# Patient Record
Sex: MALE | Race: BLACK OR AFRICAN AMERICAN | NOT HISPANIC OR LATINO | URBAN - METROPOLITAN AREA
[De-identification: names, ages, dates, MRNs, and addresses within clinical notes are randomized per-mention and may not be internally consistent; named-entity substitution may affect disease eponyms.]

---

## 2018-08-09 ENCOUNTER — INPATIENT (INPATIENT)
Facility: HOSPITAL | Age: 73
LOS: 4 days | Discharge: HOME | DRG: 64 | End: 2018-08-14
Attending: INTERNAL MEDICINE | Admitting: INTERNAL MEDICINE
Payer: COMMERCIAL

## 2018-08-10 VITALS
DIASTOLIC BLOOD PRESSURE: 123 MMHG | TEMPERATURE: 99 F | HEART RATE: 85 BPM | OXYGEN SATURATION: 98 % | SYSTOLIC BLOOD PRESSURE: 220 MMHG | WEIGHT: 179.9 LBS | RESPIRATION RATE: 18 BRPM

## 2018-08-10 DIAGNOSIS — E87.5 HYPERKALEMIA: ICD-10-CM

## 2018-08-10 DIAGNOSIS — I16.0 HYPERTENSIVE URGENCY: ICD-10-CM

## 2018-08-10 DIAGNOSIS — Z29.9 ENCOUNTER FOR PROPHYLACTIC MEASURES, UNSPECIFIED: ICD-10-CM

## 2018-08-10 DIAGNOSIS — G45.9 TRANSIENT CEREBRAL ISCHEMIC ATTACK, UNSPECIFIED: ICD-10-CM

## 2018-08-10 DIAGNOSIS — E83.51 HYPOCALCEMIA: ICD-10-CM

## 2018-08-10 DIAGNOSIS — R29.898 OTHER SYMPTOMS AND SIGNS INVOLVING THE MUSCULOSKELETAL SYSTEM: ICD-10-CM

## 2018-08-10 LAB
ALBUMIN SERPL ELPH-MCNC: 3.5 G/DL — SIGNIFICANT CHANGE UP (ref 3.5–5)
ALP SERPL-CCNC: 120 U/L — SIGNIFICANT CHANGE UP (ref 40–120)
ALT FLD-CCNC: 30 U/L DA — SIGNIFICANT CHANGE UP (ref 10–60)
ANION GAP SERPL CALC-SCNC: 4 MMOL/L — LOW (ref 5–17)
ANION GAP SERPL CALC-SCNC: 6 MMOL/L — SIGNIFICANT CHANGE UP (ref 5–17)
APPEARANCE UR: CLEAR — SIGNIFICANT CHANGE UP
APTT BLD: 28.4 SEC — SIGNIFICANT CHANGE UP (ref 27.5–37.4)
AST SERPL-CCNC: 63 U/L — HIGH (ref 10–40)
BASOPHILS # BLD AUTO: 0 K/UL — SIGNIFICANT CHANGE UP (ref 0–0.2)
BASOPHILS NFR BLD AUTO: 0.7 % — SIGNIFICANT CHANGE UP (ref 0–2)
BILIRUB SERPL-MCNC: 0.4 MG/DL — SIGNIFICANT CHANGE UP (ref 0.2–1.2)
BILIRUB UR-MCNC: NEGATIVE — SIGNIFICANT CHANGE UP
BUN SERPL-MCNC: 11 MG/DL — SIGNIFICANT CHANGE UP (ref 7–18)
BUN SERPL-MCNC: 12 MG/DL — SIGNIFICANT CHANGE UP (ref 7–18)
CALCIUM SERPL-MCNC: 7.7 MG/DL — LOW (ref 8.4–10.5)
CALCIUM SERPL-MCNC: 8.3 MG/DL — LOW (ref 8.4–10.5)
CHLORIDE SERPL-SCNC: 107 MMOL/L — SIGNIFICANT CHANGE UP (ref 96–108)
CHLORIDE SERPL-SCNC: 107 MMOL/L — SIGNIFICANT CHANGE UP (ref 96–108)
CHOLEST SERPL-MCNC: 236 MG/DL — HIGH (ref 10–199)
CK MB BLD-MCNC: 1.1 % — SIGNIFICANT CHANGE UP (ref 0–3.5)
CK MB CFR SERPL CALC: 3.3 NG/ML — SIGNIFICANT CHANGE UP (ref 0–3.6)
CK SERPL-CCNC: 311 U/L — HIGH (ref 35–232)
CO2 SERPL-SCNC: 28 MMOL/L — SIGNIFICANT CHANGE UP (ref 22–31)
CO2 SERPL-SCNC: 28 MMOL/L — SIGNIFICANT CHANGE UP (ref 22–31)
COLOR SPEC: YELLOW — SIGNIFICANT CHANGE UP
CREAT SERPL-MCNC: 1.19 MG/DL — SIGNIFICANT CHANGE UP (ref 0.5–1.3)
CREAT SERPL-MCNC: 1.33 MG/DL — HIGH (ref 0.5–1.3)
DIFF PNL FLD: ABNORMAL
EOSINOPHIL # BLD AUTO: 0.2 K/UL — SIGNIFICANT CHANGE UP (ref 0–0.5)
EOSINOPHIL NFR BLD AUTO: 2.7 % — SIGNIFICANT CHANGE UP (ref 0–6)
GLUCOSE SERPL-MCNC: 104 MG/DL — HIGH (ref 70–99)
GLUCOSE SERPL-MCNC: 118 MG/DL — HIGH (ref 70–99)
GLUCOSE UR QL: NEGATIVE — SIGNIFICANT CHANGE UP
HBA1C BLD-MCNC: 6.4 % — HIGH (ref 4–5.6)
HCT VFR BLD CALC: 47.5 % — SIGNIFICANT CHANGE UP (ref 39–50)
HCT VFR BLD CALC: 48 % — SIGNIFICANT CHANGE UP (ref 39–50)
HDLC SERPL-MCNC: 35 MG/DL — LOW (ref 40–125)
HGB BLD-MCNC: 14.8 G/DL — SIGNIFICANT CHANGE UP (ref 13–17)
HGB BLD-MCNC: 15.2 G/DL — SIGNIFICANT CHANGE UP (ref 13–17)
INR BLD: 0.96 RATIO — SIGNIFICANT CHANGE UP (ref 0.88–1.16)
KETONES UR-MCNC: NEGATIVE — SIGNIFICANT CHANGE UP
LEUKOCYTE ESTERASE UR-ACNC: NEGATIVE — SIGNIFICANT CHANGE UP
LIPID PNL WITH DIRECT LDL SERPL: 153 MG/DL — SIGNIFICANT CHANGE UP
LYMPHOCYTES # BLD AUTO: 1.7 K/UL — SIGNIFICANT CHANGE UP (ref 1–3.3)
LYMPHOCYTES # BLD AUTO: 24.9 % — SIGNIFICANT CHANGE UP (ref 13–44)
MAGNESIUM SERPL-MCNC: 2.1 MG/DL — SIGNIFICANT CHANGE UP (ref 1.6–2.6)
MCHC RBC-ENTMCNC: 27.6 PG — SIGNIFICANT CHANGE UP (ref 27–34)
MCHC RBC-ENTMCNC: 27.6 PG — SIGNIFICANT CHANGE UP (ref 27–34)
MCHC RBC-ENTMCNC: 31.3 GM/DL — LOW (ref 32–36)
MCHC RBC-ENTMCNC: 31.6 GM/DL — LOW (ref 32–36)
MCV RBC AUTO: 87.5 FL — SIGNIFICANT CHANGE UP (ref 80–100)
MCV RBC AUTO: 88.2 FL — SIGNIFICANT CHANGE UP (ref 80–100)
MONOCYTES # BLD AUTO: 0.5 K/UL — SIGNIFICANT CHANGE UP (ref 0–0.9)
MONOCYTES NFR BLD AUTO: 7.3 % — SIGNIFICANT CHANGE UP (ref 2–14)
NEUTROPHILS # BLD AUTO: 4.3 K/UL — SIGNIFICANT CHANGE UP (ref 1.8–7.4)
NEUTROPHILS NFR BLD AUTO: 64.4 % — SIGNIFICANT CHANGE UP (ref 43–77)
NITRITE UR-MCNC: NEGATIVE — SIGNIFICANT CHANGE UP
PH UR: 7 — SIGNIFICANT CHANGE UP (ref 5–8)
PHOSPHATE SERPL-MCNC: 2.2 MG/DL — LOW (ref 2.5–4.5)
PLATELET # BLD AUTO: 236 K/UL — SIGNIFICANT CHANGE UP (ref 150–400)
PLATELET # BLD AUTO: 250 K/UL — SIGNIFICANT CHANGE UP (ref 150–400)
POTASSIUM SERPL-MCNC: 3.8 MMOL/L — SIGNIFICANT CHANGE UP (ref 3.5–5.3)
POTASSIUM SERPL-MCNC: 5.9 MMOL/L — HIGH (ref 3.5–5.3)
POTASSIUM SERPL-SCNC: 3.8 MMOL/L — SIGNIFICANT CHANGE UP (ref 3.5–5.3)
POTASSIUM SERPL-SCNC: 5.9 MMOL/L — HIGH (ref 3.5–5.3)
PROT SERPL-MCNC: 8.2 G/DL — SIGNIFICANT CHANGE UP (ref 6–8.3)
PROT UR-MCNC: NEGATIVE — SIGNIFICANT CHANGE UP
PROTHROM AB SERPL-ACNC: 10.4 SEC — SIGNIFICANT CHANGE UP (ref 9.8–12.7)
RBC # BLD: 5.38 M/UL — SIGNIFICANT CHANGE UP (ref 4.2–5.8)
RBC # BLD: 5.48 M/UL — SIGNIFICANT CHANGE UP (ref 4.2–5.8)
RBC # FLD: 14 % — SIGNIFICANT CHANGE UP (ref 10.3–14.5)
RBC # FLD: 14.2 % — SIGNIFICANT CHANGE UP (ref 10.3–14.5)
SODIUM SERPL-SCNC: 139 MMOL/L — SIGNIFICANT CHANGE UP (ref 135–145)
SODIUM SERPL-SCNC: 141 MMOL/L — SIGNIFICANT CHANGE UP (ref 135–145)
SP GR SPEC: 1.01 — SIGNIFICANT CHANGE UP (ref 1.01–1.02)
TOTAL CHOLESTEROL/HDL RATIO MEASUREMENT: 6.7 RATIO — SIGNIFICANT CHANGE UP (ref 3.4–9.6)
TRIGL SERPL-MCNC: 239 MG/DL — HIGH (ref 10–149)
TROPONIN I SERPL-MCNC: <0.015 NG/ML — SIGNIFICANT CHANGE UP (ref 0–0.04)
TROPONIN I SERPL-MCNC: <0.015 NG/ML — SIGNIFICANT CHANGE UP (ref 0–0.04)
TSH SERPL-MCNC: 2.14 UU/ML — SIGNIFICANT CHANGE UP (ref 0.34–4.82)
TSH SERPL-MCNC: 3.38 UU/ML — SIGNIFICANT CHANGE UP (ref 0.34–4.82)
UROBILINOGEN FLD QL: NEGATIVE — SIGNIFICANT CHANGE UP
WBC # BLD: 6.7 K/UL — SIGNIFICANT CHANGE UP (ref 3.8–10.5)
WBC # BLD: 8 K/UL — SIGNIFICANT CHANGE UP (ref 3.8–10.5)
WBC # FLD AUTO: 6.7 K/UL — SIGNIFICANT CHANGE UP (ref 3.8–10.5)
WBC # FLD AUTO: 8 K/UL — SIGNIFICANT CHANGE UP (ref 3.8–10.5)

## 2018-08-10 PROCEDURE — 70450 CT HEAD/BRAIN W/O DYE: CPT | Mod: 26

## 2018-08-10 PROCEDURE — 99285 EMERGENCY DEPT VISIT HI MDM: CPT | Mod: 25

## 2018-08-10 PROCEDURE — 99053 MED SERV 10PM-8AM 24 HR FAC: CPT

## 2018-08-10 PROCEDURE — 71045 X-RAY EXAM CHEST 1 VIEW: CPT | Mod: 26

## 2018-08-10 RX ORDER — LOSARTAN POTASSIUM 100 MG/1
25 TABLET, FILM COATED ORAL DAILY
Qty: 0 | Refills: 0 | Status: DISCONTINUED | OUTPATIENT
Start: 2018-08-10 | End: 2018-08-11

## 2018-08-10 RX ORDER — ACETAMINOPHEN 500 MG
650 TABLET ORAL EVERY 6 HOURS
Qty: 0 | Refills: 0 | Status: DISCONTINUED | OUTPATIENT
Start: 2018-08-10 | End: 2018-08-10

## 2018-08-10 RX ORDER — INSULIN LISPRO 100/ML
VIAL (ML) SUBCUTANEOUS
Qty: 0 | Refills: 0 | Status: DISCONTINUED | OUTPATIENT
Start: 2018-08-10 | End: 2018-08-14

## 2018-08-10 RX ORDER — ENOXAPARIN SODIUM 100 MG/ML
40 INJECTION SUBCUTANEOUS DAILY
Qty: 0 | Refills: 0 | Status: DISCONTINUED | OUTPATIENT
Start: 2018-08-10 | End: 2018-08-14

## 2018-08-10 RX ORDER — INSULIN HUMAN 100 [IU]/ML
10 INJECTION, SOLUTION SUBCUTANEOUS ONCE
Qty: 0 | Refills: 0 | Status: COMPLETED | OUTPATIENT
Start: 2018-08-10 | End: 2018-08-10

## 2018-08-10 RX ORDER — ATORVASTATIN CALCIUM 80 MG/1
40 TABLET, FILM COATED ORAL AT BEDTIME
Qty: 0 | Refills: 0 | Status: DISCONTINUED | OUTPATIENT
Start: 2018-08-10 | End: 2018-08-14

## 2018-08-10 RX ORDER — DEXTROSE 50 % IN WATER 50 %
50 SYRINGE (ML) INTRAVENOUS ONCE
Qty: 0 | Refills: 0 | Status: COMPLETED | OUTPATIENT
Start: 2018-08-10 | End: 2018-08-10

## 2018-08-10 RX ORDER — LABETALOL HCL 100 MG
20 TABLET ORAL ONCE
Qty: 0 | Refills: 0 | Status: COMPLETED | OUTPATIENT
Start: 2018-08-10 | End: 2018-08-10

## 2018-08-10 RX ORDER — CALCIUM GLUCONATE 100 MG/ML
1 VIAL (ML) INTRAVENOUS ONCE
Qty: 0 | Refills: 0 | Status: COMPLETED | OUTPATIENT
Start: 2018-08-10 | End: 2018-08-10

## 2018-08-10 RX ORDER — ASPIRIN/CALCIUM CARB/MAGNESIUM 324 MG
81 TABLET ORAL DAILY
Qty: 0 | Refills: 0 | Status: DISCONTINUED | OUTPATIENT
Start: 2018-08-10 | End: 2018-08-14

## 2018-08-10 RX ADMIN — LOSARTAN POTASSIUM 25 MILLIGRAM(S): 100 TABLET, FILM COATED ORAL at 14:46

## 2018-08-10 RX ADMIN — Medication 81 MILLIGRAM(S): at 14:46

## 2018-08-10 RX ADMIN — Medication 200 GRAM(S): at 05:17

## 2018-08-10 RX ADMIN — Medication 20 MILLIGRAM(S): at 02:02

## 2018-08-10 RX ADMIN — ATORVASTATIN CALCIUM 40 MILLIGRAM(S): 80 TABLET, FILM COATED ORAL at 21:09

## 2018-08-10 RX ADMIN — Medication 50 MILLILITER(S): at 05:15

## 2018-08-10 RX ADMIN — ENOXAPARIN SODIUM 40 MILLIGRAM(S): 100 INJECTION SUBCUTANEOUS at 18:09

## 2018-08-10 RX ADMIN — Medication 650 MILLIGRAM(S): at 13:40

## 2018-08-10 RX ADMIN — Medication 650 MILLIGRAM(S): at 13:08

## 2018-08-10 RX ADMIN — INSULIN HUMAN 10 UNIT(S): 100 INJECTION, SOLUTION SUBCUTANEOUS at 05:13

## 2018-08-10 RX ADMIN — Medication 1 CAPSULE(S): at 15:07

## 2018-08-10 RX ADMIN — Medication 1 CAPSULE(S): at 14:46

## 2018-08-10 NOTE — H&P ADULT - HISTORY OF PRESENT ILLNESS
Patient is a 73 y /o M with PMH of HTN (not on any medications) doesn't have a PCP, presented with complaint of left arm numbness and weakness started yesterday morning. patient said he was trying to wear his clothes but his arm was heavy and was shaking.  He wore his clothes with difficulty  later He took 2 alieve and went to sleep. He woke up after an hour , alieve helped his symptoms a little bit but weakness and heaviness persisted. He called his Niece to bring him to the hospital. In Ed he has left upper extremity as per Ed provider. As per patient weakness resolved in a couple of hours. Now he is feeling much better. Patient denies any chest pain, SOB, similar symptoms in the past, diaphoresis, dizziness, melena, hematochezia, epistaxis, Drug of abuse use . In Ed patient's blood pressure was 220/123 mmhg. HR 85. EKG was NSR@ Rate 85 BPM Atrial Rate 85 BPMP-R Interval 152 ms QRS Duration 78 ms  ms QTc 423 ms. Received 1 dose labetalol 20mg IV push.

## 2018-08-10 NOTE — H&P ADULT - PROBLEM SELECTOR PLAN 5
RISK                                                          Points  [] Previous VTE                                           3  [] Thrombophilia                                        2  [] Lower limb paralysis                              2   [] Current Cancer                                       2   [x] Immobilization > 24 hrs                        1  [] ICU/CCU stay > 24 hours                       1  [x] Age > 60                                                   1    IMPROVE score 2 points. Lovenox 40 sc

## 2018-08-10 NOTE — ED ADULT TRIAGE NOTE - ARRIVAL INFO ADDITIONAL COMMENTS
pt BIBA from home reports R sided weakness x 12 hours. States that after onset he slept all day and woke, called his neice who then called [phone]

## 2018-08-10 NOTE — ED ADULT NURSE NOTE - NSIMPLEMENTINTERV_GEN_ALL_ED
Implemented All Universal Safety Interventions:  [place] to call system. Call bell, personal items and telephone within reach. Instruct patient to call for assistance. Room bathroom lighting operational. Non-slip footwear when patient is off stretcher. Physically safe environment: no spills, clutter or unnecessary equipment. Stretcher in lowest position, wheels locked, appropriate side rails in place.

## 2018-08-10 NOTE — H&P ADULT - PROBLEM SELECTOR PLAN 1
-Patient   presented with blood pressure of 220/23 mmHg  -headache, left arm weakness and numbness likely secondary to hypertensive urgency  -EKG NSR @85 b/min  -resolved spontaneously  -Patient has  Scr 1.33-> resolved after blood pressure control on repeat BMP  -Serum k+ 5.5 -> resolved after receiving cocktail   -will start on  Losartan 25 mg daily as per cardiology Dr meadows  -will send urine toxicology  -Monitor on telemetry  -will get echocardiogram  -cardiac enzyme x 1 negative

## 2018-08-10 NOTE — H&P ADULT - PROBLEM SELECTOR PLAN 2
-patient presented with left arm weakness and numbness  -On resident evaluation NIHSS score 0  -CT head  negative for any new infarct or bleed, except chronic microvascular ischemic changes,  -will monitor on telemetry  -will start on Aspirin, atorvastatin  -will get MRI brain w/wo contrast as per neurology recommendation Dr Rousseau  -will get cardiology consult DR meadows

## 2018-08-10 NOTE — ED PROVIDER NOTE - CRANIAL NERVE AND PUPILLARY EXAM
central vision intact/peripheral vision intact/tongue is midline/cranial nerves 2-12 intact/extra-ocular movements intact

## 2018-08-10 NOTE — ED PROVIDER NOTE - OBJECTIVE STATEMENT
73year old male PMH htn which is untreated coming in for 12 hours of left hand weakness. After symptoms began went to sleep and when he woke up in the evening symptoms were still present. tried to make food but was unable to properly use his left hand. Symptoms have resolved at this time. Denies ha, dizziness, b/l LE or RUE numbness/tingling/weakness, cp, sob, palpitations, abd pains, n/V/D/C, urinary complaints, fevers, chills, sweats. took advil at home which didn't improve symptoms. No hx cva for pt or in fam.

## 2018-08-10 NOTE — ED PROVIDER NOTE - MEDICAL DECISION MAKING DETAILS
labs are signifncant for hyperkalemia and hypocalcemia otherwise are unremarkable. cxr and ct head are unremarkable. ecg is unremarkabls NSR RRR no st or t wave changes. BP initially 220/120- given 20mg IV labetalol with improvement of bp. no symptoms in the ed. will admit for TIA workup.

## 2018-08-10 NOTE — CONSULT NOTE ADULT - SUBJECTIVE AND OBJECTIVE BOX
CHIEF COMPLAINT:Patient is a 73y old  Male who presents with a chief complaint of left arm weakness and numbness (10 Aug 2018 13:34)      HPI:  Patient is a 73 y /o M with PMH of HTN (not on any medications) doesn't have a PCP, presented with complaint of left arm numbness and weakness started yesterday morning. patient said he was trying to wear his clothes but his arm was heavy and was shaking.  He wore his clothes with difficulty  later He took 2 alieve and went to sleep. He woke up after an hour , alieve helped his symptoms a little bit but weakness and heaviness persisted. He called his Niece to bring him to the hospital. In Ed he has left upper extremity as per Ed provider. As per patient weakness resolved in a couple of hours. Now he is feeling much better. Patient denies any chest pain, SOB, similar symptoms in the past, diaphoresis, dizziness, melena, hematochezia, epistaxis, Drug of abuse use . In Ed patient's blood pressure was 220/123 mmhg. HR 85. EKG was NSR@ Rate 85 BPM Atrial Rate 85 BPMP-R Interval 152 ms QRS Duration 78 ms  ms QTc 423 ms. Received 1 dose labetalol 20mg IV push. (10 Aug 2018 13:34)      PAST MEDICAL & SURGICAL HISTORY:  Hypertension        MEDICATIONS  (STANDING):  aspirin  chewable 81 milliGRAM(s) Oral daily  atorvastatin 40 milliGRAM(s) Oral at bedtime  enoxaparin Injectable 40 milliGRAM(s) SubCutaneous daily  losartan 25 milliGRAM(s) Oral daily    MEDICATIONS  (PRN):  acetaminophen 300 mG/butalbital 50 mG/ caffeine 40 mG 1 Capsule(s) Oral every 6 hours PRN severe headache      FAMILY HISTORY:  Mother-DM  Sister-HTN      SOCIAL HISTORY:    [x ] Non-smoker    [x ] Alcohol-denies    Allergies    No Known Allergies    Intolerances    	    REVIEW OF SYSTEMS:  CONSTITUTIONAL: No fever, weight loss, or fatigue  EYES: No eye pain, visual disturbances, or discharge  ENT:  No difficulty hearing, tinnitus, vertigo; No sinus or throat pain  NECK: No pain or stiffness  RESPIRATORY: No cough, wheezing, chills or hemoptysis; No Shortness of Breath  CARDIOVASCULAR: No chest pain, palpitations, passing out, dizziness, or leg swelling  GASTROINTESTINAL: No abdominal or epigastric pain. No nausea, vomiting, or hematemesis; No diarrhea or constipation. No melena or hematochezia.  GENITOURINARY: No dysuria, frequency, hematuria, or incontinence  NEUROLOGICAL: No headaches, memory loss, + loss of strength, + numbness  SKIN: No itching, burning, rashes, or lesions   LYMPH Nodes: No enlarged glands  ENDOCRINE: No heat or cold intolerance; No hair loss  MUSCULOSKELETAL: No joint pain or swelling; No muscle, back, or extremity pain  PSYCHIATRIC: No depression, anxiety, mood swings, or difficulty sleeping  HEME/LYMPH: No easy bruising, or bleeding gums  ALLERGY AND IMMUNOLOGIC: No hives or eczema	      PHYSICAL EXAM:  T(C): 36.9 (08-10-18 @ 15:22), Max: 37.1 (08-10-18 @ 00:06)  HR: 75 (08-10-18 @ 15:22) (67 - 89)  BP: 173/91 (08-10-18 @ 11:25) (166/96 - 220/123)  RR: 17 (08-10-18 @ 15:22) (16 - 20)  SpO2: 100% (08-10-18 @ 15:22) (96% - 100%)      Appearance: Normal	  HEENT:   Normal oral mucosa, PERRL, EOMI	  Lymphatic: No lymphadenopathy  Cardiovascular: Normal S1 S2, No JVD, No murmurs, No edema  Respiratory: Lungs clear to auscultation	  Psychiatry: A & O x 3, Mood & affect appropriate  Gastrointestinal:  Soft, Non-tender, + BS	  Skin: No rashes, No ecchymoses, No cyanosis	  Neurologic: Non-focal  Extremities: Normal range of motion, No clubbing, cyanosis or edema  Vascular: Peripheral pulses palpable 2+ bilaterally     	    ECG:  	NSR    	  LABS:	 	    CARDIAC MARKERS:  CARDIAC MARKERS ( 10 Aug 2018 01:13 )  <0.015 ng/mL / x     / x     / x     / x                            15.2   8.0   )-----------( 236      ( 10 Aug 2018 10:49 )             48.0     08-10    141  |  107  |  11  ----------------------------<  118<H>  3.8   |  28  |  1.19    Ca    8.3<L>      10 Aug 2018 10:49  Phos  2.2     08-10  Mg     2.1     08-10    TPro  8.2  /  Alb  3.5  /  TBili  0.4  /  DBili  x   /  AST  63<H>  /  ALT  30  /  AlkPhos  120  08-10      Lipid Profile: Cholesterol 236    HDL 35      HgA1c: Hemoglobin A1C, Whole Blood: 6.4 % (08-10 @ 14:17)    TSH: Thyroid Stimulating Hormone, Serum: 3.38 uU/mL (08-10 @ 10:49)  Thyroid Stimulating Hormone, Serum: 2.14 uU/mL (08-10 @ 01:13)      PROCEDURE DATE:  08/10/2018          INTERPRETATION:  HISTORY: Right-sided weakness.    COMPARISON: None.    TECHNIQUE: Axial noncontrast CT images from the skull base to the vertex   were obtained and submitted for interpretation. Coronal and sagittal   reformatted images were performed. Bone and soft tissue windows were   evaluated.    FINDINGS:         There is no acute intracranial mass-effect, hemorrhage, midline shift, or   abnormal extra-axial fluid collection. Gray-white differentiation is   maintained.    Mild chronic microvascular ischemic changes are noted.    Ventricles, sulci, and cisterns are normal in size for the patient's age   without hydrocephalus. Basal cisterns are patent.     Paranasal sinuses and mastoid air cells are clear. Calvarium is intact.     IMPRESSION:     No acute intracranial bleeding, mass effect, or shift. Mild chronic   microvascular ischemic changes.     Findings were discussed with Dr. Castañeda at 12:52 AM on 8/10/2018 indicated   that the medication was understood.

## 2018-08-10 NOTE — ED PROVIDER NOTE - CARE PLAN
Principal Discharge DX:	Hand weakness Principal Discharge DX:	Hand weakness  Assessment and plan of treatment:	73 year old male with left hand weakness. hypertensive. other vitals WNL. PE as above.  ct head, cxr, ecg, labs, ua, reassess

## 2018-08-10 NOTE — ED ADULT NURSE NOTE - ED STAT RN HANDOFF DETAILS
handover patient to Nurse Martita, patient awake and alert. Is being nursed on cardiac monitor-SR. Is being admitted, awaiting bed availability.

## 2018-08-10 NOTE — H&P ADULT - NSHPPHYSICALEXAM_GEN_ALL_CORE
PHYSICAL EXAM:  GENERAL: NAD  HEENT: Normocephalic;  conjunctivae and sclerae clear; moist mucous membranes;   NECK : supple  CHEST/LUNG: Clear to auscultation bilaterally with good air entry   HEART: S1 S2  regular; no murmurs, gallops or rubs  ABDOMEN: Soft, Nontender, Nondistended; Bowel sounds present  EXTREMITIES: no cyanosis; no edema; no calf tenderness  SKIN: warm and dry; no rash  NERVOUS SYSTEM:  Awake and alert; Oriented  to place, person and time ; no new deficits

## 2018-08-10 NOTE — H&P ADULT - PROBLEM SELECTOR PLAN 4
- Serum calcium 7.7  -Phosphorous 2.2  -Serum albumin WNL  -Hypocalcemia and hypophosphatemia.  -will send PTH and 25 OH vitamin D  -f/u repeat BMp in am

## 2018-08-10 NOTE — H&P ADULT - ASSESSMENT
Patient is a 73 y /o M with PMH of HTN (not on any medications) doesn't have a PCP, presented with complaint of left arm numbness and weakness started yesterday morning. patient said he was trying to wear his clothes but his arm was heavy and was shaking.  He wore his clothes with difficulty  later He took 2 alieve and went to sleep. He woke up after an hour , alieve helped his symptoms a little bit but weakness and heaviness persisted. He called his Niece to bring him to the hospital.

## 2018-08-10 NOTE — ED PROVIDER NOTE - PLAN OF CARE
73 year old male with left hand weakness. hypertensive. other vitals WNL. PE as above.  ct head, cxr, ecg, labs, ua, reassess

## 2018-08-10 NOTE — H&P ADULT - PROBLEM SELECTOR PLAN 3
patient's serum Potassium 5.5 on admission  -s/p IV insulin, dextrose 50%, Calcium gluconate.  -repeat Serum Potassium 3.8  -could be secondary to hypertensive urgency cause renal function compromise.

## 2018-08-11 DIAGNOSIS — E78.5 HYPERLIPIDEMIA, UNSPECIFIED: ICD-10-CM

## 2018-08-11 DIAGNOSIS — R56.9 UNSPECIFIED CONVULSIONS: ICD-10-CM

## 2018-08-11 LAB
ALBUMIN SERPL ELPH-MCNC: 3 G/DL — LOW (ref 3.5–5)
ALP SERPL-CCNC: 105 U/L — SIGNIFICANT CHANGE UP (ref 40–120)
ALT FLD-CCNC: 23 U/L DA — SIGNIFICANT CHANGE UP (ref 10–60)
ANION GAP SERPL CALC-SCNC: 10 MMOL/L — SIGNIFICANT CHANGE UP (ref 5–17)
AST SERPL-CCNC: 20 U/L — SIGNIFICANT CHANGE UP (ref 10–40)
BASOPHILS # BLD AUTO: 0 K/UL — SIGNIFICANT CHANGE UP (ref 0–0.2)
BASOPHILS NFR BLD AUTO: 0.6 % — SIGNIFICANT CHANGE UP (ref 0–2)
BILIRUB SERPL-MCNC: 0.5 MG/DL — SIGNIFICANT CHANGE UP (ref 0.2–1.2)
BUN SERPL-MCNC: 13 MG/DL — SIGNIFICANT CHANGE UP (ref 7–18)
CALCIUM SERPL-MCNC: 8.2 MG/DL — LOW (ref 8.4–10.5)
CHLORIDE SERPL-SCNC: 106 MMOL/L — SIGNIFICANT CHANGE UP (ref 96–108)
CO2 SERPL-SCNC: 26 MMOL/L — SIGNIFICANT CHANGE UP (ref 22–31)
CREAT SERPL-MCNC: 1.31 MG/DL — HIGH (ref 0.5–1.3)
CULTURE RESULTS: SIGNIFICANT CHANGE UP
EOSINOPHIL # BLD AUTO: 0.2 K/UL — SIGNIFICANT CHANGE UP (ref 0–0.5)
EOSINOPHIL NFR BLD AUTO: 3.3 % — SIGNIFICANT CHANGE UP (ref 0–6)
GLUCOSE SERPL-MCNC: 97 MG/DL — SIGNIFICANT CHANGE UP (ref 70–99)
HCT VFR BLD CALC: 43.4 % — SIGNIFICANT CHANGE UP (ref 39–50)
HGB BLD-MCNC: 13.7 G/DL — SIGNIFICANT CHANGE UP (ref 13–17)
LYMPHOCYTES # BLD AUTO: 1.9 K/UL — SIGNIFICANT CHANGE UP (ref 1–3.3)
LYMPHOCYTES # BLD AUTO: 29.4 % — SIGNIFICANT CHANGE UP (ref 13–44)
MAGNESIUM SERPL-MCNC: 2.1 MG/DL — SIGNIFICANT CHANGE UP (ref 1.6–2.6)
MCHC RBC-ENTMCNC: 27.5 PG — SIGNIFICANT CHANGE UP (ref 27–34)
MCHC RBC-ENTMCNC: 31.5 GM/DL — LOW (ref 32–36)
MCV RBC AUTO: 87.5 FL — SIGNIFICANT CHANGE UP (ref 80–100)
MONOCYTES # BLD AUTO: 0.6 K/UL — SIGNIFICANT CHANGE UP (ref 0–0.9)
MONOCYTES NFR BLD AUTO: 9.2 % — SIGNIFICANT CHANGE UP (ref 2–14)
NEUTROPHILS # BLD AUTO: 3.7 K/UL — SIGNIFICANT CHANGE UP (ref 1.8–7.4)
NEUTROPHILS NFR BLD AUTO: 57.4 % — SIGNIFICANT CHANGE UP (ref 43–77)
PHOSPHATE SERPL-MCNC: 2.3 MG/DL — LOW (ref 2.5–4.5)
PLATELET # BLD AUTO: 224 K/UL — SIGNIFICANT CHANGE UP (ref 150–400)
POTASSIUM SERPL-MCNC: 3.7 MMOL/L — SIGNIFICANT CHANGE UP (ref 3.5–5.3)
POTASSIUM SERPL-SCNC: 3.7 MMOL/L — SIGNIFICANT CHANGE UP (ref 3.5–5.3)
PROT SERPL-MCNC: 7 G/DL — SIGNIFICANT CHANGE UP (ref 6–8.3)
RBC # BLD: 4.96 M/UL — SIGNIFICANT CHANGE UP (ref 4.2–5.8)
RBC # FLD: 14 % — SIGNIFICANT CHANGE UP (ref 10.3–14.5)
SODIUM SERPL-SCNC: 142 MMOL/L — SIGNIFICANT CHANGE UP (ref 135–145)
SPECIMEN SOURCE: SIGNIFICANT CHANGE UP
WBC # BLD: 6.5 K/UL — SIGNIFICANT CHANGE UP (ref 3.8–10.5)
WBC # FLD AUTO: 6.5 K/UL — SIGNIFICANT CHANGE UP (ref 3.8–10.5)

## 2018-08-11 PROCEDURE — 99223 1ST HOSP IP/OBS HIGH 75: CPT

## 2018-08-11 RX ORDER — LOSARTAN POTASSIUM 100 MG/1
50 TABLET, FILM COATED ORAL DAILY
Qty: 0 | Refills: 0 | Status: DISCONTINUED | OUTPATIENT
Start: 2018-08-11 | End: 2018-08-14

## 2018-08-11 RX ORDER — POTASSIUM PHOSPHATE, MONOBASIC POTASSIUM PHOSPHATE, DIBASIC 236; 224 MG/ML; MG/ML
15 INJECTION, SOLUTION INTRAVENOUS ONCE
Qty: 0 | Refills: 0 | Status: COMPLETED | OUTPATIENT
Start: 2018-08-11 | End: 2018-08-11

## 2018-08-11 RX ORDER — METOPROLOL TARTRATE 50 MG
25 TABLET ORAL
Qty: 0 | Refills: 0 | Status: DISCONTINUED | OUTPATIENT
Start: 2018-08-11 | End: 2018-08-14

## 2018-08-11 RX ADMIN — LOSARTAN POTASSIUM 25 MILLIGRAM(S): 100 TABLET, FILM COATED ORAL at 05:11

## 2018-08-11 RX ADMIN — ATORVASTATIN CALCIUM 40 MILLIGRAM(S): 80 TABLET, FILM COATED ORAL at 21:24

## 2018-08-11 RX ADMIN — Medication 81 MILLIGRAM(S): at 13:40

## 2018-08-11 RX ADMIN — LOSARTAN POTASSIUM 50 MILLIGRAM(S): 100 TABLET, FILM COATED ORAL at 13:38

## 2018-08-11 RX ADMIN — ENOXAPARIN SODIUM 40 MILLIGRAM(S): 100 INJECTION SUBCUTANEOUS at 16:18

## 2018-08-11 RX ADMIN — Medication 25 MILLIGRAM(S): at 17:52

## 2018-08-11 RX ADMIN — POTASSIUM PHOSPHATE, MONOBASIC POTASSIUM PHOSPHATE, DIBASIC 62.5 MILLIMOLE(S): 236; 224 INJECTION, SOLUTION INTRAVENOUS at 16:18

## 2018-08-11 NOTE — PROGRESS NOTE ADULT - SUBJECTIVE AND OBJECTIVE BOX
PGY1 Note discussed with supervising resident and primary attending.    Patient is a 73y old  Male who presents with a chief complaint of left arm weakness and numbness (10 Aug 2018 13:34)    INTERVAL HPI/OVERNIGHT EVENTS:    Mr. Joshua Sierra is seen at the bedside. He reports that his symptoms have resolved and have not recurred. He has no complaints at this time.    MEDICATIONS  (STANDING):  aspirin  chewable 81 milliGRAM(s) Oral daily  atorvastatin 40 milliGRAM(s) Oral at bedtime  enoxaparin Injectable 40 milliGRAM(s) SubCutaneous daily  insulin lispro (HumaLOG) corrective regimen sliding scale   SubCutaneous three times a day before meals  losartan 50 milliGRAM(s) Oral daily  metoprolol tartrate 25 milliGRAM(s) Oral two times a day  potassium phosphate IVPB 15 milliMole(s) IV Intermittent once    MEDICATIONS  (PRN):  acetaminophen 300 mG/butalbital 50 mG/ caffeine 40 mG 1 Capsule(s) Oral every 6 hours PRN severe headache    Allergies    No Known Allergies    Intolerances    REVIEW OF SYSTEMS:  CONSTITUTIONAL: No fever, weight loss, or fatigue  RESPIRATORY: No cough, wheezing, chills or hemoptysis; No shortness of breath  CARDIOVASCULAR: No chest pain, palpitations, dizziness, or leg swelling  GASTROINTESTINAL: No abdominal or epigastric pain. No nausea, vomiting, or hematemesis; No diarrhea or constipation. No melena or hematochezia.  NEUROLOGICAL: No headaches, memory loss, loss of strength, numbness, or tremors  SKIN: No itching, burning, rashes, or lesions     Vital Signs Last 24 Hrs  T(C): 36.8 (11 Aug 2018 11:55), Max: 37 (11 Aug 2018 08:24)  T(F): 98.2 (11 Aug 2018 11:55), Max: 98.6 (11 Aug 2018 08:24)  HR: 71 (11 Aug 2018 11:55) (70 - 84)  BP: 140/84 (11 Aug 2018 11:55) (140/84 - 183/99)  BP(mean): --  RR: 18 (11 Aug 2018 11:55) (17 - 18)  SpO2: 98% (11 Aug 2018 11:55) (98% - 100%)    PHYSICAL EXAM:  GENERAL: NAD, well-groomed, well-developed  HEAD:  Atraumatic, Normocephalic  EYES: EOMI, PERRLA, conjunctiva and sclera clear  NECK: Supple, No JVD, Normal thyroid  CHEST/LUNG: Clear to percussion bilaterally; No rales, rhonchi, wheezing, or rubs  HEART: Regular rate and rhythm; No murmurs, rubs, or gallops  ABDOMEN: Soft, Nontender, Nondistended; Bowel sounds present  NERVOUS SYSTEM:  Alert & Oriented X3, Good concentration; Motor Strength 5/5 B/L   EXTREMITIES:  2+ Peripheral Pulses, No clubbing, cyanosis, or edema    LABS:                        13.7   6.5   )-----------( 224      ( 11 Aug 2018 08:23 )             43.4     08-    142  |  106  |  13  ----------------------------<  97  3.7   |  26  |  1.31<H>    Ca    8.2<L>      11 Aug 2018 08:23  Phos  2.3       Mg     2.1     11    TPro  7.0  /  Alb  3.0<L>  /  TBili  0.5  /  DBili  x   /  AST  20  /  ALT  23  /  AlkPhos  105  08-11    PT/INR - ( 10 Aug 2018 01:13 )   PT: 10.4 sec;   INR: 0.96 ratio         PTT - ( 10 Aug 2018 01:13 )  PTT:28.4 sec  Urinalysis Basic - ( 10 Aug 2018 02:15 )    Color: Yellow / Appearance: Clear / S.010 / pH: x  Gluc: x / Ketone: Negative  / Bili: Negative / Urobili: Negative   Blood: x / Protein: Negative / Nitrite: Negative   Leuk Esterase: Negative / RBC: 0-2 /HPF / WBC 0-2 /HPF   Sq Epi: x / Non Sq Epi: Occasional /HPF / Bacteria: Trace /HPF      CAPILLARY BLOOD GLUCOSE      POCT Blood Glucose.: 136 mg/dL (11 Aug 2018 11:45)  POCT Blood Glucose.: 99 mg/dL (11 Aug 2018 08:10)  POCT Blood Glucose.: 123 mg/dL (10 Aug 2018 18:08)      RADIOLOGY & ADDITIONAL TESTS:    Imaging Personally Reviewed:  [X ] YES  [ ] NO    Consultant(s) Notes Reviewed:  [X ] YES  [ ] NO    CT HEAD:    IMPRESSION:     No acute intracranial bleeding, mass effect, or shift. Mild chronic   microvascular ischemic changes.

## 2018-08-11 NOTE — PROGRESS NOTE ADULT - SUBJECTIVE AND OBJECTIVE BOX
CARDIOLOGY     PROGRESS  NOTE   ________________________________________________    CHIEF COMPLAINT:Patient is a 73y old  Male who presents with a chief complaint of left arm weakness and numbness (10 Aug 2018 13:34)  no complain.  	  REVIEW OF SYSTEMS:  CONSTITUTIONAL: No fever, weight loss, or fatigue  EYES: No eye pain, visual disturbances, or discharge  ENT:  No difficulty hearing, tinnitus, vertigo; No sinus or throat pain  NECK: No pain or stiffness  RESPIRATORY: No cough, wheezing, chills or hemoptysis; No Shortness of Breath  CARDIOVASCULAR: No chest pain, palpitations, passing out, dizziness, or leg swelling  GASTROINTESTINAL: No abdominal or epigastric pain. No nausea, vomiting, or hematemesis; No diarrhea or constipation. No melena or hematochezia.  GENITOURINARY: No dysuria, frequency, hematuria, or incontinence  NEUROLOGICAL: No headaches, memory loss, loss of strength, numbness, or tremors  SKIN: No itching, burning, rashes, or lesions   LYMPH Nodes: No enlarged glands  ENDOCRINE: No heat or cold intolerance; No hair loss  MUSCULOSKELETAL: No joint pain or swelling; No muscle, back, or extremity pain  PSYCHIATRIC: No depression, anxiety, mood swings, or difficulty sleeping  HEME/LYMPH: No easy bruising, or bleeding gums  ALLERGY AND IMMUNOLOGIC: No hives or eczema	    [ ] All others negative	  [ ] Unable to obtain    PHYSICAL EXAM:  T(C): 37 (08-11-18 @ 08:24), Max: 37 (08-11-18 @ 08:24)  HR: 70 (08-11-18 @ 08:24) (67 - 84)  BP: 183/99 (08-11-18 @ 08:24) (155/74 - 183/99)  RR: 18 (08-11-18 @ 08:24) (17 - 18)  SpO2: 100% (08-11-18 @ 08:24) (99% - 100%)  Wt(kg): --  I&O's Summary      Appearance: Normal	  HEENT:   Normal oral mucosa, PERRL, EOMI	  Lymphatic: No lymphadenopathy  Cardiovascular: Normal S1 S2, No JVD, + murmurs, No edema  Respiratory: Lungs clear to auscultation	  Psychiatry: A & O x 3, Mood & affect appropriate  Gastrointestinal:  Soft, Non-tender, + BS	  Skin: No rashes, No ecchymoses, No cyanosis	  Neurologic: Non-focal  Extremities: Normal range of motion, No clubbing, cyanosis or edema  Vascular: Peripheral pulses palpable 2+ bilaterally    MEDICATIONS  (STANDING):  aspirin  chewable 81 milliGRAM(s) Oral daily  atorvastatin 40 milliGRAM(s) Oral at bedtime  enoxaparin Injectable 40 milliGRAM(s) SubCutaneous daily  insulin lispro (HumaLOG) corrective regimen sliding scale   SubCutaneous three times a day before meals  losartan 25 milliGRAM(s) Oral daily      TELEMETRY: nsr	    ECG:  	  RADIOLOGY:  OTHER: 	  	  LABS:	 	    CARDIAC MARKERS:  CARDIAC MARKERS ( 10 Aug 2018 16:27 )  <0.015 ng/mL / x     / 311 U/L / x     / 3.3 ng/mL  CARDIAC MARKERS ( 10 Aug 2018 01:13 )  <0.015 ng/mL / x     / x     / x     / x                                    15.2   8.0   )-----------( 236      ( 10 Aug 2018 10:49 )             48.0     08-10    141  |  107  |  11  ----------------------------<  118<H>  3.8   |  28  |  1.19    Ca    8.3<L>      10 Aug 2018 10:49  Phos  2.2     08-10  Mg     2.1     08-10    TPro  8.2  /  Alb  3.5  /  TBili  0.4  /  DBili  x   /  AST  63<H>  /  ALT  30  /  AlkPhos  120  08-10    proBNP:   Lipid Profile: Cholesterol 236    HDL 35      HgA1c: Hemoglobin A1C, Whole Blood: 6.4 % (08-10 @ 14:17)    TSH: Thyroid Stimulating Hormone, Serum: 3.38 uU/mL (08-10 @ 10:49)  Thyroid Stimulating Hormone, Serum: 2.14 uU/mL (08-10 @ 01:13)    PT/INR - ( 10 Aug 2018 01:13 )   PT: 10.4 sec;   INR: 0.96 ratio         PTT - ( 10 Aug 2018 01:13 )  PTT:28.4 sec      Assessment and plan  ---------------------------  73 y /o M with PMH of HTN (not on any medications) doesn't have a PCP, presented with complaint of left arm numbness and weakness.  1.Tele monitoring.  2.Echocardiogram.  3.Neurology eval.  4.HTN-satrt cozaar 25mg qd-Pt complaining of headache.  5.Lipid D/O-statin.  6.Cont asa,statin.  7. will adjust bp meds

## 2018-08-11 NOTE — CONSULT NOTE ADULT - SUBJECTIVE AND OBJECTIVE BOX
Patient is a 73y old  Male who presents with a chief complaint of left arm weakness and numbness (10 Aug 2018 13:34)      HPI:  Patient is a 73 y /o M with PMH of HTN (not on any medications) doesn't have a PCP, presented with complaint of left arm numbness and weakness started yesterday morning. patient said he was trying to wear his clothes but his arm was heavy and was shaking.  He wore his clothes with difficulty  later He took 2 alieve and went to sleep. He woke up after an hour , alieve helped his symptoms a little bit but weakness and heaviness persisted. He called his Niece to bring him to the hospital. In Ed he has left upper extremity as per Ed provider. As per patient weakness resolved in a couple of hours. Now he is feeling much better. Patient denies any chest pain, SOB, similar symptoms in the past, diaphoresis, dizziness, melena, hematochezia, epistaxis, Drug of abuse use . In Ed patient's blood pressure was 220/123 mmhg. HR 85. EKG was NSR@ Rate 85 BPM Atrial Rate 85 BPMP-R Interval 152 ms QRS Duration 78 ms  ms QTc 423 ms. Received 1 dose labetalol 20mg IV push. (10 Aug 2018 13:34)           The patient was last know well at  The patient lives at home/ NH.  The patient walks without assistance/ with a cane or walker    Neurological Review of Systems:  No difficulty with language.  No vision loss or double vision.  No dizziness, vertigo or new hearing loss.  No difficulty with speech or swallowing.  No focal weakness.  No focal sensory changes.  No numbness or tingling in the bilateral lower extremities.  No difficulty with balance.  No difficulty with ambulation.      MEDICATIONS  (STANDING):  aspirin  chewable 81 milliGRAM(s) Oral daily  atorvastatin 40 milliGRAM(s) Oral at bedtime  enoxaparin Injectable 40 milliGRAM(s) SubCutaneous daily  insulin lispro (HumaLOG) corrective regimen sliding scale   SubCutaneous three times a day before meals  losartan 50 milliGRAM(s) Oral daily  metoprolol tartrate 25 milliGRAM(s) Oral two times a day  potassium phosphate IVPB 15 milliMole(s) IV Intermittent once    MEDICATIONS  (PRN):  acetaminophen 300 mG/butalbital 50 mG/ caffeine 40 mG 1 Capsule(s) Oral every 6 hours PRN severe headache    Allergies    No Known Allergies    Intolerances      PAST MEDICAL & SURGICAL HISTORY:  Hypertension  No significant past surgical history    FAMILY HISTORY:  No pertinent family history in first degree relatives    SOCIAL HISTORY: non smoker/ former smoker/ active smoker    Review of Systems:  Constitutional: No generalized weakness. No fevers or chills.                    Eyes, Ears, Mouth, Throat: No vision loss   Respiratory: No shortness of breath or cough.                                Cardiovascular: No chest pain or palpitations  Gastrointestinal: No nausea or vomiting.                                         Genitourinary: No urinary incontinence or burning on urination.  Musculoskeletal: No joint pain.                                                           Dermatologic: No rash.  Neurological: as per HPI                                                                      Psychiatric: No behavioral problems.  Endocrine: No known hypoglycemia.               Hematologic/Lymphatic: No easy bleeding.    O:  Vital Signs Last 24 Hrs  T(C): 36.8 (11 Aug 2018 11:55), Max: 37 (11 Aug 2018 08:24)  T(F): 98.2 (11 Aug 2018 11:55), Max: 98.6 (11 Aug 2018 08:24)  HR: 71 (11 Aug 2018 11:55) (70 - 84)  BP: 140/84 (11 Aug 2018 11:55) (140/84 - 183/99)  BP(mean): --  RR: 18 (11 Aug 2018 11:55) (17 - 18)  SpO2: 98% (11 Aug 2018 11:55) (98% - 100%)    General Exam:   General appearance: No acute distress                 Cardiovascular: Pedal dorsalis pulses intact bilaterally    Neurological Exam:  NIH Stroke Scale (NIHSS):   1a. LOConscious:  0-alert 1-lethargy 2-obtund 3-coma:    _____  1b. LOC Questions:  0-both 1-one 2-none                       _____  1c. LOC Commands:  0-both 1-one 2-none                     _____  2.   Gaze:  0-nl 1-partial 2-conjugate                                _____  3.   Visual:  0-nl 1-part ming 2-full ming 3-bilat ming         _____  4.   Facial Palsy:  0-nl 1-minor 2-part 3-complete             _____  5.   Motor Arm:  0-nl 1-drift 2-effort 3-no effort         Left             _____                              4-no move UN-amputated                     Right  _____  6.   Motor Leg:                                                                 Left   _____                                                                                   Right _____  7.   Ataxia:  0-nl 1-one limb 2-two UN-amp                      _____  8.   Sensory:  0-nl 1-mild 2-severe                                  _____  9.   Language:  0-nl 1-mild 2-severe 3-mute                     _____  10.  Dysarthria:  0-nl 1-mild 2-severe 3-barrier                  _____  11.  Extinction/Inattention:  0-nl 1-mild 2-deep                 _____         TOTAL NIHSS       ________    Mental Status: Orientated to self, date and place.  Attention intact.  No dysarthria, aphasia or neglect.  Knowledge intact.  Registration intact.  Short and long term memory grossly intact.      Cranial Nerves: CN I - not tested.  PERRL, EOMI, VFF, no nystagmus or diplopia.  No APD.  Fundi not visualized bilaterally.  CN V1-3 intact to light touch and pinprick.  No facial asymmetry.  Hearing intact to finger rub bilaterally.  Tongue, uvula and palate midline.  Sternocleidomastoid and Trapezius intact bilaterally.    Motor:   Tone: normal.                  Strength intact throughout  No pronator drift bilaterally                      No dysmetria on finger-nose-finger or heel-shin-heel  No truncal ataxia.  No resting, postural or action tremor.  No myoclonus.    Sensation: intact to light touch, pinprick, vibration and proprioception    Deep Tendon Reflexes: 1+ bilateral biceps, triceps, brachioradialis, knee and ankle  Toes flexor bilaterally    Gait: normal and stable.  Rhomberg -aguila.    Other:      LABS:                        13.7   6.5   )-----------( 224      ( 11 Aug 2018 08:23 )             43.4     -    142  |  106  |  13  ----------------------------<  97  3.7   |  26  |  1.31<H>    Ca    8.2<L>      11 Aug 2018 08:23  Phos  2.3       Mg     2.1         TPro  7.0  /  Alb  3.0<L>  /  TBili  0.5  /  DBili  x   /  AST  20  /  ALT  23  /  AlkPhos  105  08-11    PT/INR - ( 10 Aug 2018 01:13 )   PT: 10.4 sec;   INR: 0.96 ratio         PTT - ( 10 Aug 2018 01:13 )  PTT:28.4 sec  Urinalysis Basic - ( 10 Aug 2018 02:15 )    Color: Yellow / Appearance: Clear / S.010 / pH: x  Gluc: x / Ketone: Negative  / Bili: Negative / Urobili: Negative   Blood: x / Protein: Negative / Nitrite: Negative   Leuk Esterase: Negative / RBC: 0-2 /HPF / WBC 0-2 /HPF   Sq Epi: x / Non Sq Epi: Occasional /HPF / Bacteria: Trace /HPF      LDL  [id number] A1C, Whole Blood: 6.4 % (08-10 @ 14:17)      RADIOLOGY & ADDITIONAL STUDIES:    EKG: < from: 12 Lead ECG (08.10.18 @ 00:22) >    Diagnosis Line Normal sinus rhythm  Normal ECG  Confirmed by CHERY DODSON MD ([id number]) on 10-Aug-2018 11:30:08    < end of copied text >    < from: CT Head No Cont (08.10.18 @ 00:55) > (images reviwed)  IMPRESSION:     No acute intracranial bleeding, mass effect, or shift. Mild chronic   microvascular ischemic changes.     < end of copied text >      Impression:       Recommendations:  1.             Admit to telemetry   2.             MRI brain, MRA head without contrast, Carotid duplex (CD).  If unable to get MR imaging, please consider CTA head and neck in 24hours (no need for CD in this case).  If the patient is unable to get MR and unable to get IV contrast please repeat the CTH in 24hours and get a CD.  3.             TTE  4.             Please check HbA1C and fasting lipid profile  5.             Start ASA 81mg (or ASA 325mg rectally) and Lipitor 40mg HS  6.             BP goal of normal/ permissive HTN of SBP <200/<180<160  7.             NS at 125 cc/h/ D5 NS at 125 cc/hour, if NPO, if cardiac function allows, to ensure good perfusion  8.             Frequent neurochecks  9.             Urine Tox  10.          Able to resume normal diet as passed bedside swallowing test/ NPO for now  11.          Formal speech and swallow evaluation  12.          PT evaluation  13.          STAT CTH IF the patient has sudden change in mental status or neurological exam  14.          DVT PPx    Thank you for the courtesy of this consult. Patient is a 73y old  Male who presents with a chief complaint of left arm weakness and numbness (10 Aug 2018 13:34)      HPI:  Patient is a 73 y /o M with PMH of HTN (not on any medications) doesn't have a PCP, presented with complaint of left arm numbness and weakness started day prior to presentation.  The patient says that he had left arm shaking, without his control, present for a few minutes. He was completely awake and aware of his surroundings during this episode.  The shaking lasted for few minutes.  After the shaking, the patient felt numbness and weakness in the left arm.  Subsequently, he felt numbness and weakness in  the right arm as well.  His symptoms are now resolved.  He has not had similar episodes in the past.  No history of head trama.      Neurological Review of Systems:  No difficulty with language.  No vision loss or double vision.  No dizziness, vertigo or new hearing loss.  No difficulty with speech or swallowing.  No numbness or tingling in the bilateral lower extremities.  No difficulty with balance.  No difficulty with ambulation.      MEDICATIONS  (STANDING):  aspirin  chewable 81 milliGRAM(s) Oral daily  atorvastatin 40 milliGRAM(s) Oral at bedtime  enoxaparin Injectable 40 milliGRAM(s) SubCutaneous daily  insulin lispro (HumaLOG) corrective regimen sliding scale   SubCutaneous three times a day before meals  losartan 50 milliGRAM(s) Oral daily  metoprolol tartrate 25 milliGRAM(s) Oral two times a day  potassium phosphate IVPB 15 milliMole(s) IV Intermittent once    MEDICATIONS  (PRN):  acetaminophen 300 mG/butalbital 50 mG/ caffeine 40 mG 1 Capsule(s) Oral every 6 hours PRN severe headache    Allergies    No Known Allergies    Intolerances      PAST MEDICAL & SURGICAL HISTORY:  Hypertension  No significant past surgical history    FAMILY HISTORY:  No pertinent family history in first degree relatives    SOCIAL HISTORY: non smoker    Review of Systems:  Constitutional: No generalized weakness. No fevers or chills.                    Eyes, Ears, Mouth, Throat: No vision loss   Respiratory: No shortness of breath or cough.                                Cardiovascular: No chest pain or palpitations  Gastrointestinal: No nausea or vomiting.                                         Genitourinary: No urinary incontinence or burning on urination.  Musculoskeletal: No joint pain.                                                           Dermatologic: No rash.  Neurological: as per HPI                                                                      Psychiatric: No behavioral problems.  Endocrine: No known hypoglycemia.               Hematologic/Lymphatic: No easy bleeding.    O:  Vital Signs Last 24 Hrs  T(C): 36.8 (11 Aug 2018 11:55), Max: 37 (11 Aug 2018 08:24)  T(F): 98.2 (11 Aug 2018 11:55), Max: 98.6 (11 Aug 2018 08:24)  HR: 71 (11 Aug 2018 11:55) (70 - 84)  BP: 140/84 (11 Aug 2018 11:55) (140/84 - 183/99)  BP(mean): --  RR: 18 (11 Aug 2018 11:55) (17 - 18)  SpO2: 98% (11 Aug 2018 11:55) (98% - 100%)    General Exam:   General appearance: No acute distress                 Cardiovascular: Pedal dorsalis pulses intact bilaterally    Neurological Exam:  NIH Stroke Scale (NIHSS):   1a. LOConscious:  0-alert 1-lethargy 2-obtund 3-coma:    ___0__  1b. LOC Questions:  0-both 1-one 2-none                       _0____  1c. LOC Commands:  0-both 1-one 2-none                     _0____  2.   Gaze:  0-nl 1-partial 2-conjugate                                0_____  3.   Visual:  0-nl 1-part ming 2-full ming 3-bilat ming         __0___  4.   Facial Palsy:  0-nl 1-minor 2-part 3-complete             __0___  5.   Motor Arm:  0-nl 1-drift 2-effort 3-no effort         Left             _0____                              4-no move UN-amputated                     Right  _0____  6.   Motor Leg:                                                                 Left   __0___                                                                                   Right __0___  7.   Ataxia:  0-nl 1-one limb 2-two UN-amp                      ____0_  8.   Sensory:  0-nl 1-mild 2-severe                                  _0____  9.   Language:  0-nl 1-mild 2-severe 3-mute                     _0____  10.  Dysarthria:  0-nl 1-mild 2-severe 3-barrier                 0 _____  11.  Extinction/Inattention:  0-nl 1-mild 2-deep                 0_____         TOTAL NIHSS       ____0____    Mental Status: Orientated to self, date and place.  Attention intact.  No dysarthria, aphasia or neglect.  Knowledge intact.  Registration intact.  Short and long term memory grossly intact.      Cranial Nerves: CN I - not tested.  PERRL, EOMI, VFF, no nystagmus or diplopia.  No APD.  Fundi not visualized bilaterally.  CN V1-3 intact to light touch.  No facial asymmetry.  Hearing intact to finger rub bilaterally.  Tongue, uvula and palate midline.  Sternocleidomastoid and Trapezius intact bilaterally.    Motor:   Tone: normal.                  Strength intact throughout  No pronator drift bilaterally                      No dysmetria on finger-nose-finger or heel-shin-heel    Sensation: intact to light touch    Deep Tendon Reflexes: 1+ bilateral biceps, triceps, brachioradialis, knee and ankle  Toes flexor bilaterally    Gait: normal and stable.     Other:      LABS:                        13.7   6.5   )-----------( 224      ( 11 Aug 2018 08:23 )             43.4     08-11    142  |  106  |  13  ----------------------------<  97  3.7   |  26  |  1.31<H>    Ca    8.2<L>      11 Aug 2018 08:23  Phos  2.3     08-11  Mg     2.1     -11    TPro  7.0  /  Alb  3.0<L>  /  TBili  0.5  /  DBili  x   /  AST  20  /  ALT  23  /  AlkPhos  105  08-11    PT/INR - ( 10 Aug 2018 01:13 )   PT: 10.4 sec;   INR: 0.96 ratio         PTT - ( 10 Aug 2018 01:13 )  PTT:28.4 sec  Urinalysis Basic - ( 10 Aug 2018 02:15 )    Color: Yellow / Appearance: Clear / S.010 / pH: x  Gluc: x / Ketone: Negative  / Bili: Negative / Urobili: Negative   Blood: x / Protein: Negative / Nitrite: Negative   Leuk Esterase: Negative / RBC: 0-2 /HPF / WBC 0-2 /HPF   Sq Epi: x / Non Sq Epi: Occasional /HPF / Bacteria: Trace /HPF    Lipid Profile (08.10.18 @ 10:49)    Direct LDL: 153: LDL Cholesterol --- Interpretive Comment (for adults 18 and over)  Optimal LDL Level may vary based on clinical situation  Below 70                  Ideal for people at very high risk of heart  disease  Below 100                Ideal for people at risk of heart disease  100 - 129                   [place]  130 - 159                   Borderline high  160 - 189                   High  190 and Above          Very high mg/dL      [id number] A1C, Whole Blood: 6.4 % (08-10 @ 14:17)      RADIOLOGY & ADDITIONAL STUDIES:    EKG: < from: 12 Lead ECG (08.10.18 @ 00:22) >    Diagnosis Line Normal sinus rhythm  Normal ECG  Confirmed by CHERY DODSON MD ([id number]) on 10-Aug-2018 11:30:08    < end of copied text >    < from: CT Head No Cont (08.10.18 @ 00:55) > (images reviwed)  IMPRESSION:     No acute intracranial bleeding, mass effect, or shift. Mild chronic   microvascular ischemic changes.     < end of copied text >

## 2018-08-12 LAB
24R-OH-CALCIDIOL SERPL-MCNC: 9.4 NG/ML — LOW (ref 30–80)
ALBUMIN SERPL ELPH-MCNC: 3 G/DL — LOW (ref 3.5–5)
ALP SERPL-CCNC: 111 U/L — SIGNIFICANT CHANGE UP (ref 40–120)
ALT FLD-CCNC: 25 U/L DA — SIGNIFICANT CHANGE UP (ref 10–60)
ANION GAP SERPL CALC-SCNC: 6 MMOL/L — SIGNIFICANT CHANGE UP (ref 5–17)
AST SERPL-CCNC: 20 U/L — SIGNIFICANT CHANGE UP (ref 10–40)
BASOPHILS # BLD AUTO: 0 K/UL — SIGNIFICANT CHANGE UP (ref 0–0.2)
BASOPHILS NFR BLD AUTO: 0.6 % — SIGNIFICANT CHANGE UP (ref 0–2)
BILIRUB SERPL-MCNC: 0.5 MG/DL — SIGNIFICANT CHANGE UP (ref 0.2–1.2)
BUN SERPL-MCNC: 14 MG/DL — SIGNIFICANT CHANGE UP (ref 7–18)
CALCIUM SERPL-MCNC: 8.2 MG/DL — LOW (ref 8.4–10.5)
CALCIUM SERPL-MCNC: 8.5 MG/DL — SIGNIFICANT CHANGE UP (ref 8.4–10.5)
CHLORIDE SERPL-SCNC: 106 MMOL/L — SIGNIFICANT CHANGE UP (ref 96–108)
CO2 SERPL-SCNC: 27 MMOL/L — SIGNIFICANT CHANGE UP (ref 22–31)
CREAT SERPL-MCNC: 1.31 MG/DL — HIGH (ref 0.5–1.3)
EOSINOPHIL # BLD AUTO: 0.2 K/UL — SIGNIFICANT CHANGE UP (ref 0–0.5)
EOSINOPHIL NFR BLD AUTO: 2.7 % — SIGNIFICANT CHANGE UP (ref 0–6)
FOLATE SERPL-MCNC: 9.2 NG/ML — SIGNIFICANT CHANGE UP
GLUCOSE SERPL-MCNC: 127 MG/DL — HIGH (ref 70–99)
HCT VFR BLD CALC: 44.4 % — SIGNIFICANT CHANGE UP (ref 39–50)
HGB BLD-MCNC: 13.9 G/DL — SIGNIFICANT CHANGE UP (ref 13–17)
LYMPHOCYTES # BLD AUTO: 1.9 K/UL — SIGNIFICANT CHANGE UP (ref 1–3.3)
LYMPHOCYTES # BLD AUTO: 24.8 % — SIGNIFICANT CHANGE UP (ref 13–44)
MAGNESIUM SERPL-MCNC: 2.3 MG/DL — SIGNIFICANT CHANGE UP (ref 1.6–2.6)
MCHC RBC-ENTMCNC: 27.6 PG — SIGNIFICANT CHANGE UP (ref 27–34)
MCHC RBC-ENTMCNC: 31.3 GM/DL — LOW (ref 32–36)
MCV RBC AUTO: 88.3 FL — SIGNIFICANT CHANGE UP (ref 80–100)
MONOCYTES # BLD AUTO: 0.6 K/UL — SIGNIFICANT CHANGE UP (ref 0–0.9)
MONOCYTES NFR BLD AUTO: 7.2 % — SIGNIFICANT CHANGE UP (ref 2–14)
NEUTROPHILS # BLD AUTO: 5 K/UL — SIGNIFICANT CHANGE UP (ref 1.8–7.4)
NEUTROPHILS NFR BLD AUTO: 64.8 % — SIGNIFICANT CHANGE UP (ref 43–77)
PHOSPHATE SERPL-MCNC: 2.2 MG/DL — LOW (ref 2.5–4.5)
PLATELET # BLD AUTO: 235 K/UL — SIGNIFICANT CHANGE UP (ref 150–400)
POTASSIUM SERPL-MCNC: 4.3 MMOL/L — SIGNIFICANT CHANGE UP (ref 3.5–5.3)
POTASSIUM SERPL-SCNC: 4.3 MMOL/L — SIGNIFICANT CHANGE UP (ref 3.5–5.3)
PROT SERPL-MCNC: 7.2 G/DL — SIGNIFICANT CHANGE UP (ref 6–8.3)
PTH-INTACT FLD-MCNC: 57 PG/ML — SIGNIFICANT CHANGE UP (ref 15–65)
RBC # BLD: 5.02 M/UL — SIGNIFICANT CHANGE UP (ref 4.2–5.8)
RBC # FLD: 14 % — SIGNIFICANT CHANGE UP (ref 10.3–14.5)
SODIUM SERPL-SCNC: 139 MMOL/L — SIGNIFICANT CHANGE UP (ref 135–145)
VIT B12 SERPL-MCNC: 466 PG/ML — SIGNIFICANT CHANGE UP (ref 232–1245)
WBC # BLD: 7.7 K/UL — SIGNIFICANT CHANGE UP (ref 3.8–10.5)
WBC # FLD AUTO: 7.7 K/UL — SIGNIFICANT CHANGE UP (ref 3.8–10.5)

## 2018-08-12 RX ADMIN — Medication 25 MILLIGRAM(S): at 17:29

## 2018-08-12 RX ADMIN — Medication 81 MILLIGRAM(S): at 12:01

## 2018-08-12 RX ADMIN — Medication 25 MILLIGRAM(S): at 06:01

## 2018-08-12 RX ADMIN — LOSARTAN POTASSIUM 50 MILLIGRAM(S): 100 TABLET, FILM COATED ORAL at 06:01

## 2018-08-12 RX ADMIN — ENOXAPARIN SODIUM 40 MILLIGRAM(S): 100 INJECTION SUBCUTANEOUS at 13:30

## 2018-08-12 RX ADMIN — Medication 1: at 08:13

## 2018-08-12 RX ADMIN — ATORVASTATIN CALCIUM 40 MILLIGRAM(S): 80 TABLET, FILM COATED ORAL at 22:06

## 2018-08-12 NOTE — PROGRESS NOTE ADULT - SUBJECTIVE AND OBJECTIVE BOX
HILLARY DREW  MR# [id number]  73yMale        Patient is a 73y old  Male who presents with a chief complaint of left arm weakness and numbness (10 Aug 2018 13:34)      INTERVAL HPI/OVERNIGHT EVENTS:  Patient seen and examined at bedside. No notations of chest pain, palpitation, SOB, orthopnea, nausea, vomiting or abdominal pain.    ALLERGIES  No Known Allergies      MEDICATIONS  acetaminophen 300 mG/butalbital 50 mG/ caffeine 40 mG 1 Capsule(s) Oral every 6 hours PRN severe headache  aspirin  chewable 81 milliGRAM(s) Oral daily  atorvastatin 40 milliGRAM(s) Oral at bedtime  enoxaparin Injectable 40 milliGRAM(s) SubCutaneous daily  insulin lispro (HumaLOG) corrective regimen sliding scale   SubCutaneous three times a day before meals  losartan 50 milliGRAM(s) Oral daily  metoprolol tartrate 25 milliGRAM(s) Oral two times a day              REVIEW OF SYSTEMS:  CONSTITUTIONAL: No fever, weight loss, or fatigue  EYES: No eye pain, visual disturbances, or discharge  ENT:  No difficulty hearing, tinnitus, vertigo; No sinus or throat pain  NECK: No pain or stiffness  RESPIRATORY: No cough, wheezing, chills or hemoptysis; No Shortness of Breath  CARDIOVASCULAR: No chest pain, palpitations, passing out, dizziness, or leg swelling  GASTROINTESTINAL: No abdominal or epigastric pain. No nausea, vomiting, or hematemesis; No diarrhea or constipation. No melena or hematochezia.  GENITOURINARY: No dysuria, frequency, hematuria, or incontinence  NEUROLOGICAL: No headaches, memory loss, loss of strength, numbness, or tremors  SKIN: No itching, burning, rashes, or lesions   LYMPH Nodes: No enlarged glands  ENDOCRINE: No heat or cold intolerance; No hair loss  MUSCULOSKELETAL: No joint pain or swelling; No muscle, back, or extremity pain  PSYCHIATRIC: No depression, anxiety, mood swings, or difficulty sleeping  HEME/LYMPH: No easy bruising, or bleeding gums  ALLERGY AND IMMUNOLOGIC: No hives or eczema	    [ ] All others negative	  [ ] Unable to obtain      T(C): 36.9 (08-12-18 @ 15:17), Max: 37.1 (08-12-18 @ 00:00)  T(F): 98.4 (08-12-18 @ 15:17), Max: 98.8 (08-12-18 @ 08:05)  HR: 69 (08-12-18 @ 15:17) (66 - 76)  BP: 142/89 (08-12-18 @ 15:17) (132/84 - 157/79)  RR: 20 (08-12-18 @ 15:17) (16 - 20)  SpO2: 100% (08-12-18 @ 15:17) (96% - 100%)  Wt(kg): --    I&O's Summary        PHYSICAL EXAM:  A X O x  HEAD:  Atraumatic, Normocephalic  EYES: EOMI, PERRLA, conjunctiva and sclera clear  NECK: Supple, No JVD, Normal thyroid  Resp: CTAB, No crackles, wheezing,   CVS: Regular rate and rhythm; No discernable murmurs, rubs, or gallops  ABD: Soft, Nontender, Nondistended; Bowel sounds present  EXTREMITIES:  2+ Peripheral Pulses, No edema  LYMPH: No dicernable lymphadenopathy noted  GENERAL: NAD, well-groomed, well-developed      LABS:                        13.9   7.7   )-----------( 235      ( 12 Aug 2018 08:15 )             44.4     08-12    139  |  106  |  14  ----------------------------<  127<H>  4.3   |  27  |  1.31<H>    Ca    8.5      12 Aug 2018 08:15  Phos  2.2     08-12  Mg     2.3     08-12    TPro  7.2  /  Alb  3.0<L>  /  TBili  0.5  /  DBili  x   /  AST  20  /  ALT  25  /  AlkPhos  111  08-12        CAPILLARY BLOOD GLUCOSE      POCT Blood Glucose.: 140 mg/dL (12 Aug 2018 16:41)  POCT Blood Glucose.: 106 mg/dL (12 Aug 2018 11:41)  POCT Blood Glucose.: 159 mg/dL (12 Aug 2018 07:38)  POCT Blood Glucose.: 123 mg/dL (11 Aug 2018 21:55)      Troponins:  ProBNP:  Lipid Profile:   HgA1c:  TSH:           RADIOLOGY & ADDITIONAL TESTS:    Imaging Personally Reviewed:  [ ] YES  [ ] NO      Consultant(s) Notes Reviewed:  [x ] YES  [ ] NO    Care Discussed with Consultants/Other Providers [ x] YES  [ ] NO          PAST MEDICAL & SURGICAL HISTORY:  Hypertension  No significant past surgical history        Other symptom or sign involving musculoskeletal system  No pertinent family history in first degree relatives  Handoff  MEWS Score  Hypertension  Hand weakness  Seizure  Hyperlipidemia  Prophylactic measure  Hypocalcemia  Hyperkalemia  TIA (transient ischemic attack)  Hypertensive urgency  No significant past surgical history  A-STROKE  CVA (cerebral vascular accident)

## 2018-08-12 NOTE — PROGRESS NOTE ADULT - SUBJECTIVE AND OBJECTIVE BOX
CARDIOLOGY     PROGRESS  NOTE   ________________________________________________    CHIEF COMPLAINT:Patient is a 73y old  Male who presents with a chief complaint of left arm weakness and numbness (10 Aug 2018 13:34)  no complain.  	  REVIEW OF SYSTEMS:  CONSTITUTIONAL: No fever, weight loss, or fatigue  EYES: No eye pain, visual disturbances, or discharge  ENT:  No difficulty hearing, tinnitus, vertigo; No sinus or throat pain  NECK: No pain or stiffness  RESPIRATORY: No cough, wheezing, chills or hemoptysis; No Shortness of Breath  CARDIOVASCULAR: No chest pain, palpitations, passing out, dizziness, or leg swelling  GASTROINTESTINAL: No abdominal or epigastric pain. No nausea, vomiting, or hematemesis; No diarrhea or constipation. No melena or hematochezia.  GENITOURINARY: No dysuria, frequency, hematuria, or incontinence  NEUROLOGICAL: No headaches, memory loss, loss of strength, numbness, or tremors  SKIN: No itching, burning, rashes, or lesions   LYMPH Nodes: No enlarged glands  ENDOCRINE: No heat or cold intolerance; No hair loss  MUSCULOSKELETAL: No joint pain or swelling; No muscle, back, or extremity pain  PSYCHIATRIC: No depression, anxiety, mood swings, or difficulty sleeping  HEME/LYMPH: No easy bruising, or bleeding gums  ALLERGY AND IMMUNOLOGIC: No hives or eczema	    [ ] All others negative	  [ ] Unable to obtain    PHYSICAL EXAM:  T(C): 37.1 (08-12-18 @ 08:05), Max: 37.1 (08-12-18 @ 00:00)  HR: 71 (08-12-18 @ 08:05) (66 - 76)  BP: 157/79 (08-12-18 @ 08:05) (132/84 - 157/79)  RR: 16 (08-12-18 @ 08:05) (16 - 20)  SpO2: 100% (08-12-18 @ 08:05) (96% - 100%)  Wt(kg): --  I&O's Summary      Appearance: Normal	  HEENT:   Normal oral mucosa, PERRL, EOMI	  Lymphatic: No lymphadenopathy  Cardiovascular: Normal S1 S2, No JVD, + murmurs, No edema  Respiratory: rhonchi  Psychiatry: A & O x 3, Mood & affect appropriate  Gastrointestinal:  Soft, Non-tender, + BS	  Skin: No rashes, No ecchymoses, No cyanosis	  Neurologic: Non-focal  Extremities: Normal range of motion, No clubbing, cyanosis or edema  Vascular: Peripheral pulses palpable 2+ bilaterally    MEDICATIONS  (STANDING):  aspirin  chewable 81 milliGRAM(s) Oral daily  atorvastatin 40 milliGRAM(s) Oral at bedtime  enoxaparin Injectable 40 milliGRAM(s) SubCutaneous daily  insulin lispro (HumaLOG) corrective regimen sliding scale   SubCutaneous three times a day before meals  losartan 50 milliGRAM(s) Oral daily  metoprolol tartrate 25 milliGRAM(s) Oral two times a day      TELEMETRY: nsr	    ECG:  	  RADIOLOGY:  OTHER: 	  	  LABS:	 	    CARDIAC MARKERS:  CARDIAC MARKERS ( 10 Aug 2018 16:27 )  <0.015 ng/mL / x     / 311 U/L / x     / 3.3 ng/mL                                13.9   7.7   )-----------( 235      ( 12 Aug 2018 08:15 )             44.4     08-12    139  |  106  |  14  ----------------------------<  127<H>  4.3   |  27  |  1.31<H>    Ca    8.5      12 Aug 2018 08:15  Phos  2.2     08-12  Mg     2.3     08-12    TPro  7.2  /  Alb  3.0<L>  /  TBili  0.5  /  DBili  x   /  AST  20  /  ALT  25  /  AlkPhos  111  08-12    proBNP:   Lipid Profile: Cholesterol 236    HDL 35      HgA1c: Hemoglobin A1C, Whole Blood: 6.4 % (08-10 @ 14:17)    TSH: Thyroid Stimulating Hormone, Serum: 3.38 uU/mL (08-10 @ 10:49)  Thyroid Stimulating Hormone, Serum: 2.14 uU/mL (08-10 @ 01:13)          Assessment and plan  ---------------------------  73 y /o M with PMH of HTN (not on any medications) doesn't have a PCP, presented with complaint of left arm numbness and weakness.  1.Tele monitoring.  2.Echocardiogram awaiting  3.Neurology eval.  4.HTN-satrt cozaar 25mg qd-Pt complaining of headache.  5.Lipid D/O-statin.  6.Cont asa,statin.  7. will adjust bp meds better controlled f/u renal function

## 2018-08-13 LAB
ALBUMIN SERPL ELPH-MCNC: 3.2 G/DL — LOW (ref 3.5–5)
ALP SERPL-CCNC: 118 U/L — SIGNIFICANT CHANGE UP (ref 40–120)
ALT FLD-CCNC: 25 U/L DA — SIGNIFICANT CHANGE UP (ref 10–60)
ANION GAP SERPL CALC-SCNC: 7 MMOL/L — SIGNIFICANT CHANGE UP (ref 5–17)
AST SERPL-CCNC: 21 U/L — SIGNIFICANT CHANGE UP (ref 10–40)
BASOPHILS # BLD AUTO: 0.1 K/UL — SIGNIFICANT CHANGE UP (ref 0–0.2)
BASOPHILS NFR BLD AUTO: 1 % — SIGNIFICANT CHANGE UP (ref 0–2)
BILIRUB SERPL-MCNC: 0.5 MG/DL — SIGNIFICANT CHANGE UP (ref 0.2–1.2)
BUN SERPL-MCNC: 16 MG/DL — SIGNIFICANT CHANGE UP (ref 7–18)
CALCIUM SERPL-MCNC: 8.5 MG/DL — SIGNIFICANT CHANGE UP (ref 8.4–10.5)
CHLORIDE SERPL-SCNC: 107 MMOL/L — SIGNIFICANT CHANGE UP (ref 96–108)
CO2 SERPL-SCNC: 25 MMOL/L — SIGNIFICANT CHANGE UP (ref 22–31)
CREAT SERPL-MCNC: 1.35 MG/DL — HIGH (ref 0.5–1.3)
EOSINOPHIL # BLD AUTO: 0.3 K/UL — SIGNIFICANT CHANGE UP (ref 0–0.5)
EOSINOPHIL NFR BLD AUTO: 3.7 % — SIGNIFICANT CHANGE UP (ref 0–6)
GLUCOSE SERPL-MCNC: 119 MG/DL — HIGH (ref 70–99)
HCT VFR BLD CALC: 48.1 % — SIGNIFICANT CHANGE UP (ref 39–50)
HGB BLD-MCNC: 15.1 G/DL — SIGNIFICANT CHANGE UP (ref 13–17)
LYMPHOCYTES # BLD AUTO: 2.3 K/UL — SIGNIFICANT CHANGE UP (ref 1–3.3)
LYMPHOCYTES # BLD AUTO: 32 % — SIGNIFICANT CHANGE UP (ref 13–44)
MAGNESIUM SERPL-MCNC: 2.2 MG/DL — SIGNIFICANT CHANGE UP (ref 1.6–2.6)
MCHC RBC-ENTMCNC: 27.5 PG — SIGNIFICANT CHANGE UP (ref 27–34)
MCHC RBC-ENTMCNC: 31.5 GM/DL — LOW (ref 32–36)
MCV RBC AUTO: 87.4 FL — SIGNIFICANT CHANGE UP (ref 80–100)
MONOCYTES # BLD AUTO: 0.6 K/UL — SIGNIFICANT CHANGE UP (ref 0–0.9)
MONOCYTES NFR BLD AUTO: 9 % — SIGNIFICANT CHANGE UP (ref 2–14)
NEUTROPHILS # BLD AUTO: 3.9 K/UL — SIGNIFICANT CHANGE UP (ref 1.8–7.4)
NEUTROPHILS NFR BLD AUTO: 54.3 % — SIGNIFICANT CHANGE UP (ref 43–77)
PHOSPHATE SERPL-MCNC: 2.8 MG/DL — SIGNIFICANT CHANGE UP (ref 2.5–4.5)
PLATELET # BLD AUTO: 294 K/UL — SIGNIFICANT CHANGE UP (ref 150–400)
POTASSIUM SERPL-MCNC: 4.1 MMOL/L — SIGNIFICANT CHANGE UP (ref 3.5–5.3)
POTASSIUM SERPL-SCNC: 4.1 MMOL/L — SIGNIFICANT CHANGE UP (ref 3.5–5.3)
PROT SERPL-MCNC: 8.1 G/DL — SIGNIFICANT CHANGE UP (ref 6–8.3)
RBC # BLD: 5.5 M/UL — SIGNIFICANT CHANGE UP (ref 4.2–5.8)
RBC # FLD: 14.1 % — SIGNIFICANT CHANGE UP (ref 10.3–14.5)
SODIUM SERPL-SCNC: 139 MMOL/L — SIGNIFICANT CHANGE UP (ref 135–145)
WBC # BLD: 7.2 K/UL — SIGNIFICANT CHANGE UP (ref 3.8–10.5)
WBC # FLD AUTO: 7.2 K/UL — SIGNIFICANT CHANGE UP (ref 3.8–10.5)

## 2018-08-13 PROCEDURE — 93880 EXTRACRANIAL BILAT STUDY: CPT | Mod: 26

## 2018-08-13 PROCEDURE — 70551 MRI BRAIN STEM W/O DYE: CPT | Mod: 26

## 2018-08-13 RX ORDER — ERGOCALCIFEROL 1.25 MG/1
50000 CAPSULE ORAL
Qty: 0 | Refills: 0 | Status: DISCONTINUED | OUTPATIENT
Start: 2018-08-13 | End: 2018-08-14

## 2018-08-13 RX ADMIN — LOSARTAN POTASSIUM 50 MILLIGRAM(S): 100 TABLET, FILM COATED ORAL at 05:14

## 2018-08-13 RX ADMIN — Medication 81 MILLIGRAM(S): at 12:05

## 2018-08-13 RX ADMIN — Medication 25 MILLIGRAM(S): at 05:14

## 2018-08-13 RX ADMIN — ERGOCALCIFEROL 50000 UNIT(S): 1.25 CAPSULE ORAL at 12:05

## 2018-08-13 RX ADMIN — ENOXAPARIN SODIUM 40 MILLIGRAM(S): 100 INJECTION SUBCUTANEOUS at 12:05

## 2018-08-13 RX ADMIN — Medication 25 MILLIGRAM(S): at 20:08

## 2018-08-13 RX ADMIN — Medication 1: at 12:03

## 2018-08-13 RX ADMIN — ATORVASTATIN CALCIUM 40 MILLIGRAM(S): 80 TABLET, FILM COATED ORAL at 21:50

## 2018-08-13 NOTE — PROGRESS NOTE ADULT - SUBJECTIVE AND OBJECTIVE BOX
HILLARY DREW  73y Male  MRN:[id number]    Patient is a 73y old  Male who presents with a chief complaint of left arm weakness and numbness (10 Aug 2018 13:34)    HPI:  Patient is a 73 y /o M with PMH of HTN (not on any medications) doesn't have a PCP, presented with complaint of left arm numbness and weakness started yesterday morning. patient said he was trying to wear his clothes but his arm was heavy and was shaking.  He wore his clothes with difficulty  later He took 2 alieve and went to sleep. He woke up after an hour , alieve helped his symptoms a little bit but weakness and heaviness persisted. He called his Niece to bring him to the hospital. In Ed he has left upper extremity as per Ed provider. As per patient weakness resolved in a couple of hours. Now he is feeling much better. Patient denies any chest pain, SOB, similar symptoms in the past, diaphoresis, dizziness, melena, hematochezia, epistaxis, Drug of abuse use . In Ed patient's blood pressure was 220/123 mmhg. HR 85. EKG was NSR@ Rate 85 BPM Atrial Rate 85 BPMP-R Interval 152 ms QRS Duration 78 ms  ms QTc 423 ms. Received 1 dose labetalol 20mg IV push. (10 Aug 2018 13:34)      Patient seen and evaluated at bedside. No acute events overnight except as noted.    Interval HPI: found to have acute cva on MRI    PAST MEDICAL & SURGICAL HISTORY:  Hypertension  No significant past surgical history      REVIEW OF SYSTEMS:  as per hpi    VITALS:  Vital Signs Last 24 Hrs  T(C): 37.1 (13 Aug 2018 11:17), Max: 37.1 (13 Aug 2018 11:17)  T(F): 98.7 (13 Aug 2018 11:17), Max: 98.7 (13 Aug 2018 11:17)  HR: 68 (13 Aug 2018 11:17) (67 - 76)  BP: 151/94 (13 Aug 2018 11:17) (134/94 - 161/95)  BP(mean): --  RR: 18 (13 Aug 2018 11:17) (18 - 20)  SpO2: 96% (13 Aug 2018 11:17) (96% - 100%)  CAPILLARY BLOOD GLUCOSE      POCT Blood Glucose.: 174 mg/dL (13 Aug 2018 11:55)  POCT Blood Glucose.: 120 mg/dL (13 Aug 2018 08:02)  POCT Blood Glucose.: 162 mg/dL (12 Aug 2018 21:37)  POCT Blood Glucose.: 140 mg/dL (12 Aug 2018 16:41)    I&O's Summary    12 Aug 2018 07:01  -  13 Aug 2018 07:00  --------------------------------------------------------  IN: 200 mL / OUT: 350 mL / NET: -150 mL        PHYSICAL EXAM:  GENERAL: NAD, well-developed  HEAD:  Atraumatic, Normocephalic  EYES: EOMI, PERRLA, conjunctiva and sclera clear  NECK: Supple, No JVD  CHEST/LUNG: Clear to auscultation bilaterally; No wheeze  HEART: S1, S2; No murmurs, rubs, or gallops  ABDOMEN: Soft, Nontender, Nondistended; Bowel sounds present  EXTREMITIES:  2+ Peripheral Pulses, No clubbing, cyanosis, or edema  PSYCH: Normal affect  NEUROLOGY: AAOX3; non-focal  SKIN: No rashes or lesions    Consultant(s) Notes Reviewed:  [x ] YES  [ ] NO  Care Discussed with Consultants/Other Providers [ x] YES  [ ] NO    MEDS:  MEDICATIONS  (STANDING):  aspirin  chewable 81 milliGRAM(s) Oral daily  atorvastatin 40 milliGRAM(s) Oral at bedtime  enoxaparin Injectable 40 milliGRAM(s) SubCutaneous daily  ergocalciferol 54523 Unit(s) Oral <User Schedule>  insulin lispro (HumaLOG) corrective regimen sliding scale   SubCutaneous three times a day before meals  losartan 50 milliGRAM(s) Oral daily  metoprolol tartrate 25 milliGRAM(s) Oral two times a day    MEDICATIONS  (PRN):  acetaminophen 300 mG/butalbital 50 mG/ caffeine 40 mG 1 Capsule(s) Oral every 6 hours PRN severe headache    ALLERGIES:  No Known Allergies      LABS:                        15.1   7.2   )-----------( 294      ( 13 Aug 2018 09:00 )             48.1     08-13    139  |  107  |  16  ----------------------------<  119<H>  4.1   |  25  |  1.35<H>    Ca    8.5      13 Aug 2018 09:00  Phos  2.8     08-13  Mg     2.2     08-13    TPro  8.1  /  Alb  3.2<L>  /  TBili  0.5  /  DBili  x   /  AST  21  /  ALT  25  /  AlkPhos  118  08-13          LIVER FUNCTIONS - ( 13 Aug 2018 09:00 )  Alb: 3.2 g/dL / Pro: 8.1 g/dL / ALK PHOS: 118 U/L / ALT: 25 U/L DA / AST: 21 U/L / GGT: x             < from: MR Head No Cont (08.13.18 @ 10:57) >  IMPRESSION:  Multiple small acute/subacute infarcts in the left cerebral hemisphere,   mostly involving the white matter and left basal ganglia, and also   involving left frontal, parietal and occipital lobe cortices. These are   suggestive of watershed left VIGNESH/MCA and MCA/PCA territories.    Findings were discussed with Dr. Watkins on 8/13/2018 11:15 AM with   readback.      < end of copied text >

## 2018-08-13 NOTE — PROGRESS NOTE ADULT - SUBJECTIVE AND OBJECTIVE BOX
PGY1 Note discussed with Supervising Resident and Primary Attending.    Patient is a 73y old  Male who presents with a chief complaint of left arm weakness and numbness (10 Aug 2018 13:34)      INTERVAL HPI/OVERNIGHT EVENTS :  Patient had no acute overnight events. Patient seen and examined at bedside. Denies current headache, tingling, numbness, weakness, dizziness, SOB, chest pain, abd pain.    MEDICATIONS  (STANDING):  aspirin  chewable 81 milliGRAM(s) Oral daily  atorvastatin 40 milliGRAM(s) Oral at bedtime  enoxaparin Injectable 40 milliGRAM(s) SubCutaneous daily  ergocalciferol 74632 Unit(s) Oral <User Schedule>  insulin lispro (HumaLOG) corrective regimen sliding scale   SubCutaneous three times a day before meals  losartan 50 milliGRAM(s) Oral daily  metoprolol tartrate 25 milliGRAM(s) Oral two times a day    MEDICATIONS  (PRN):  acetaminophen 300 mG/butalbital 50 mG/ caffeine 40 mG 1 Capsule(s) Oral every 6 hours PRN severe headache      Allergies    No Known Allergies    Intolerances        REVIEW OF SYSTEMS :  General: denies chills, fatigue, loss of appetite  HEENT: denies headache, dizziness, changes in vision  Respiratory: denies SOB, wheezing, coughing  Cardio: denies chest pain, palpitations  Abd: denies nausea, vomiting, diarrhea, abd pain  Extremities: denies bruising  Neuro: denies weakness, numbness, tingling  Skin: denies rashes    Vital Signs Last 24 Hrs  T(C): 36.8 (13 Aug 2018 15:24), Max: 37.1 (13 Aug 2018 11:17)  T(F): 98.2 (13 Aug 2018 15:24), Max: 98.7 (13 Aug 2018 11:17)  HR: 60 (13 Aug 2018 15:24) (60 - 76)  BP: 123/78 (13 Aug 2018 15:24) (123/78 - 161/95)  BP(mean): --  RR: 18 (13 Aug 2018 15:24) (18 - 20)  SpO2: 97% (13 Aug 2018 15:24) (96% - 100%)    PHYSICAL EXAM :  * General: no acute distress, well nourished  * Head: atraumatic, normocephalic  * Eyes: EOMI, PERRL  * ENT: moist mucus membranes  * Neck: supple, no JVD  * Respiratory: cta b/l; no wheezes, rales, rhonchi  * Cardio: RRR; no appreciable murmurs, rubs, gallops  * Abdomen: soft, nontender, nondistended + BS  * Neuro: AAOx3; sensation intact throughout; 5/5 strength throughout  * Extremities: no clubbing, cyanosis, edema  * Skin: no rashes, lesions     LABS:                          15.1   7.2   )-----------( 294      ( 13 Aug 2018 09:00 )             48.1     08-13    139  |  107  |  16  ----------------------------<  119<H>  4.1   |  25  |  1.35<H>    Ca    8.5      13 Aug 2018 09:00  Phos  2.8     08-13  Mg     2.2     08-13    TPro  8.1  /  Alb  3.2<L>  /  TBili  0.5  /  DBili  x   /  AST  21  /  ALT  25  /  AlkPhos  118  08-13        CAPILLARY BLOOD GLUCOSE      POCT Blood Glucose.: 128 mg/dL (13 Aug 2018 17:07)  POCT Blood Glucose.: 174 mg/dL (13 Aug 2018 11:55)  POCT Blood Glucose.: 120 mg/dL (13 Aug 2018 08:02)  POCT Blood Glucose.: 162 mg/dL (12 Aug 2018 21:37)      RADIOLOGY & ADDITIONAL TESTS:   MRI head: multiple subacute and acute infarcts in left hemisphere    Imaging Personally Reviewed   :  [x] YES  [ ] NO    Consultant(s) Notes Reviewed :  [x] YES  [ ] NO

## 2018-08-13 NOTE — PROGRESS NOTE ADULT - PROBLEM SELECTOR PLAN 2
CT head negative  MR head noted with acute cva  MRA and carotid duplex pending  neuro f/u  asa/statin  echo pending  tele monitor  neuro following - Dr. Rousseau  swallow eval

## 2018-08-13 NOTE — PROGRESS NOTE ADULT - SUBJECTIVE AND OBJECTIVE BOX
CHIEF COMPLAINT:Patient is a 73y old  Male who presents with a chief complaint of left arm weakness and numbness .Pt appears comfrtable.    	  REVIEW OF SYSTEMS:  CONSTITUTIONAL: No fever, weight loss, or fatigue  EYES: No eye pain, visual disturbances, or discharge  ENT:  No difficulty hearing, tinnitus, vertigo; No sinus or throat pain  NECK: No pain or stiffness  RESPIRATORY: No cough, wheezing, chills or hemoptysis; No Shortness of Breath  CARDIOVASCULAR: No chest pain, palpitations, passing out, dizziness, or leg swelling  GASTROINTESTINAL: No abdominal or epigastric pain. No nausea, vomiting, or hematemesis; No diarrhea or constipation. No melena or hematochezia.  GENITOURINARY: No dysuria, frequency, hematuria, or incontinence  NEUROLOGICAL: No headaches, memory loss, loss of strength, numbness, or tremors  SKIN: No itching, burning, rashes, or lesions   LYMPH Nodes: No enlarged glands  ENDOCRINE: No heat or cold intolerance; No hair loss  MUSCULOSKELETAL: No joint pain or swelling; No muscle, back, or extremity pain  PSYCHIATRIC: No depression, anxiety, mood swings, or difficulty sleeping  HEME/LYMPH: No easy bruising, or bleeding gums  ALLERGY AND IMMUNOLOGIC: No hives or eczema	      PHYSICAL EXAM:  T(C): 36.8 (08-13-18 @ 07:20), Max: 36.9 (08-12-18 @ 11:59)  HR: 67 (08-13-18 @ 07:20) (67 - 76)  BP: 143/97 (08-13-18 @ 07:20) (134/94 - 161/95)  RR: 18 (08-13-18 @ 07:20) (16 - 20)  SpO2: 100% (08-13-18 @ 07:20) (97% - 100%)  Wt(kg): --  I&O's Summary    12 Aug 2018 07:01  -  13 Aug 2018 07:00  --------------------------------------------------------  IN: 200 mL / OUT: 350 mL / NET: -150 mL        Appearance: Normal	  HEENT:   Normal oral mucosa, PERRL, EOMI	  Lymphatic: No lymphadenopathy  Cardiovascular: Normal S1 S2, No JVD, No murmurs, No edema  Respiratory: Lungs clear to auscultation	  Psychiatry: A & O x 3, Mood & affect appropriate  Gastrointestinal:  Soft, Non-tender, + BS	  Skin: No rashes, No ecchymoses, No cyanosis	  Neurologic: Non-focal  Extremities: Normal range of motion, No clubbing, cyanosis or edema  Vascular: Peripheral pulses palpable 2+ bilaterally    MEDICATIONS  (STANDING):  aspirin  chewable 81 milliGRAM(s) Oral daily  atorvastatin 40 milliGRAM(s) Oral at bedtime  enoxaparin Injectable 40 milliGRAM(s) SubCutaneous daily  insulin lispro (HumaLOG) corrective regimen sliding scale   SubCutaneous three times a day before meals  losartan 50 milliGRAM(s) Oral daily  metoprolol tartrate 25 milliGRAM(s) Oral two times a day      	  LABS:	 	                         13.9   7.7   )-----------( 235      ( 12 Aug 2018 08:15 )             44.4     08-12    139  |  106  |  14  ----------------------------<  127<H>  4.3   |  27  |  1.31<H>    Ca    8.5      12 Aug 2018 08:15  Phos  2.2     08-12  Mg     2.3     08-12    TPro  7.2  /  Alb  3.0<L>  /  TBili  0.5  /  DBili  x   /  AST  20  /  ALT  25  /  AlkPhos  111  08-12    proBNP:   Lipid Profile: Cholesterol 236    HDL 35      HgA1c: Hemoglobin A1C, Whole Blood: 6.4 % (08-10 @ 14:17)    TSH: Thyroid Stimulating Hormone, Serum: 3.38 uU/mL (08-10 @ 10:49)  Thyroid Stimulating Hormone, Serum: 2.14 uU/mL (08-10 @ 01:13)      OBSERVATIONS:  Mitral Valve: Normal mitral valve.  Aortic Root: Aortic Root: 3.3 cm.    Aortic Valve: Normal trileaflet aortic valve.  Left Ventricle: Normal Left Ventricular Systolic Function,  (EF = 55 to 60%) Normal left ventricular internal  dimensions and wall thicknesses. Grade II diastolic  dysfunction.  Right Heart: Normal right atrium. Normal right ventricular  size and function. Normal tricuspid valve. Normal pulmonic  valve.  Pericardium/PleuraNormal pericardium with no pericardial  effusion.

## 2018-08-13 NOTE — PROGRESS NOTE ADULT - SUBJECTIVE AND OBJECTIVE BOX
Seen at bedside denies recurrence of sxms.  Denies HA, or dizziness.  Reports ambulating today w/o difficulty.      HPI:    PAST MEDICAL & SURGICAL HISTORY:  Hypertension  No significant past surgical history      FAMILY HISTORY:  No pertinent family history in first degree relatives        Social Hx:  Nonsmoker, no drug or alcohol use    MEDICATIONS  (STANDING):  aspirin  chewable 81 milliGRAM(s) Oral daily  atorvastatin 40 milliGRAM(s) Oral at bedtime  enoxaparin Injectable 40 milliGRAM(s) SubCutaneous daily  ergocalciferol 04713 Unit(s) Oral <User Schedule>  insulin lispro (HumaLOG) corrective regimen sliding scale   SubCutaneous three times a day before meals  losartan 50 milliGRAM(s) Oral daily  metoprolol tartrate 25 milliGRAM(s) Oral two times a day       Allergies    No Known Allergies    Intolerances        ROS: Pertinent positives in HPI, all other ROS were reviewed and are negative.      Vital Signs Last 24 Hrs  T(C): 37.1 (13 Aug 2018 11:17), Max: 37.1 (13 Aug 2018 11:17)  T(F): 98.7 (13 Aug 2018 11:17), Max: 98.7 (13 Aug 2018 11:17)  HR: 68 (13 Aug 2018 11:17) (67 - 76)  BP: 151/94 (13 Aug 2018 11:17) (134/94 - 161/95)  BP(mean): --  RR: 18 (13 Aug 2018 11:17) (18 - 20)  SpO2: 96% (13 Aug 2018 11:17) (96% - 100%)        Constitutional: awake and alert.  HEENT: PERRLA, EOMI,   Neck: Supple  Respiratory: Breath sounds are clear bilaterally  Cardiovascular: S1 and S2, regular / irregular rhythm  Gastrointestinal: soft, nontender  Extremities:  no edema  Vascular: Carotid Bruit - no  Musculoskeletal: no joint swelling/tenderness, no abnormal movements  Skin: No rashes    Neurological exam:  HF: A x O x 3. Appropriately interactive, normal affect. Speech fluent, No Aphasia or paraphasic errors. Naming /repetition intact   CN: SARAH, EOMI, VFF, facial sensation normal, no NLFD, tongue midline, Palate moves equally, SCM equal bilaterally  Motor: No pronator drift, Strength 5/5 in all 4 ext, normal bulk and tone, no tremor, rigidity or bradykinesia.    Sens: Intact to light touch / PP/ VS/ JS    Reflexes: Symmetric and normal . BJ 2+, BR 2+, KJ 2+, AJ 2+, downgoing toes b/l  Coord:  No FNFA, dysmetria, DOMENICO intact   Gait/Balance: Normal/Cannot test    NIHSS: 0    MRS: 0          Labs:                        15.1   7.2   )-----------( 294      ( 13 Aug 2018 09:00 )             48.1     08-13    139  |  107  |  16  ----------------------------<  119<H>  4.1   |  25  |  1.35<H>    Ca    8.5      13 Aug 2018 09:00  Phos  2.8     08-13  Mg     2.2     08-13    TPro  8.1  /  Alb  3.2<L>  /  TBili  0.5  /  DBili  x   /  AST  21  /  ALT  25  /  AlkPhos  118  08-13    08-10 [id number] 6.4    08-10 Chol 236<H>  HDL 35<L> Trig 239<H>      Radiology report:  CT head:  < from: CT Head No Cont (08.10.18 @ 00:55) >    EXAM:  CT BRAIN                            PROCEDURE DATE:  08/10/2018          INTERPRETATION:  HISTORY: Right-sided weakness.    COMPARISON: None.    TECHNIQUE: Axial noncontrast CT images from the skull base to the vertex   were obtained and submitted for interpretation. Coronal and sagittal   reformatted images were performed. Bone and soft tissue windows were   evaluated.    FINDINGS:         There is no acute intracranial mass-effect, hemorrhage, midline shift, or   abnormal extra-axial fluid collection. Gray-white differentiation is   maintained.    Mild chronic microvascular ischemic changes are noted.    Ventricles, sulci, and cisterns are normal in size for the patient's age   without hydrocephalus. Basal cisterns are patent.     Paranasal sinuses and mastoid air cells are clear. Calvarium is intact.     IMPRESSION:     No acute intracranial bleeding, mass effect, or shift. Mild chronic     < end of copied text >    MRI brain:  < from: MR Head No Cont (08.13.18 @ 10:57) >  EXAM:  MR BRAIN                            PROCEDURE DATE:  08/13/2018          INTERPRETATION:  MRI BRAIN WITHOUT CONTRAST     INDICATION: 73 years old. Male. Right-sided weakness r/o CVA.     COMPARISON: CT head 8/10/2018.    TECHNIQUE: Multiplanar, multisequential MR imaging of the brain without   contrast was performed on a 1.5 Giovanna magnet.    FINDINGS:  There are multiple small foci of diffusion restriction with associated   T2/FLAIR hyperintensity compatible with acute/subacute infarcts in the   left cerebral hemisphere, mostly affecting the left basal ganglia,   caudate, periventricular, deep and subcortical white matter in the left   frontal and parietal lobes, minimally in the left occipital lobe; several   foci involve the cortex.    The ventricles and cortical sulci are within normal limits for age. No   intracranial hemorrhage, mass effect or midline shift. The basal cisterns   are patent.    Few, scattered foci of T2/FLAIR hyperintensity are noted in the   periventricular and subcortical white matter, nonspecific but likely   sequela of small vessel ischemic disease.    The major intracranial flow voids at the skull base are preserved. The   paranasal sinuses and mastoid air cells are well aerated.    IMPRESSION:  Multiple small acute/subacute infarcts in the left cerebral hemisphere,   mostly involving the white matter and left basal ganglia, and also   involving left frontal, parietal and occipital lobe cortices. These are   suggestive of watershed left VIGNESH/MCA and MCA/PCA territories.      < end of copied text >    A/P:  Multiple small acute/subacute infarcts in the left cerebral hemisphere  Continue ASA & Statin  Recommend MRA Brain & Carotid A. US  - Dysphagia screen  - Speech/swallow eval  - DVT PPX  - PT eval    Total Critical Care Time spent:

## 2018-08-13 NOTE — PROGRESS NOTE ADULT - PROBLEM SELECTOR PLAN 2
CT head negative  MRI - multiple subacute and acute infarcts in left hemisphere  Dr. Rousseau and Dr. Brandon aware  MRA, TTE with bubble scan, and b/l carotid U/S ordered  f/u lipid panel

## 2018-08-14 ENCOUNTER — TRANSCRIPTION ENCOUNTER (OUTPATIENT)
Age: 73
End: 2018-08-14

## 2018-08-14 VITALS
HEART RATE: 71 BPM | DIASTOLIC BLOOD PRESSURE: 81 MMHG | TEMPERATURE: 98 F | RESPIRATION RATE: 17 BRPM | OXYGEN SATURATION: 98 % | SYSTOLIC BLOOD PRESSURE: 187 MMHG

## 2018-08-14 DIAGNOSIS — N18.2 CHRONIC KIDNEY DISEASE, STAGE 2 (MILD): ICD-10-CM

## 2018-08-14 DIAGNOSIS — R60.0 LOCALIZED EDEMA: ICD-10-CM

## 2018-08-14 DIAGNOSIS — I50.32 CHRONIC DIASTOLIC (CONGESTIVE) HEART FAILURE: ICD-10-CM

## 2018-08-14 DIAGNOSIS — I12.9 HYPERTENSIVE CHRONIC KIDNEY DISEASE WITH STAGE 1 THROUGH STAGE 4 CHRONIC KIDNEY DISEASE, OR UNSPECIFIED CHRONIC KIDNEY DISEASE: ICD-10-CM

## 2018-08-14 LAB
ANION GAP SERPL CALC-SCNC: 6 MMOL/L — SIGNIFICANT CHANGE UP (ref 5–17)
ANION GAP SERPL CALC-SCNC: 7 MMOL/L — SIGNIFICANT CHANGE UP (ref 5–17)
BUN SERPL-MCNC: 16 MG/DL — SIGNIFICANT CHANGE UP (ref 7–18)
BUN SERPL-MCNC: 20 MG/DL — HIGH (ref 7–18)
CALCIUM SERPL-MCNC: 8.2 MG/DL — LOW (ref 8.4–10.5)
CALCIUM SERPL-MCNC: 8.2 MG/DL — LOW (ref 8.4–10.5)
CHLORIDE SERPL-SCNC: 107 MMOL/L — SIGNIFICANT CHANGE UP (ref 96–108)
CHLORIDE SERPL-SCNC: 107 MMOL/L — SIGNIFICANT CHANGE UP (ref 96–108)
CHOLEST SERPL-MCNC: 180 MG/DL — SIGNIFICANT CHANGE UP (ref 10–199)
CO2 SERPL-SCNC: 27 MMOL/L — SIGNIFICANT CHANGE UP (ref 22–31)
CO2 SERPL-SCNC: 28 MMOL/L — SIGNIFICANT CHANGE UP (ref 22–31)
CREAT SERPL-MCNC: 1.45 MG/DL — HIGH (ref 0.5–1.3)
CREAT SERPL-MCNC: 1.57 MG/DL — HIGH (ref 0.5–1.3)
GLUCOSE SERPL-MCNC: 97 MG/DL — SIGNIFICANT CHANGE UP (ref 70–99)
GLUCOSE SERPL-MCNC: 99 MG/DL — SIGNIFICANT CHANGE UP (ref 70–99)
HCT VFR BLD CALC: 45.3 % — SIGNIFICANT CHANGE UP (ref 39–50)
HCYS SERPL-MCNC: 17.9 UMOL/L — SIGNIFICANT CHANGE UP (ref 5–20)
HDLC SERPL-MCNC: 31 MG/DL — LOW (ref 40–125)
HGB BLD-MCNC: 14.4 G/DL — SIGNIFICANT CHANGE UP (ref 13–17)
LIPID PNL WITH DIRECT LDL SERPL: 122 MG/DL — SIGNIFICANT CHANGE UP
MCHC RBC-ENTMCNC: 27.8 PG — SIGNIFICANT CHANGE UP (ref 27–34)
MCHC RBC-ENTMCNC: 31.7 GM/DL — LOW (ref 32–36)
MCV RBC AUTO: 87.7 FL — SIGNIFICANT CHANGE UP (ref 80–100)
PLATELET # BLD AUTO: 237 K/UL — SIGNIFICANT CHANGE UP (ref 150–400)
POTASSIUM SERPL-MCNC: 3.9 MMOL/L — SIGNIFICANT CHANGE UP (ref 3.5–5.3)
POTASSIUM SERPL-MCNC: 3.9 MMOL/L — SIGNIFICANT CHANGE UP (ref 3.5–5.3)
POTASSIUM SERPL-SCNC: 3.9 MMOL/L — SIGNIFICANT CHANGE UP (ref 3.5–5.3)
POTASSIUM SERPL-SCNC: 3.9 MMOL/L — SIGNIFICANT CHANGE UP (ref 3.5–5.3)
RBC # BLD: 5.16 M/UL — SIGNIFICANT CHANGE UP (ref 4.2–5.8)
RBC # FLD: 13.9 % — SIGNIFICANT CHANGE UP (ref 10.3–14.5)
SODIUM SERPL-SCNC: 141 MMOL/L — SIGNIFICANT CHANGE UP (ref 135–145)
SODIUM SERPL-SCNC: 141 MMOL/L — SIGNIFICANT CHANGE UP (ref 135–145)
TOTAL CHOLESTEROL/HDL RATIO MEASUREMENT: 5.8 RATIO — SIGNIFICANT CHANGE UP (ref 3.4–9.6)
TRIGL SERPL-MCNC: 137 MG/DL — SIGNIFICANT CHANGE UP (ref 10–149)
WBC # BLD: 5.8 K/UL — SIGNIFICANT CHANGE UP (ref 3.8–10.5)
WBC # FLD AUTO: 5.8 K/UL — SIGNIFICANT CHANGE UP (ref 3.8–10.5)

## 2018-08-14 PROCEDURE — 81291 MTHFR GENE: CPT

## 2018-08-14 PROCEDURE — 84443 ASSAY THYROID STIM HORMONE: CPT

## 2018-08-14 PROCEDURE — 70544 MR ANGIOGRAPHY HEAD W/O DYE: CPT

## 2018-08-14 PROCEDURE — 85610 PROTHROMBIN TIME: CPT

## 2018-08-14 PROCEDURE — 93880 EXTRACRANIAL BILAT STUDY: CPT

## 2018-08-14 PROCEDURE — 82310 ASSAY OF CALCIUM: CPT

## 2018-08-14 PROCEDURE — 93306 TTE W/DOPPLER COMPLETE: CPT

## 2018-08-14 PROCEDURE — 83735 ASSAY OF MAGNESIUM: CPT

## 2018-08-14 PROCEDURE — 86147 CARDIOLIPIN ANTIBODY EA IG: CPT

## 2018-08-14 PROCEDURE — 85300 ANTITHROMBIN III ACTIVITY: CPT

## 2018-08-14 PROCEDURE — 70544 MR ANGIOGRAPHY HEAD W/O DYE: CPT | Mod: 26

## 2018-08-14 PROCEDURE — 80053 COMPREHEN METABOLIC PANEL: CPT

## 2018-08-14 PROCEDURE — 82550 ASSAY OF CK (CPK): CPT

## 2018-08-14 PROCEDURE — 82306 VITAMIN D 25 HYDROXY: CPT

## 2018-08-14 PROCEDURE — 86901 BLOOD TYPING SEROLOGIC RH(D): CPT

## 2018-08-14 PROCEDURE — 86850 RBC ANTIBODY SCREEN: CPT

## 2018-08-14 PROCEDURE — 81240 F2 GENE: CPT

## 2018-08-14 PROCEDURE — 85307 ASSAY ACTIVATED PROTEIN C: CPT

## 2018-08-14 PROCEDURE — 70548 MR ANGIOGRAPHY NECK W/DYE: CPT

## 2018-08-14 PROCEDURE — 85303 CLOT INHIBIT PROT C ACTIVITY: CPT

## 2018-08-14 PROCEDURE — 70551 MRI BRAIN STEM W/O DYE: CPT

## 2018-08-14 PROCEDURE — 97161 PT EVAL LOW COMPLEX 20 MIN: CPT

## 2018-08-14 PROCEDURE — 93308 TTE F-UP OR LMTD: CPT

## 2018-08-14 PROCEDURE — 85730 THROMBOPLASTIN TIME PARTIAL: CPT

## 2018-08-14 PROCEDURE — 99232 SBSQ HOSP IP/OBS MODERATE 35: CPT

## 2018-08-14 PROCEDURE — 70450 CT HEAD/BRAIN W/O DYE: CPT

## 2018-08-14 PROCEDURE — 86146 BETA-2 GLYCOPROTEIN ANTIBODY: CPT

## 2018-08-14 PROCEDURE — 87086 URINE CULTURE/COLONY COUNT: CPT

## 2018-08-14 PROCEDURE — 84100 ASSAY OF PHOSPHORUS: CPT

## 2018-08-14 PROCEDURE — 86900 BLOOD TYPING SEROLOGIC ABO: CPT

## 2018-08-14 PROCEDURE — 81001 URINALYSIS AUTO W/SCOPE: CPT

## 2018-08-14 PROCEDURE — 85027 COMPLETE CBC AUTOMATED: CPT

## 2018-08-14 PROCEDURE — 93005 ELECTROCARDIOGRAM TRACING: CPT

## 2018-08-14 PROCEDURE — 82607 VITAMIN B-12: CPT

## 2018-08-14 PROCEDURE — 83970 ASSAY OF PARATHORMONE: CPT

## 2018-08-14 PROCEDURE — 96374 THER/PROPH/DIAG INJ IV PUSH: CPT

## 2018-08-14 PROCEDURE — 99285 EMERGENCY DEPT VISIT HI MDM: CPT | Mod: 25

## 2018-08-14 PROCEDURE — 83090 ASSAY OF HOMOCYSTEINE: CPT

## 2018-08-14 PROCEDURE — 82746 ASSAY OF FOLIC ACID SERUM: CPT

## 2018-08-14 PROCEDURE — 92610 EVALUATE SWALLOWING FUNCTION: CPT

## 2018-08-14 PROCEDURE — 83036 HEMOGLOBIN GLYCOSYLATED A1C: CPT

## 2018-08-14 PROCEDURE — 80061 LIPID PANEL: CPT

## 2018-08-14 PROCEDURE — 71045 X-RAY EXAM CHEST 1 VIEW: CPT

## 2018-08-14 PROCEDURE — 82962 GLUCOSE BLOOD TEST: CPT

## 2018-08-14 PROCEDURE — 70548 MR ANGIOGRAPHY NECK W/DYE: CPT | Mod: 26

## 2018-08-14 PROCEDURE — 80048 BASIC METABOLIC PNL TOTAL CA: CPT

## 2018-08-14 PROCEDURE — 81241 F5 GENE: CPT

## 2018-08-14 PROCEDURE — 84484 ASSAY OF TROPONIN QUANT: CPT

## 2018-08-14 PROCEDURE — 82553 CREATINE MB FRACTION: CPT

## 2018-08-14 PROCEDURE — G0452: CPT | Mod: 26

## 2018-08-14 RX ORDER — SODIUM CHLORIDE 9 MG/ML
1000 INJECTION INTRAMUSCULAR; INTRAVENOUS; SUBCUTANEOUS
Qty: 0 | Refills: 0 | Status: DISCONTINUED | OUTPATIENT
Start: 2018-08-14 | End: 2018-08-14

## 2018-08-14 RX ORDER — HYDRALAZINE HCL 50 MG
1 TABLET ORAL
Qty: 90 | Refills: 0 | OUTPATIENT
Start: 2018-08-14 | End: 2018-09-12

## 2018-08-14 RX ORDER — ATORVASTATIN CALCIUM 80 MG/1
1 TABLET, FILM COATED ORAL
Qty: 30 | Refills: 0
Start: 2018-08-14 | End: 2018-09-12

## 2018-08-14 RX ORDER — ASPIRIN/CALCIUM CARB/MAGNESIUM 324 MG
1 TABLET ORAL
Qty: 30 | Refills: 0
Start: 2018-08-14 | End: 2018-09-12

## 2018-08-14 RX ORDER — ASPIRIN/CALCIUM CARB/MAGNESIUM 324 MG
1 TABLET ORAL
Qty: 30 | Refills: 0 | OUTPATIENT
Start: 2018-08-14 | End: 2018-09-12

## 2018-08-14 RX ORDER — HYDRALAZINE HCL 50 MG
25 TABLET ORAL EVERY 8 HOURS
Qty: 0 | Refills: 0 | Status: DISCONTINUED | OUTPATIENT
Start: 2018-08-14 | End: 2018-08-14

## 2018-08-14 RX ORDER — METOPROLOL TARTRATE 50 MG
1 TABLET ORAL
Qty: 60 | Refills: 0 | OUTPATIENT
Start: 2018-08-14 | End: 2018-09-12

## 2018-08-14 RX ADMIN — SODIUM CHLORIDE 50 MILLILITER(S): 9 INJECTION INTRAMUSCULAR; INTRAVENOUS; SUBCUTANEOUS at 14:36

## 2018-08-14 RX ADMIN — Medication 25 MILLIGRAM(S): at 14:24

## 2018-08-14 RX ADMIN — Medication 25 MILLIGRAM(S): at 05:37

## 2018-08-14 RX ADMIN — Medication 81 MILLIGRAM(S): at 11:39

## 2018-08-14 RX ADMIN — LOSARTAN POTASSIUM 50 MILLIGRAM(S): 100 TABLET, FILM COATED ORAL at 05:37

## 2018-08-14 RX ADMIN — Medication 25 MILLIGRAM(S): at 17:19

## 2018-08-14 RX ADMIN — ENOXAPARIN SODIUM 40 MILLIGRAM(S): 100 INJECTION SUBCUTANEOUS at 11:39

## 2018-08-14 NOTE — DISCHARGE NOTE ADULT - MEDICATION SUMMARY - MEDICATIONS TO TAKE
I will START or STAY ON the medications listed below when I get home from the hospital:    [place] Aspirin Adult Chewable 81 mg oral tablet, chewable  -- 1 tab(s) by mouth once a day  -- Indication: For Chronic diastolic heart failure    atorvastatin 40 mg oral tablet  -- 1 tab(s) by mouth once a day (at bedtime)  -- Indication: For Hyperlipidemia    Metoprolol Tartrate 25 mg oral tablet  -- 1 tab(s) by mouth 2 times a day  -- Indication: For Hypertension    hydrALAZINE 25 mg oral tablet  -- 1 tab(s) by mouth every 8 hours  -- Indication: For Hypertension

## 2018-08-14 NOTE — CONSULT NOTE ADULT - ATTENDING COMMENTS
[facility] NEPHROLOGY  Shree Gardiner M.D.  Say Hancock D.O.  Bina Hernandez M.D.  Ban Bonilla, MSN, ANP-C    Telephone: [phone]  Facsimile: [phone]    [address] [place] [address]

## 2018-08-14 NOTE — DISCHARGE NOTE ADULT - HOSPITAL COURSE
Patient is a 73 y /o M with PMH of HTN (not on any medications) doesn't have a PCP, presented with complaint of left arm numbness and weakness started yesterday morning. patient said he was trying to wear his clothes but his arm was heavy and was shaking.  He wore his clothes with difficulty  later He took 2 alieve and went to sleep. He woke up after an hour , alieve helped his symptoms a little bit but weakness and heaviness persisted. He called his Niece to bring him to the hospital. In Ed he has left upper extremity as per Ed provider. As per patient weakness resolved in a couple of hours. Now he is feeling much better. Patient denies any chest pain, SOB, similar symptoms in the past, diaphoresis, dizziness, melena, hematochezia, epistaxis, Drug of abuse use . In Ed patient's blood pressure was 220/123 mmhg. HR 85. EKG was NSR@ Rate 85 BPM Atrial Rate 85 BPMP-R Interval 152 ms QRS Duration 78 ms  ms QTc 423 ms. Received 1 dose labetalol 20mg IV push. (10 Aug 2018 13:34)    Patient had CT head that was negative. Patient had MRI that showed multiple infarcts in left cerebral hemisphere. Patient had MRA that showed an aneurysm, and a b/l carotid u/s that was unable to visualize due to high bifurcation. Patient was seen by neurology and cleared for discharge. Patient was seen by nephrology due to increased Creatinine. Cr has since decreased, patient has been cleared for discharge. Patient is stable for discharge home and will followup with cardiology, neurology, neurosurgery, nephrology, and PCP within a week of discharge. Please see full chart for full summary of stay.

## 2018-08-14 NOTE — DISCHARGE NOTE ADULT - CARE PROVIDERS DIRECT ADDRESSES
,DirectAddress_Unknown,[email or web address],DirectAddress_Unknown

## 2018-08-14 NOTE — DISCHARGE NOTE ADULT - PLAN OF CARE
recovery You presented with left arm weakness and were admitted to rule out CVA. Your CT head was negative. Your MRI showed multiple small infarcts in the left side of your brain and multiple areas. You had an ultrasound of your carotid arteries that was unable to fully visualize what was needed, so you had an MRA of your head and neck which showed a small aneurysm in your brain. You will followup with your neurologist and neurosurgeon within one week after discharge. You will followup with your primary care doctor within one week. You will continue with your medications as prescribed. You presented with elevated creatinine due to damage to your kidneys. You will followup with your nephrologist within a week of discharge. You will continue with your medication as prescribed. You have elevated lipid levels in your blood. You will followup with your cardiologist within one week after discharge. You will continue with your medication as prescribed. You have a history of high blood pressure. You will continue with your medications as prescribed. You will followup with your cardiologist and primary care provider within one week of discharge.

## 2018-08-14 NOTE — CONSULT NOTE ADULT - PROBLEM SELECTOR RECOMMENDATION 9
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  The rise is ~20% from baseline and therefore borderline acceptable rise in creatinine.  Recommend continuation of ARB and f/u as outpatient in 1 week in my office.

## 2018-08-14 NOTE — SWALLOW BEDSIDE ASSESSMENT ADULT - ASR SWALLOW ASPIRATION MONITOR
cough/gurgly voice/upper respiratory infection/fever/pneumonia/throat clearing/change of breathing pattern/oral hygiene/position upright (89Y)

## 2018-08-14 NOTE — CONSULT NOTE ADULT - PROBLEM SELECTOR RECOMMENDATION 3
LE edema, trace, with diastolic HF.- monitor for now.  Would consider giving a diuretic in part for HTN management in the future if appropriate from cardiac perspective as well.  For now, no diuretic and monitor as outpatient.

## 2018-08-14 NOTE — PROGRESS NOTE ADULT - PROBLEM SELECTOR PLAN 1
losartan 50mg daily  metoprolol 25mg BID  mary nagel - Dr. Phillips
losartan 50mg daily  metoprolol 25mg BID  cardio - Dr. Phillips
losartan 50mg daily  metoprolol 25mg BID  cardio - Dr. Phillips
losartan 50mg daily  metoprolol 25mg BID  cards following - Dr. Jacqueline meehan pending
losartan 50mg daily  metoprolol 25mg BID  mary nagel - Dr. Phillips

## 2018-08-14 NOTE — PROGRESS NOTE ADULT - SUBJECTIVE AND OBJECTIVE BOX
PGY1 Note discussed with Supervising Resident and Primary Attending.    Patient is a 73y old  Male who presents with a chief complaint of left arm weakness and numbness (10 Aug 2018 13:34)      INTERVAL HPI/OVERNIGHT EVENTS :  No acute overnight events. Patient seen and examined at bedside. Patient has no current complaints. Patient denies nausea, vomiting, diarrhea, chest pain, SOB, headache.  MEDICATIONS  (STANDING):  aspirin  chewable 81 milliGRAM(s) Oral daily  atorvastatin 40 milliGRAM(s) Oral at bedtime  enoxaparin Injectable 40 milliGRAM(s) SubCutaneous daily  ergocalciferol 47056 Unit(s) Oral <User Schedule>  insulin lispro (HumaLOG) corrective regimen sliding scale   SubCutaneous three times a day before meals  losartan 50 milliGRAM(s) Oral daily  metoprolol tartrate 25 milliGRAM(s) Oral two times a day    MEDICATIONS  (PRN):  acetaminophen 300 mG/butalbital 50 mG/ caffeine 40 mG 1 Capsule(s) Oral every 6 hours PRN severe headache      Allergies    No Known Allergies    Intolerances        REVIEW OF SYSTEMS :  * General: denies chills, fatigue  * Eyes: denies vision changes  * Respiratory: denies cough, SOB, wheezing  * Cardio: denies chest pain, palpitations  * GI: denies abd pain, nausea, vomiting, diarrhea  * : denies dysuria  * Neuro: denies headaches, numbness, weakness  * MSK: denies joint pain  * Skin: denies itching, rashes    Vital Signs Last 24 Hrs  T(C): 37 (14 Aug 2018 04:45), Max: 37.1 (13 Aug 2018 11:17)  T(F): 98.6 (14 Aug 2018 04:45), Max: 98.7 (13 Aug 2018 11:17)  HR: 69 (14 Aug 2018 04:45) (60 - 74)  BP: 165/99 (14 Aug 2018 04:45) (123/78 - 172/87)  BP(mean): --  RR: 20 (14 Aug 2018 04:45) (18 - 20)  SpO2: 100% (14 Aug 2018 04:45) (94% - 100%)    PHYSICAL EXAM :  * General: no acute distress, well-nourished, well-developed  * HEENT: atraumatic, normocephalic; moist mucus membranes; supple neck; no JVD  * CN: EOMI, PERRL  * Respiratory: cta b/l; no wheezes, rales, rhonchi  * Cardio: RRR; no appreciable murmurs, rubs, gallops  * Abd: soft, nontender, nondistended, +BS  * Neuro: AAOx3; strength 5/5; sensation intact throughout  * Extremities: no clubbing, cyanosis, edema  * Skin: no rashes    LABS:                          15.1   7.2   )-----------( 294      ( 13 Aug 2018 09:00 )             48.1     08-13    139  |  107  |  16  ----------------------------<  119<H>  4.1   |  25  |  1.35<H>    Ca    8.5      13 Aug 2018 09:00  Phos  2.8     08-13  Mg     2.2     08-13    TPro  8.1  /  Alb  3.2<L>  /  TBili  0.5  /  DBili  x   /  AST  21  /  ALT  25  /  AlkPhos  118  08-13        CAPILLARY BLOOD GLUCOSE      POCT Blood Glucose.: 98 mg/dL (14 Aug 2018 08:11)  POCT Blood Glucose.: 229 mg/dL (13 Aug 2018 21:21)  POCT Blood Glucose.: 128 mg/dL (13 Aug 2018 17:07)  POCT Blood Glucose.: 174 mg/dL (13 Aug 2018 11:55)      RADIOLOGY & ADDITIONAL TESTS:   no new imaging    Imaging Personally Reviewed:    Consultant(s) Notes Reviewed: PGY1 Note discussed with Supervising Resident and Primary Attending.    Patient is a 73y old  Male who presents with a chief complaint of left arm weakness and numbness (10 Aug 2018 13:34)      INTERVAL HPI/OVERNIGHT EVENTS :  No acute overnight events. Patient seen and examined at bedside. Patient has no current complaints. Patient denies nausea, vomiting, diarrhea, chest pain, SOB, headache.    MEDICATIONS  (STANDING):  aspirin  chewable 81 milliGRAM(s) Oral daily  atorvastatin 40 milliGRAM(s) Oral at bedtime  enoxaparin Injectable 40 milliGRAM(s) SubCutaneous daily  ergocalciferol 14253 Unit(s) Oral <User Schedule>  insulin lispro (HumaLOG) corrective regimen sliding scale   SubCutaneous three times a day before meals  losartan 50 milliGRAM(s) Oral daily  metoprolol tartrate 25 milliGRAM(s) Oral two times a day    MEDICATIONS  (PRN):  acetaminophen 300 mG/butalbital 50 mG/ caffeine 40 mG 1 Capsule(s) Oral every 6 hours PRN severe headache      Allergies    No Known Allergies    Intolerances        REVIEW OF SYSTEMS :  * General: denies chills, fatigue  * Eyes: denies vision changes  * Respiratory: denies cough, SOB, wheezing  * Cardio: denies chest pain, palpitations  * GI: denies abd pain, nausea, vomiting, diarrhea  * : denies dysuria  * Neuro: denies headaches, numbness, weakness  * MSK: denies joint pain  * Skin: denies itching, rashes    Vital Signs Last 24 Hrs  T(C): 37 (14 Aug 2018 04:45), Max: 37.1 (13 Aug 2018 11:17)  T(F): 98.6 (14 Aug 2018 04:45), Max: 98.7 (13 Aug 2018 11:17)  HR: 69 (14 Aug 2018 04:45) (60 - 74)  BP: 165/99 (14 Aug 2018 04:45) (123/78 - 172/87)  BP(mean): --  RR: 20 (14 Aug 2018 04:45) (18 - 20)  SpO2: 100% (14 Aug 2018 04:45) (94% - 100%)    PHYSICAL EXAM :  * General: no acute distress, well-nourished, well-developed  * HEENT: atraumatic, normocephalic; moist mucus membranes; supple neck; no JVD  * CN: EOMI, PERRL  * Respiratory: cta b/l; no wheezes, rales, rhonchi  * Cardio: RRR; no appreciable murmurs, rubs, gallops  * Abd: soft, nontender, nondistended, +BS  * Neuro: AAOx3; strength 5/5; sensation intact throughout  * Extremities: no clubbing, cyanosis, edema  * Skin: no rashes    LABS:                          15.1   7.2   )-----------( 294      ( 13 Aug 2018 09:00 )             48.1     08-13    139  |  107  |  16  ----------------------------<  119<H>  4.1   |  25  |  1.35<H>    Ca    8.5      13 Aug 2018 09:00  Phos  2.8     08-13  Mg     2.2     08-13    TPro  8.1  /  Alb  3.2<L>  /  TBili  0.5  /  DBili  x   /  AST  21  /  ALT  25  /  AlkPhos  118  08-13        CAPILLARY BLOOD GLUCOSE      POCT Blood Glucose.: 98 mg/dL (14 Aug 2018 08:11)  POCT Blood Glucose.: 229 mg/dL (13 Aug 2018 21:21)  POCT Blood Glucose.: 128 mg/dL (13 Aug 2018 17:07)  POCT Blood Glucose.: 174 mg/dL (13 Aug 2018 11:55)      RADIOLOGY & ADDITIONAL TESTS:   no new imaging    Consultant(s) Notes Reviewed: yes

## 2018-08-14 NOTE — PROGRESS NOTE ADULT - PROVIDER SPECIALTY LIST ADULT
Cardiology
Internal Medicine
Neurology
Neurology
Internal Medicine
Internal Medicine

## 2018-08-14 NOTE — PROGRESS NOTE ADULT - ATTENDING COMMENTS
no events o/n  cva on mri  mra noted with small aneurysm.  as per neuro no further w/u  given outpt f/u with nsx dr cancino    paul  likely post contrast and arb  renal eval noted  creat improved with fluids  dc arb  outpt renal f/u 5 days    dc planning  outpt f/u neuro, nsx, renal, cards

## 2018-08-14 NOTE — PROGRESS NOTE ADULT - PROBLEM SELECTOR PLAN 4
aspirin 81mg  lovenox 40mg subq daily

## 2018-08-14 NOTE — CONSULT NOTE ADULT - SUBJECTIVE AND OBJECTIVE BOX
[facility] NEPHROLOGY- CONSULTATION NOTE    Patient is a 73y Male without regular medical care presented to the hospital with hypertensive emergency and diastolic HF.  He was started on losartan 25mg and this was increased to 50mg.  Pt also on Metoprolol.    PAST MEDICAL & SURGICAL HISTORY:  Hypertension  No significant past surgical history    No Known Allergies    Home Medications Reviewed  Hospital Medications:   MEDICATIONS  (STANDING):  aspirin  chewable 81 milliGRAM(s) Oral daily  atorvastatin 40 milliGRAM(s) Oral at bedtime  enoxaparin Injectable 40 milliGRAM(s) SubCutaneous daily  ergocalciferol 60101 Unit(s) Oral <User Schedule>  hydrALAZINE 25 milliGRAM(s) Oral every 8 hours  insulin lispro (HumaLOG) corrective regimen sliding scale   SubCutaneous three times a day before meals  metoprolol tartrate 25 milliGRAM(s) Oral two times a day  sodium chloride 0.9%. 1000 milliLiter(s) (50 mL/Hr) IV Continuous <Continuous>  sodium chloride 0.9%. 1000 milliLiter(s) (75 mL/Hr) IV Continuous <Continuous>    SOCIAL HISTORY:  Denies ETOh,Smoking,   FAMILY HISTORY:  No pertinent family history in first degree relatives    REVIEW OF SYSTEMS:  CONSTITUTIONAL: No weakness, fevers or chills  EYES/ENT: No visual changes;  No vertigo or throat pain   NECK: No pain or stiffness  RESPIRATORY: No cough, wheezing, hemoptysis; No shortness of breath  CARDIOVASCULAR: No chest pain or palpitations.  GASTROINTESTINAL: No abdominal or epigastric pain. No nausea, vomiting, or hematemesis; No diarrhea or constipation. No melena or hematochezia.  GENITOURINARY: No dysuria, frequency, foamy urine, urinary urgency, incontinence or hematuria  NEUROLOGICAL: No numbness or weakness  SKIN: No itching, burning, rashes, or lesions   VASCULAR: No bilateral lower extremity edema.   All other review of systems is negative unless indicated above.    VITALS:  T(F): 98.3 (18 @ 11:22), Max: 98.6 (18 @ 04:45)  HR: 72 (18 @ 11:22)  BP: 141/98 (18 @ 11:22)  RR: 18 (18 @ 11:22)  SpO2: 99% (18 @ 11:22)  Wt(kg): --      PHYSICAL EXAM:  Constitutional: NAD  HEENT: anicteric sclera, oropharynx clear, MMM  Neck: No JVD  Respiratory: CTAB, no wheezes, rales or rhonchi  Cardiovascular: S1, S2, RRR  Gastrointestinal: BS+, soft, NT/ND  Extremities: No cyanosis or clubbing. + trace B  LE edema  Neurological: A/O x 3, no focal deficits  Psychiatric: Normal mood, normal affect  : No CVA tenderness. No tran.   Skin: No rashes  Vascular Access:    LABS:      141  |  107  |  16  ----------------------------<  97  3.9   |  28  |  1.57<H>    Ca    8.2<L>      14 Aug 2018 08:22  Phos  2.8       Mg     2.2         TPro  8.1  /  Alb  3.2<L>  /  TBili  0.5  /  DBili      /  AST  21  /  ALT  25  /  AlkPhos  118  08    Creatinine Trend: 1.57 <--, 1.35 <--, 1.31 <--, 1.31 <--, 1.19 <--, 1.33 <--                        14.4   5.8   )-----------( 237      ( 14 Aug 2018 08:22 )             45.3     Urine Studies:  Urinalysis Basic - ( 10 Aug 2018 02:15 )    Color: Yellow / Appearance: Clear / S.010 / pH:   Gluc:  / Ketone: Negative  / Bili: Negative / Urobili: Negative   Blood:  / Protein: Negative / Nitrite: Negative   Leuk Esterase: Negative / RBC: 0-2 /HPF / WBC 0-2 /HPF   Sq Epi:  / Non Sq Epi: Occasional /HPF / Bacteria: Trace /HPF      RADIOLOGY & ADDITIONAL STUDIES:    < from: Transthoracic Echocardiogram (18 @ 09:05) >      < end of copied text >

## 2018-08-14 NOTE — PROGRESS NOTE ADULT - SUBJECTIVE AND OBJECTIVE BOX
Patient seen and examined, no new complaints, feels fine    VSS /80  CN's normal  power 5/5  FNF normal  gait normal      MRI brain read as subacute small infarcts subcortical left cerebral, however this looks possibly to be an over-read and does not correlate with symptoms    Carotid dopplers:  no stenosis but has high bifurcation and not visualized    IMP:  TIA, back to baseline    cont ASA  cont Atorvastatin  MRA neck to r/o stenosis, and if ok can be DC'd for outpatient f/u

## 2018-08-14 NOTE — PROGRESS NOTE ADULT - PROBLEM SELECTOR PROBLEM 2
R/O CVA (cerebral vascular accident)

## 2018-08-14 NOTE — DISCHARGE NOTE ADULT - ADDITIONAL INSTRUCTIONS
Followup with your primary care doctor within one week  Followup with Dr. Phillips for cardiology within one week.  Followup with Dr. Brandon for neurology within one week.  Followup with Dr. Bill for neurosurgery within one week.  Followup with Dr. Gardiner for nephrology within one week.

## 2018-08-14 NOTE — SWALLOW BEDSIDE ASSESSMENT ADULT - COMMENTS
Pt seen for bedside swallow evaluation. Pt found seated upright at edge of bed, eating lunch. A,A+Ox4, able to follow directives and respond to questions regarding current status.

## 2018-08-14 NOTE — PROGRESS NOTE ADULT - PROBLEM SELECTOR PLAN 2
CT head negative  MRI - multiple subacute and acute infarcts in left hemisphere  Dr. Ruosseau and Dr. Brandon aware  MRA, TTE with bubble scan, and b/l carotid U/S ordered  f/u lipid panel CT head negative  MRI - multiple subacute and acute infarcts in left hemisphere  carotid U/S - high bifurcations  MRA - aneurysm  Dr. Brandon aware - thinks MRI was over-read, thinks there was no stroke and is TIA, signed off - recommends outpatient neurology f/u

## 2018-08-14 NOTE — SWALLOW BEDSIDE ASSESSMENT ADULT - SWALLOW EVAL: RECOMMENDED FEEDING/EATING TECHNIQUES
oral hygiene/alternate food with liquid/allow for swallow between intakes/check mouth frequently for oral residue/pocketing/hard swallow w/ each bite or sip/position upright (90 degrees)/small sips/bites/maintain upright posture during/after eating for 30 mins

## 2018-08-14 NOTE — DISCHARGE NOTE ADULT - PATIENT PORTAL LINK FT
You can access the [facility] Patient Portal, offered by [facility], by registering with the following website: [email or web address]

## 2018-08-14 NOTE — CONSULT NOTE ADULT - ASSESSMENT
73 y /o M with PMH of HTN (not on any medications) doesn't have a PCP, presented with complaint of left arm numbness and weakness.  1.Tele monitoring.  2.Echocardiogram.  3.Neurology eval.  4.HTN-satrt cozaar 25mg qd-Pt complaining of headache.  5.Lipid D/O-statin.  6.Cont asa,statin.  7.GI and DVT prophylaxis.
Impression:  Left arm shaking followed with numbness and weakness concerning for focal seizure with Alan's paralysis.  Focal stroke can also sometimes present with seizure.     Recommendations:  1.             Admit to telemetry   2.             EEG  3. MRI brain.  If there is stroke on the MRI of the brain, then complete stroke work up: MRA head without contrast, Carotid duplex (CD), TTE  4. ASA 81mg and Lipitor 40mg HS, can stop if there is no stroke on imaging  5. no AED for now  6. seizure and fall precautions  7.          STAT CTH IF the patient has sudden change in mental status or neurological exam  8.          DVT PPx    Thank you for the courtesy of this consult.
73 year-old man with CKD 2 with rising creatinine due to ARB.  The rise is ~20% from baseline and therefore borderline acceptable rise in creatinine.  Recommend continuation of ARB and f/u as outpatient in 1 week in my office.  HTN with BP greatly improved. Continue current BP meds.  Monitor.  LE edema, trace, with diastolic HF.- monitor for now.  Would consider giving a diuretic in part for HTN management in the future if appropriate from cardiac perspective as well.  For now, no diuretic and monitor as outpatient.

## 2018-08-14 NOTE — DISCHARGE NOTE ADULT - CARE PROVIDER_API CALL
Shree Gardiner), Internal Medicine; Nephrology  [address] [place] [address]  Phone: [phone]  Fax: [phone]    Angel Luis Brandon), Neurology  [address]  [place] [address]  Phone: [phone]  Fax: [phone]    Hero Bill), Neurosurgery  [address] [address]  Phone: [phone]  Fax: [phone]    Liberty Phillips), Internal Medicine  [address]  [place] [address]  Phone: [phone]  Fax: [phone]

## 2018-08-14 NOTE — DISCHARGE NOTE ADULT - NS AS DC STROKE ED MATERIALS
Stroke Education Booklet/Stroke Warning Signs and Symptoms/Prescribed Medications/Need for Followup After Discharge/Call 911 for Stroke/Risk Factors for Stroke

## 2018-08-14 NOTE — DISCHARGE NOTE ADULT - CARE PLAN
Principal Discharge DX:	CVA (cerebral vascular accident)  Goal:	recovery  Assessment and plan of treatment:	You presented with left arm weakness and were admitted to rule out CVA. Your CT head was negative. Your MRI showed multiple small infarcts in the left side of your brain and multiple areas. You had an ultrasound of your carotid arteries that was unable to fully visualize what was needed, so you had an MRA of your head and neck which showed a small aneurysm in your brain. You will followup with your neurologist and neurosurgeon within one week after discharge. You will followup with your primary care doctor within one week. You will continue with your medications as prescribed.  Secondary Diagnosis:	CKD (chronic kidney disease) stage 2, GFR 60-89 ml/min  Assessment and plan of treatment:	You presented with elevated creatinine due to damage to your kidneys. You will followup with your nephrologist within a week of discharge. You will continue with your medication as prescribed.  Secondary Diagnosis:	Hyperlipidemia  Assessment and plan of treatment:	You have elevated lipid levels in your blood. You will followup with your cardiologist within one week after discharge. You will continue with your medication as prescribed.  Secondary Diagnosis:	Hypertension  Assessment and plan of treatment:	You have a history of high blood pressure. You will continue with your medications as prescribed. You will followup with your cardiologist and primary care provider within one week of discharge.

## 2018-08-14 NOTE — PROGRESS NOTE ADULT - SUBJECTIVE AND OBJECTIVE BOX
CHIEF COMPLAINT:Patient is a 73y old  Male who presents with a chief complaint of left arm weakness and numbness .Pt appears comfortable.    	  REVIEW OF SYSTEMS:  CONSTITUTIONAL: No fever, weight loss, or fatigue  EYES: No eye pain, visual disturbances, or discharge  ENT:  No difficulty hearing, tinnitus, vertigo; No sinus or throat pain  NECK: No pain or stiffness  RESPIRATORY: No cough, wheezing, chills or hemoptysis; No Shortness of Breath  CARDIOVASCULAR: No chest pain, palpitations, passing out, dizziness, or leg swelling  GASTROINTESTINAL: No abdominal or epigastric pain. No nausea, vomiting, or hematemesis; No diarrhea or constipation. No melena or hematochezia.  GENITOURINARY: No dysuria, frequency, hematuria, or incontinence  NEUROLOGICAL: No headaches, memory loss, loss of strength, numbness, or tremors  SKIN: No itching, burning, rashes, or lesions   LYMPH Nodes: No enlarged glands  ENDOCRINE: No heat or cold intolerance; No hair loss  MUSCULOSKELETAL: No joint pain or swelling; No muscle, back, or extremity pain  PSYCHIATRIC: No depression, anxiety, mood swings, or difficulty sleeping  HEME/LYMPH: No easy bruising, or bleeding gums  ALLERGY AND IMMUNOLOGIC: No hives or eczema	      PHYSICAL EXAM:  T(C): 36.6 (08-14-18 @ 07:52), Max: 37.1 (08-13-18 @ 11:17)  HR: 64 (08-14-18 @ 07:52) (60 - 74)  BP: 143/80 (08-14-18 @ 07:52) (123/78 - 172/87)  RR: 18 (08-14-18 @ 07:52) (18 - 20)  SpO2: 100% (08-14-18 @ 07:52) (94% - 100%)  Wt(kg): --  I&O's Summary      Appearance: Normal	  HEENT:   Normal oral mucosa, PERRL, EOMI	  Lymphatic: No lymphadenopathy  Cardiovascular: Normal S1 S2, No JVD, No murmurs, No edema  Respiratory: Lungs clear to auscultation	  Psychiatry: A & O x 3, Mood & affect appropriate  Gastrointestinal:  Soft, Non-tender, + BS	  Skin: No rashes, No ecchymoses, No cyanosis	  Neurologic: Non-focal  Extremities: Normal range of motion, No clubbing, cyanosis or edema  Vascular: Peripheral pulses palpable 2+ bilaterally    MEDICATIONS  (STANDING):  aspirin  chewable 81 milliGRAM(s) Oral daily  atorvastatin 40 milliGRAM(s) Oral at bedtime  enoxaparin Injectable 40 milliGRAM(s) SubCutaneous daily  ergocalciferol 07476 Unit(s) Oral <User Schedule>  insulin lispro (HumaLOG) corrective regimen sliding scale   SubCutaneous three times a day before meals  losartan 50 milliGRAM(s) Oral daily  metoprolol tartrate 25 milliGRAM(s) Oral two times a day      	  LABS:	 	                       14.4   5.8   )-----------( 237      ( 14 Aug 2018 08:22 )             45.3     08-13    139  |  107  |  16  ----------------------------<  119<H>  4.1   |  25  |  1.35<H>    Ca    8.5      13 Aug 2018 09:00  Phos  2.8     08-13  Mg     2.2     08-13    TPro  8.1  /  Alb  3.2<L>  /  TBili  0.5  /  DBili  x   /  AST  21  /  ALT  25  /  AlkPhos  118  08-13      Lipid Profile: Cholesterol 236    HDL 35      HgA1c: Hemoglobin A1C, Whole Blood: 6.4 % (08-10 @ 14:17)    TSH: Thyroid Stimulating Hormone, Serum: 3.38 uU/mL (08-10 @ 10:49)  Thyroid Stimulating Hormone, Serum: 2.14 uU/mL (08-10 @ 01:13)      EXAM:  MR BRAIN                            PROCEDURE DATE:  08/13/2018          INTERPRETATION:  MRI BRAIN WITHOUT CONTRAST     INDICATION: 73 years old. Male. Right-sided weakness r/o CVA.     COMPARISON: CT head 8/10/2018.    TECHNIQUE: Multiplanar, multisequential MR imaging of the brain without   contrast was performed on a 1.5 Giovanna magnet.    FINDINGS:  There are multiple small foci of diffusion restriction with associated   T2/FLAIR hyperintensity compatible with acute/subacute infarcts in the   left cerebral hemisphere, mostly affecting the left basal ganglia,   caudate, periventricular, deep and subcortical white matter in the left   frontal and parietal lobes, minimally in the left occipital lobe; several   foci involve the cortex.    The ventricles and cortical sulci are within normal limits for age. No   intracranial hemorrhage, mass effect or midline shift. The basal cisterns   are patent.    Few, scattered foci of T2/FLAIR hyperintensity are noted in the   periventricular and subcortical white matter, nonspecific but likely   sequela of small vessel ischemic disease.    The major intracranial flow voids at the skull base are preserved. The   paranasal sinuses and mastoid air cells are well aerated.    IMPRESSION:  Multiple small acute/subacute infarcts in the left cerebral hemisphere,   mostly involving the white matter and left basal ganglia, and also   involving left frontal, parietal and occipital lobe cortices. These are   suggestive of watershed left VIGNESH/MCA and MCA/PCA territories.  	    PROCEDURE DATE:  08/13/2018          INTERPRETATION:  Carotid duplex Doppler ultrasound    Indication: CVA    Comparison: None    Carotid duplex Doppler ultrasound is performed. The examination is   limited by patient's high carotid bifurcation. The right distal internal   carotid artery and the left mid to distal internal carotid artery are not   visualized due to the limitation. Grayscale ultrasound demonstrates mild   atherosclerotic plaques in the carotid bulbs bilaterally. The flow   velocity measurements during peak systolic phase in centimeters per   second are as the following:    Right CCA 79, ICA 46, ECA 39.  Left , ICA 51, ECA 54.    The end diastolic velocity measurement for the right ICA is 13, and  for   the left ICA is 19.    The peak systolic ICA/CCA velocity ratio on the right is 0.6, and on the   left is 0.5.    Both vertebral arteries maintain normal antegrade flow direction.    Impression: The examination is limited by patient's high carotid   bifurcation. The right distal internal carotid artery and the left mid to   distal internal carotid artery are not visualized due to the limitation.   No evidence for hemodynamically significant stenosis in the visualized   internal carotid arteries bilaterally by velocity criteria.

## 2018-08-14 NOTE — PROGRESS NOTE ADULT - ASSESSMENT
Mr. Joshua Sierra is a 73 year old male with PMH of HTN presents with complaint of left arm numbness and weakness. Patient was admitted to rule out CVA. His symptoms have resolved and he currently feels well.
Mr. Joshua Sierra is a 73 year old male with PMH of HTN presents with complaint of left arm numbness and weakness. Patient was admitted to rule out CVA. His symptoms have resolved and he currently feels well.
73 y /o M with PMH of HTN (not on any medications) doesn't have a PCP, presented with complaint of left arm numbness and weakness,Lt cerebral hemeshere infarcts.  1.Tele monitoring.  2.Await MRA head and neck.  3.Neurology f/u.  4.HTN- lopressor and cozaar.  5.Lipid D/O-statin.  6.Cont asa,statin.  7.GI and DVT prophylaxis.  8.Check hypercoag work-up.
73 y /o M with PMH of HTN (not on any medications) doesn't have a PCP, presented with complaint of left arm numbness and weakness.  1.Tele monitoring.  2.Await MRI.  3.Neurology f/u.  4.HTN- cozaar 50mg qd.  5.Lipid D/O-statin.  6.Cont asa,statin.  7.GI and DVT prophylaxis.
Mr. Joshua Sierra is a 73 year old male with PMH of HTN presents with complaint of left arm numbness and weakness. Patient was admitted to rule out CVA. His symptoms have resolved and he currently feels well.

## 2018-08-14 NOTE — SWALLOW BEDSIDE ASSESSMENT ADULT - SLP PERTINENT HISTORY OF CURRENT PROBLEM
73 y /o M with PMHx of HTN (not on any medications), presented with complaint of left arm numbness and weakness. Admitted on 8/10/18 to r/o CVA. As per neurology, MRI brain indicated subacute small infarcts subcortical left cerebral; however, looks possibly to be an over-read and does not correlate with symptoms, likely TIA as Pt is back to baseline.

## 2018-08-15 LAB
B2 GLYCOPROT1 AB SER QL: NEGATIVE — SIGNIFICANT CHANGE UP
CARDIOLIPIN AB SER-ACNC: NEGATIVE — SIGNIFICANT CHANGE UP

## 2018-08-16 LAB
DNA PLOIDY SPEC FC-IMP: SIGNIFICANT CHANGE UP
MTHFR GENE INTERPRETATION: SIGNIFICANT CHANGE UP
PTR INTERPRETATION: SIGNIFICANT CHANGE UP

## 2018-10-10 ENCOUNTER — EMERGENCY (EMERGENCY)
Facility: HOSPITAL | Age: 73
LOS: 1 days | Discharge: HOME | End: 2018-10-10
Attending: EMERGENCY MEDICINE
Payer: COMMERCIAL

## 2018-10-10 VITALS
RESPIRATION RATE: 18 BRPM | HEART RATE: 86 BPM | SYSTOLIC BLOOD PRESSURE: 163 MMHG | TEMPERATURE: 98 F | OXYGEN SATURATION: 98 % | DIASTOLIC BLOOD PRESSURE: 118 MMHG

## 2018-10-10 PROBLEM — I10 ESSENTIAL (PRIMARY) HYPERTENSION: Chronic | Status: ACTIVE | Noted: 2018-08-10

## 2018-10-10 PROCEDURE — 99282 EMERGENCY DEPT VISIT SF MDM: CPT

## 2018-10-10 PROCEDURE — 99281 EMR DPT VST MAYX REQ PHY/QHP: CPT

## 2018-10-10 RX ORDER — METOPROLOL TARTRATE 50 MG
1 TABLET ORAL
Qty: 60 | Refills: 0 | OUTPATIENT
Start: 2018-10-10 | End: 2018-11-08

## 2018-10-10 RX ORDER — METOPROLOL TARTRATE 50 MG
1 TABLET ORAL
Qty: 60 | Refills: 0
Start: 2018-10-10 | End: 2018-11-08

## 2018-10-10 RX ORDER — HYDRALAZINE HCL 50 MG
1 TABLET ORAL
Qty: 90 | Refills: 0 | OUTPATIENT
Start: 2018-10-10 | End: 2018-11-08

## 2018-10-10 RX ORDER — HYDRALAZINE HCL 50 MG
1 TABLET ORAL
Qty: 90 | Refills: 0
Start: 2018-10-10 | End: 2018-11-08

## 2018-10-10 NOTE — ED PROVIDER NOTE - NS ED ROS FT
Constitutional: - Fever, - Chills, - Anorexia, - Fatigue  Eyes: - Discharge, - Irritation, - Redness, - Visual changes, - Light sensitivity  EARS: - Ear Pain, - Apparent hearing problems  NOSE: - Congestion, - Bloody nose  MOUTH/THROAT: - Vocal Changes, - Drooling, - Sore throat  NECK: - Lumps, - Stiffness, - Pain  CV: - Palpitations, - Chest Pain, - Edema   RESP:  - Cough, - Shortness of Breath, - Dyspnea on Exertion, - Trouble speaking, - Pain with breathing, - Wheezing  GI: - Diarrhea, - Constipation, - Bloody stools, - Nausea, - Vomiting, - Abdominal Pain  : - Dysuria, -Frequency, - Hematuria, - Hesitancy, - Incontinence, - Saddle Anesthesia  MSK: - Myalgias, - Arthralgias, - Weakness, - Deformities, - Injuries  SKIN: - Color change, - Rash, - Swelling, - Bruises, - Wounds  NEURO: - Change in behavior, - Dec. Alertness, - Headache, - Dizziness, - Numbness/Tingling, - Change in speech, - Weakness, - Seizure-like activity

## 2018-10-10 NOTE — ED PROVIDER NOTE - ATTENDING CONTRIBUTION TO CARE
agree with above. ran out of bp meds, here asking for refill. not having any sx. gave refill and referred to pmd for outpt follow up for continuity of care.

## 2018-10-10 NOTE — ED PROVIDER NOTE - MEDICAL DECISION MAKING DETAILS
72 yo male with htn requesting medication refill after running out of his metoprolol and hctz previously prescribed in august. pt is asymptomatic and without any complaints, htn in ED likely due to not taking medication. will prescribe medications and have care coordination work with patient to find a primary care provider.

## 2018-10-10 NOTE — ED PROVIDER NOTE - PHYSICAL EXAMINATION
PE:   GEN: Awake, alert, interactive, NAD, non-toxic appearing.   HEAD: Atraumatic  EYES: Sclera white, conjunctiva pink, PERRLA  CARDIAC: Reg rate and rhythm, S1,S2, no murmur/rub/gallop. Strong central and peripheral pulses, Brisk cap refill, no evident pedal edema.   RESP: No distress noted. L/S clear = Bilat without accessory muscle use, wheeze, rhonchi, rales.   ABD: soft, supple, non-tender, no guarding. BS x 4, normoactive.   NEURO: AOx3, CN II-XII grossly intact without focal deficit.   MSK: Moving all extremities with no apparent deformities.   SKIN: Warm, dry, normal color, without apparent rashes.

## 2018-10-10 NOTE — ED ADULT NURSE NOTE - NSIMPLEMENTINTERV_GEN_ALL_ED
Implemented All Universal Safety Interventions:  [place] to call system. Call bell, personal items and telephone within reach. Instruct patient to call for assistance. Room bathroom lighting operational. Non-slip footwear when patient is off stretcher. Physically safe environment: no spills, clutter or unnecessary equipment. Stretcher in lowest position, wheels locked, appropriate side rails in place.

## 2018-10-10 NOTE — ED PROVIDER NOTE - OBJECTIVE STATEMENT
this is a 74 yo male, hx of htn, presenting to the ED with a request for a refill of his metoprolol and hctz. states he does not have a primary care provider and thought the pharmacy would just refill his medications. last took medications 2 weeks ago. has been feeling well without any complaints. no nausea, vomiting, headache, dizziness, chest pain, shortness of breath, abd pain, muscle cramps, urinary symptoms or fever.

## 2018-11-27 ENCOUNTER — EMERGENCY (EMERGENCY)
Facility: HOSPITAL | Age: 73
LOS: 1 days | Discharge: HOME | End: 2018-11-27
Attending: EMERGENCY MEDICINE
Payer: COMMERCIAL

## 2018-11-27 VITALS
HEART RATE: 95 BPM | OXYGEN SATURATION: 100 % | SYSTOLIC BLOOD PRESSURE: 194 MMHG | WEIGHT: 169.98 LBS | RESPIRATION RATE: 20 BRPM | HEIGHT: 70 IN | DIASTOLIC BLOOD PRESSURE: 111 MMHG | TEMPERATURE: 98 F

## 2018-11-27 PROCEDURE — 99282 EMERGENCY DEPT VISIT SF MDM: CPT

## 2018-11-27 RX ORDER — HYDRALAZINE HCL 50 MG
1 TABLET ORAL
Qty: 12 | Refills: 0
Start: 2018-11-27 | End: 2018-11-30

## 2018-11-27 RX ORDER — METOPROLOL TARTRATE 50 MG
1 TABLET ORAL
Qty: 8 | Refills: 0
Start: 2018-11-27 | End: 2018-11-30

## 2018-11-27 NOTE — ED PROVIDER NOTE - OBJECTIVE STATEMENT
72 y/o M patient with a significant PMHx of HTN and no significant PSHx presents to the ED requesting a refill of Metoprolol and Hydralazine. Patient doesn't understand that he must take his medication everyday. Patient states he only takes them when his blood pressure is high. Patient notes a history of mini stroke and states his sister also had a mini stroke and . Discussed with patient that he is going to be on blood pressure medication for the rest of his left without exceptions and has to see his PMD. Patient denies any other complaints. NKDA.

## 2019-06-10 NOTE — PHYSICAL THERAPY INITIAL EVALUATION ADULT - LONG TERM MEMORY, REHAB EVAL
intact Consent: The rationale for the repair was explained to the patient and consent was obtained. The risks, benefits and alternatives to therapy were discussed in detail. Specifically, the risks of infection, scarring, bleeding, prolonged wound healing, incomplete removal, allergy to anesthesia, nerve injury and recurrence were addressed. Prior to the procedure, the treatment site was clearly identified and confirmed by the patient. All components of Universal Protocol/PAUSE Rule completed.

## 2019-07-17 NOTE — ED ADULT NURSE NOTE - OBJECTIVE STATEMENT
Problem: Patient Care Overview  Goal: Plan of Care Review  Outcome: Ongoing (interventions implemented as appropriate)   07/17/19 0616   Coping/Psychosocial   Plan of Care Reviewed With patient;spouse   Plan of Care Review   Progress improving   OTHER   Outcome Summary Admitted from ED, A&O, no complaints of pain, tolerating ice, IVF infusing, VSS, will continue to monitor.     Goal: Individualization and Mutuality  Outcome: Ongoing (interventions implemented as appropriate)    Goal: Discharge Needs Assessment  Outcome: Ongoing (interventions implemented as appropriate)      Problem: Fall Risk (Adult)  Goal: Identify Related Risk Factors and Signs and Symptoms  Outcome: Outcome(s) achieved Date Met: 07/17/19    Goal: Absence of Fall  Outcome: Ongoing (interventions implemented as appropriate)      Problem: Pain, Acute (Adult)  Goal: Identify Related Risk Factors and Signs and Symptoms  Outcome: Outcome(s) achieved Date Met: 07/17/19    Goal: Acceptable Pain Control/Comfort Level  Outcome: Ongoing (interventions implemented as appropriate)         73 year old male presented to ED h/o  left hand weakness over the last 12 hours. After symptoms began went to sleep and when he woke up in the evening symptoms were still present. tried to make food but was unable to properly use his left hand. Symptoms have resolved at this time. Denies ha, dizziness, b/l LE or RUE numbness/tingling/weakness, cp, sob, palpitations, abd pains, n/V/D/C, urinary complaints, fevers, chills, sweats. took Advil at home which didn't improve symptoms.

## 2022-02-15 ENCOUNTER — INPATIENT (INPATIENT)
Facility: HOSPITAL | Age: 77
LOS: 2 days | Discharge: HOME | DRG: 690 | End: 2022-02-18
Attending: STUDENT IN AN ORGANIZED HEALTH CARE EDUCATION/TRAINING PROGRAM | Admitting: STUDENT IN AN ORGANIZED HEALTH CARE EDUCATION/TRAINING PROGRAM
Payer: COMMERCIAL

## 2022-02-15 VITALS
DIASTOLIC BLOOD PRESSURE: 84 MMHG | WEIGHT: 179.9 LBS | SYSTOLIC BLOOD PRESSURE: 151 MMHG | OXYGEN SATURATION: 97 % | HEIGHT: 70 IN | HEART RATE: 98 BPM | TEMPERATURE: 98 F | RESPIRATION RATE: 17 BRPM

## 2022-02-15 PROBLEM — Z00.00 ENCOUNTER FOR PREVENTIVE HEALTH EXAMINATION: Status: ACTIVE | Noted: 2022-02-15

## 2022-02-15 LAB
ALBUMIN SERPL ELPH-MCNC: 3.4 G/DL — LOW (ref 3.5–5)
ALP SERPL-CCNC: 127 U/L — HIGH (ref 40–120)
ALT FLD-CCNC: 24 U/L DA — SIGNIFICANT CHANGE UP (ref 10–60)
ANION GAP SERPL CALC-SCNC: 8 MMOL/L — SIGNIFICANT CHANGE UP (ref 5–17)
APPEARANCE UR: ABNORMAL
AST SERPL-CCNC: 14 U/L — SIGNIFICANT CHANGE UP (ref 10–40)
BACTERIA # UR AUTO: ABNORMAL /HPF
BASOPHILS # BLD AUTO: 0.03 K/UL — SIGNIFICANT CHANGE UP (ref 0–0.2)
BASOPHILS NFR BLD AUTO: 0.3 % — SIGNIFICANT CHANGE UP (ref 0–2)
BILIRUB SERPL-MCNC: 0.6 MG/DL — SIGNIFICANT CHANGE UP (ref 0.2–1.2)
BILIRUB UR-MCNC: NEGATIVE — SIGNIFICANT CHANGE UP
BUN SERPL-MCNC: 28 MG/DL — HIGH (ref 7–18)
CALCIUM SERPL-MCNC: 11.4 MG/DL — HIGH (ref 8.4–10.5)
CHLORIDE SERPL-SCNC: 99 MMOL/L — SIGNIFICANT CHANGE UP (ref 96–108)
CO2 SERPL-SCNC: 28 MMOL/L — SIGNIFICANT CHANGE UP (ref 22–31)
COLOR SPEC: YELLOW — SIGNIFICANT CHANGE UP
CREAT SERPL-MCNC: 1.65 MG/DL — HIGH (ref 0.5–1.3)
DIFF PNL FLD: ABNORMAL
EOSINOPHIL # BLD AUTO: 0.01 K/UL — SIGNIFICANT CHANGE UP (ref 0–0.5)
EOSINOPHIL NFR BLD AUTO: 0.1 % — SIGNIFICANT CHANGE UP (ref 0–6)
EPI CELLS # UR: ABNORMAL /HPF
GLUCOSE SERPL-MCNC: 407 MG/DL — HIGH (ref 70–99)
GLUCOSE UR QL: 1000 MG/DL
HCT VFR BLD CALC: 42.2 % — SIGNIFICANT CHANGE UP (ref 39–50)
HGB BLD-MCNC: 13.8 G/DL — SIGNIFICANT CHANGE UP (ref 13–17)
IMM GRANULOCYTES NFR BLD AUTO: 0.7 % — SIGNIFICANT CHANGE UP (ref 0–1.5)
KETONES UR-MCNC: ABNORMAL
LEUKOCYTE ESTERASE UR-ACNC: ABNORMAL
LYMPHOCYTES # BLD AUTO: 1.01 K/UL — SIGNIFICANT CHANGE UP (ref 1–3.3)
LYMPHOCYTES # BLD AUTO: 9 % — LOW (ref 13–44)
MCHC RBC-ENTMCNC: 27.4 PG — SIGNIFICANT CHANGE UP (ref 27–34)
MCHC RBC-ENTMCNC: 32.7 GM/DL — SIGNIFICANT CHANGE UP (ref 32–36)
MCV RBC AUTO: 83.7 FL — SIGNIFICANT CHANGE UP (ref 80–100)
MONOCYTES # BLD AUTO: 0.64 K/UL — SIGNIFICANT CHANGE UP (ref 0–0.9)
MONOCYTES NFR BLD AUTO: 5.7 % — SIGNIFICANT CHANGE UP (ref 2–14)
NEUTROPHILS # BLD AUTO: 9.42 K/UL — HIGH (ref 1.8–7.4)
NEUTROPHILS NFR BLD AUTO: 84.2 % — HIGH (ref 43–77)
NITRITE UR-MCNC: NEGATIVE — SIGNIFICANT CHANGE UP
NRBC # BLD: 0 /100 WBCS — SIGNIFICANT CHANGE UP (ref 0–0)
NT-PROBNP SERPL-SCNC: 40 PG/ML — SIGNIFICANT CHANGE UP (ref 0–450)
PH UR: 6 — SIGNIFICANT CHANGE UP (ref 5–8)
PLATELET # BLD AUTO: 338 K/UL — SIGNIFICANT CHANGE UP (ref 150–400)
POTASSIUM SERPL-MCNC: 4.8 MMOL/L — SIGNIFICANT CHANGE UP (ref 3.5–5.3)
POTASSIUM SERPL-SCNC: 4.8 MMOL/L — SIGNIFICANT CHANGE UP (ref 3.5–5.3)
PROT SERPL-MCNC: 8.2 G/DL — SIGNIFICANT CHANGE UP (ref 6–8.3)
PROT UR-MCNC: 30 MG/DL
RBC # BLD: 5.04 M/UL — SIGNIFICANT CHANGE UP (ref 4.2–5.8)
RBC # FLD: 13.2 % — SIGNIFICANT CHANGE UP (ref 10.3–14.5)
RBC CASTS # UR COMP ASSIST: ABNORMAL /HPF (ref 0–2)
SODIUM SERPL-SCNC: 135 MMOL/L — SIGNIFICANT CHANGE UP (ref 135–145)
SP GR SPEC: 1.01 — SIGNIFICANT CHANGE UP (ref 1.01–1.02)
UROBILINOGEN FLD QL: NEGATIVE — SIGNIFICANT CHANGE UP
WBC # BLD: 11.19 K/UL — HIGH (ref 3.8–10.5)
WBC # FLD AUTO: 11.19 K/UL — HIGH (ref 3.8–10.5)
WBC UR QL: >50 /HPF (ref 0–5)

## 2022-02-15 PROCEDURE — 99285 EMERGENCY DEPT VISIT HI MDM: CPT

## 2022-02-15 PROCEDURE — 99053 MED SERV 10PM-8AM 24 HR FAC: CPT

## 2022-02-15 RX ORDER — CEFTRIAXONE 500 MG/1
1000 INJECTION, POWDER, FOR SOLUTION INTRAMUSCULAR; INTRAVENOUS ONCE
Refills: 0 | Status: COMPLETED | OUTPATIENT
Start: 2022-02-15 | End: 2022-02-15

## 2022-02-15 RX ORDER — SODIUM CHLORIDE 9 MG/ML
1000 INJECTION INTRAMUSCULAR; INTRAVENOUS; SUBCUTANEOUS ONCE
Refills: 0 | Status: COMPLETED | OUTPATIENT
Start: 2022-02-15 | End: 2022-02-15

## 2022-02-15 RX ADMIN — CEFTRIAXONE 100 MILLIGRAM(S): 500 INJECTION, POWDER, FOR SOLUTION INTRAMUSCULAR; INTRAVENOUS at 23:52

## 2022-02-15 RX ADMIN — SODIUM CHLORIDE 1000 MILLILITER(S): 9 INJECTION INTRAMUSCULAR; INTRAVENOUS; SUBCUTANEOUS at 23:03

## 2022-02-15 NOTE — ED ADULT NURSE NOTE - NSFALLRSKINDICATORS_ED_ALL_ED
Completed Prior Authorization request for patient Rx Maxalt. Awaiting response from insurance.   
no

## 2022-02-15 NOTE — ED PROVIDER NOTE - NSICDXPASTMEDICALHX_GEN_ALL_CORE_FT
PAST MEDICAL HISTORY:  Hypertension      PAST MEDICAL HISTORY:  Hypercholesterolemia     Hypertension

## 2022-02-15 NOTE — ED PROVIDER NOTE - RELIEVING FACTORS
Action 2: Continue Detail Level: Zone Plan: Will give samples of nolex cream Plan: Hyroquinone, TrEtinoin, [place] Acid QHS none

## 2022-02-15 NOTE — ED PROVIDER NOTE - RATE
Price (Do Not Change): 0.00 Instructions: This plan will send the code FBSE to the PM system.  DO NOT or CHANGE the price. Detail Level: Simple 106

## 2022-02-15 NOTE — ED PROVIDER NOTE - CARE PLAN
1 Principal Discharge DX:	Obstructed, uropathy  Secondary Diagnosis:	Diabetes mellitus, new onset  Secondary Diagnosis:	Urinary retention  Secondary Diagnosis:	Cystitis  Secondary Diagnosis:	Hypercalcemia

## 2022-02-15 NOTE — ED ADULT NURSE NOTE - NSIMPLEMENTINTERV_GEN_ALL_ED
Implemented All Universal Safety Interventions:  [place] to call system. Call bell, personal items and telephone within reach. Instruct patient to call for assistance. Room bathroom lighting operational. Non-slip footwear when patient is off stretcher. Physically safe environment: no spills, clutter or unnecessary equipment. Stretcher in lowest position, wheels locked, appropriate side rails in place.

## 2022-02-15 NOTE — ED PROVIDER NOTE - WR ORDER DATE AND TIME 1
----- Message from Teresa Orosco sent at 1/24/2018  9:32 AM CST -----  Contact: pt at [phone]  Dante pt-pt needs to cancel and reschedule his appt th at is on Jan.30   16-Feb-2022 01:46

## 2022-02-15 NOTE — ED ADULT NURSE REASSESSMENT NOTE - NS ED NURSE REASSESS COMMENT FT1
scanned pt's bladder 478 ml of urine noted. tran catheter inserted on this pt x 2 pressure noted unable to fully insert the catheter. Dr EDY Samaniego made aware. as per MD will refer to urology.

## 2022-02-15 NOTE — ED PROVIDER NOTE - PROGRESS NOTE DETAILS
tran placed by surgery, ~450cc pinkish urine obtained.  pt with elevated blood sugar, new onset DM, also post obstructive uropathy, UTI, will admit pt.  Case d/w Dr. Dumont

## 2022-02-15 NOTE — ED PROVIDER NOTE - OBJECTIVE STATEMENT
76 y.o. male c/o urinary retention 3 weeks ago, pt had tran cath on Sun. @ [facility].  Pt lasted urinated in [place] 2 days ago, pt c/o dripping, nausea, dysuria, no vomiting, since, no suprapubic pain, fever, hematuria.  Pt has an appt with urology tomorrow

## 2022-02-16 DIAGNOSIS — N13.9 OBSTRUCTIVE AND REFLUX UROPATHY, UNSPECIFIED: ICD-10-CM

## 2022-02-16 DIAGNOSIS — N39.0 URINARY TRACT INFECTION, SITE NOT SPECIFIED: ICD-10-CM

## 2022-02-16 DIAGNOSIS — R33.9 RETENTION OF URINE, UNSPECIFIED: ICD-10-CM

## 2022-02-16 DIAGNOSIS — N18.9 CHRONIC KIDNEY DISEASE, UNSPECIFIED: ICD-10-CM

## 2022-02-16 DIAGNOSIS — E11.9 TYPE 2 DIABETES MELLITUS WITHOUT COMPLICATIONS: ICD-10-CM

## 2022-02-16 DIAGNOSIS — Z29.9 ENCOUNTER FOR PROPHYLACTIC MEASURES, UNSPECIFIED: ICD-10-CM

## 2022-02-16 DIAGNOSIS — I10 ESSENTIAL (PRIMARY) HYPERTENSION: ICD-10-CM

## 2022-02-16 LAB
ALBUMIN SERPL ELPH-MCNC: 2.9 G/DL — LOW (ref 3.5–5)
ALP SERPL-CCNC: 114 U/L — SIGNIFICANT CHANGE UP (ref 40–120)
ALT FLD-CCNC: 20 U/L DA — SIGNIFICANT CHANGE UP (ref 10–60)
ANION GAP SERPL CALC-SCNC: 6 MMOL/L — SIGNIFICANT CHANGE UP (ref 5–17)
AST SERPL-CCNC: 11 U/L — SIGNIFICANT CHANGE UP (ref 10–40)
BILIRUB SERPL-MCNC: 0.5 MG/DL — SIGNIFICANT CHANGE UP (ref 0.2–1.2)
BUN SERPL-MCNC: 22 MG/DL — HIGH (ref 7–18)
CALCIUM SERPL-MCNC: 10.1 MG/DL — SIGNIFICANT CHANGE UP (ref 8.4–10.5)
CHLORIDE SERPL-SCNC: 106 MMOL/L — SIGNIFICANT CHANGE UP (ref 96–108)
CO2 SERPL-SCNC: 26 MMOL/L — SIGNIFICANT CHANGE UP (ref 22–31)
CREAT SERPL-MCNC: 1.19 MG/DL — SIGNIFICANT CHANGE UP (ref 0.5–1.3)
GLUCOSE BLDC GLUCOMTR-MCNC: 221 MG/DL — HIGH (ref 70–99)
GLUCOSE BLDC GLUCOMTR-MCNC: 273 MG/DL — HIGH (ref 70–99)
GLUCOSE BLDC GLUCOMTR-MCNC: 287 MG/DL — HIGH (ref 70–99)
GLUCOSE BLDC GLUCOMTR-MCNC: 353 MG/DL — HIGH (ref 70–99)
GLUCOSE BLDC GLUCOMTR-MCNC: 391 MG/DL — HIGH (ref 70–99)
GLUCOSE SERPL-MCNC: 283 MG/DL — HIGH (ref 70–99)
HCT VFR BLD CALC: 40.3 % — SIGNIFICANT CHANGE UP (ref 39–50)
HGB BLD-MCNC: 12.9 G/DL — LOW (ref 13–17)
MAGNESIUM SERPL-MCNC: 1.8 MG/DL — SIGNIFICANT CHANGE UP (ref 1.6–2.6)
MCHC RBC-ENTMCNC: 27 PG — SIGNIFICANT CHANGE UP (ref 27–34)
MCHC RBC-ENTMCNC: 32 GM/DL — SIGNIFICANT CHANGE UP (ref 32–36)
MCV RBC AUTO: 84.5 FL — SIGNIFICANT CHANGE UP (ref 80–100)
NRBC # BLD: 0 /100 WBCS — SIGNIFICANT CHANGE UP (ref 0–0)
PHOSPHATE SERPL-MCNC: 3.7 MG/DL — SIGNIFICANT CHANGE UP (ref 2.5–4.5)
PLATELET # BLD AUTO: 290 K/UL — SIGNIFICANT CHANGE UP (ref 150–400)
POTASSIUM SERPL-MCNC: 4.1 MMOL/L — SIGNIFICANT CHANGE UP (ref 3.5–5.3)
POTASSIUM SERPL-SCNC: 4.1 MMOL/L — SIGNIFICANT CHANGE UP (ref 3.5–5.3)
PROT SERPL-MCNC: 7.4 G/DL — SIGNIFICANT CHANGE UP (ref 6–8.3)
RBC # BLD: 4.77 M/UL — SIGNIFICANT CHANGE UP (ref 4.2–5.8)
RBC # FLD: 13.2 % — SIGNIFICANT CHANGE UP (ref 10.3–14.5)
SARS-COV-2 RNA SPEC QL NAA+PROBE: SIGNIFICANT CHANGE UP
SODIUM SERPL-SCNC: 138 MMOL/L — SIGNIFICANT CHANGE UP (ref 135–145)
WBC # BLD: 11.72 K/UL — HIGH (ref 3.8–10.5)
WBC # FLD AUTO: 11.72 K/UL — HIGH (ref 3.8–10.5)

## 2022-02-16 PROCEDURE — 12345: CPT | Mod: NC

## 2022-02-16 PROCEDURE — 71045 X-RAY EXAM CHEST 1 VIEW: CPT | Mod: 26

## 2022-02-16 RX ORDER — INSULIN LISPRO 100/ML
VIAL (ML) SUBCUTANEOUS
Refills: 0 | Status: DISCONTINUED | OUTPATIENT
Start: 2022-02-16 | End: 2022-02-18

## 2022-02-16 RX ORDER — ATORVASTATIN CALCIUM 80 MG/1
40 TABLET, FILM COATED ORAL AT BEDTIME
Refills: 0 | Status: DISCONTINUED | OUTPATIENT
Start: 2022-02-16 | End: 2022-02-18

## 2022-02-16 RX ORDER — TAMSULOSIN HYDROCHLORIDE 0.4 MG/1
0.4 CAPSULE ORAL AT BEDTIME
Refills: 0 | Status: DISCONTINUED | OUTPATIENT
Start: 2022-02-16 | End: 2022-02-18

## 2022-02-16 RX ORDER — INSULIN GLARGINE 100 [IU]/ML
20 INJECTION, SOLUTION SUBCUTANEOUS AT BEDTIME
Refills: 0 | Status: DISCONTINUED | OUTPATIENT
Start: 2022-02-16 | End: 2022-02-17

## 2022-02-16 RX ORDER — AMLODIPINE BESYLATE 2.5 MG/1
10 TABLET ORAL DAILY
Refills: 0 | Status: DISCONTINUED | OUTPATIENT
Start: 2022-02-16 | End: 2022-02-18

## 2022-02-16 RX ORDER — FINASTERIDE 5 MG/1
5 TABLET, FILM COATED ORAL DAILY
Refills: 0 | Status: DISCONTINUED | OUTPATIENT
Start: 2022-02-16 | End: 2022-02-18

## 2022-02-16 RX ORDER — CEFTRIAXONE 500 MG/1
1000 INJECTION, POWDER, FOR SOLUTION INTRAMUSCULAR; INTRAVENOUS EVERY 24 HOURS
Refills: 0 | Status: DISCONTINUED | OUTPATIENT
Start: 2022-02-16 | End: 2022-02-18

## 2022-02-16 RX ORDER — SODIUM CHLORIDE 9 MG/ML
1000 INJECTION INTRAMUSCULAR; INTRAVENOUS; SUBCUTANEOUS ONCE
Refills: 0 | Status: COMPLETED | OUTPATIENT
Start: 2022-02-16 | End: 2022-02-16

## 2022-02-16 RX ORDER — FAMOTIDINE 10 MG/ML
20 INJECTION INTRAVENOUS ONCE
Refills: 0 | Status: COMPLETED | OUTPATIENT
Start: 2022-02-16 | End: 2022-02-16

## 2022-02-16 RX ORDER — HEPARIN SODIUM 5000 [USP'U]/ML
5000 INJECTION INTRAVENOUS; SUBCUTANEOUS EVERY 8 HOURS
Refills: 0 | Status: DISCONTINUED | OUTPATIENT
Start: 2022-02-16 | End: 2022-02-18

## 2022-02-16 RX ADMIN — INSULIN GLARGINE 20 UNIT(S): 100 INJECTION, SOLUTION SUBCUTANEOUS at 04:57

## 2022-02-16 RX ADMIN — CEFTRIAXONE 1000 MILLIGRAM(S): 500 INJECTION, POWDER, FOR SOLUTION INTRAMUSCULAR; INTRAVENOUS at 01:23

## 2022-02-16 RX ADMIN — AMLODIPINE BESYLATE 10 MILLIGRAM(S): 2.5 TABLET ORAL at 05:15

## 2022-02-16 RX ADMIN — ATORVASTATIN CALCIUM 40 MILLIGRAM(S): 80 TABLET, FILM COATED ORAL at 22:38

## 2022-02-16 RX ADMIN — SODIUM CHLORIDE 1000 MILLILITER(S): 9 INJECTION INTRAMUSCULAR; INTRAVENOUS; SUBCUTANEOUS at 01:23

## 2022-02-16 RX ADMIN — TAMSULOSIN HYDROCHLORIDE 0.4 MILLIGRAM(S): 0.4 CAPSULE ORAL at 22:39

## 2022-02-16 RX ADMIN — HEPARIN SODIUM 5000 UNIT(S): 5000 INJECTION INTRAVENOUS; SUBCUTANEOUS at 05:18

## 2022-02-16 RX ADMIN — Medication 5: at 12:42

## 2022-02-16 RX ADMIN — Medication 2: at 18:37

## 2022-02-16 RX ADMIN — CEFTRIAXONE 100 MILLIGRAM(S): 500 INJECTION, POWDER, FOR SOLUTION INTRAMUSCULAR; INTRAVENOUS at 23:10

## 2022-02-16 RX ADMIN — INSULIN GLARGINE 20 UNIT(S): 100 INJECTION, SOLUTION SUBCUTANEOUS at 22:42

## 2022-02-16 RX ADMIN — FINASTERIDE 5 MILLIGRAM(S): 5 TABLET, FILM COATED ORAL at 13:49

## 2022-02-16 RX ADMIN — SODIUM CHLORIDE 1000 MILLILITER(S): 9 INJECTION INTRAMUSCULAR; INTRAVENOUS; SUBCUTANEOUS at 02:47

## 2022-02-16 RX ADMIN — SODIUM CHLORIDE 1000 MILLILITER(S): 9 INJECTION INTRAMUSCULAR; INTRAVENOUS; SUBCUTANEOUS at 02:20

## 2022-02-16 RX ADMIN — HEPARIN SODIUM 5000 UNIT(S): 5000 INJECTION INTRAVENOUS; SUBCUTANEOUS at 22:38

## 2022-02-16 RX ADMIN — FAMOTIDINE 20 MILLIGRAM(S): 10 INJECTION INTRAVENOUS at 02:47

## 2022-02-16 NOTE — H&P ADULT - NSHPPHYSICALEXAM_GEN_ALL_CORE
ICU Vital Signs Last 24 Hrs  T(C): 37.2 (16 Feb 2022 00:06), Max: 37.2 (16 Feb 2022 00:06)  T(F): 98.9 (16 Feb 2022 00:06), Max: 98.9 (16 Feb 2022 00:06)  HR: 102 (16 Feb 2022 00:06) (94 - 102)  BP: 167/92 (16 Feb 2022 00:06) (151/84 - 167/92)  RR: 18 (16 Feb 2022 00:06) (17 - 18)  SpO2: 96% (16 Feb 2022 00:06) (96% - 97%)    GENERAL: NAD, lying in bed comfortably, AAOx3  HEAD:  Atraumatic, Normocephalic  EYES: EOMI, PERRLA, conjunctiva and sclera clear  ENT: Moist mucous membranes  NECK: Supple, No JVD  CHEST/LUNG: Clear to auscultation bilaterally; No rales, rhonchi, wheezing, or rubs. Unlabored respirations  HEART: Regular rate and rhythm; No murmurs, rubs, or gallops  ABDOMEN: Lower mid abdominal pain prior to my exam before tran was inserted as per ED attending; Improved s/p tran   EXTREMITIES:  2+ Peripheral Pulses, brisk capillary refill. No clubbing, cyanosis, or edema  NERVOUS SYSTEM:  Alert & Oriented X3, speech clear. No deficits   MSK: FROM all 4 extremities, full and equal strength  SKIN: No rashes or lesions

## 2022-02-16 NOTE — H&P ADULT - HISTORY OF PRESENT ILLNESS
Patient is 76 years old male with past medical history of recent diagnosis of Pancreatic cancer hypertension, newly diagnosed DM, BPH, intracranial aneurysm, multiple cysts in kidneys who presented to ED due to urinary retention/Unable to urinate. Patient was admitted to [facility] for groin pain and inc urine frequency and he required tran catherter placement. 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 Patient had EUS showed dilation of pancreatic duct. Patient states that 2 days before, He was discharged home and he was urinating normal then he last night, he was unable to urinate on his own and he noticed dripping urine. Patient feels pain on straining so he decided to come to hospital. Patient denied abdominal pain, chest pain, vomiting, change in bowel movement.         Off note; Patient has all paperwork including Visit summary, EUS, CT/MRCP reports.

## 2022-02-16 NOTE — H&P ADULT - ASSESSMENT
in kidneys who presented to ED due to urinary retention/Unable to urinate. Patient was admitted to [facility] for groin pain and inc urine frequency and he required tran catherter placement. 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 Patient had EUS showed dilation of pancreatic duct. Patient states that 2 days before, He was discharged home and he was urinating normal then he last night, he was unable to urinate on his own and he noticed dripping urine.     In ED, Tran was placed; 450cc urine out. Patient feels better. Urology was consulted; Recommended discharge with tran and follow up outpatient. But as Per Dr. Samaniego, his urine was dirty so UA was sent and likely patient has UTI. Patient was admitted for UTI and urine retention.

## 2022-02-16 NOTE — H&P ADULT - PROBLEM SELECTOR PLAN 1
- Patient with history of BPH came due to urine retention; Unable to urinate   - Tran was placed in ED; 450cc came out   - Urology was consulted; Recommended to DC with tran and follow up outpatient on his appointment.  - Will keep the patient hydrated   - Continue on finasteride and tamsulosin

## 2022-02-16 NOTE — H&P ADULT - ATTENDING COMMENTS
Patient seen and examined at bedside.    Vital Signs Last 24 Hrs  T(C): 37.2 (16 Feb 2022 00:06), Max: 37.2 (16 Feb 2022 00:06)  T(F): 98.9 (16 Feb 2022 00:06), Max: 98.9 (16 Feb 2022 00:06)  HR: 102 (16 Feb 2022 00:06) (94 - 102)  BP: 167/92 (16 Feb 2022 00:06) (151/84 - 167/92)  BP(mean): --  RR: 18 (16 Feb 2022 00:06) (17 - 18)  SpO2: 96% (16 Feb 2022 00:06) (96% - 97%) Patient is 76 years old male with past medical history of recent diagnosis of Pancreatic cancer hypertension, newly diagnosed DM, BPH, intracranial aneurysm, multiple cysts in kidneys who presented to ED due to urinary retention/Unable to urinate. Patient was admitted to [facility] for groin pain and inc urine frequency and he required tran catherter placement. 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 Patient had EUS showed dilation of pancreatic duct. Patient states that 2 days before, He was discharged home and he was urinating normal then he last night, he was unable to urinate on his own and he noticed dripping urine. Patient feels pain on straining so he decided to come to hospital. Patient denied abdominal pain, chest pain, vomiting, change in bowel movement.     Vital Signs Last 24 Hrs  T(C): 37.2 (16 Feb 2022 00:06), Max: 37.2 (16 Feb 2022 00:06)  T(F): 98.9 (16 Feb 2022 00:06), Max: 98.9 (16 Feb 2022 00:06)  HR: 102 (16 Feb 2022 00:06) (94 - 102)  BP: 167/92 (16 Feb 2022 00:06) (151/84 - 167/92)  BP(mean): --  RR: 18 (16 Feb 2022 00:06) (17 - 18)  SpO2: 96% (16 Feb 2022 00:06) (96% - 97%) Patient is 77 yo male with PMH of recently diagnosed Pancreatic cancer, hypertension, newly diagnosed DM, BPH, intracranial aneurysm, multiple renal cysts p/w with c/o Unable to urinate. Patient was admitted to [facility] for groin pain and inc urine frequency and he required tran catherter placement. 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 Patient had EUS showed dilation of pancreatic duct. Patient states that 2 days before, He was discharged home and he was urinating normal then he last night, he was unable to urinate on his own and he noticed dripping urine. Patient feels pain on straining so he decided to come to hospital. Patient denied abdominal pain, chest pain, vomiting, change in bowel movement.     Vital Signs Last 24 Hrs  T(C): 37.2 (16 Feb 2022 00:06), Max: 37.2 (16 Feb 2022 00:06)  T(F): 98.9 (16 Feb 2022 00:06), Max: 98.9 (16 Feb 2022 00:06)  HR: 102 (16 Feb 2022 00:06) (94 - 102)  BP: 167/92 (16 Feb 2022 00:06) (151/84 - 167/92)  BP(mean): --  RR: 18 (16 Feb 2022 00:06) (17 - 18)  SpO2: 96% (16 Feb 2022 00:06) (96% - 97%) Patient is 77 yo male with PMH of recently diagnosed Pancreatic cancer( IPMN), hypertension, newly diagnosed DM, BPH, intracranial aneurysm, multiple renal cysts p/w with c/o Unable to urinate/ dripping of urine/straining. Patient was recently admitted to [facility] for groin pain and increased urine frequency and he required tran catheter placement, when he was diagnosed with IPMN (had Triple phase CT abdomen and MRCP, EUS showed dilation of pancreatic duct) as per the [facility] records. Patient had tran catheter removed on discharge and was urinating well for 2 days and until last night.   In ED patient afebrile, sinus tachycardia, suprapubic fullness; Tran catheter was placed in ED   Urology consulted.  mild leucocytosis, hyperglycemia BG in 400s, Normal AGAP,  JJ creat 1.6 ( baseline 1.4)    Vital Signs Last 24 Hrs  T(C): 37.2 (16 Feb 2022 00:06), Max: 37.2 (16 Feb 2022 00:06)  T(F): 98.9 (16 Feb 2022 00:06), Max: 98.9 (16 Feb 2022 00:06)  HR: 102 (16 Feb 2022 00:06) (94 - 102)  BP: 167/92 (16 Feb 2022 00:06) (151/84 - 167/92)  BP(mean): --  RR: 18 (16 Feb 2022 00:06) (17 - 18)  SpO2: 96% (16 Feb 2022 00:06) (96% - 97%)    Physical exam as above    Labs and imaging studies noted.    Assessment and plan:   Patient is 77 yo male with PMH of recently diagnosed Pancreatic cancer( IPMN), hypertension, newly diagnosed DM, BPH, intracranial aneurysm, multiple renal cysts p/w with c/o Unable to urinate/ dripping of urine/straining admitted for urinary retention, post obstructive nephropathy, hyperglycemia.    Urinary retention/BPH: as described above  - Urology consulted in ED  - Tran catheter placed in ED, patient to be discharged with tran with outpatient urology follow up  - started on Flomax and Finasteride.  - Renal function improved with Tran placement 1.6 >1.2. monitor bmp    JJ on CKD: as discussed above.  postobstructive uropathy. creat 1.6 ( baseline 1.4)  -Renal function improved with Tran placement 1.6 >1.2.     Hyperglycemia/newly diagnosed diabetes: hyperglycemia BG in 400s, Normal AGAP.  - patient recently diagnosed with IPMN at [facility]  - takes Lantus 20 units at home, with kkep on basal insulin along with SSI.    Hypertension: on Amlodipine 10 mg . monitor vitals    Recently diagnosed Pancreatic cancer( IPMN): recently diagnosed with IPMN (had Triple phase CT abdomen and MRCP, EUS showed dilation of pancreatic duct) as per the [facility] records.    Rest as above

## 2022-02-16 NOTE — H&P ADULT - NSICDXPASTMEDICALHX_GEN_ALL_CORE_FT
[facility] Farideh Pozo





[facility] recommendations. Specimen was then placed on the grid and placed in

[employer] 

evans the cavity. Dead 




















FARIDEH POZO DO     Mar 11, 2021 22:40 PAST MEDICAL HISTORY:  Hypercholesterolemia     Hypertension

## 2022-02-16 NOTE — H&P ADULT - NSHPREVIEWOFSYSTEMS_GEN_ALL_CORE
REVIEW OF SYSTEMS:  CONSTITUTIONAL: Weakness  EYES/ENT: No visual changes;  No vertigo or throat pain   RESPIRATORY: No cough, wheezing, hemoptysis; No shortness of breath  CARDIOVASCULAR: No chest pain or palpitations  GASTROINTESTINAL: No abdominal  pain. No nausea, vomiting. No diarrhea or constipation. No melena or hematochezia.  GENITOURINARY: No dysuria, frequency or hematuria  NEUROLOGICAL: No numbness or weakness  SKIN: No itching, rashes

## 2022-02-16 NOTE — H&P ADULT - PROBLEM SELECTOR PLAN 4
- On 20 units of Lantus at bedtime; Will continue for now   - Sliding scale   - finger stick TID  - Diabetic diet

## 2022-02-16 NOTE — PATIENT PROFILE ADULT - FALL HARM RISK - RISK INTERVENTIONS

## 2022-02-16 NOTE — PROCEDURE NOTE - NSURITECHNIQUE_GU_A_CORE
Proper hand hygiene was performed/Sterile gloves were worn for the duration of the procedure/A sterile drape was used to cover all adjacent areas/The catheter was appropriately lubricated/The catheter was secured with a securement device (e.g. StatLock)/The urinary drainage system is closed at the end of the procedure/All applicable medical record documentation is completed

## 2022-02-17 DIAGNOSIS — N18.2 CHRONIC KIDNEY DISEASE, STAGE 2 (MILD): ICD-10-CM

## 2022-02-17 LAB
A1C WITH ESTIMATED AVERAGE GLUCOSE RESULT: 14.1 % — HIGH (ref 4–5.6)
CULTURE RESULTS: SIGNIFICANT CHANGE UP
ESTIMATED AVERAGE GLUCOSE: 358 MG/DL — HIGH (ref 68–114)
GLUCOSE BLDC GLUCOMTR-MCNC: 202 MG/DL — HIGH (ref 70–99)
GLUCOSE BLDC GLUCOMTR-MCNC: 235 MG/DL — HIGH (ref 70–99)
GLUCOSE BLDC GLUCOMTR-MCNC: 272 MG/DL — HIGH (ref 70–99)
HCV AB S/CO SERPL IA: 0.11 S/CO — SIGNIFICANT CHANGE UP (ref 0–0.99)
HCV AB SERPL-IMP: SIGNIFICANT CHANGE UP
SPECIMEN SOURCE: SIGNIFICANT CHANGE UP

## 2022-02-17 PROCEDURE — 99233 SBSQ HOSP IP/OBS HIGH 50: CPT

## 2022-02-17 RX ORDER — KETOROLAC TROMETHAMINE 30 MG/ML
15 SYRINGE (ML) INJECTION ONCE
Refills: 0 | Status: DISCONTINUED | OUTPATIENT
Start: 2022-02-17 | End: 2022-02-17

## 2022-02-17 RX ORDER — INSULIN GLARGINE 100 [IU]/ML
24 INJECTION, SOLUTION SUBCUTANEOUS AT BEDTIME
Refills: 0 | Status: DISCONTINUED | OUTPATIENT
Start: 2022-02-17 | End: 2022-02-18

## 2022-02-17 RX ORDER — ACETAMINOPHEN 500 MG
650 TABLET ORAL EVERY 6 HOURS
Refills: 0 | Status: DISCONTINUED | OUTPATIENT
Start: 2022-02-17 | End: 2022-02-18

## 2022-02-17 RX ORDER — INSULIN LISPRO 100/ML
6 VIAL (ML) SUBCUTANEOUS
Refills: 0 | Status: DISCONTINUED | OUTPATIENT
Start: 2022-02-17 | End: 2022-02-18

## 2022-02-17 RX ADMIN — HEPARIN SODIUM 5000 UNIT(S): 5000 INJECTION INTRAVENOUS; SUBCUTANEOUS at 22:04

## 2022-02-17 RX ADMIN — Medication 3: at 08:25

## 2022-02-17 RX ADMIN — Medication 15 MILLIGRAM(S): at 12:25

## 2022-02-17 RX ADMIN — TAMSULOSIN HYDROCHLORIDE 0.4 MILLIGRAM(S): 0.4 CAPSULE ORAL at 22:05

## 2022-02-17 RX ADMIN — Medication 650 MILLIGRAM(S): at 08:38

## 2022-02-17 RX ADMIN — AMLODIPINE BESYLATE 10 MILLIGRAM(S): 2.5 TABLET ORAL at 05:14

## 2022-02-17 RX ADMIN — Medication 6 UNIT(S): at 17:34

## 2022-02-17 RX ADMIN — ATORVASTATIN CALCIUM 40 MILLIGRAM(S): 80 TABLET, FILM COATED ORAL at 22:05

## 2022-02-17 RX ADMIN — FINASTERIDE 5 MILLIGRAM(S): 5 TABLET, FILM COATED ORAL at 11:56

## 2022-02-17 RX ADMIN — HEPARIN SODIUM 5000 UNIT(S): 5000 INJECTION INTRAVENOUS; SUBCUTANEOUS at 14:04

## 2022-02-17 RX ADMIN — HEPARIN SODIUM 5000 UNIT(S): 5000 INJECTION INTRAVENOUS; SUBCUTANEOUS at 05:14

## 2022-02-17 RX ADMIN — Medication 2: at 17:34

## 2022-02-17 RX ADMIN — Medication 15 MILLIGRAM(S): at 12:45

## 2022-02-17 RX ADMIN — Medication 2: at 11:57

## 2022-02-17 RX ADMIN — Medication 650 MILLIGRAM(S): at 09:10

## 2022-02-17 NOTE — CONSULT NOTE ADULT - PROBLEM SELECTOR RECOMMENDATION 9
with hyperglycemia  change lantus to 24 units  add admelog 6 ac tid  pt wishes to take oral meds upon d/c  rec metformin 500 bid and low dose amaryl 2 mg upon d/c   fine tuning as out pt  nutrition eval  fsg ac and hs  d/w prim team

## 2022-02-17 NOTE — PROGRESS NOTE ADULT - PROBLEM SELECTOR PLAN 1
pt p/w urinary retention and pt p/w urinary retention with recent admission at [facility]  Continue tran until outpatient trial of void  pain control  Hand irrigate prn  Continue tamsulosin and finasteride   Patient to f/u with Urologist at [facility]   urology following

## 2022-02-17 NOTE — PROGRESS NOTE ADULT - PROBLEM SELECTOR PLAN 4
pt p/w hx of htn   Will continue on Amlodipine 10mg daily with holding parameters   - DASH diet. pt p/w new onset of diabetes not on any meds  a1c 14  c/w insulin sliding scale  change lantus to 24 units  add admelog 6 ac tid  monitor glucose achs  pt to be discharged on metformin 500 bid and low dose amaryl 2 mg   endocrine Dr. Garzon following

## 2022-02-17 NOTE — CONSULT NOTE ADULT - SUBJECTIVE AND OBJECTIVE BOX
Patient is a 76y old  Male who presents with a chief complaint of Urinary retention (2022 09:39)      HPI:  Patient is 76 years old male with past medical history of recent diagnosis of Pancreatic cancer hypertension, newly diagnosed DM, BPH, intracranial aneurysm, multiple cysts in kidneys who presented to ED due to urinary retention/Unable to urinate. Patient was admitted to [facility] for groin pain and inc urine frequency and he required tran catherter placement. 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 Patient had EUS showed dilation of pancreatic duct. Patient states that 2 days before, He was discharged home and he was urinating normal then he last night, he was unable to urinate on his own and he noticed dripping urine. Patient feels pain on straining so he decided to come to hospital. Patient denied abdominal pain, chest pain, vomiting, change in bowel movement. Found to have hyperglycemia. Pt was recently told to have dm during hospitalization. Pt was not taking any meds as out pt. Admits to noncompliance with diet .         Off note; Patient has all paperwork including Visit summary, EUS, CT/MRCP reports. (2022 03:03)      PAST MEDICAL & SURGICAL HISTORY:  Hypertension    Hypercholesterolemia    No significant past surgical history           MEDICATIONS  (STANDING):  amLODIPine   Tablet 10 milliGRAM(s) Oral daily  atorvastatin 40 milliGRAM(s) Oral at bedtime  cefTRIAXone   IVPB 1000 milliGRAM(s) IV Intermittent every 24 hours  finasteride 5 milliGRAM(s) Oral daily  heparin   Injectable 5000 Unit(s) SubCutaneous every 8 hours  insulin glargine Injectable (LANTUS) 20 Unit(s) SubCutaneous at bedtime  insulin lispro (ADMELOG) corrective regimen sliding scale   SubCutaneous three times a day before meals  tamsulosin 0.4 milliGRAM(s) Oral at bedtime    MEDICATIONS  (PRN):  acetaminophen     Tablet .. 650 milliGRAM(s) Oral every 6 hours PRN Mild Pain (1 - 3)      FAMILY HISTORY:      SOCIAL HISTORY:      REVIEW OF SYSTEMS:  CONSTITUTIONAL: No fever, weight loss, or fatigue  EYES: No eye pain, visual disturbances, or discharge  ENT:  No difficulty hearing, tinnitus, vertigo; No sinus or throat pain  NECK: No pain or stiffness  RESPIRATORY: No cough, wheezing, chills or hemoptysis; No Shortness of Breath  CARDIOVASCULAR: No chest pain, palpitations, passing out, dizziness, or leg swelling  GASTROINTESTINAL: No abdominal or epigastric pain. No nausea, vomiting, or hematemesis; No diarrhea or constipation. No melena or hematochezia.  GENITOURINARY: No dysuria, frequency, hematuria, or incontinence  NEUROLOGICAL: No headaches, memory loss, loss of strength, numbness, or tremors  SKIN: No itching, burning, rashes, or lesions   LYMPH Nodes: No enlarged glands  ENDOCRINE: No heat or cold intolerance; No hair loss  MUSCULOSKELETAL: No joint pain or swelling; No muscle, back, or extremity pain  PSYCHIATRIC: No depression, anxiety, mood swings, or difficulty sleeping  HEME/LYMPH: No easy bruising, or bleeding gums  ALLERGY AND IMMUNOLOGIC: No hives or eczema	        Vital Signs Last 24 Hrs  T(C): 36.7 (2022 05:01), Max: 37.4 (2022 12:53)  T(F): 98 (2022 05:01), Max: 99.3 (2022 12:53)  HR: 84 (2022 05:01) (84 - 110)  BP: 110/68 (2022 05:01) (110/68 - 141/81)  BP(mean): --  RR: 18 (2022 05:01) (17 - 19)  SpO2: 99% (2022 05:01) (97% - 100%)      Constitutional:    NC/AT: nad    Neck:  No JVD, bruits or thyromegaly    Respiratory:  Clear without rales or rhonchi    Cardiovascular:  RR without murmur, rub or gallop.    Gastrointestinal: Soft without hepatosplenomegaly.    Extremities: without cyanosis, clubbing or edema.    Neurological:  Oriented   x  3    . No gross sensory or motor defects.        LABS:                        12.9   11.72 )-----------( 290      ( 2022 06:07 )             40.3     02-16    138  |  106  |  22<H>  ----------------------------<  283<H>  4.1   |  26  |  1.19    Ca    10.1      2022 06:07  Phos  3.7     -  Mg     1.8     -    TPro  7.4  /  Alb  2.9<L>  /  TBili  0.5  /  DBili  x   /  AST  11  /  ALT  20  /  AlkPhos  114  -          Urinalysis Basic - ( 15 Feb 2022 21:15 )    Color: Yellow / Appearance: very cloudy / S.010 / pH: x  Gluc: x / Ketone: Small  / Bili: Negative / Urobili: Negative   Blood: x / Protein: 30 mg/dL / Nitrite: Negative   Leuk Esterase: Moderate / RBC: 5-10 /HPF / WBC >50 /HPF   Sq Epi: x / Non Sq Epi: Occasional /HPF / Bacteria: Many /HPF      CAPILLARY BLOOD GLUCOSE      POCT Blood Glucose.: 272 mg/dL (2022 07:44)  POCT Blood Glucose.: 353 mg/dL (2022 22:41)  POCT Blood Glucose.: 221 mg/dL (2022 18:11)  POCT Blood Glucose.: 391 mg/dL (2022 12:26)      RADIOLOGY & ADDITIONAL STUDIES:
Urology Consultation Note    Patient is a 76y old  Male who presents with a chief complaint of     HPI:  77yo M with history of pancreatic adenocarcinoma c/o inability to urinate today. Pt states he had difficulty urinating 3 weeks ago and went to the ER at [facility].  Pt had Darnell catheter placed and was seen by a urologist 2 days ago.  Pt states Darnell catheter was removed by a urologist and he voided on his own.  Pt states he has a follow-up with a urologist tomorrow at 3pm in [facility].  Pt states he was given medication for enlarged prostate but cannot remember names.  He denies any history of prostate cancer, biopsy.  He admits to burning with attempted urination today but none prior to first episode of retention. He denies history of recurrent UTIs or constipation. Denies fever, chills, nausea or vomiting.     PAST MEDICAL & SURGICAL HISTORY:  Hypertension    No significant past surgical history        Allergies    No Known Allergies    Intolerances    Vital Signs Last 24 Hrs  T(C): 36.9 (15 Feb 2022 18:19), Max: 36.9 (15 Feb 2022 18:19)  T(F): 98.4 (15 Feb 2022 18:19), Max: 98.4 (15 Feb 2022 18:19)  HR: 98 (15 Feb 2022 18:19) (98 - 98)  BP: 151/84 (15 Feb 2022 18:19) (151/84 - 151/84)  BP(mean): --  RR: 17 (15 Feb 2022 18:19) (17 - 17)  SpO2: 97% (15 Feb 2022 18:19) (97% - 97%)    Physical Exam:  Gen: awake, alert oriented NAD  HEENT: anicteric  Abd: soft NT ND, + suprapubic fullness and tenderness  Back: no CVA tenderness bilaterally  Pelvic: nl external genitalia  Ext: warm to touch  no c/c/e        Urinalysis Basic - ( 15 Feb 2022 21:15 )    Color: Yellow / Appearance: very cloudy / S.010 / pH: x  Gluc: x / Ketone: Small  / Bili: Negative / Urobili: Negative   Blood: x / Protein: 30 mg/dL / Nitrite: Negative   Leuk Esterase: Moderate / RBC: x / WBC x   Sq Epi: x / Non Sq Epi: x / Bacteria: x

## 2022-02-17 NOTE — PROGRESS NOTE ADULT - PROBLEM SELECTOR PLAN 5
continue heparin 5000 unit pt p/w hx of htn   Will continue on Amlodipine 10mg daily with holding parameters   - DASH diet.

## 2022-02-17 NOTE — PROGRESS NOTE ADULT - ATTENDING COMMENTS
75 yo male with PMH of recently diagnosed Pancreatic cancer( IPMN), hypertension, newly diagnosed DM, BPH, intracranial aneurysm, multiple renal cysts admitted for urinary retention, post obstructive nephropathy, hyperglycemia and UTI.    #UTI  #Urinary Retention  #Post Obstructive nephropathy  #Hyperglycemia  #DM  #Hx IPMN, HTN, BPH, renal cysts  - Continue ceftriaxone  - f/u UCX  - Cr improving, s/p tran placement  - Trend Cr  - Continue tran until outpatient trial of void  - Continue tamsulosin and finasteride  - c/w Lantus 24U, admelog 6U tid, SSI  - c/w amlodipine  - f/u endocrine  - f/u urology  - DVT ppx  - Once ready for DC patient to be DCed with tran and outpatient urology follow up

## 2022-02-17 NOTE — PROGRESS NOTE ADULT - ASSESSMENT
75 yo M with urinary retention, recurrent. BPH with LUTS on meds  -20 F coude Tran placed without difficulty with return of blood tinged urine   -irrigated multiple times with no return of clots    -- Labs reviewed; Cr improved to 1.19 from 1.65 after tran placement   -- Continue tran until outpatient trial of void  -- Hand irrigate prn  -- F/u urine culture   -- Continue tamsulosin and finasteride   -- Patient to f/u with Urologist at [facility]     D/w Dr. Lainez
77 y/o male with PMH of recent diagnosis of Pancreatic cancer hypertension, newly diagnosed DM, BPH, intracranial aneurysm, multiple cysts in kidneys who presented to ED due to urinary retention and not being to urinate after discharge from [facility]. Patient was recently admitted to [facility] for groin pain with increase in urine frequency and required tran catheter placement. At [place], patient had Triple phase CT abdomen and MRCP and was found to have Intraductal Papillary Mucinous Neoplasm (IPMN). In addition, at [facility], Patient had EUS showed dilation of pancreatic duct.     Pt was admitted to medicine for further management of urinary retention and UTI, Urology following, currently on abx. Pending urine culture results. Endocrine was consulted for newly diagnosed diabetes and poor glucose control.

## 2022-02-17 NOTE — PROGRESS NOTE ADULT - PROBLEM SELECTOR PLAN 3
pt p/w new onset of diabetes not on any meds  a1c 14  c/w insulin sliding scale  change lantus to 24 units  add admelog 6 ac tid  monitor glucose achs  pt to be discharged on metformin 500 bid and low dose amaryl 2 mg   endocrine Dr. Garzon following Baseline 1.45 on 2018  creatinine now improved 1.19  s/p 2L IV fluids   f/u BMP

## 2022-02-17 NOTE — CONSULT NOTE ADULT - ASSESSMENT
75 yo M with urinary retention, recurrent. BPH with LUTS on meds  -20 F coude Darnell placed without difficulty with return of blood tinged urine   -irrigated multiple times with no return of clots    1. Recommend d/c with Darnell leg bag  2. F/u with Urologist at [facility] at appointed time  3. Pt advised to return to ED if Darnell catheter is not draining   4. Pt advised to stay hydrated and drink plenty of water.   5. Will d/w Dr. Lainez
Patient is 76 years old male with past medical history of recent diagnosis of Pancreatic cancer hypertension, newly diagnosed DM, BPH, intracranial aneurysm, multiple cysts in kidneys who presented to ED due to urinary retention/Unable to urinate. Found to have hyperglycemia. Pt was recently told to have dm during hospitalization. Pt was not taking any meds as out pt. Admits to noncompliance with diet .

## 2022-02-18 ENCOUNTER — TRANSCRIPTION ENCOUNTER (OUTPATIENT)
Age: 77
End: 2022-02-18

## 2022-02-18 VITALS
TEMPERATURE: 98 F | SYSTOLIC BLOOD PRESSURE: 107 MMHG | DIASTOLIC BLOOD PRESSURE: 64 MMHG | HEART RATE: 88 BPM | RESPIRATION RATE: 18 BRPM | OXYGEN SATURATION: 99 %

## 2022-02-18 LAB
ALBUMIN SERPL ELPH-MCNC: 2.4 G/DL — LOW (ref 3.5–5)
ALP SERPL-CCNC: 99 U/L — SIGNIFICANT CHANGE UP (ref 40–120)
ALT FLD-CCNC: 17 U/L DA — SIGNIFICANT CHANGE UP (ref 10–60)
ANION GAP SERPL CALC-SCNC: 6 MMOL/L — SIGNIFICANT CHANGE UP (ref 5–17)
AST SERPL-CCNC: 10 U/L — SIGNIFICANT CHANGE UP (ref 10–40)
BILIRUB SERPL-MCNC: 0.3 MG/DL — SIGNIFICANT CHANGE UP (ref 0.2–1.2)
BUN SERPL-MCNC: 19 MG/DL — HIGH (ref 7–18)
CALCIUM SERPL-MCNC: 8.6 MG/DL — SIGNIFICANT CHANGE UP (ref 8.4–10.5)
CHLORIDE SERPL-SCNC: 107 MMOL/L — SIGNIFICANT CHANGE UP (ref 96–108)
CO2 SERPL-SCNC: 26 MMOL/L — SIGNIFICANT CHANGE UP (ref 22–31)
CREAT SERPL-MCNC: 1.28 MG/DL — SIGNIFICANT CHANGE UP (ref 0.5–1.3)
GLUCOSE BLDC GLUCOMTR-MCNC: 180 MG/DL — HIGH (ref 70–99)
GLUCOSE BLDC GLUCOMTR-MCNC: 187 MG/DL — HIGH (ref 70–99)
GLUCOSE BLDC GLUCOMTR-MCNC: 188 MG/DL — HIGH (ref 70–99)
GLUCOSE SERPL-MCNC: 230 MG/DL — HIGH (ref 70–99)
POTASSIUM SERPL-MCNC: 3.4 MMOL/L — LOW (ref 3.5–5.3)
POTASSIUM SERPL-SCNC: 3.4 MMOL/L — LOW (ref 3.5–5.3)
PROT SERPL-MCNC: 6.7 G/DL — SIGNIFICANT CHANGE UP (ref 6–8.3)
SODIUM SERPL-SCNC: 139 MMOL/L — SIGNIFICANT CHANGE UP (ref 135–145)

## 2022-02-18 PROCEDURE — 99285 EMERGENCY DEPT VISIT HI MDM: CPT | Mod: 25

## 2022-02-18 PROCEDURE — 99239 HOSP IP/OBS DSCHRG MGMT >30: CPT

## 2022-02-18 PROCEDURE — 83036 HEMOGLOBIN GLYCOSYLATED A1C: CPT

## 2022-02-18 PROCEDURE — 96361 HYDRATE IV INFUSION ADD-ON: CPT

## 2022-02-18 PROCEDURE — 82962 GLUCOSE BLOOD TEST: CPT

## 2022-02-18 PROCEDURE — 36415 COLL VENOUS BLD VENIPUNCTURE: CPT

## 2022-02-18 PROCEDURE — 80053 COMPREHEN METABOLIC PANEL: CPT

## 2022-02-18 PROCEDURE — 85025 COMPLETE CBC W/AUTO DIFF WBC: CPT

## 2022-02-18 PROCEDURE — 84100 ASSAY OF PHOSPHORUS: CPT

## 2022-02-18 PROCEDURE — 93005 ELECTROCARDIOGRAM TRACING: CPT

## 2022-02-18 PROCEDURE — 83735 ASSAY OF MAGNESIUM: CPT

## 2022-02-18 PROCEDURE — 87635 SARS-COV-2 COVID-19 AMP PRB: CPT

## 2022-02-18 PROCEDURE — 86803 HEPATITIS C AB TEST: CPT

## 2022-02-18 PROCEDURE — 81001 URINALYSIS AUTO W/SCOPE: CPT

## 2022-02-18 PROCEDURE — 71045 X-RAY EXAM CHEST 1 VIEW: CPT

## 2022-02-18 PROCEDURE — 83880 ASSAY OF NATRIURETIC PEPTIDE: CPT

## 2022-02-18 PROCEDURE — 85027 COMPLETE CBC AUTOMATED: CPT

## 2022-02-18 PROCEDURE — 87086 URINE CULTURE/COLONY COUNT: CPT

## 2022-02-18 PROCEDURE — 96365 THER/PROPH/DIAG IV INF INIT: CPT

## 2022-02-18 RX ORDER — GLIMEPIRIDE 1 MG
1 TABLET ORAL
Qty: 30 | Refills: 0
Start: 2022-02-18 | End: 2022-03-19

## 2022-02-18 RX ORDER — SENNA PLUS 8.6 MG/1
2 TABLET ORAL AT BEDTIME
Refills: 0 | Status: DISCONTINUED | OUTPATIENT
Start: 2022-02-18 | End: 2022-02-18

## 2022-02-18 RX ORDER — ACETAMINOPHEN 500 MG
2 TABLET ORAL
Qty: 0 | Refills: 0 | DISCHARGE
Start: 2022-02-18

## 2022-02-18 RX ORDER — ISOPROPYL ALCOHOL, BENZOCAINE .7; .06 ML/ML; ML/ML
1 SWAB TOPICAL
Qty: 100 | Refills: 1
Start: 2022-02-18 | End: 2022-04-08

## 2022-02-18 RX ORDER — FAMOTIDINE 10 MG/ML
1 INJECTION INTRAVENOUS
Qty: 60 | Refills: 0
Start: 2022-02-18 | End: 2022-03-19

## 2022-02-18 RX ORDER — CEFUROXIME AXETIL 250 MG
1 TABLET ORAL
Qty: 8 | Refills: 0
Start: 2022-02-18 | End: 2022-02-21

## 2022-02-18 RX ORDER — PHENAZOPYRIDINE HCL 100 MG
1 TABLET ORAL
Qty: 6 | Refills: 0
Start: 2022-02-18 | End: 2022-02-19

## 2022-02-18 RX ORDER — MAGNESIUM HYDROXIDE 400 MG/1
30 TABLET, CHEWABLE ORAL ONCE
Refills: 0 | Status: COMPLETED | OUTPATIENT
Start: 2022-02-18 | End: 2022-02-18

## 2022-02-18 RX ORDER — METFORMIN HYDROCHLORIDE 850 MG/1
1 TABLET ORAL
Qty: 60 | Refills: 0
Start: 2022-02-18 | End: 2022-03-19

## 2022-02-18 RX ADMIN — Medication 1: at 08:20

## 2022-02-18 RX ADMIN — FINASTERIDE 5 MILLIGRAM(S): 5 TABLET, FILM COATED ORAL at 11:57

## 2022-02-18 RX ADMIN — Medication 6 UNIT(S): at 17:18

## 2022-02-18 RX ADMIN — Medication 1: at 17:18

## 2022-02-18 RX ADMIN — Medication 6 UNIT(S): at 08:20

## 2022-02-18 RX ADMIN — AMLODIPINE BESYLATE 10 MILLIGRAM(S): 2.5 TABLET ORAL at 05:23

## 2022-02-18 RX ADMIN — Medication 1: at 11:58

## 2022-02-18 RX ADMIN — MAGNESIUM HYDROXIDE 30 MILLILITER(S): 400 TABLET, CHEWABLE ORAL at 03:53

## 2022-02-18 RX ADMIN — Medication 6 UNIT(S): at 11:58

## 2022-02-18 RX ADMIN — HEPARIN SODIUM 5000 UNIT(S): 5000 INJECTION INTRAVENOUS; SUBCUTANEOUS at 05:23

## 2022-02-18 NOTE — DISCHARGE NOTE NURSING/CASE MANAGEMENT/SOCIAL WORK - PATIENT PORTAL LINK FT
You can access the FollowMyHealth Patient Portal offered by [facility] by registering at the following website: [email or web address]. By joining [employer]’s FollowMyHealth portal, you will also be able to view your health information using other applications (apps) compatible with our system.

## 2022-02-18 NOTE — DISCHARGE NOTE PROVIDER - ATTENDING DISCHARGE PHYSICAL EXAMINATION:
75 yo male with PMH of recently diagnosed Pancreatic cancer( IPMN), hypertension, newly diagnosed DM, BPH, intracranial aneurysm, multiple renal cysts admitted for urinary retention, post obstructive nephropathy, hyperglycemia and UTI. Tran was placed with improvement of JJ and retention. Patient will need to continue tran at home and follow up with outpatient urology. Started on ceftriaxone IV - switch to PO course for total 7 days. Ucx showing Coag neg staph. Patient to start oral DM regiment outpatient. Stable for discharge  PHYSICAL EXAM:  GENERAL: well appearing, NAD  EYES: clear conjunctiva  ENMT: Moist mucous membranes  NECK: Supple, No JVD  CHEST/LUNG: Clear to auscultation bilaterally; No rales, rhonchi, wheezing, or rubs  HEART: S1, S2, Regular rate and rhythm  ABDOMEN: Soft, Nontender, Nondistended; Bowel sounds present  : +tran  NEURO: Alert & Oriented X3  EXTREMITIES: No LE edema, no calf tenderness  SKIN: No rashes or lesions

## 2022-02-18 NOTE — DISCHARGE NOTE PROVIDER - NSDCCPCAREPLAN_GEN_ALL_CORE_FT
PRINCIPAL DISCHARGE DIAGNOSIS  Diagnosis: Urinary retention  Assessment and Plan of Treatment: you came in with urinary retention, you are to continue tran until outpatient urology follow up to perform trial of void without the tran  please keep the area around the tran clean to prevent any infection  Continue to take the medications tamsulosin and finasteride   A referral for the urology clinic has been provided, please follow up within 1 week.  if you notice that your tran catheter is not draining, and you are experiencing worsing cramps or abdominal pain from not being to urinate, you need to come to the ER to have the tran checked, there may be a blockage.      SECONDARY DISCHARGE DIAGNOSES  Diagnosis: UTI (urinary tract infection)  Assessment and Plan of Treatment: you were found to have a UTI and your urine culture grew coag. negative staph aureus, you were given ceftriaxone intravenously for 3 days and will continue to take ceftin 250 mg BID, to complete total of 7 days of all antibiotics. you can take pyridium for 2 days to reduce the burning sensation and for relief. your burning sensation should resolved with antibiotics as the UTI gets resolved.    Diagnosis: Diabetes mellitus, new onset  Assessment and Plan of Treatment: your hemoglobin a1c is 14, it is a diagnostic for diabetes.   you were seen by endocrinologist Dr. Garzon, please follow up with her in 2 weeks, referral provided.  please take metformin 500 bid and low dose amaryl 2 mg  a prescription for a glucometer to check your sugars also sent to pharmacy  Make sure you get your HgA1c checked every three months.  If you take oral diabetes medications, check your blood glucose two times a day.  It's important not to skip any meals.  Keep a log of your blood glucose results and always take it with you to your doctor appointments.  Keep a list of your current medications including injectables and over the counter medications and bring this medication list with you to all your doctor appointments.  If you have not seen your ophthalmologist this year call for appointment.  Check your feet daily for redness, sores, or openings. Do not self treat. If no improvement in two days call your primary care physician for an appointment.  Low blood sugar (hypoglycemia) is a blood sugar below 70mg/dl. Check your blood sugar if you feel signs/symptoms of hypoglycemia. If your blood sugar is below 70 take 15 grams of carbohydrates (ex 4 oz of apple juice, 3-4 glucose tablets, or 4-6 oz of regular soda) wait 15 minutes and repeat blood sugar to make sure it comes up above 70.  If your blood sugar is above 70 and you are due for a meal, have a meal.  If you are not due for a meal have a snack.  This snack helps keeps your blood sugar at a safe range.    Diagnosis: HTN (hypertension)  Assessment and Plan of Treatment: your blood pressure ranged from (106/68 - 111/72), continue to take amlodipine daily

## 2022-02-18 NOTE — DISCHARGE NOTE NURSING/CASE MANAGEMENT/SOCIAL WORK - NSDCPEFALRISK_GEN_ALL_CORE
[facility] sent a fax stating Novolog is not covered, Humalog is preferred. Per Dr. Nydia Cornejo, it is ok to make this change. Left message for patient to call back to discuss this. For information on Fall & Injury Prevention, visit: [email or web address] OR  [email or web address] OR  https://www.cdc.gov/steadi/patient.html

## 2022-02-18 NOTE — DISCHARGE NOTE PROVIDER - NSFOLLOWUPCLINICS_GEN_ALL_ED_FT
[facility]  Urology  [address] [place] [address]  Phone: [phone]  Fax: [phone]  Follow Up Time: 1 week

## 2022-02-18 NOTE — DISCHARGE NOTE PROVIDER - CARE PROVIDER_API CALL
Jennifer Garzon)  EndocrinologyMetabDiabetes  [address] [place] [address]  Phone: [phone]  Fax: [phone]  Follow Up Time: 2 weeks

## 2022-02-18 NOTE — DISCHARGE NOTE PROVIDER - NSDCFUSCHEDAPPT_GEN_ALL_CORE_FT
HILLARY DREW ; 02/23/2022 ; [facility] [address] [facility] ; 03/24/2022 ; [facility] ; 04/20/2022 ; NPP Med [address] [facility]

## 2022-02-18 NOTE — DISCHARGE NOTE PROVIDER - HOSPITAL COURSE
75 y/o male with PMH of recent diagnosis of Pancreatic cancer hypertension, newly diagnosed DM, BPH, intracranial aneurysm, multiple cysts in kidneys who presented to ED due to urinary retention and not being to urinate after discharge from [facility]. Patient was recently admitted to [facility] for groin pain with increase in urine frequency and required tran catheter placement. At [place], patient had Triple phase CT abdomen and MRCP and was found to have Intraductal Papillary Mucinous Neoplasm (IPMN). In addition, at [place], Patient had EUS showed dilation of pancreatic duct.     Pt was admitted to medicine for further management of urinary retention and UTI, Urology following, currently on abx. Urine culture showed coag. negative staph auerus. Endocrine was consulted for newly diagnosed diabetes and poor glucose control.    Will transition to ceftin for 4 more days to complete a total of 7 days of all abx.   Will discharge on metformin and glimiperide and follow outpatient with endocrine Dr. Garzon.  Given patient's improved clinical status and current hemodynamic stability, decision was made to discharge. Please note that this a brief summary of hospital course please refer to daily progress notes and consult notes for full course and events

## 2022-02-18 NOTE — DISCHARGE NOTE PROVIDER - NSDCMRMEDTOKEN_GEN_ALL_CORE_FT
acetaminophen 325 mg oral tablet: 2 tab(s) orally every 6 hours, As needed, Mild Pain (1 - 3)  Amaryl 2 mg oral tablet: 1 tab(s) orally once a day   amLODIPine 10 mg oral tablet: 1 tab(s) orally once a day  atorvastatin 40 mg oral tablet: 1 tab(s) orally once a day (at bedtime)  cefuroxime 250 mg oral tablet: 1 tab(s) orally 2 times a day   finasteride 5 mg oral tablet: 1 tab(s) orally once a day  metFORMIN 500 mg oral tablet: 1 tab(s) orally 2 times a day   Pyridium 200 mg oral tablet: 1 tab(s) orally 3 times a day   tamsulosin 0.4 mg oral capsule: 1 cap(s) orally once a day   acetaminophen 325 mg oral tablet: 2 tab(s) orally every 6 hours, As needed, Mild Pain (1 - 3)  alcohol swabs : Apply topically to affected area 4 times a day   Amaryl 2 mg oral tablet: 1 tab(s) orally once a day   amLODIPine 10 mg oral tablet: 1 tab(s) orally once a day  atorvastatin 40 mg oral tablet: 1 tab(s) orally once a day (at bedtime)  cefuroxime 250 mg oral tablet: 1 tab(s) orally 2 times a day   finasteride 5 mg oral tablet: 1 tab(s) orally once a day  glucometer (per patient&#x27;s insurance): Test blood sugars four times a day. Dispense #1 glucometer.  metFORMIN 500 mg oral tablet: 1 tab(s) orally 2 times a day   Pyridium 200 mg oral tablet: 1 tab(s) orally 3 times a day   tamsulosin 0.4 mg oral capsule: 1 cap(s) orally once a day  test strips (per patient&#x27;s insurance): 1 application subcutaneously 4 times a day. ** Compatible with patient&#x27;s glucometer **   acetaminophen 325 mg oral tablet: 2 tab(s) orally every 6 hours, As needed, Mild Pain (1 - 3)  alcohol swabs : Apply topically to affected area 4 times a day   Amaryl 2 mg oral tablet: 1 tab(s) orally once a day   amLODIPine 10 mg oral tablet: 1 tab(s) orally once a day  atorvastatin 40 mg oral tablet: 1 tab(s) orally once a day (at bedtime)  cefuroxime 250 mg oral tablet: 1 tab(s) orally 2 times a day   famotidine 10 mg oral tablet: 1 tab(s) orally 2 times a day   finasteride 5 mg oral tablet: 1 tab(s) orally once a day  glucometer (per patient&#x27;s insurance): Test blood sugars four times a day. Dispense #1 glucometer.  metFORMIN 500 mg oral tablet: 1 tab(s) orally 2 times a day   Pyridium 200 mg oral tablet: 1 tab(s) orally 3 times a day   tamsulosin 0.4 mg oral capsule: 1 cap(s) orally once a day  test strips (per patient&#x27;s insurance): 1 application subcutaneously 4 times a day. ** Compatible with patient&#x27;s glucometer **

## 2022-02-18 NOTE — PROGRESS NOTE ADULT - SUBJECTIVE AND OBJECTIVE BOX
Patient is a 76y old  Male who presents with a chief complaint of Urinary retention (2022 10:09)    OVERNIGHT EVENTS: no acute changes    REVIEW OF SYSTEMS:  CONSTITUTIONAL: No fever, chills  NECK: No pain or stiffness  RESPIRATORY: No cough, SOB  CARDIOVASCULAR: No chest pain, palpitations  GASTROINTESTINAL: No abdominal pain. No nausea, vomiting, or diarrhea  GENITOURINARY: +pain around tran cath. No dysuria  NEUROLOGICAL: No HA  SKIN: No itching, burning, rashes, or lesions   MUSCULOSKELETAL: No joint pain or swelling; No muscle, back, or extremity pain    T(C): 36.7 (22 @ 05:01), Max: 37.4 (22 @ 12:53)  HR: 84 (22 @ 05:01) (84 - 110)  BP: 110/68 (22 @ 05:01) (110/68 - 141/81)  RR: 18 (22 @ 05:01) (17 - 19)  SpO2: 99% (22 @ 05:01) (97% - 100%)  Wt(kg): --Vital Signs Last 24 Hrs  T(C): 36.7 (2022 05:01), Max: 37.4 (2022 12:53)  T(F): 98 (2022 05:01), Max: 99.3 (2022 12:53)  HR: 84 (2022 05:01) (84 - 110)  BP: 110/68 (2022 05:01) (110/68 - 141/81)  BP(mean): --  RR: 18 (2022 05:01) (17 - 19)  SpO2: 99% (2022 05:01) (97% - 100%)    MEDICATIONS  (STANDING):  amLODIPine   Tablet 10 milliGRAM(s) Oral daily  atorvastatin 40 milliGRAM(s) Oral at bedtime  cefTRIAXone   IVPB 1000 milliGRAM(s) IV Intermittent every 24 hours  finasteride 5 milliGRAM(s) Oral daily  heparin   Injectable 5000 Unit(s) SubCutaneous every 8 hours  insulin glargine Injectable (LANTUS) 20 Unit(s) SubCutaneous at bedtime  insulin lispro (ADMELOG) corrective regimen sliding scale   SubCutaneous three times a day before meals  tamsulosin 0.4 milliGRAM(s) Oral at bedtime    MEDICATIONS  (PRN):  acetaminophen     Tablet .. 650 milliGRAM(s) Oral every 6 hours PRN Mild Pain (1 - 3)    PHYSICAL EXAM:  GENERAL: well appearing, NAD  EYES: clear conjunctiva  ENMT: Moist mucous membranes  NECK: Supple, No JVD  CHEST/LUNG: Clear to auscultation bilaterally; No rales, rhonchi, wheezing, or rubs  HEART: S1, S2, Regular rate and rhythm  ABDOMEN: Soft, Nontender, Nondistended; Bowel sounds present  : +tran  NEURO: Alert & Oriented X3  EXTREMITIES: No LE edema, no calf tenderness  SKIN: No rashes or lesions    Consultant(s) Notes Reviewed:  [x ] YES  [ ] NO  Care Discussed with Consultants/Other Providers [ x] YES  [ ] NO    LABS:                        12.9   11.72 )-----------( 290      ( 2022 06:07 )             40.3     02-    138  |  106  |  22<H>  ----------------------------<  283<H>  4.1   |  26  |  1.19    Ca    10.1      2022 06:07  Phos  3.7     -  Mg     1.8     -    TPro  7.4  /  Alb  2.9<L>  /  TBili  0.5  /  DBili  x   /  AST  11  /  ALT  20  /  AlkPhos  114  -      CAPILLARY BLOOD GLUCOSE      POCT Blood Glucose.: 202 mg/dL (2022 11:32)  POCT Blood Glucose.: 272 mg/dL (2022 07:44)  POCT Blood Glucose.: 353 mg/dL (2022 22:41)  POCT Blood Glucose.: 221 mg/dL (2022 18:11)        Urinalysis Basic - ( 15 Feb 2022 21:15 )    Color: Yellow / Appearance: very cloudy / S.010 / pH: x  Gluc: x / Ketone: Small  / Bili: Negative / Urobili: Negative   Blood: x / Protein: 30 mg/dL / Nitrite: Negative   Leuk Esterase: Moderate / RBC: 5-10 /HPF / WBC >50 /HPF   Sq Epi: x / Non Sq Epi: Occasional /HPF / Bacteria: Many /HPF        RADIOLOGY & ADDITIONAL TESTS:  < from: Xray Chest 1 View-PORTABLE IMMEDIATE (Xray Chest 1 View-PORTABLE IMMEDIATE .) (22 @ 02:29) >    ACC: [id number] EXAM:  XR CHEST PORTABLE IMMED 1V                          PROCEDURE DATE:  2022          INTERPRETATION:  CLINICAL STATEMENT: Chest Pain.    TECHNIQUE: AP view of the chest.    COMPARISON: 8/10/2018    FINDINGS/  IMPRESSION:  No consolidation or infiltrate.  No pleural effusion.    Heart size cannot be accurately assessed in this projection.    --- End of Report ---            NAY GALLOWAY MD; Attending Radiologist  This document has been electronically signed. 2022 10:12AM    < end of copied text >      Imaging Personally Reviewed:  [ ] YES  [ ] NO  
Interval Events:  pt in nad    Allergies    No Known Allergies    Intolerances      Endocrine/Metabolic Medications:  atorvastatin 40 milliGRAM(s) Oral at bedtime  finasteride 5 milliGRAM(s) Oral daily  insulin glargine Injectable (LANTUS) 24 Unit(s) SubCutaneous at bedtime  insulin lispro (ADMELOG) corrective regimen sliding scale   SubCutaneous three times a day before meals  insulin lispro Injectable (ADMELOG) 6 Unit(s) SubCutaneous three times a day before meals      Vital Signs Last 24 Hrs  T(C): 36.4 (18 Feb 2022 05:49), Max: 37 (17 Feb 2022 14:04)  T(F): 97.6 (18 Feb 2022 05:49), Max: 98.6 (17 Feb 2022 14:04)  HR: 79 (18 Feb 2022 05:49) (79 - 93)  BP: 111/72 (18 Feb 2022 05:49) (106/68 - 111/72)  BP(mean): --  RR: 16 (18 Feb 2022 05:49) (15 - 16)  SpO2: 99% (18 Feb 2022 05:49) (95% - 99%)      PHYSICAL EXAM  All physical exam findings normal, except those marked:  General:	Alert, active, cooperative, NAD, well hydrated  .		[] Abnormal:  Neck		Normal: supple, no cervical adenopathy, no palpable thyroid  .		[] Abnormal:  Cardiovascular	Normal: regular rate, normal S1, S2, no murmurs  .		[] Abnormal:  Respiratory	Normal: no chest wall deformity, normal respiratory pattern, CTA B/L  .		[] Abnormal:  Abdominal	Normal: soft, ND, NT, bowel sounds present, no masses, no organomegaly  .		[] Abnormal:  		Normal normal genitalia, testes descended, circumcised/uncircumcised  .		Itsh stage:			Breast tish:  .		Menstrual history:  .		[] Abnormal:  Extremities	Normal: FROM x4  .		[] Abnormal:  Skin		Normal: intact and not indurated, no rash, no acanthosis nigricans  .		[] Abnormal:  Neurologic	Normal: grossly intact  .		[] Abnormal:    LABS                        11.8   7.79  )-----------( 255      ( 18 Feb 2022 06:13 )             36.7                               139    |  107    |  19                  Calcium: 8.6   / iCa: x      (02-18 @ 06:13)    ----------------------------<  230       Magnesium: x                                3.4     |  26     |  1.28             Phosphorous: x        TPro  6.7    /  Alb  2.4    /  TBili  0.3    /  DBili  x      /  AST  10     /  ALT  17     /  AlkPhos  99     18 Feb 2022 06:13    CAPILLARY BLOOD GLUCOSE      POCT Blood Glucose.: 188 mg/dL (18 Feb 2022 07:52)  POCT Blood Glucose.: 260 mg/dL (17 Feb 2022 23:06)  POCT Blood Glucose.: 235 mg/dL (17 Feb 2022 16:52)  POCT Blood Glucose.: 202 mg/dL (17 Feb 2022 11:32)        Assesment/plan    Patient is 76 years old male with past medical history of recent diagnosis of Pancreatic cancer hypertension, newly diagnosed DM, BPH, intracranial aneurysm, multiple cysts in kidneys who presented to ED due to urinary retention/Unable to urinate. Found to have hyperglycemia. Pt was recently told to have dm during hospitalization. Pt was not taking any meds as out pt. Admits to noncompliance with diet .        Problem/Recommendation - 1:  ·  Problem: Diabetes mellitus.   ·  Recommendation: with hyperglycemia  change lantus to 24 units  add admelog 6 ac tid  pt wishes to take oral meds upon d/c  rec metformin 500 bid and low dose amaryl 2 mg upon d/c   fine tuning as out pt  nutrition eval  fsg ac and hs  a1c-14.1%  d/w prim team.     Problem/Recommendation - 2:  ·  Problem: UTI (urinary tract infection).   ·  Recommendation: iv abx per prim team    
Subjective    Patient seen and examined. No acute events overnight, resting comfortably.     Objective    Vital signs  T(F): , Max: 99 (02-16-22 @ 07:25)  HR: 96 (02-16-22 @ 07:25)  BP: 116/77 (02-16-22 @ 07:25)  SpO2: 97% (02-16-22 @ 07:25)  Wt(kg): --    Output     02-15 @ 07:01  -  02-16 @ 07:00  --------------------------------------------------------  IN: 0 mL / OUT: 1540 mL / NET: -1540 mL        General: NAD  Abdomen: soft/non-tender/non-distended  : tran in place draining clear pink urine    Labs      02-16 @ 06:07    WBC 11.72 / Hct 40.3  / SCr 1.19     02-15 @ 23:09    WBC 11.19 / Hct 42.2  / SCr 1.65       Urine Cx: pending    Imaging: no imaging for review

## 2022-02-23 ENCOUNTER — APPOINTMENT (OUTPATIENT)
Dept: UROLOGY | Facility: CLINIC | Age: 77
End: 2022-02-23
Payer: MEDICARE

## 2022-02-23 DIAGNOSIS — Z78.9 OTHER SPECIFIED HEALTH STATUS: ICD-10-CM

## 2022-02-23 PROBLEM — E78.00 PURE HYPERCHOLESTEROLEMIA, UNSPECIFIED: Chronic | Status: ACTIVE | Noted: 2022-02-15

## 2022-02-23 PROCEDURE — 99204 OFFICE O/P NEW MOD 45 MIN: CPT

## 2022-02-23 NOTE — PHYSICAL EXAM
[General Appearance - Well Developed] : well developed [General Appearance - Well Nourished] : well nourished [Normal Appearance] : normal appearance [Well Groomed] : well groomed [General Appearance - In No Acute Distress] : no acute distress [Edema] : no peripheral edema [Respiration, Rhythm And Depth] : normal respiratory rhythm and effort [Exaggerated Use Of Accessory Muscles For Inspiration] : no accessory muscle use [Abdomen Soft] : soft [Abdomen Tenderness] : non-tender [Costovertebral Angle Tenderness] : no ~M costovertebral angle tenderness [Urethral Meatus] : meatus normal [Urinary Bladder Findings] : the bladder was normal on palpation [Scrotum] : the scrotum was normal [Testes Mass (___cm)] : there were no testicular masses [FreeTextEntry1] : tran clear urine  [Normal Station and Gait] : the gait and station were normal for the patient's age [] : no rash [No Focal Deficits] : no focal deficits [Oriented To Time, Place, And Person] : oriented to person, place, and time [Affect] : the affect was normal [Mood] : the mood was normal [Not Anxious] : not anxious [No Palpable Adenopathy] : no palpable adenopathy

## 2022-02-23 NOTE — HISTORY OF PRESENT ILLNESS
[FreeTextEntry1] : cc retention \par History of Present Illness:\par Patient is 76 years old male with past medical history of recent diagnosis of\par Pancreatic cancer hypertension, newly diagnosed DM, BPH, intracranial aneurysm,\par multiple cysts in kidneys who presented to ED 2/16 due to urinary retention/Unable\par to urinate. Patient was admitted to [facility] for groin pain and inc\par urine frequency and he required tran catherter placement. 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 Patient had EUS showed dilation of pancreatic duct. Patient\par states that 2 days before, He was discharged home and he was urinating normal\par then he last night, he was unable to urinate on his own and he noticed dripping\par urine. Patient feels pain on straining so he decided to come to hospital tran was placed\par Patient denied abdominal pain, chest pain, vomiting, change in bowel movement.\par pt on tamsulosin but does not know for how long  Controlled with current regime

## 2022-02-23 NOTE — REVIEW OF SYSTEMS
[both] : pain during and after intercourse [denies] : denies pain with orgasm [base] : pain in base of penis [Pain during urination] : pain during urination [Urine retention] : urine retention [Strain or push to urinate] : strain or push to urinate [Negative] : Heme/Lymph [Pain at onset of urination] : denies pain during onset of urination [FreeTextEntry2] : pt has catheter

## 2022-03-09 ENCOUNTER — APPOINTMENT (OUTPATIENT)
Dept: UROLOGY | Facility: CLINIC | Age: 77
End: 2022-03-09
Payer: MEDICARE

## 2022-03-09 DIAGNOSIS — N21.0 CALCULUS IN BLADDER: ICD-10-CM

## 2022-03-09 DIAGNOSIS — N13.8 BENIGN PROSTATIC HYPERPLASIA WITH LOWER URINARY TRACT SYMPMS: ICD-10-CM

## 2022-03-09 DIAGNOSIS — N40.1 BENIGN PROSTATIC HYPERPLASIA WITH LOWER URINARY TRACT SYMPMS: ICD-10-CM

## 2022-03-09 PROCEDURE — 52000 CYSTOURETHROSCOPY: CPT

## 2022-03-24 ENCOUNTER — APPOINTMENT (OUTPATIENT)
Dept: GASTROENTEROLOGY | Facility: CLINIC | Age: 77
End: 2022-03-24

## 2022-04-06 ENCOUNTER — APPOINTMENT (OUTPATIENT)
Dept: INTERNAL MEDICINE | Facility: CLINIC | Age: 77
End: 2022-04-06

## 2022-05-12 ENCOUNTER — APPOINTMENT (OUTPATIENT)
Dept: GASTROENTEROLOGY | Facility: CLINIC | Age: 77
End: 2022-05-12

## 2022-06-16 ENCOUNTER — APPOINTMENT (OUTPATIENT)
Dept: GASTROENTEROLOGY | Facility: CLINIC | Age: 77
End: 2022-06-16
Payer: MEDICARE

## 2022-06-16 ENCOUNTER — LABORATORY RESULT (OUTPATIENT)
Age: 77
End: 2022-06-16

## 2022-06-16 VITALS
WEIGHT: 190 LBS | BODY MASS INDEX: 27.2 KG/M2 | HEIGHT: 70 IN | TEMPERATURE: 98.1 F | OXYGEN SATURATION: 98 % | SYSTOLIC BLOOD PRESSURE: 140 MMHG | HEART RATE: 97 BPM | DIASTOLIC BLOOD PRESSURE: 86 MMHG

## 2022-06-16 PROCEDURE — 99204 OFFICE O/P NEW MOD 45 MIN: CPT

## 2022-06-19 NOTE — ASSESSMENT
[FreeTextEntry1] : This patient has an IPMN with an extremely high CEA level suspicious for a malignancy..  My plan will be\par \par \par Repeat MR now in anticipation of seeing Dr. Callaway for likely repeat EUS with aspiration and biopsy.\par Make appointment to see Dr. Franklin from surgical oncology and we will help him do this today

## 2022-06-19 NOTE — HISTORY OF PRESENT ILLNESS
[de-identified] : [facility] patient is a 78-year-old with history of IPMN's and renal cyst.  December 2021 had biopsies of the pancreatic lesions at [place].  February 2022 had urinary retention and was at [place] [place].  Was seen at [facility] but again due to insurance issues was lost to follow-up.  Fluid aspiration showed a fluid CEA level of 180,220.  There were also atypical cells seen at that time that were suspicious.  It was suggested that he see surgical oncology but due to insurance reasons this appointment was also canceled.

## 2022-06-24 ENCOUNTER — APPOINTMENT (OUTPATIENT)
Dept: GASTROENTEROLOGY | Facility: CLINIC | Age: 77
End: 2022-06-24
Payer: MEDICARE

## 2022-06-24 VITALS
SYSTOLIC BLOOD PRESSURE: 138 MMHG | WEIGHT: 190 LBS | BODY MASS INDEX: 27.2 KG/M2 | OXYGEN SATURATION: 98 % | HEART RATE: 90 BPM | HEIGHT: 70 IN | DIASTOLIC BLOOD PRESSURE: 91 MMHG | TEMPERATURE: 97.8 F

## 2022-06-24 PROCEDURE — 99204 OFFICE O/P NEW MOD 45 MIN: CPT

## 2022-06-24 NOTE — HISTORY OF PRESENT ILLNESS
[de-identified] : 76M with pmhx of HTN, BPH, bladder stones, pancreatic cysts presenting for evaluation. Pt previously followed at [place], initially had MRI showing pancreatic cysts with ductal dilation, underwent EUS/FNA 12/2021 with pancreatic head cyst with mural nodule, cytology with atypia. Then lost to f/u due to insurance issues. Now here for evaluation. Pt has no complaints today, denies abd pain, n/v/d/c, melena, hematochezia, fever/chills, jaundice, or other issue.

## 2022-06-24 NOTE — ASSESSMENT
[FreeTextEntry1] : 76M with pmhx of HTN, BPH, bladder stones, pancreatic cysts presenting for evaluation. Pt previously followed at [place], initially had MRI showing pancreatic cysts with ductal dilation, underwent EUS/FNA 12/2021 with pancreatic head cyst with mural nodule, cytology with atypia. Then lost to f/u due to insurance issues. Now here for evaluation. Ordered for MRI but pt states will not do it due to claustrophobia. \par - Discussed given apparent high risk IPMN, risk of progression to cancer and concern given recent elevated Ca19-9, warrants repeat imaging to evaluate current status. \par - Obtain CT pancreas protocol as not able to tolerate MR given no recent imaging to reevaluate. \par - If CT with ductal dilation and mural nodule, given prior EUS findings and atypia with elevated CEA, high risk IPMN and may be reasonable to consider direct surgical resection vs repeat EUS. \par - Has appointment to discuss surgical options with Dr. Franklin next week.

## 2022-06-27 ENCOUNTER — APPOINTMENT (OUTPATIENT)
Dept: CT IMAGING | Facility: HOSPITAL | Age: 77
End: 2022-06-27

## 2022-06-27 ENCOUNTER — RESULT REVIEW (OUTPATIENT)
Age: 77
End: 2022-06-27

## 2022-06-27 ENCOUNTER — OUTPATIENT (OUTPATIENT)
Dept: OUTPATIENT SERVICES | Facility: HOSPITAL | Age: 77
LOS: 1 days | End: 2022-06-27
Payer: MEDICARE

## 2022-06-27 DIAGNOSIS — K86.2 CYST OF PANCREAS: ICD-10-CM

## 2022-06-27 PROCEDURE — 74177 CT ABD & PELVIS W/CONTRAST: CPT

## 2022-06-27 PROCEDURE — 74177 CT ABD & PELVIS W/CONTRAST: CPT | Mod: 26

## 2022-06-29 ENCOUNTER — APPOINTMENT (OUTPATIENT)
Dept: SURGICAL ONCOLOGY | Facility: CLINIC | Age: 77
End: 2022-06-29

## 2022-06-29 VITALS
WEIGHT: 184 LBS | BODY MASS INDEX: 26.34 KG/M2 | DIASTOLIC BLOOD PRESSURE: 86 MMHG | HEIGHT: 70 IN | SYSTOLIC BLOOD PRESSURE: 137 MMHG | HEART RATE: 101 BPM

## 2022-06-29 VITALS — TEMPERATURE: 97.1 F

## 2022-06-29 DIAGNOSIS — Z82.49 FAMILY HISTORY OF ISCHEMIC HEART DISEASE AND OTHER DISEASES OF THE CIRCULATORY SYSTEM: ICD-10-CM

## 2022-06-29 DIAGNOSIS — Z83.3 FAMILY HISTORY OF DIABETES MELLITUS: ICD-10-CM

## 2022-06-29 DIAGNOSIS — Z80.1 FAMILY HISTORY OF MALIGNANT NEOPLASM OF TRACHEA, BRONCHUS AND LUNG: ICD-10-CM

## 2022-06-29 PROCEDURE — 99204 OFFICE O/P NEW MOD 45 MIN: CPT

## 2022-06-30 PROBLEM — Z82.49 FAMILY HISTORY OF HYPERTENSION: Status: ACTIVE | Noted: 2022-06-29

## 2022-06-30 PROBLEM — Z83.3 FAMILY HISTORY OF DIABETES MELLITUS: Status: ACTIVE | Noted: 2022-06-29

## 2022-06-30 PROBLEM — Z80.1 FAMILY HISTORY OF LUNG CANCER: Status: ACTIVE | Noted: 2022-06-29

## 2022-06-30 LAB
ALBUMIN SERPL ELPH-MCNC: 4.3 G/DL
ALP BLD-CCNC: 101 U/L
ALT SERPL-CCNC: 12 U/L
ANION GAP SERPL CALC-SCNC: 14 MMOL/L
AST SERPL-CCNC: 18 U/L
BASOPHILS # BLD AUTO: 0.03 K/UL
BASOPHILS NFR BLD AUTO: 0.5 %
BILIRUB SERPL-MCNC: 0.3 MG/DL
BUN SERPL-MCNC: 17 MG/DL
CALCIUM SERPL-MCNC: 9 MG/DL
CANCER AG19-9 SERPL-ACNC: 87 U/ML
CHLORIDE SERPL-SCNC: 108 MMOL/L
CO2 SERPL-SCNC: 20 MMOL/L
CREAT SERPL-MCNC: 1.38 MG/DL
EGFR: 54 ML/MIN/1.73M2
EOSINOPHIL # BLD AUTO: 0.11 K/UL
EOSINOPHIL NFR BLD AUTO: 1.7 %
GLUCOSE SERPL-MCNC: 112 MG/DL
HCT VFR BLD CALC: 44.2 %
HGB BLD-MCNC: 13.5 G/DL
IMM GRANULOCYTES NFR BLD AUTO: 0.6 %
LYMPHOCYTES # BLD AUTO: 1.89 K/UL
LYMPHOCYTES NFR BLD AUTO: 29.4 %
MAN DIFF?: NORMAL
MCHC RBC-ENTMCNC: 26.9 PG
MCHC RBC-ENTMCNC: 30.5 GM/DL
MCV RBC AUTO: 88.2 FL
MONOCYTES # BLD AUTO: 0.52 K/UL
MONOCYTES NFR BLD AUTO: 8.1 %
NEUTROPHILS # BLD AUTO: 3.84 K/UL
NEUTROPHILS NFR BLD AUTO: 59.7 %
PLATELET # BLD AUTO: 288 K/UL
POTASSIUM SERPL-SCNC: 4.4 MMOL/L
PROT SERPL-MCNC: 7.4 G/DL
RBC # BLD: 5.01 M/UL
RBC # FLD: 14.8 %
SODIUM SERPL-SCNC: 141 MMOL/L
WBC # FLD AUTO: 6.43 K/UL

## 2022-06-30 NOTE — CONSULT LETTER
[Dear  ___] : Dear  [unfilled], [Please see my note below.] : Please see my note below. [Consult Closing:] : Thank you very much for allowing me to participate in the care of this patient.  If you have any questions, please do not hesitate to contact me. [Sincerely,] : Sincerely, [DrSatya  ___] : Dr. ALAS [Consult Letter:] : I had the pleasure of evaluating your patient, [unfilled]. [( Thank you for referring [unfilled] for consultation for _____ )] : Thank you for referring [unfilled] for consultation for [unfilled] [FreeTextEntry2] : Rafael Walsh MD [FreeTextEntry3] : Minh Franklin MD, FICS, FACS\par Director of Surgical Oncology- [facility] \par , Department of Surgery  \par [facility] \par [address]\[place] [address]\par \par [address]\[place] [address]\par \par [address]\[place] [address]\par \par (mob) [phone]\par (o) [phone]\par (f) [phone]\par \par

## 2022-06-30 NOTE — ASSESSMENT
[FreeTextEntry1] : IMP:\par Main duct IPMN with high cyst CEA 1.6 cm mural nodule and 1 cm MPD dilatation, suspicious for malignancy\par \par Labs 6/2022: \par Ca 19-9 87 U/mL\par \par CT pancreas protocol from 6/27/2022 with Atrophic pancreas with severe short length ductal stricture at the head and upstream ductal dilation with possible nodular enhancement versus debris within several dilated branch ducts. Accompanying cysts versus branch duct IPMNs.\par \par Joshua presents today with his niece, another niece present via telephone, discussed findings from [facility] and recent CT imaging. Explained to patient that surgery up front is initial treatment plan, discussed with patient that a repeat EUS may be necessary, he is in agreement. Explained that we need to see the imaging and pathology from [place] in order to decide next steps.\par \par PLAN:\par Need to obtain pathology report from [place], along with discs of all imaging \par Labs today \par RTO 1 week after obtaining discs to discuss treatment plan\par \par \par I have discussed the diagnosis, therapeutic plan and options with the patient at length. Patient expressed verbal understanding to proceed with the proposed plan. All questions answered.\par

## 2022-06-30 NOTE — PHYSICAL EXAM
[Normal] : supple, no neck mass and thyroid not enlarged [Normal Neck Lymph Nodes] : normal neck lymph nodes  [Normal Supraclavicular Lymph Nodes] : normal supraclavicular lymph nodes [Normal Groin Lymph Nodes] : normal groin lymph nodes [Normal Axillary Lymph Nodes] : normal axillary lymph nodes [Normal] : oriented to person, place and time, with appropriate affect [FreeTextEntry1] : COVID-19 precautions as per [facility] policy was universally followed\par  [de-identified] : soft NTND

## 2022-06-30 NOTE — HISTORY OF PRESENT ILLNESS
[de-identified] : Mr. JOSHUA DREW is a 76 year old man, referred by Dr. Rafael Walsh, for evaluation main duct IPMN\par \par PMH of renal cysts, BPH and HTN, newly diagnosed DM in 2021 (recent a1c= 13.2). Denies any family history of pancreatic cancer or pancreatitis \par \par Joshua was initially followed at [facility] in 12/2021 p/w urinary issues, subsequent imaging \par MRCP 12/2021 at [place] showed multiple cystic lesions in pancreas possibly communicating with the dilated PD, suspicious for intraductal neoplasm. \par s/p EUS w/ FNA 1/2022- findings of mild dilatation of PD, multiple cysts likely sidebranch IPMN, one cyst w/ a mural nodule (measuring 1.6 x 1.2 cm)  s/p targeted biopsy. \par mild PD dilatation ~4.5 mm. normal CBD\par Fluid ,220. cytology with atypical findings, rare atypical but degenerated cells, a few lymphocytes and macrophages \par He was discussed at [place] tumor board with recommendations made for a surgical oncology evaluation given that mucinous cystadenocarcinoma cannot be ruled out, however his insurance would not cover a surgical oncology visit at [place]. \par He then lost follow-up due to insurance issues. \par \par He saw Dr. Walsh in 6/2022 to establish care who ordered him for an MRCP and referred to Dr. Clancy for an EUS \par \par 6/2022- Ca 19-9: 87 (elevated)\par At his initial visit with Dr. Clancy last week, they discussed his inability to obtain an MRI due to claustrophobia, and will obtain a CT pancreas protocol. If CT w/ ductal dilation & mural nodule, given prior EUS findings and atypia with elevated CEA, high risk IPMN and may be reasonable to consider surgical resection vs repeat EUS. \par \par CT pancreas protocol from 6/27/2022 with Atrophic pancreas with severe short length ductal stricture at the head and upstream ductal dilation with possible nodular enhancement versus debris within several dilated branch ducts. Accompanying cysts versus branch duct IPMNs. Recommend further enhancement with contrast enhancement MRI abdomen\par Enlarged prostate with extensive ingrowth into the bladder, sequela of chronic outlet obstruction and 1.5 cm lamellated bladder calculus. Consider urology referral\par \par He reports occasional constipation that is relieved with Miralax. Retired from job as . [employer] 0, Denies prior surgical history, denies tobacco, alcohol or illicit drug use

## 2022-07-06 ENCOUNTER — APPOINTMENT (OUTPATIENT)
Dept: SURGICAL ONCOLOGY | Facility: CLINIC | Age: 77
End: 2022-07-06

## 2022-07-06 VITALS — TEMPERATURE: 97.1 F

## 2022-07-06 VITALS
HEIGHT: 70 IN | HEART RATE: 93 BPM | OXYGEN SATURATION: 97 % | SYSTOLIC BLOOD PRESSURE: 137 MMHG | BODY MASS INDEX: 27.35 KG/M2 | DIASTOLIC BLOOD PRESSURE: 83 MMHG | WEIGHT: 191 LBS

## 2022-07-06 PROCEDURE — 99215 OFFICE O/P EST HI 40 MIN: CPT

## 2022-07-12 NOTE — HISTORY OF PRESENT ILLNESS
[de-identified] : Mr. JOSHUA DREW is a 76 year old man, referred by Dr. 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 Here for a follow-up visit\par \par [facility] of renal cysts, BPH and HTN, newly diagnosed DM in 2021 (recent a1c= 13.2). Denies any family history of pancreatic cancer or pancreatitis \par \par Joshua was initially followed at [facility] in 12/2021 p/w urinary issues, subsequent imaging \par MRCP 12/2021 at [place] showed multiple cystic lesions in pancreas possibly communicating with the dilated PD, suspicious for intraductal neoplasm. \par s/p EUS w/ FNA 1/2022- findings of mild dilatation of PD, multiple cysts likely sidebranch IPMN, one cyst w/ a mural nodule (measuring 1.6 x 1.2 cm)  s/p targeted biopsy. \par mild PD dilatation ~4.5 mm. normal CBD\par Fluid ,220. cytology with atypical findings, rare atypical but degenerated cells, a few lymphocytes and macrophages \par He was discussed at [place] tumor board with recommendations made for a surgical oncology evaluation given that mucinous cystadenocarcinoma cannot be ruled out, however his insurance would not cover a surgical oncology visit at [place]. \par He then lost follow-up due to insurance issues. \par \par He saw Dr. Walsh in 6/2022 to establish care who ordered him for an MRCP and referred to Dr. Clancy for an EUS \par \par 6/2022- Ca 19-9: 87 (elevated)\par At his initial visit with Dr. Clancy last week, they discussed his inability to obtain an MRI due to claustrophobia, and will obtain a CT pancreas protocol. If CT w/ ductal dilation & mural nodule, given prior EUS findings and atypia with elevated CEA, high risk IPMN and may be reasonable to consider surgical resection vs repeat EUS. \par \par CT pancreas protocol from 6/27/2022 with Atrophic pancreas with severe short length ductal stricture at the head and upstream ductal dilation with possible nodular enhancement versus debris within several dilated branch ducts. Accompanying cysts versus branch duct IPMNs. Recommend further enhancement with contrast enhancement MRI abdomen\par Enlarged prostate with extensive ingrowth into the bladder, sequela of chronic outlet obstruction and 1.5 cm lamellated bladder calculus. Consider urology referral\par \par 6/29/22- He reports occasional constipation that is relieved with MiraLAX. Retired from job as . ECOG 0, Denies prior surgical history, denies tobacco, alcohol or illicit drug use \par \par Labs 6/29/22: CBC: WNL | CEA: 6.2 | LFTs: WNL\par \par 7/6/22- Patient returns today, having obtained all necessary records, for further recommendations. Patient in agreement to proceed with surgery after extensive discussion.

## 2022-07-12 NOTE — ASSESSMENT
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 Accompanying cysts versus branch duct IPMNs.\par \par 6/29/22- Joshua presents today with his niece, another niece present via telephone, discussed findings from [facility] and recent CT imaging. Explained to patient that surgery up front is initial treatment plan, discussed with patient that a repeat EUS may be necessary, he is in agreement. Explained that we need to see the imaging and pathology from [place] in order to decide next steps.\par Labs: CEA 6.2\par \par PLAN:\par refer to endocrinology for preop optimization and education of glycemic control- \par Pt has a main duct IPMN with complete atrophy of his pancreas - the MPD >1cm with IPMN through out his ductal system including branch ducts. I suspect an invasive component given the CT appearance and serum tumor marker elevation. A segmental resection would be inadequate given an entire field defect like picture of the main duct. He will benefit from a total pancreatectomy, discussed post-op brittle diabetes with patient & niece \par Joshua will need [facility] (has plans to establish care with Dr. Valeria Moran and Dr.Kalpana Garzon) & PST\par \par I have discussed the risks, benefits, alternatives, complications including but not limited bleeding, infection, damage to adjacent structures, sepsis, need for further procedures, sepsis, tumor recurrence to the patient in detail. Patient expressed verbal understanding. Written informed consent to be obtained in the preoperative period\par \par I have discussed the diagnosis, therapeutic plan and options with the patient at length. Patient expressed verbal understanding to proceed with the proposed plan. All questions answered.

## 2022-07-12 NOTE — CONSULT LETTER
[Dear  ___] : Dear  [unfilled], [Courtesy Letter:] : I had the pleasure of seeing your patient, [unfilled], in my office today. [Please see my note below.] : Please see my note below. [Consult Closing:] : Thank you very much for allowing me to participate in the care of this patient.  If you have any questions, please do not hesitate to contact me. [Sincerely,] : Sincerely, [DrSatya  ___] : Dr. ALAS [( Thank you for referring [unfilled] for consultation for _____ )] : Thank you for referring [unfilled] for consultation for [unfilled] [FreeTextEntry2] : Rafael Walsh MD [FreeTextEntry3] : Minh Franklin MD, FICS, FACS\par Director of Surgical Oncology- [facility] \par , Department of Surgery  \par [facility] [employer] [facility] \par [address]\[place] [address]\par \par [address]\[place] [address]\par \par [address]\[place] [address]\par \par (mob) [phone]\par (o) [phone]\par (f) [phone]\par \par

## 2022-07-12 NOTE — PHYSICAL EXAM
[Normal] : supple, no neck mass and thyroid not enlarged [Normal Neck Lymph Nodes] : normal neck lymph nodes  [Normal Supraclavicular Lymph Nodes] : normal supraclavicular lymph nodes [Normal Groin Lymph Nodes] : normal groin lymph nodes [Normal Axillary Lymph Nodes] : normal axillary lymph nodes [Normal] : oriented to person, place and time, with appropriate affect [FreeTextEntry1] : COVID-19 precautions as per [facility] policy was universally followed\par

## 2022-07-18 ENCOUNTER — LABORATORY RESULT (OUTPATIENT)
Age: 77
End: 2022-07-18

## 2022-07-18 ENCOUNTER — APPOINTMENT (OUTPATIENT)
Dept: INTERNAL MEDICINE | Facility: CLINIC | Age: 77
End: 2022-07-18

## 2022-07-18 VITALS
WEIGHT: 184 LBS | BODY MASS INDEX: 26.34 KG/M2 | HEIGHT: 70 IN | TEMPERATURE: 98.2 F | RESPIRATION RATE: 16 BRPM | OXYGEN SATURATION: 92 % | DIASTOLIC BLOOD PRESSURE: 92 MMHG | HEART RATE: 100 BPM | SYSTOLIC BLOOD PRESSURE: 131 MMHG

## 2022-07-18 PROCEDURE — 99204 OFFICE O/P NEW MOD 45 MIN: CPT | Mod: 25

## 2022-07-18 PROCEDURE — 90670 PCV13 VACCINE IM: CPT

## 2022-07-18 PROCEDURE — 90471 IMMUNIZATION ADMIN: CPT

## 2022-07-20 ENCOUNTER — APPOINTMENT (OUTPATIENT)
Dept: SURGICAL ONCOLOGY | Facility: CLINIC | Age: 77
End: 2022-07-20

## 2022-07-20 PROCEDURE — 99204 OFFICE O/P NEW MOD 45 MIN: CPT | Mod: 95

## 2022-07-21 NOTE — HEALTH RISK ASSESSMENT
[Never] : Never [No] : No [0] : 2) Feeling down, depressed, or hopeless: Not at all (0) [[id number]] : 0

## 2022-07-21 NOTE — HISTORY OF PRESENT ILLNESS
[FreeTextEntry1] : establish care [de-identified] : 77yoM, retired security, accompanied by miguel Garcia on phone [phone] [facility] pancreatic mass, DM2, HTN, intracranial aneurysm presents to establish care\par \par seen at outside hospital in 12/2021 for urinary retention\par MR : pancreatic mass\par followed up with GI, completed EUS and CT abdomen (cannot complete MR 2/2 claustrophobia) - scheduled for total pancreatectomy/splenectomy\par \par endo at [place], Dr Mireles - prescribed insulin but did not start insulin\par \par seen at outside hospital in 2018 for transient left hand weakness - was told has "brain aneurysm" - no follow up afterwards\par \par denies chest pain, sob, abdominal apin

## 2022-07-22 ENCOUNTER — NON-APPOINTMENT (OUTPATIENT)
Age: 77
End: 2022-07-22

## 2022-07-23 NOTE — ASSESSMENT
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 Accompanying cysts versus branch duct IPMNs.\par \par 7/20/22- Joshua presents today with questions regarding upcoming surgery, at our last office visit we discussed details in depth. \par \par \par PLAN:\par Pt has a main duct IPMN with complete atrophy of his pancreas - the MPD >1cm with IPMN through out his ductal system including branch ducts. I suspect an invasive component given the CT appearance and serum tumor marker elevation. A segmental resection would be inadequate given an entire field defect like picture of the main duct. He will benefit from a total pancreatectomy, discussed post-op brittle diabetes with patient & niece \par I have reiterated the plan to him and the reasoning behind the surgical plan\par Pt states to me he has seeked another opinion from [facility] system with a similar surgical plan and he wishes to pursue care with my team.\par I have discussed the risks, benefits, alternatives, complications including but not limited bleeding, infection, damage to adjacent structures, sepsis, need for further procedures, sepsis, tumor recurrence to the patient in detail. Patient expressed verbal understanding. Written informed consent to be obtained in the preoperative period\par \par I have discussed the diagnosis, therapeutic plan and options with the patient at length. Patient expressed verbal understanding to proceed with the proposed plan. All questions answered.

## 2022-07-23 NOTE — CONSULT LETTER
[Dear  ___] : Dear  [unfilled], [Courtesy Letter:] : I had the pleasure of seeing your patient, [unfilled], in my office today. [Please see my note below.] : Please see my note below. [Consult Closing:] : Thank you very much for allowing me to participate in the care of this patient.  If you have any questions, please do not hesitate to contact me. [Sincerely,] : Sincerely, [DrSatya  ___] : Dr. ALAS [( Thank you for referring [unfilled] for consultation for _____ )] : Thank you for referring [unfilled] for consultation for [unfilled] [FreeTextEntry2] : Rafael Walsh MD [FreeTextEntry3] : Minh Franklin MD, FICS, FACS\par Director of Surgical Oncology- [facility] \par , Department of Surgery  \par [facility] [employer] [facility] [employer] [facility] \par [address]\[place] [address]\par \par [address]\[place] [address]\par \par [address]\[place] [address]\par \par (mob) [phone]\par (o) [phone]\par (f) [phone]\par \par  [DrSatya ___] : Dr. ALAS

## 2022-07-23 NOTE — HISTORY OF PRESENT ILLNESS
[Home] : at home, [unfilled] , at the time of the visit. [Medical Office: ([facility])___] : at the medical office located in  [Family Member] : family member [Verbal consent obtained from patient] : the patient, [unfilled] [de-identified] : This visit was provided via telehealth using real-time 2-way audio visual technology. The patient, HILLARY DREW, was located at home [address] [place] [address] at the time of the visit. This provider was located at the clinic in [place], [place] at the time of the visit. The provider, patient participated in the telehealth encounter.  Verbal consent was given Jul 20 2022 10:15AM by the patient. \par \par Mr. HILLARY DREW is a 76 year old man, referred by Dr. 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 Here for a follow-up visit\par \par PMH of renal cysts, BPH and HTN, newly diagnosed DM in 2021 (recent a1c= 13.2). Denies any family history of pancreatic cancer or pancreatitis \par \par Hillary was initially followed at [facility] in 12/2021 p/w urinary issues, subsequent imaging \par MRCP 12/2021 at [place] showed multiple cystic lesions in pancreas possibly communicating with the dilated PD, suspicious for intraductal neoplasm. \par s/p EUS w/ FNA 1/2022- findings of mild dilatation of PD, multiple cysts likely sidebranch IPMN, one cyst w/ a mural nodule (measuring 1.6 x 1.2 cm)  s/p targeted biopsy. \par mild PD dilatation ~4.5 mm. normal CBD\par Fluid ,220. cytology with atypical findings, rare atypical but degenerated cells, a few lymphocytes and macrophages \par He was discussed at [facility] tumor board with recommendations made for a surgical oncology evaluation given that mucinous cystadenocarcinoma cannot be ruled out, however his insurance would not cover a surgical oncology visit at [facility]. \par He then lost follow-up due to insurance issues. \par \par He saw Dr. Walsh in 6/2022 to establish care who ordered him for an MRCP and referred to Dr. Clancy for an EUS \par \par 6/2022- Ca 19-9: 87 (elevated)\par At his initial visit with Dr. Clancy last week, they discussed his inability to obtain an MRI due to claustrophobia, and will obtain a CT pancreas protocol. If CT w/ ductal dilation & mural nodule, given prior EUS findings and atypia with elevated CEA, high risk IPMN and may be reasonable to consider surgical resection vs repeat EUS. \par \par CT pancreas protocol from 6/27/2022 with Atrophic pancreas with severe short length ductal stricture at the head and upstream ductal dilation with possible nodular enhancement versus debris within several dilated branch ducts. Accompanying cysts versus branch duct IPMNs. Recommend further enhancement with contrast enhancement MRI abdomen\par Enlarged prostate with extensive ingrowth into the bladder, sequela of chronic outlet obstruction and 1.5 cm lamellated bladder calculus. Consider urology referral\par \par 6/29/22- He reports occasional constipation that is relieved with MiraLAX. Retired from job as . [employer] 0, Denies prior surgical history, denies tobacco, alcohol or illicit drug use. Hillary presents today with his niece, another niece present via telephone, discussed findings from [facility] and recent CT imaging. Explained to patient that surgery up front is initial treatment plan, discussed with patient that a repeat EUS may be necessary, he is in agreement. Explained that we need to see the imaging and pathology from [facility] in order to decide next steps.\par \par Labs 6/29/22: CBC: WNL | CEA: 6.2 | LFTs: WNL\par \par 7/6/22- Patient returns today, having obtained all necessary records, for further recommendations. Patient in agreement to proceed with surgery after extensive discussion. refer to endocrinology for preop optimization and education of glycemic control- \par Pt has a main duct IPMN with complete atrophy of his pancreas - the MPD >1cm with IPMN through out his ductal system including branch ducts. I suspect an invasive component given the CT appearance and serum tumor marker elevation. A segmental resection would be inadequate given an entire field defect like picture of the main duct. He will benefit from a total pancreatectomy, discussed post-op brittle diabetes with patient & niece \par Hillary will need [facility] (has plans to establish care with Dr. Valeria Moran and Dr. Jennifer Garzon) & PST

## 2022-07-23 NOTE — PHYSICAL EXAM
[Normal Axillary Lymph Nodes] : normal axillary lymph nodes [FreeTextEntry1] : COVID-19 precautions as per [facility] policy was universally followed\par

## 2022-07-28 ENCOUNTER — RESULT REVIEW (OUTPATIENT)
Age: 77
End: 2022-07-28

## 2022-07-28 ENCOUNTER — OUTPATIENT (OUTPATIENT)
Dept: OUTPATIENT SERVICES | Facility: HOSPITAL | Age: 77
LOS: 1 days | End: 2022-07-28
Payer: MEDICAID

## 2022-07-28 DIAGNOSIS — K86.2 CYST OF PANCREAS: ICD-10-CM

## 2022-07-28 PROCEDURE — 88321 CONSLTJ&REPRT SLD PREP ELSWR: CPT

## 2022-08-08 ENCOUNTER — APPOINTMENT (OUTPATIENT)
Dept: INTERNAL MEDICINE | Facility: CLINIC | Age: 77
End: 2022-08-08

## 2022-08-08 VITALS
HEART RATE: 76 BPM | WEIGHT: 180 LBS | RESPIRATION RATE: 16 BRPM | TEMPERATURE: 98.3 F | BODY MASS INDEX: 25.83 KG/M2 | SYSTOLIC BLOOD PRESSURE: 142 MMHG | OXYGEN SATURATION: 96 % | DIASTOLIC BLOOD PRESSURE: 90 MMHG

## 2022-08-08 PROCEDURE — 99214 OFFICE O/P EST MOD 30 MIN: CPT

## 2022-08-08 RX ORDER — AMLODIPINE BESYLATE 10 MG/1
10 TABLET ORAL
Qty: 90 | Refills: 0 | Status: DISCONTINUED | COMMUNITY
Start: 2021-12-24 | End: 2022-08-08

## 2022-08-09 RX ORDER — METFORMIN HYDROCHLORIDE 625 MG/1
TABLET ORAL
Refills: 0 | Status: DISCONTINUED | COMMUNITY
End: 2022-08-09

## 2022-08-09 NOTE — PHYSICAL EXAM
[No Acute Distress] : no acute distress [Well Nourished] : well nourished [Well Developed] : well developed [Well-Appearing] : well-appearing [Normal Sclera/Conjunctiva] : normal sclera/conjunctiva [PERRL] : pupils equal round and reactive to light [EOMI] : extraocular movements intact [Normal Outer Ear/Nose] : the outer ears and nose were normal in appearance [Normal Oropharynx] : the oropharynx was normal [No JVD] : no jugular venous distention [Supple] : supple [Thyroid Normal, No Nodules] : the thyroid was normal and there were no nodules present [No Respiratory Distress] : no respiratory distress  [No Accessory Muscle Use] : no accessory muscle use [Clear to Auscultation] : lungs were clear to auscultation bilaterally [Normal Rate] : normal rate  [Regular Rhythm] : with a regular rhythm [Normal S1, S2] : normal S1 and S2 [No Murmur] : no murmur heard [No Carotid Bruits] : no carotid bruits [No Abdominal Bruit] : a ~M bruit was not heard ~T in the abdomen [No Varicosities] : no varicosities [Pedal Pulses Present] : the pedal pulses are present [No Edema] : there was no peripheral edema [No Palpable Aorta] : no palpable aorta [No Extremity Clubbing/Cyanosis] : no extremity clubbing/cyanosis [Soft] : abdomen soft [Non Tender] : non-tender [Non-distended] : non-distended [No Masses] : no abdominal mass palpated [No HSM] : no HSM [Normal Bowel Sounds] : normal bowel sounds [Normal Posterior Cervical Nodes] : no posterior cervical lymphadenopathy [Normal Anterior Cervical Nodes] : no anterior cervical lymphadenopathy [No CVA Tenderness] : no CVA  tenderness [No Spinal Tenderness] : no spinal tenderness [No Joint Swelling] : no joint swelling [Grossly Normal Strength/Tone] : grossly normal strength/tone [No Rash] : no rash [Coordination Grossly Intact] : coordination grossly intact [No Focal Deficits] : no focal deficits [Normal Gait] : normal gait [Deep Tendon Reflexes (DTR)] : deep tendon reflexes were 2+ and symmetric [Normal Affect] : the affect was normal [Normal Insight/Judgement] : insight and judgment were intact [de-identified] : enlarged right submandibular gland

## 2022-08-09 NOTE — HISTORY OF PRESENT ILLNESS
[FreeTextEntry1] : preop [de-identified] : 77yoM, retired security, accompanied by miguel Garcia on phone [phone] PMH pancreatic mass, DM2, HTN, intracranial aneurysm presents for follow up\par \par upcoming cardiology appt for preop clearance\par neurology appt to discuss appropriate workup for intracranial aneurysm prior to total pancreatectomy - patient declined MR without sedation\par surgeon helping patient look for endo who will accept insurance\par \par patient brings in med list - on metformin and glimeperide\par amlodipine not on list - atenolol-chlorthadline on list\par patient has not taken any BP meds\par \par endorses pain left neck and ear when swallowing\par prior EUS - no mention of esophageal abnormality

## 2022-08-12 ENCOUNTER — APPOINTMENT (OUTPATIENT)
Dept: NEUROLOGY | Facility: CLINIC | Age: 77
End: 2022-08-12

## 2022-08-14 RX ORDER — PHENAZOPYRIDINE HYDROCHLORIDE 200 MG/1
200 TABLET ORAL
Qty: 6 | Refills: 0 | Status: DISCONTINUED | COMMUNITY
Start: 2022-02-18 | End: 2022-08-14

## 2022-08-16 ENCOUNTER — NON-APPOINTMENT (OUTPATIENT)
Age: 77
End: 2022-08-16

## 2022-08-16 ENCOUNTER — APPOINTMENT (OUTPATIENT)
Dept: CARDIOLOGY | Facility: CLINIC | Age: 77
End: 2022-08-16

## 2022-08-16 VITALS
WEIGHT: 180 LBS | HEIGHT: 70 IN | SYSTOLIC BLOOD PRESSURE: 164 MMHG | TEMPERATURE: 98.3 F | BODY MASS INDEX: 25.77 KG/M2 | DIASTOLIC BLOOD PRESSURE: 101 MMHG | HEART RATE: 88 BPM | OXYGEN SATURATION: 98 %

## 2022-08-16 DIAGNOSIS — Z01.818 ENCOUNTER FOR OTHER PREPROCEDURAL EXAMINATION: ICD-10-CM

## 2022-08-16 PROCEDURE — 99204 OFFICE O/P NEW MOD 45 MIN: CPT | Mod: 25

## 2022-08-16 PROCEDURE — 93000 ELECTROCARDIOGRAM COMPLETE: CPT | Mod: NC

## 2022-08-16 NOTE — PHYSICAL EXAM
[General Appearance - Well Developed] : well developed [Normal Appearance] : normal appearance [Well Groomed] : well groomed [General Appearance - Well Nourished] : well nourished [No Deformities] : no deformities [Normal Conjunctiva] : the conjunctiva exhibited no abnormalities [Normal Oral Mucosa] : normal oral mucosa [] : no respiratory distress [Exaggerated Use Of Accessory Muscles For Inspiration] : no accessory muscle use [Auscultation Breath Sounds / Voice Sounds] : lungs were clear to auscultation bilaterally [Heart Rate And Rhythm] : heart rate and rhythm were normal [Heart Sounds] : normal S1 and S2 [Murmurs] : no murmurs present [Arterial Pulses Normal] : the arterial pulses were normal [Edema] : no peripheral edema present [Abdomen Soft] : soft [Abnormal Walk] : normal gait [Cyanosis, Localized] : no localized cyanosis [Skin Turgor] : normal skin turgor [Oriented To Time, Place, And Person] : oriented to person, place, and time [Impaired Insight] : insight and judgment were intact [Affect] : the affect was normal

## 2022-08-17 PROBLEM — Z01.818 PREOP EXAMINATION: Status: ACTIVE | Noted: 2022-08-17

## 2022-08-17 NOTE — REVIEW OF SYSTEMS
[Headache] : headache [Weight Loss (___ Lbs)] : [unfilled] ~Ulb weight loss [Sore Throat] : sore throat [Heartburn] : heartburn [Constipation] : constipation [Urinary Frequency] : urinary frequency [Fever] : no fever [Chills] : no chills [Feeling Fatigued] : not feeling fatigued [Blurry Vision] : no blurred vision [Seeing Double (Diplopia)] : no diplopia [Eye Pain] : no eye pain [Earache] : no earache [Discharge From Ears] : no discharge from the ears [Hearing Loss] : no hearing loss [Mouth Sores] : no mouth sores [Sinus Pressure] : no sinus pressure [Tinnitus] : no tinnitus [Vertigo] : no vertigo [SOB] : no shortness of breath [Dyspnea on exertion] : not dyspnea during exertion [Chest Discomfort] : no chest discomfort [Lower Ext Edema] : no extremity edema [Leg Claudication] : no intermittent leg claudication [Palpitations] : no palpitations [Orthopnea] : no orthopnea [PND] : no PND [Syncope] : no syncope [Cough] : no cough [Wheezing] : no wheezing [Coughing Up Blood] : no hemoptysis [Snoring] : no snoring [Abdominal Pain] : no abdominal pain [Nausea] : no nausea [Vomiting] : no vomiting [Change in Appetite] : no change in appetite [Change In The Stool] : no change in stool [Dysphagia] : no dysphagia [Diarrhea] : diarrhea [Blood in stool] : no blood in stoo [Hematuria] : no hematuria [Pain During Urination] : no dysuria [Joint Pain] : no joint pain [Rash] : no rash [Skin Lesions] : no skin lesions [Dizziness] : no dizziness [Tremor] : no tremor was seen [Numbness (Hypoesthesia)] : no numbness [Convulsions] : no convulsions [Limb Weakness (Paresis)] : no limb weakness (Paresis) [Confusion] : no confusion was observed [Anxiety] : no anxiety [Easy Bleeding] : no tendency for easy bleeding [Swollen Glands] : no swollen glands [Easy Bruising] : no tendency for easy bruising

## 2022-08-17 NOTE — ASSESSMENT
[FreeTextEntry1] : Pt is a 78 yo M w HTN, HLD, DM, intracranial aneurysm, pancreatic mass, here for preop evaluation for excision of pancreatic mass. Pt's HTN in office is elevated and concerning for poorly controlled HTN, and an echo is warranted to evaluate for LVH. However, he has no ssx of unstable arrhythmia, acute MI, decompensated HF, or aortic stenosis. EKG on 8/16/2022 WNL. Pt Jackson score 0.3%, low-intermediate cardiac risk for surgery, can have echo post procedure. Pt advised to follow up within 3 months for re-evaluation.\par \par #HTN\par Pt's BP in office was elevated to 164/100, and on repeat was 158/100. Pt may benefit from the addition of medication to his current regimen. Elevated BP is also concerning for exacerbation of intracranial aneurysm. Was advised to monitor his BP at home at least once a week. Will f/u within 2 weeks for readings. Pt would benefit from Echo to evaluate for LVH given his HTN.\par \par #HLD\par Pt's LDL was 166, HDL 44,  (7/22). Pt was recently prescribed atorvastatin 2 weeks ago. Pt also counseled on modifying his diet. Recheck lipid panel in 3 months.\par \par #DM\par Last A1c 7.2% on 7/22. Pt endorses taking his oral medications for DM, but does not use insulin, and does not check his blood sugars at home. Pt also admits to eating unhealthy foods, including cakes and pastries. Was counseled on modifying his diet, and referred to nutritionist. Recheck in 3 months.\par \par #Intracranial aneurysm\par Pt has been complaining of constant headaches, for which he takes Excedrin daily. Currently he has no neurologist following his aneurysm, but has an appointment to establish care with Dr. Zamora in September. Control of pt's HTN would likely be beneficial for management of aneurysm.\par \par #Throat pain\par Pt complained of throat pain during this visit, was advised to f/u w Dr. Clancy/Dr. Walsh for further evaluation.\par

## 2022-08-17 NOTE — HISTORY OF PRESENT ILLNESS
[Preoperative Visit] : for a medical evaluation prior to surgery [Good] : Good [Urinary Frequency] : urinary frequency [Diabetes] : diabetes [Cardiovascular Disease] : cardiovascular disease [Frequent use of NSAIDs] : frequent use of NSAIDs [Electrocardiogram] : ~T an ECG ~C was performed [Fever] : no fever [Chills] : no chills [Fatigue] : no fatigue [Chest Pain] : no chest pain [Cough] : no cough [Dyspnea] : no dyspnea [Dysuria] : no dysuria [Nausea] : no nausea [Vomiting] : no vomiting [Diarrhea] : no diarrhea [Abdominal Pain] : no abdominal pain [Easy Bruising] : no easy bruising [Lower Extremity Swelling] : no lower extremity swelling [Poor Exercise Tolerance] : no poor exercise tolerance [Pulmonary Disease] : no pulmonary disease [Anti-Platelet Agents] : no anti-platelet agents [Nicotine Dependence] : no nicotine dependence [Alcohol Use] : no  alcohol use [Renal Disease] : no renal disease [GI Disease] : no gastrointestinal disease [Sleep Apnea] : no sleep apnea [Thromboembolic Problems] : no thromboembolic problems [Clotting Disorder] : no clotting disorder [Bleeding Disorder] : no bleeding disorder [Transfusion Reaction] : no transfusion reaction [Impaired Immunity] : no impaired immunity [Steroid Use in Last 6 Months] : no steroid use in the last six months [Frequent Aspirin Use] : no frequent aspirin use [Prior Anesthesia] : No prior anesthesia [FreeTextEntry1] : Pt is a 76 yo M w HTN, HLD, DM, intracranial aneurysm, pancreatic mass, BPH, here for preop cardiac evaluation. Pt denies any other hx of cardiac problems, endorses good exercise tolerance, does salsa and tango dancing, and walks extensively. He admits to not eating healthy foods. He endorses compliance with his medications, but does not check his BP at home. He denies any dyspnea, orthopnea, chest pain, palpitations, peripheral edema. \par \par He does endorse constant headache for which he takes Excedrin daily. Also endorses sore throat which he has had for 4 months. Denies dizziness, n/v, diarrhea, dysuria.\par \par Patient is active, able to do salsa dancing without symptoms.

## 2022-08-17 NOTE — END OF VISIT
[] : Resident [FreeTextEntry3] : 76 yo M with T2DM and HTN presenting for preprocedural evaluation prior to pancreatic mass surgery. Patient is euvolemic on exam, and reports being able to perform > 4 METs without symptoms. Risk calculators as per above. Patient is at low-intermediate risk of  adverse perioperative cardiac events undergoing risk surgery. No further cardiac testing is needed prior to patient's surgery.\par \par 2. HTN: Seems suboptimally controlled in office on current regimen.  Patient instructed to check his blood pressure at home and I will call him in 2 weeks to see what his readings are.  If they are elevated, will consider adding on amlodipine on top of his olmesartan.\par -Will send for echocardiogram to assess for LVH.  Do not expect results of echocardiogram to change the above risk stratification.\par \par 3.  HLD: Patient was recently started on atorvastatin, will repeat lipid panel in 3 months\par -Also given referral to nutritionist \par \par FUV 3 months or PRN\par \par

## 2022-08-18 ENCOUNTER — RESULT REVIEW (OUTPATIENT)
Age: 77
End: 2022-08-18

## 2022-08-18 ENCOUNTER — APPOINTMENT (OUTPATIENT)
Dept: CT IMAGING | Facility: CLINIC | Age: 77
End: 2022-08-18

## 2022-08-18 ENCOUNTER — OUTPATIENT (OUTPATIENT)
Dept: OUTPATIENT SERVICES | Facility: HOSPITAL | Age: 77
LOS: 1 days | End: 2022-08-18

## 2022-08-18 PROCEDURE — 70490 CT SOFT TISSUE NECK W/O DYE: CPT | Mod: 26

## 2022-08-23 ENCOUNTER — NON-APPOINTMENT (OUTPATIENT)
Age: 77
End: 2022-08-23

## 2022-08-25 ENCOUNTER — NON-APPOINTMENT (OUTPATIENT)
Age: 77
End: 2022-08-25

## 2022-08-26 ENCOUNTER — APPOINTMENT (OUTPATIENT)
Dept: INTERNAL MEDICINE | Facility: CLINIC | Age: 77
End: 2022-08-26

## 2022-08-26 ENCOUNTER — APPOINTMENT (OUTPATIENT)
Dept: SURGICAL ONCOLOGY | Facility: HOSPITAL | Age: 77
End: 2022-08-26

## 2022-08-26 VITALS
WEIGHT: 180 LBS | HEART RATE: 83 BPM | HEIGHT: 70 IN | TEMPERATURE: 97.8 F | RESPIRATION RATE: 16 BRPM | OXYGEN SATURATION: 98 % | BODY MASS INDEX: 25.77 KG/M2 | SYSTOLIC BLOOD PRESSURE: 157 MMHG | DIASTOLIC BLOOD PRESSURE: 91 MMHG

## 2022-08-26 PROCEDURE — 99214 OFFICE O/P EST MOD 30 MIN: CPT

## 2022-08-26 RX ORDER — FINASTERIDE 5 MG/1
5 TABLET, FILM COATED ORAL
Refills: 0 | Status: DISCONTINUED | COMMUNITY
Start: 2022-08-08 | End: 2022-08-26

## 2022-08-29 NOTE — PHYSICAL EXAM
[No Acute Distress] : no acute distress [Well Nourished] : well nourished [Well Developed] : well developed [Well-Appearing] : well-appearing [Normal Sclera/Conjunctiva] : normal sclera/conjunctiva [PERRL] : pupils equal round and reactive to light [EOMI] : extraocular movements intact [Normal Outer Ear/Nose] : the outer ears and nose were normal in appearance [Normal Oropharynx] : the oropharynx was normal [No JVD] : no jugular venous distention [Supple] : supple [Thyroid Normal, No Nodules] : the thyroid was normal and there were no nodules present [No Respiratory Distress] : no respiratory distress  [No Accessory Muscle Use] : no accessory muscle use [Clear to Auscultation] : lungs were clear to auscultation bilaterally [Normal Rate] : normal rate  [Regular Rhythm] : with a regular rhythm [Normal S1, S2] : normal S1 and S2 [No Murmur] : no murmur heard [No Carotid Bruits] : no carotid bruits [No Abdominal Bruit] : a ~M bruit was not heard ~T in the abdomen [No Varicosities] : no varicosities [Pedal Pulses Present] : the pedal pulses are present [No Edema] : there was no peripheral edema [No Palpable Aorta] : no palpable aorta [No Extremity Clubbing/Cyanosis] : no extremity clubbing/cyanosis [Soft] : abdomen soft [Non Tender] : non-tender [Non-distended] : non-distended [No Masses] : no abdominal mass palpated [No HSM] : no HSM [Normal Bowel Sounds] : normal bowel sounds [Normal Posterior Cervical Nodes] : no posterior cervical lymphadenopathy [Normal Anterior Cervical Nodes] : no anterior cervical lymphadenopathy [No CVA Tenderness] : no CVA  tenderness [No Spinal Tenderness] : no spinal tenderness [No Joint Swelling] : no joint swelling [Grossly Normal Strength/Tone] : grossly normal strength/tone [No Rash] : no rash [Coordination Grossly Intact] : coordination grossly intact [No Focal Deficits] : no focal deficits [Normal Gait] : normal gait [Deep Tendon Reflexes (DTR)] : deep tendon reflexes were 2+ and symmetric [Normal Affect] : the affect was normal [Normal Insight/Judgement] : insight and judgment were intact [de-identified] : right submandibular mass

## 2022-08-29 NOTE — HISTORY OF PRESENT ILLNESS
[FreeTextEntry1] : review CT neck [de-identified] : 77yoM, retired security, accompanied by miguel Garcia on phone [phone] PMH pancreatic mass, DM2, HTN, intracranial aneurysm presents to review CT neck\par \par endorses dysphagia and change in taste\par CT neck - exophytic mass base tongue\par surgical oncology aware - Head and Neck Dr Haynes aware

## 2022-08-30 ENCOUNTER — APPOINTMENT (OUTPATIENT)
Dept: OTOLARYNGOLOGY | Facility: CLINIC | Age: 77
End: 2022-08-30

## 2022-08-30 VITALS
HEART RATE: 77 BPM | BODY MASS INDEX: 25.77 KG/M2 | WEIGHT: 180 LBS | SYSTOLIC BLOOD PRESSURE: 180 MMHG | HEIGHT: 70 IN | DIASTOLIC BLOOD PRESSURE: 114 MMHG

## 2022-08-30 PROCEDURE — 99204 OFFICE O/P NEW MOD 45 MIN: CPT

## 2022-08-30 NOTE — HISTORY OF PRESENT ILLNESS
[de-identified] : 77 yro pt referred by Dr. Moran for eval of BOT mass found on CT scan from 8/18/2022. Pt c/o dysphagia since Dec 2021, worse when eating food. Pt also expectorating lots of phlegm and notes voice changes.  No aspiration, hemoptysis, dyspnea, dysphonia, fever, chills, weight loss.  \par Denies smoking or etoh use. \par \par CT neck w/o contrast 8/18/2022:\par Findings:\par \par *  Aerodigestive Tract: There is a bulky exophytic mass at the central and right base of tongue with approximate measurements of 4 (ML) by 3.5 (CC) by 3 (AP) centimeters, though margins are difficult to define in the absence of intravenous contrast. There is associated posterior deflection of the epiglottis without obvious epiglottic invasion. Anterior extension into the extrinsic tongue musculature is suspected.\par \par *  Lymph Nodes: There is a conglomerate of pathologic lymph nodes in right levels 2 and 3, with individual nodes measuring in excess of 3 cm in maximal dimension and demonstrating both cystic and solid elements. While no enlarged lymph nodes are identified in the left neck, this assessment is also hampered in the absence of intravenous contrast.\par \par *  Salivary Glands: No masses.\par \par *  Thyroid Gland: Normal.\par \par *  Orbits: Normal.\par \par *  Brain: Included portions are without hydrocephalus or mass effect\par \par *  Skull Base: Intact.\par \par *  Paranasal Sinuses: Mild ethmoid mucosal thickening.\par \par *  Mastoids: Clear.\par \par *  Cervical Spine: Normal vertebral body height and alignment in the sagittal plane with multilevel disc and facet degeneration\par \par *  Lung Apices: No large apical lung mass\par \par \par IMPRESSION:\par \par There is a bulky tumor at the central and right base of tongue, likely squamous cell carcinoma, with right cervical lymphadenopathy, as above. This noncontrast CT examination is inadequate for accurate tumor and althea staging; for example, extension into the extrinsic tongue musculature is suspected but should be verified on a contrast enhanced study.\par \par

## 2022-09-07 ENCOUNTER — OUTPATIENT (OUTPATIENT)
Dept: OUTPATIENT SERVICES | Facility: HOSPITAL | Age: 77
LOS: 1 days | End: 2022-09-07
Payer: COMMERCIAL

## 2022-09-07 ENCOUNTER — RESULT REVIEW (OUTPATIENT)
Age: 77
End: 2022-09-07

## 2022-09-07 ENCOUNTER — APPOINTMENT (OUTPATIENT)
Dept: ULTRASOUND IMAGING | Facility: IMAGING CENTER | Age: 77
End: 2022-09-07

## 2022-09-07 DIAGNOSIS — K14.8 OTHER DISEASES OF TONGUE: ICD-10-CM

## 2022-09-07 PROCEDURE — 88173 CYTOPATH EVAL FNA REPORT: CPT | Mod: 26

## 2022-09-07 PROCEDURE — 88173 CYTOPATH EVAL FNA REPORT: CPT

## 2022-09-07 PROCEDURE — 88305 TISSUE EXAM BY PATHOLOGIST: CPT

## 2022-09-07 PROCEDURE — 10005 FNA BX W/US GDN 1ST LES: CPT | Mod: 59

## 2022-09-07 PROCEDURE — 88305 TISSUE EXAM BY PATHOLOGIST: CPT | Mod: 26

## 2022-09-07 PROCEDURE — 76942 ECHO GUIDE FOR BIOPSY: CPT | Mod: 26,59

## 2022-09-07 PROCEDURE — 88341 IMHCHEM/IMCYTCHM EA ADD ANTB: CPT

## 2022-09-07 PROCEDURE — 88364 INSITU HYBRIDIZATION (FISH): CPT | Mod: 26

## 2022-09-07 PROCEDURE — 88365 INSITU HYBRIDIZATION (FISH): CPT

## 2022-09-07 PROCEDURE — 88342 IMHCHEM/IMCYTCHM 1ST ANTB: CPT

## 2022-09-07 PROCEDURE — 76942 ECHO GUIDE FOR BIOPSY: CPT

## 2022-09-07 PROCEDURE — 10005 FNA BX W/US GDN 1ST LES: CPT

## 2022-09-07 PROCEDURE — 88342 IMHCHEM/IMCYTCHM 1ST ANTB: CPT | Mod: 26,59

## 2022-09-07 PROCEDURE — 88341 IMHCHEM/IMCYTCHM EA ADD ANTB: CPT | Mod: 26

## 2022-09-07 PROCEDURE — 88172 CYTP DX EVAL FNA 1ST EA SITE: CPT

## 2022-09-07 PROCEDURE — 20206 BIOPSY MUSCLE PERQ NEEDLE: CPT

## 2022-09-07 PROCEDURE — 88365 INSITU HYBRIDIZATION (FISH): CPT | Mod: 26,59

## 2022-09-13 ENCOUNTER — APPOINTMENT (OUTPATIENT)
Dept: GASTROENTEROLOGY | Facility: CLINIC | Age: 77
End: 2022-09-13

## 2022-09-13 VITALS
HEART RATE: 91 BPM | OXYGEN SATURATION: 98 % | WEIGHT: 180 LBS | BODY MASS INDEX: 25.77 KG/M2 | DIASTOLIC BLOOD PRESSURE: 80 MMHG | HEIGHT: 70 IN | SYSTOLIC BLOOD PRESSURE: 122 MMHG | TEMPERATURE: 96.7 F

## 2022-09-13 PROCEDURE — 99214 OFFICE O/P EST MOD 30 MIN: CPT

## 2022-09-19 ENCOUNTER — APPOINTMENT (OUTPATIENT)
Dept: NEUROLOGY | Facility: CLINIC | Age: 77
End: 2022-09-19

## 2022-09-20 ENCOUNTER — APPOINTMENT (OUTPATIENT)
Dept: OTOLARYNGOLOGY | Facility: CLINIC | Age: 77
End: 2022-09-20

## 2022-09-20 VITALS
WEIGHT: 180 LBS | HEART RATE: 65 BPM | DIASTOLIC BLOOD PRESSURE: 94 MMHG | BODY MASS INDEX: 25.77 KG/M2 | HEIGHT: 70 IN | SYSTOLIC BLOOD PRESSURE: 155 MMHG

## 2022-09-20 PROCEDURE — 99214 OFFICE O/P EST MOD 30 MIN: CPT

## 2022-09-20 NOTE — HISTORY OF PRESENT ILLNESS
[de-identified] : 77 yro pt referred by Dr. Moran for eval of BOT mass found on CT scan from 8/18/2022. Pt c/o dysphagia since Dec 2021, worse when eating food. Pt also expectorating lots of phlegm and notes voice changes.  No aspiration, hemoptysis, dyspnea, dysphonia, fever, chills, weight loss.  Denies smoking or etoh use. Pt here today to discuss lymph node core needle biopsy results which were positive for malignant cells. \par \par path 9/7/2022\par Final Diagnosis\par 1. LYMPH NODE, LEVEL 2, RIGHT, US GUIDED CORE BIOPSY AND FNA\par POSITIVE FOR MALIGNANT CELLS.\par Non keratinizing squamous cell carcinoma, P16 positive\par \par Core biopsy shows syncytial sheets and nests of basaloid cells with high\par N:C ratios, hyperchromatic nuclei and dense scanty cytoplasm surrounded\par by dense collagenized tissue, associated with marked necrosis. Cytology\par smears and cell block show predominantly anucleated squamous cells and\par rare atypical cells.\par Immunohistochemistry:   The neoplastic cells are positive for P16 and P40\par supporting the above diagnosis. HPV status will follow as an addendum.\par \par 2. LYMPH NODE, LEVEL 3, RIGHT, US GUIDED FNA\par POSITIVE FOR MALIGNANT CELLS.\par Non keratinizing squamous cell carcinoma, P16 positive\par \par Cytology smears show a hypercellular specimen composed of syncytial sheets\par and numerous single squamous cells and clusters of basaloid cells with\par hyperchromatic nuclei and dense cytoplasm in a background of necrosis and\par keratinized debris. Cell block shows rare atypical epithelial cells.\par \par Addendum\par This addendum is being issued to report HPV and LINDA EDISON immunostain\par results.\par \par Immunohistochemistr y\par HPV 16: negative\par LINDA EDISON: negative\par

## 2022-09-20 NOTE — ASSESSMENT
[FreeTextEntry1] : This is a 77 year male here for a follow up of IPMNs.\par \par My plan -follow with Dr. Haynes for tongue mass\par -follow up with DR. Franklin for pancreatic mass\par -will consider prophylactic PEG tube placement if needs radiation therapy for tong mass \par -GI PRN

## 2022-09-20 NOTE — CONSULT LETTER
[Dear  ___] : Dear  [unfilled], [Courtesy Letter:] : I had the pleasure of seeing your patient, [unfilled], in my office today. [Please see my note below.] : Please see my note below. [Consult Closing:] : Thank you very much for allowing me to participate in the care of this patient.  If you have any questions, please do not hesitate to contact me. [Sincerely,] : Sincerely, [FreeTextEntry2] : Dr Franklin [FreeTextEntry3] : \par Luis Haynes MD, FACS\par \par Otolaryngology-Head and Neck Surgery\par [employer] [facility]\par

## 2022-09-20 NOTE — HISTORY OF PRESENT ILLNESS
[FreeTextEntry1] : This is a 77 year male here for a follow up of IPMNs. Since our  initial visit he was seen by Dr. Clancy. S/P EUS w/ FNA 1/2022- findings of mild dilatation of PD, multiple cysts likely sidebranch IPMN, one cyst w/ a mural nodule (measuring 1.6 x 1.2 cm) s/p targeted biopsy. mild PD dilatation ~4.5 mm. normal CBD. Fluid ,220. cytology with atypical findings, rare atypical but degenerated cells, a few lymphocytes and macrophages. He was to follow up with Dr. Franklin for surgical intervention but failed to follow through. Of note, he has a new tongue mass that he is following with Dr. Haynes. Biopsy of the tongue was done but results pending. Only complaint patient has is difficulty swallowing solids and clears. Denies any N&V or abdominal pain. Weight stable. \par

## 2022-09-22 ENCOUNTER — NON-APPOINTMENT (OUTPATIENT)
Age: 77
End: 2022-09-22

## 2022-09-29 ENCOUNTER — OUTPATIENT (OUTPATIENT)
Dept: OUTPATIENT SERVICES | Facility: HOSPITAL | Age: 77
LOS: 1 days | Discharge: HOME | End: 2022-09-29

## 2022-09-29 DIAGNOSIS — C02.9 MALIGNANT NEOPLASM OF TONGUE, UNSPECIFIED: ICD-10-CM

## 2022-09-30 ENCOUNTER — APPOINTMENT (OUTPATIENT)
Dept: NEUROLOGY | Facility: CLINIC | Age: 77
End: 2022-09-30

## 2022-09-30 VITALS
BODY MASS INDEX: 25.77 KG/M2 | TEMPERATURE: 97.6 F | HEIGHT: 70 IN | WEIGHT: 180 LBS | OXYGEN SATURATION: 97 % | SYSTOLIC BLOOD PRESSURE: 122 MMHG | HEART RATE: 76 BPM | DIASTOLIC BLOOD PRESSURE: 80 MMHG

## 2022-09-30 DIAGNOSIS — R51.9 HEADACHE, UNSPECIFIED: ICD-10-CM

## 2022-09-30 PROCEDURE — 99205 OFFICE O/P NEW HI 60 MIN: CPT

## 2022-09-30 NOTE — CONSULT LETTER
[Dear  ___] : Dear  [unfilled], [Consult Letter:] : I had the pleasure of evaluating your patient, [unfilled]. [Please see my note below.] : Please see my note below. [Consult Closing:] : Thank you very much for allowing me to participate in the care of this patient.  If you have any questions, please do not hesitate to contact me. [Sincerely,] : Sincerely, [FreeTextEntry3] : Khadra Rousseau MD, MPH\par

## 2022-09-30 NOTE — HISTORY OF PRESENT ILLNESS
[FreeTextEntry1] : 76 yo M with PMH pancreatic mass, DM2, HTN, intracranial aneurysm presents here for headache which started in 2022.  The headache is described as sharp pain, could be anyplace on the head, no associated photo phonophobia or nausea or vomiting.  The headache is mild to moderate, present daily.  He takes Tylenol for the headache without relief.  He was told that he cannot take Aleve or NSAIDs.\par \par He has not had work-up for this headache.  He also has a known history of intracranial aneurysm but has not followed up with neurosurgery.

## 2022-09-30 NOTE — ASSESSMENT
[FreeTextEntry1] : Headache without migrainous features, occurring daily, consistent with chronic daily headaches, will obtain an MRI of the brain with and without contrast to rule out structural lesions.  We will start nortriptyline 10 mg at night for headache prevention.  The patient declines Medrol pack at this time due to concern of possible elevation of fingersticks.\par \par Patient has intracranial aneurysm per history, will obtain an MRI of the head and refer patient to neurosurgery for evaluation and establishment of care.  Advised patient of the symptoms of intracranial bleeds, specifically of thunderclap headache and that this is an emergency which should be addressed STAT as aneurysmal bleed could be deadly.\par \par I spent the time noted on the day of this patient encounter preparing for, providing and documenting the above E/M service and counseling and educate patient on differential, workup, disease course, and treatment/management. Education was provided to the patient during this encounter. All questions and concerns were answered and addressed in detail. The patient verbalized understanding and agreed to plan. Patient was advised to continue to monitor for neurologic symptoms and to notify my office or go to the nearest emergency room if there are any changes. Any orders/referrals and communications were provided as well. \par Side effects of the above medications were discussed in detail including but not limited to applicable black box warning and teratogenicity as appropriate. \par Patient was advised to bring previous records to my office, including CD of imaging, when applicable. \par \par

## 2022-10-03 ENCOUNTER — OUTPATIENT (OUTPATIENT)
Dept: OUTPATIENT SERVICES | Facility: HOSPITAL | Age: 77
LOS: 1 days | End: 2022-10-03
Payer: COMMERCIAL

## 2022-10-03 ENCOUNTER — APPOINTMENT (OUTPATIENT)
Dept: NUCLEAR MEDICINE | Facility: IMAGING CENTER | Age: 77
End: 2022-10-03

## 2022-10-03 DIAGNOSIS — C10.9 MALIGNANT NEOPLASM OF OROPHARYNX, UNSPECIFIED: ICD-10-CM

## 2022-10-03 PROCEDURE — A9552: CPT

## 2022-10-03 PROCEDURE — 78815 PET IMAGE W/CT SKULL-THIGH: CPT | Mod: 26,PI

## 2022-10-03 PROCEDURE — 78815 PET IMAGE W/CT SKULL-THIGH: CPT

## 2022-10-06 ENCOUNTER — RESULT REVIEW (OUTPATIENT)
Age: 77
End: 2022-10-06

## 2022-10-06 ENCOUNTER — NON-APPOINTMENT (OUTPATIENT)
Age: 77
End: 2022-10-06

## 2022-10-06 ENCOUNTER — APPOINTMENT (OUTPATIENT)
Dept: HEMATOLOGY ONCOLOGY | Facility: CLINIC | Age: 77
End: 2022-10-06

## 2022-10-06 VITALS
HEIGHT: 68.82 IN | SYSTOLIC BLOOD PRESSURE: 164 MMHG | RESPIRATION RATE: 16 BRPM | OXYGEN SATURATION: 97 % | TEMPERATURE: 98.2 F | DIASTOLIC BLOOD PRESSURE: 83 MMHG | HEART RATE: 74 BPM | BODY MASS INDEX: 25.8 KG/M2 | WEIGHT: 174.17 LBS

## 2022-10-06 LAB
BASOPHILS # BLD AUTO: 0.03 K/UL — SIGNIFICANT CHANGE UP (ref 0–0.2)
BASOPHILS NFR BLD AUTO: 0.5 % — SIGNIFICANT CHANGE UP (ref 0–2)
EOSINOPHIL # BLD AUTO: 0.1 K/UL — SIGNIFICANT CHANGE UP (ref 0–0.5)
EOSINOPHIL NFR BLD AUTO: 1.6 % — SIGNIFICANT CHANGE UP (ref 0–6)
HCT VFR BLD CALC: 43.7 % — SIGNIFICANT CHANGE UP (ref 39–50)
HGB BLD-MCNC: 13.7 G/DL — SIGNIFICANT CHANGE UP (ref 13–17)
IMM GRANULOCYTES NFR BLD AUTO: 0.5 % — SIGNIFICANT CHANGE UP (ref 0–0.9)
LYMPHOCYTES # BLD AUTO: 1.22 K/UL — SIGNIFICANT CHANGE UP (ref 1–3.3)
LYMPHOCYTES # BLD AUTO: 19.1 % — SIGNIFICANT CHANGE UP (ref 13–44)
MCHC RBC-ENTMCNC: 27.6 PG — SIGNIFICANT CHANGE UP (ref 27–34)
MCHC RBC-ENTMCNC: 31.4 G/DL — LOW (ref 32–36)
MCV RBC AUTO: 88.1 FL — SIGNIFICANT CHANGE UP (ref 80–100)
MONOCYTES # BLD AUTO: 0.5 K/UL — SIGNIFICANT CHANGE UP (ref 0–0.9)
MONOCYTES NFR BLD AUTO: 7.8 % — SIGNIFICANT CHANGE UP (ref 2–14)
NEUTROPHILS # BLD AUTO: 4.5 K/UL — SIGNIFICANT CHANGE UP (ref 1.8–7.4)
NEUTROPHILS NFR BLD AUTO: 70.5 % — SIGNIFICANT CHANGE UP (ref 43–77)
NRBC # BLD: 0 /100 WBCS — SIGNIFICANT CHANGE UP (ref 0–0)
PLATELET # BLD AUTO: 278 K/UL — SIGNIFICANT CHANGE UP (ref 150–400)
RBC # BLD: 4.96 M/UL — SIGNIFICANT CHANGE UP (ref 4.2–5.8)
RBC # FLD: 15 % — HIGH (ref 10.3–14.5)
WBC # BLD: 6.38 K/UL — SIGNIFICANT CHANGE UP (ref 3.8–10.5)
WBC # FLD AUTO: 6.38 K/UL — SIGNIFICANT CHANGE UP (ref 3.8–10.5)

## 2022-10-06 PROCEDURE — 99205 OFFICE O/P NEW HI 60 MIN: CPT

## 2022-10-07 ENCOUNTER — NON-APPOINTMENT (OUTPATIENT)
Age: 77
End: 2022-10-07

## 2022-10-07 LAB
ALBUMIN SERPL ELPH-MCNC: 4.3 G/DL
ALP BLD-CCNC: 102 U/L
ALT SERPL-CCNC: 15 U/L
ANION GAP SERPL CALC-SCNC: 13 MMOL/L
AST SERPL-CCNC: 23 U/L
BILIRUB SERPL-MCNC: 0.2 MG/DL
BUN SERPL-MCNC: 15 MG/DL
CALCIUM SERPL-MCNC: 9.5 MG/DL
CEA SERPL-MCNC: 4 NG/ML
CHLORIDE SERPL-SCNC: 104 MMOL/L
CO2 SERPL-SCNC: 24 MMOL/L
CREAT SERPL-MCNC: 1.54 MG/DL
EGFR: 47 ML/MIN/1.73M2
GLUCOSE SERPL-MCNC: 113 MG/DL
HAV IGM SER QL: NONREACTIVE
HBV CORE IGG+IGM SER QL: NONREACTIVE
HBV CORE IGM SER QL: NONREACTIVE
HBV SURFACE AG SER QL: NONREACTIVE
HCV AB SER QL: NONREACTIVE
HCV S/CO RATIO: 0.19 S/CO
MAGNESIUM SERPL-MCNC: 1.9 MG/DL
POTASSIUM SERPL-SCNC: 4.9 MMOL/L
PROT SERPL-MCNC: 7.5 G/DL
SODIUM SERPL-SCNC: 142 MMOL/L
TSH SERPL-ACNC: 2.06 UIU/ML

## 2022-10-07 NOTE — HISTORY OF PRESENT ILLNESS
[Disease: _____________________] : Disease: [unfilled] [T: ___] : T[unfilled] [N: ___] : N[unfilled] [M: ___] : M[unfilled] [AJCC Stage: ____] : AJCC Stage: [unfilled] [de-identified] : Mr. Sierra is accompanied by his family member for consultation of BOT mass. The patient his a 77yoM with a pancreatic IPMN, DM II, HTN, and intracranial aneurysm. Denies tobacco or etoh use. Complained of dysphagia since 12/2021 worse when eating food along with an excess amount of phlegm and noted voice changes. CT Neck 8/18/22: There is a bulky tumor at the central and right base of tongue, likely squamous cell carcinoma, with right cervical lymphadenopathy, as above. This noncontrast CT examination is inadequate for accurate tumor and althea staging; for example, extension into the extrinsic tongue musculature is suspected but should be verified on a contrast enhanced study. 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 IHC positive for p16 and p40. LINDA EDISON and HPV 16 negative. A PET/CT on 10/3/22 showing 1. Markedly FDG avid large ill-defined bulky exophytic mass at central and right base of tongue, extending into midline of vallecula (SUV 21.9; image 67 of dedicated head and neck series), is not significantly changed as compared to CT dated 8/18/2022, corresponding to known malignancy. 2. Metastatic, partially necrotic Right level II and III cervical lymphadenopathy. Few small, minimally FDG-avid BILATERAL level II-IV cervical lymph nodes are nonspecific. Further evaluation may be performed with ultrasound and percutaneous needle biopsy, as indicated. 3. Nonspecific colonic hypermetabolism probably is related to metformin. 4. The remainder of the study demonstrates no evidence of FDG-avid disease. \par \par States he is still having trouble swallowing and eating solid food. Denies pain or any other symptoms. Eager to begin treatment. Of note states he was being worked up for an IPMN in his pancreas where a resection was recommended, received a second opinion from [facility] where this was also the recommendation. Was planning for this procedure however BOT mass found and surgery deferred for now.  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 IHC positive for p16 and p40. LINDA EDISON and HPV 16 negative [de-identified] : CEA 4.0 [de-identified] : CEA being tracked from prior IPMN work up

## 2022-10-07 NOTE — ASSESSMENT
[FreeTextEntry1] : Mr. Sierra is a 78 yo M with a pancreatic IPMN, DM II, HTN, and intracranial aneurysm with dysphagia since 12/2021 found to have a BOT mass, biopsied on 9/7/22 resulted as SCC p16+, PET/CT with localized disease mass 3.5cm and ipsilateral LNs, clinical stage 1 \par \par #[facility]\par - dysphagia since 12/2021, found to have BOT mass on imaging 8/18/22 with ipsilateral LNs\par - s/p US guided biopsy of LNs showing SCC, p16 positive, HPV 16 negative\par - PET/CT with locoregional disease\par - clinical stage 1 \par - discussed standard of care involves likely concurrent chemoRT however dependent on Radiation Oncology evaluation\par - patient has new pt appointment with Rad Onc Dr. Velasquez on 10/12 \par - baseline creatinine appears to be elevated, likely component of CKD related to DM II and HTN\par - plan for weekly carboplatin to start when radiation scheduled \par - discussed carboplatin side effects, chemocare handout given, consented \par - recommend speech and swallow evaluation and consultation with surgeon Dr. Sultana for possible PEG placement \par - all questions answered, patient in agreement with plan \par - labs today \par - RTC 2-3 weeks after Radiation Oncology evaluation \par \par #IPMN\par - initially planned for resection however found to have BOT mass\par - PET/CT with physiologic uptake in the pancreas \par - continue surveillance and trend CEA \par \par Patient seen and discussed with attending, Dr. Pittman \par \par Alen Head MD, PGY6\par Hematology/Oncology Fellow \par

## 2022-10-07 NOTE — CONSULT LETTER
[Dear  ___] : Dear  [unfilled], [Consult Letter:] : I had the pleasure of evaluating your patient, [unfilled]. [Please see my note below.] : Please see my note below. [Consult Closing:] : Thank you very much for allowing me to participate in the care of this patient.  If you have any questions, please do not hesitate to contact me. [Sincerely,] : Sincerely, [FreeTextEntry2] : Dr. Luis Haynes [FreeTextEntry3] : Abdoulaye Pittman MD\par \par  [DrSatya  ___] : Dr. ALAS

## 2022-10-07 NOTE — REASON FOR VISIT
[Initial Consultation] : an initial consultation [Family Member] : family member [FreeTextEntry2] : [facility]

## 2022-10-12 ENCOUNTER — OUTPATIENT (OUTPATIENT)
Dept: OUTPATIENT SERVICES | Facility: HOSPITAL | Age: 77
LOS: 1 days | Discharge: HOME | End: 2022-10-12

## 2022-10-12 ENCOUNTER — APPOINTMENT (OUTPATIENT)
Dept: RADIATION ONCOLOGY | Facility: CLINIC | Age: 77
End: 2022-10-12

## 2022-10-12 VITALS
SYSTOLIC BLOOD PRESSURE: 173 MMHG | HEART RATE: 74 BPM | BODY MASS INDEX: 26.42 KG/M2 | WEIGHT: 178 LBS | OXYGEN SATURATION: 98 % | DIASTOLIC BLOOD PRESSURE: 97 MMHG | TEMPERATURE: 98.78 F | RESPIRATION RATE: 17 BRPM

## 2022-10-12 PROCEDURE — 99205 OFFICE O/P NEW HI 60 MIN: CPT | Mod: 25

## 2022-10-12 PROCEDURE — 31505 DIAGNOSTIC LARYNGOSCOPY: CPT

## 2022-10-12 PROCEDURE — 77263 THER RADIOLOGY TX PLNG CPLX: CPT

## 2022-10-12 NOTE — PROCEDURE
[Gag Reflex] : gag reflex preventing mirror examination [Topical Lidocaine] : topical lidocaine [Mass ___ cm] : [unfilled]Ucm vallecula mass [Flexible Endoscope] : examined with the flexible endoscope [Normal] : the arytenoid cartilage ~T was normal [Lesion(s)] : no lesions [de-identified] : Submucosal bulky mass occupying entire R tonsil extending to base of tongue to the vallecula, crossing midline. Normal epiglottis. No apparent soft palate involvement. Copious clear secretions. [de-identified] : Mass extending to vallecula [de-identified] : Exam limited by bulky oropharynx mass but cords appeared mobile and midline, no apparent lesions of the larynx.

## 2022-10-12 NOTE — PHYSICAL EXAM
[Hearing Threshold Finger Rub Not Angelina] : hearing was normal [Normal] : normal skin color and pigmentation and no rash [No Focal Deficits] : no focal deficits [de-identified] : Right neck palpable lump 3 cm

## 2022-10-12 NOTE — REVIEW OF SYSTEMS
[Recent Change In Weight] : ~T recent weight change [Dysphagia] : dysphagia [Swollen Glands] : swollen glands [Negative] : Psychiatric [Dysphagia: Grade 1 - Symptomatic, able to eat regular diet] : Dysphagia: Grade 1 - Symptomatic, able to eat regular diet [Headache: Grade 1 - Mild pain] : Headache: Grade 1 - Mild pain [de-identified] : Hx of Headaches [de-identified] : DM [de-identified] : Rt Neck

## 2022-10-12 NOTE — HISTORY OF PRESENT ILLNESS
[FreeTextEntry1] : Ms Sierra is a 77 year old male non-smoker with PMH of IPMN of the pancreas (on surveillance pending H&N treatment), Intracranial Aneurysm with new diagnosis of AJCC 8th Ed. at least Stage I ([id number]) and AJCC 7th Ed. at least Stage SAVAGE ([id number]) p16+ SCC of BOT.\par \par He had c/o voice changes, dysphagia associated with excessive phlegm since 12/2021\par \par 8/18/2022 CT Neck without Contrast showed: There is a bulky tumor at the central and right base of tongue, likely squamous cell carcinoma, with right cervical lymphadenopathy, as above. This noncontrast CT examination is inadequate for accurate tumor and althea staging; for example, extension into the extrinsic tongue musculature is suspected but should be verified on a contrast enhanced study. \par \par 9/7/2022 US biopsy of LN level 2, 3 and FNA, Pathology showed: Non keratinizing squamous cell carcinoma.  IHC positive for p16 and p40. LINDA EDISON and HPV 16 negative. AJCC 8th Ed TNM stage: T3, N1, M0 \par AJCC Stage: 1 \par \par 10/3/2022 PET./CT SHOWED 1. Markedly FDG avid large ill-defined bulky exophytic mass at central and right base of tongue, extending into midline of vallecula (SUV 21.9; image 67 of dedicated head and neck series), is not significantly changed as compared to CT dated 8/18/2022, corresponding to known malignancy. \par 2. Metastatic, partially necrotic Right level II and III cervical lymphadenopathy. Few small, minimally FDG-avid BILATERAL level II-IV cervical lymph nodes are nonspecific. Further evaluation may be performed with ultrasound and percutaneous needle biopsy, as indicated.\par 3. Nonspecific colonic hypermetabolism probably is related to metformin. 4. The remainder of the study demonstrates no evidence of FDG-avid disease. \par \par 10/6/2022 Established care with Dr Pittman\par \par Of note, surgery for IPMN of the pancreas was deferred due to new diagnosis of Tongue cancer. \par \par Today, he reports dysphagia and intermittent burning throat pain. Denies Odynophagia.  He takes Tylenol for the discomfort with some effect. Reported 6lbs wt loss over last several weeks, per patient intentional.  No stridor, dyspnea.

## 2022-10-12 NOTE — VITALS
[Maximal Pain Intensity: 5/10] : 5/10 [Least Pain Intensity: 5/10] : 5/10 [Pain Location: ___] : Pain Location: [unfilled] [OTC] : OTC [80: Normal activity with effort; some signs or symptoms of disease.] : 80: Normal activity with effort; some signs or symptoms of disease.  [ECOG Performance Status: 1 - Restricted in physically strenuous activity but ambulatory and able to carry out work of a light or sedentary nature] : Performance Status: 1 - Restricted in physically strenuous activity but ambulatory and able to carry out work of a light or sedentary nature, e.g., light house work, office work [0 - No Distress] : Distress Level: 0

## 2022-10-17 ENCOUNTER — NON-APPOINTMENT (OUTPATIENT)
Age: 77
End: 2022-10-17

## 2022-10-17 LAB
ANION GAP SERPL CALC-SCNC: 14 MMOL/L
BUN SERPL-MCNC: 22 MG/DL
CALCIUM SERPL-MCNC: 9.5 MG/DL
CHLORIDE SERPL-SCNC: 107 MMOL/L
CO2 SERPL-SCNC: 24 MMOL/L
CREAT SERPL-MCNC: 1.69 MG/DL
EGFR: 42 ML/MIN/1.73M2
GLUCOSE SERPL-MCNC: 169 MG/DL
POTASSIUM SERPL-SCNC: 4.7 MMOL/L
SODIUM SERPL-SCNC: 145 MMOL/L

## 2022-10-17 PROCEDURE — 77334 RADIATION TREATMENT AID(S): CPT | Mod: 26

## 2022-10-18 ENCOUNTER — APPOINTMENT (OUTPATIENT)
Dept: HEMATOLOGY ONCOLOGY | Facility: CLINIC | Age: 77
End: 2022-10-18

## 2022-10-18 VITALS
HEART RATE: 73 BPM | BODY MASS INDEX: 26.18 KG/M2 | WEIGHT: 176.37 LBS | OXYGEN SATURATION: 96 % | TEMPERATURE: 98.2 F | SYSTOLIC BLOOD PRESSURE: 130 MMHG | RESPIRATION RATE: 16 BRPM | DIASTOLIC BLOOD PRESSURE: 89 MMHG

## 2022-10-18 DIAGNOSIS — K14.8 OTHER DISEASES OF TONGUE: ICD-10-CM

## 2022-10-18 PROCEDURE — 99215 OFFICE O/P EST HI 40 MIN: CPT

## 2022-10-18 NOTE — CONSULT LETTER
[Dear  ___] : Dear  [unfilled], [Consult Letter:] : I had the pleasure of evaluating your patient, [unfilled]. [Please see my note below.] : Please see my note below. [Consult Closing:] : Thank you very much for allowing me to participate in the care of this patient.  If you have any questions, please do not hesitate to contact me. [Sincerely,] : Sincerely, [DrSatya  ___] : Dr. ALAS [FreeTextEntry2] : Dr. Luis Haynes [FreeTextEntry3] : Abdoulaye Pittman MD\par \par

## 2022-10-18 NOTE — ASSESSMENT
[FreeTextEntry1] : Mr. Sierra is a 76 yo M with a pancreatic IPMN, DM II, HTN, and intracranial aneurysm with dysphagia since 12/2021 found to have a BOT mass, biopsied on 9/7/22 resulted as SCC p16+, PET/CT with localized disease mass 3.5cm and ipsilateral LNs, clinical stage 1 \par \par #HNSCC\par - dysphagia since 12/2021, found to have BOT mass on imaging 8/18/22 with ipsilateral LNs\par - s/p US guided biopsy of LNs showing SCC, p16 positive, HPV 16 negative\par - PET/CT with locoregional disease\par - clinical stage 1. On mirror exam today, I see that the OP mass seems a little larger than prior. rt 2 cm LAD was also appreciated. \par - discussed standard of care involves likely concurrent chemoRT, but pt had several questions pertaining to long term AEs and if he really needs to do the treatment. \par -I discussed the alternate option of supportive care only and perhaps consideration of hospice. \par -After extensive discussion, pt decided on proceeding with tx. \par -I discussed with Dr. Velasquez and we agreed CRT is best approach\par -I would have preferred weekly cisplatin but pt has CKD sec to HTN and DM, which has worsened with NSAID use- hence will use carboplatin as radiosensitizer. \par -Discussed pain management. Stop Excedrin since it has aspirin and pt is taking several of these a day. I explained to him that this is not migraine but pain radiating from tumor. I sent scripts for gabapentin and Tramadol. \par -G tube placement request- d/q Dr. Sultana,. \par -Pt states he has dental clearance but I do not see any document as such in the EMR. \par -We discussed schedule, dose, potential AEs of carbo. pt signed informed consent\par - all questions answered, patient in agreement with plan \par - labs today \par - RTC 2-3 weeks, matching week 2 of chemo\par

## 2022-10-18 NOTE — HISTORY OF PRESENT ILLNESS
[Disease: _____________________] : Disease: [unfilled] [T: ___] : T[unfilled] [N: ___] : N[unfilled] [M: ___] : M[unfilled] [AJCC Stage: ____] : AJCC Stage: [unfilled] [de-identified] : Mr. Sierra is accompanied by his family member for consultation of BOT mass. The patient his a 77yoM with a pancreatic IPMN, DM II, HTN, and intracranial aneurysm. Denies tobacco or etoh use. Complained of dysphagia since 12/2021 worse when eating food along with an excess amount of phlegm and noted voice changes. CT Neck 8/18/22: There is a bulky tumor at the central and right base of tongue, likely squamous cell carcinoma, with right cervical lymphadenopathy, as above. This noncontrast CT examination is inadequate for accurate tumor and althea staging; for example, extension into the extrinsic tongue musculature is suspected but should be verified on a contrast enhanced study. 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 IHC positive for p16 and p40. LINDA EDISON and HPV 16 negative. A PET/CT on 10/3/22 showing 1. Markedly FDG avid large ill-defined bulky exophytic mass at central and right base of tongue, extending into midline of vallecula (SUV 21.9; image 67 of dedicated head and neck series), is not significantly changed as compared to CT dated 8/18/2022, corresponding to known malignancy. 2. Metastatic, partially necrotic Right level II and III cervical lymphadenopathy. Few small, minimally FDG-avid BILATERAL level II-IV cervical lymph nodes are nonspecific. Further evaluation may be performed with ultrasound and percutaneous needle biopsy, as indicated. 3. Nonspecific colonic hypermetabolism probably is related to metformin. 4. The remainder of the study demonstrates no evidence of FDG-avid disease. \par \par States he is still having trouble swallowing and eating solid food. Denies pain or any other symptoms. Eager to begin treatment. Of note states he was being worked up for an IPMN in his pancreas where a resection was recommended, received a second opinion from [facility] where this was also the recommendation. Was planning for this procedure however BOT mass found and surgery deferred for now. \par \par \par 10/18/22: Pt has seen rad onc and plan is for CRT. He notes pain in rt side of throat radiating to head, has been taking Excedrin migraine which has not helped much. He also has odynophagia.  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 IHC positive for p16 and p40. LINDA EDISON and HPV 16 negative [de-identified] : CEA 4.0 [de-identified] : CEA being tracked from prior IPMN work up

## 2022-10-20 ENCOUNTER — NON-APPOINTMENT (OUTPATIENT)
Age: 77
End: 2022-10-20

## 2022-10-24 ENCOUNTER — APPOINTMENT (OUTPATIENT)
Dept: INTERNAL MEDICINE | Facility: CLINIC | Age: 77
End: 2022-10-24

## 2022-10-24 DIAGNOSIS — N18.30 CHRONIC KIDNEY DISEASE, STAGE 3 UNSPECIFIED: ICD-10-CM

## 2022-10-24 DIAGNOSIS — I67.1 CEREBRAL ANEURYSM, NONRUPTURED: ICD-10-CM

## 2022-10-24 DIAGNOSIS — Z23 ENCOUNTER FOR IMMUNIZATION: ICD-10-CM

## 2022-10-24 PROCEDURE — 90662 IIV NO PRSV INCREASED AG IM: CPT

## 2022-10-24 PROCEDURE — 99214 OFFICE O/P EST MOD 30 MIN: CPT | Mod: 25

## 2022-10-24 PROCEDURE — G0008: CPT

## 2022-10-25 ENCOUNTER — EMERGENCY (EMERGENCY)
Facility: HOSPITAL | Age: 77
LOS: 1 days | Discharge: HOME | End: 2022-10-25
Attending: EMERGENCY MEDICINE
Payer: MEDICARE

## 2022-10-25 ENCOUNTER — NON-APPOINTMENT (OUTPATIENT)
Age: 77
End: 2022-10-25

## 2022-10-25 VITALS
RESPIRATION RATE: 19 BRPM | OXYGEN SATURATION: 97 % | SYSTOLIC BLOOD PRESSURE: 111 MMHG | HEIGHT: 70 IN | TEMPERATURE: 98 F | WEIGHT: 169.98 LBS | DIASTOLIC BLOOD PRESSURE: 74 MMHG | HEART RATE: 102 BPM

## 2022-10-25 PROBLEM — N18.30 STAGE 3 CHRONIC KIDNEY DISEASE: Status: RESOLVED | Noted: 2022-10-25 | Resolved: 2022-10-25

## 2022-10-25 PROBLEM — I67.1 INTRACRANIAL ANEURYSM: Status: ACTIVE | Noted: 2022-07-18

## 2022-10-25 LAB
ALBUMIN SERPL ELPH-MCNC: 3.5 G/DL — SIGNIFICANT CHANGE UP (ref 3.5–5)
ALP SERPL-CCNC: 103 U/L — SIGNIFICANT CHANGE UP (ref 40–120)
ALT FLD-CCNC: 20 U/L DA — SIGNIFICANT CHANGE UP (ref 10–60)
ANION GAP SERPL CALC-SCNC: 9 MMOL/L — SIGNIFICANT CHANGE UP (ref 5–17)
AST SERPL-CCNC: 17 U/L — SIGNIFICANT CHANGE UP (ref 10–40)
BASOPHILS # BLD AUTO: 0.03 K/UL — SIGNIFICANT CHANGE UP (ref 0–0.2)
BASOPHILS NFR BLD AUTO: 0.4 % — SIGNIFICANT CHANGE UP (ref 0–2)
BILIRUB SERPL-MCNC: 0.6 MG/DL — SIGNIFICANT CHANGE UP (ref 0.2–1.2)
BUN SERPL-MCNC: 22 MG/DL — HIGH (ref 7–18)
CALCIUM SERPL-MCNC: 9.2 MG/DL — SIGNIFICANT CHANGE UP (ref 8.4–10.5)
CHLORIDE SERPL-SCNC: 102 MMOL/L — SIGNIFICANT CHANGE UP (ref 96–108)
CO2 SERPL-SCNC: 27 MMOL/L — SIGNIFICANT CHANGE UP (ref 22–31)
CREAT SERPL-MCNC: 1.75 MG/DL — HIGH (ref 0.5–1.3)
EGFR: 40 ML/MIN/1.73M2 — LOW
EOSINOPHIL # BLD AUTO: 0.01 K/UL — SIGNIFICANT CHANGE UP (ref 0–0.5)
EOSINOPHIL NFR BLD AUTO: 0.1 % — SIGNIFICANT CHANGE UP (ref 0–6)
GLUCOSE SERPL-MCNC: 121 MG/DL — HIGH (ref 70–99)
HCT VFR BLD CALC: 43.5 % — SIGNIFICANT CHANGE UP (ref 39–50)
HGB BLD-MCNC: 13.7 G/DL — SIGNIFICANT CHANGE UP (ref 13–17)
IMM GRANULOCYTES NFR BLD AUTO: 0.7 % — SIGNIFICANT CHANGE UP (ref 0–0.9)
LYMPHOCYTES # BLD AUTO: 0.98 K/UL — LOW (ref 1–3.3)
LYMPHOCYTES # BLD AUTO: 14.3 % — SIGNIFICANT CHANGE UP (ref 13–44)
MCHC RBC-ENTMCNC: 27.3 PG — SIGNIFICANT CHANGE UP (ref 27–34)
MCHC RBC-ENTMCNC: 31.5 GM/DL — LOW (ref 32–36)
MCV RBC AUTO: 86.8 FL — SIGNIFICANT CHANGE UP (ref 80–100)
MONOCYTES # BLD AUTO: 0.62 K/UL — SIGNIFICANT CHANGE UP (ref 0–0.9)
MONOCYTES NFR BLD AUTO: 9.1 % — SIGNIFICANT CHANGE UP (ref 2–14)
NEUTROPHILS # BLD AUTO: 5.16 K/UL — SIGNIFICANT CHANGE UP (ref 1.8–7.4)
NEUTROPHILS NFR BLD AUTO: 75.4 % — SIGNIFICANT CHANGE UP (ref 43–77)
NRBC # BLD: 0 /100 WBCS — SIGNIFICANT CHANGE UP (ref 0–0)
PLATELET # BLD AUTO: 287 K/UL — SIGNIFICANT CHANGE UP (ref 150–400)
POTASSIUM SERPL-MCNC: 4.3 MMOL/L — SIGNIFICANT CHANGE UP (ref 3.5–5.3)
POTASSIUM SERPL-SCNC: 4.3 MMOL/L — SIGNIFICANT CHANGE UP (ref 3.5–5.3)
PROT SERPL-MCNC: 8.3 G/DL — SIGNIFICANT CHANGE UP (ref 6–8.3)
RBC # BLD: 5.01 M/UL — SIGNIFICANT CHANGE UP (ref 4.2–5.8)
RBC # FLD: 15 % — HIGH (ref 10.3–14.5)
SODIUM SERPL-SCNC: 138 MMOL/L — SIGNIFICANT CHANGE UP (ref 135–145)
TROPONIN I, HIGH SENSITIVITY RESULT: 9.2 NG/L — SIGNIFICANT CHANGE UP
WBC # BLD: 6.85 K/UL — SIGNIFICANT CHANGE UP (ref 3.8–10.5)
WBC # FLD AUTO: 6.85 K/UL — SIGNIFICANT CHANGE UP (ref 3.8–10.5)

## 2022-10-25 PROCEDURE — 70450 CT HEAD/BRAIN W/O DYE: CPT | Mod: MA

## 2022-10-25 PROCEDURE — 96374 THER/PROPH/DIAG INJ IV PUSH: CPT

## 2022-10-25 PROCEDURE — 71046 X-RAY EXAM CHEST 2 VIEWS: CPT | Mod: 26

## 2022-10-25 PROCEDURE — 36415 COLL VENOUS BLD VENIPUNCTURE: CPT

## 2022-10-25 PROCEDURE — 80053 COMPREHEN METABOLIC PANEL: CPT

## 2022-10-25 PROCEDURE — 84484 ASSAY OF TROPONIN QUANT: CPT

## 2022-10-25 PROCEDURE — 99285 EMERGENCY DEPT VISIT HI MDM: CPT | Mod: 25

## 2022-10-25 PROCEDURE — 71046 X-RAY EXAM CHEST 2 VIEWS: CPT

## 2022-10-25 PROCEDURE — 99285 EMERGENCY DEPT VISIT HI MDM: CPT

## 2022-10-25 PROCEDURE — 85025 COMPLETE CBC W/AUTO DIFF WBC: CPT

## 2022-10-25 RX ORDER — NORTRIPTYLINE HYDROCHLORIDE 10 MG/1
10 CAPSULE ORAL
Qty: 30 | Refills: 5 | Status: DISCONTINUED | COMMUNITY
Start: 2022-09-30 | End: 2022-10-25

## 2022-10-25 RX ORDER — SODIUM CHLORIDE 9 MG/ML
1000 INJECTION INTRAMUSCULAR; INTRAVENOUS; SUBCUTANEOUS ONCE
Refills: 0 | Status: DISCONTINUED | OUTPATIENT
Start: 2022-10-25 | End: 2022-10-29

## 2022-10-25 RX ORDER — INSULIN GLARGINE 100 [IU]/ML
100 INJECTION, SOLUTION SUBCUTANEOUS
Refills: 0 | Status: DISCONTINUED | COMMUNITY
Start: 2022-04-13 | End: 2022-10-25

## 2022-10-25 RX ORDER — KETOROLAC TROMETHAMINE 30 MG/ML
15 SYRINGE (ML) INJECTION ONCE
Refills: 0 | Status: DISCONTINUED | OUTPATIENT
Start: 2022-10-25 | End: 2022-10-25

## 2022-10-25 RX ADMIN — Medication 15 MILLIGRAM(S): at 21:52

## 2022-10-25 NOTE — ED PROVIDER NOTE - PROGRESS NOTE DETAILS
ADDENDUM by Chito King M.D.: I received sign-off from Dr. SHAHEEN Samnaiego. 77yoM with h/o HTN, neck mass being worked up, presents with fall, I was to dc if CT unremarkable. Pt confirms to me felt dizzy transiently yesterday and fell hitting head however no symptoms since then and HA now resolved. CT unremarkable. JJ and given IVF. Patient is well appearing, NAD, afebrile, hemodynamically stable, no WOB, ambulating independently and well here. Any available tests and studies were discussed with patient including JJ. Discharged with instructions in further symptomatic care, improved hydration, return precautions, and need for PMD f/u.

## 2022-10-25 NOTE — ED PROVIDER NOTE - PATIENT PORTAL LINK FT
You can access the FollowMyHealth Patient Portal offered by [facility] by registering at the following website: [email or web address]. By joining [facility]’s FollowMyHealth portal, you will also be able to view your health information using other applications (apps) compatible with our system.

## 2022-10-25 NOTE — ED ADULT TRIAGE NOTE - ARRIVAL FROM
July 11, 2020        Girish Calderón  [address] [facility] [address]    This letter provides a written record that you were tested for COVID-19 on 7/7/20.       Your result was negative. This means that we didn t find the virus that causes COVID-19 in your sample. A test may show negative when you do actually have the virus. This can happen when the virus is in the early stages of infection, before you feel illness symptoms.    If you have symptoms   Stay home and away from others (self-isolate) until you meet ALL of the guidelines below:    You ve had no fever--and no medicine that reduces fever--for 3 full days (72 hours). And      Your other symptoms have gotten better. For example, your cough or breathing has improved. And     At least 10 days have passed since your symptoms started.    During this time:    Stay home. Don t go to work, school or anywhere else.     Stay in your own room, including for meals. Use your own bathroom if you can.    Stay away from others in your home. No hugging, kissing or shaking hands. No visitors.    Clean  high touch  surfaces often (doorknobs, counters, handles, etc.). Use a household cleaning spray or wipes. You can find a full list on the EPA website at www.epa.gov/pesticide-registration/list-n-disinfectants-use-against-sars-cov-2.    Cover your mouth and nose with a mask, tissue or washcloth to avoid spreading germs.    Wash your hands and face often with soap and water.    Going back to work  Check with your employer for any guidelines to follow for going back to work.    Employers: This document serves as formal notice that your employee tested negative for COVID-19, as of the testing date shown above.     Home

## 2022-10-25 NOTE — ED PROVIDER NOTE - CLINICAL SUMMARY MEDICAL DECISION MAKING FREE TEXT BOX
77 yr old male with hx of HTn, HLD, BPh and right neck mass pending bx in 2 weeks presents to ed c/o dizziness and passed out hitting head 1 day ago. pt no cp, no palpitations, no n/v, no focal weakness, no n/v.  headache. post fall also having right knee pain with weigh bearing.    dizziness r/o arrhythmia vs lytes imbalance vs ic mass vs orthostasis- labs, cth, cxr, likely dc

## 2022-10-25 NOTE — ED PROVIDER NOTE - NSFOLLOWUPINSTRUCTIONS_ED_ALL_ED_FT
Please follow up with your primary care doctor in 1-2 days.  Please keep well hydrated.  Please use acetaminophen as needed for headache.  Please return to the emergency department if you have worsening dizziness, chest pain, shortness of breath, vomiting, fever, or any other symptoms.    Dizziness    Dizziness is a common problem. It makes you feel unsteady or light-headed. You may feel like you are about to pass out (faint). Dizziness can lead to getting hurt if you stumble or fall. Dizziness can be caused by many things, including:  •Medicines.  •Not having enough water in your body (dehydration).  •Illness.    Follow these instructions at home:    Eating and drinking   A comparison of three sample cups showing dark yellow, yellow, and pale yellow urine.   •Drink enough fluid to keep your pee (urine) pale yellow. This helps to keep you from getting dehydrated. Try to drink more clear fluids, such as water.  • Do not drink alcohol.  •Limit how much caffeine you drink or eat, if your doctor tells you to do that.  •Limit how much salt (sodium) you drink or eat, if your doctor tells you to do that.    Activity   A sign showing that a person should not drive. •Avoid making quick movements.  •Stand up slowly from sitting in a chair, and steady yourself until you feel okay.  •In the morning, first sit up on the side of the bed. When you feel okay, stand up slowly while you hold onto something. Do this until you know that your balance is okay.  •If you need to  one place for a long time, move your legs often. Tighten and relax the muscles in your legs while you are standing.  • Do not drive or use machinery if you feel dizzy.  •Avoid bending down if you feel dizzy. Place items in your home so you can reach them easily without leaning over.    Lifestyle   • Do not smoke or use any products that contain nicotine or tobacco. If you need help quitting, ask your doctor.  •Try to lower your stress level. You can do this by using methods such as yoga or meditation. Talk with your doctor if you need help.    General instructions   •Watch your dizziness for any changes.  •Take over-the-counter and prescription medicines only as told by your doctor. Talk with your doctor if you think that you are dizzy because of a medicine that you are taking.  •Tell a friend or a family member that you are feeling dizzy. If he or she notices any changes in your behavior, have this person call your doctor.  •Keep all follow-up visits.    Contact a doctor if:  •Your dizziness does not go away.  •Your dizziness or light-headedness gets worse.  •You feel like you may vomit (are nauseous).  •You have trouble hearing.  •You have new symptoms.  •You are unsteady on your feet.  •You feel like the room is spinning.  •You have neck pain or a stiff neck.  •You have a fever.    Get help right away if:  •You vomit or have watery poop (diarrhea), and you cannot eat or drink anything.  •You have trouble:  •Talking.  •Walking.  •Swallowing.  •Using your arms, hands, or legs.  •You feel generally weak.  •You are not thinking clearly, or you have trouble forming sentences. A friend or family member may notice this.  •You have:  •Chest pain.  •Pain in your belly (abdomen).  •Shortness of breath.  •Sweating.  •Your vision changes.  •You are bleeding.  •You have a very bad headache.    These symptoms may be an emergency. Get help right away. Call your local emergency services (911 in the [place]   • Do not wait to see if the symptoms will go away.    • Do not drive yourself to the hospital.     Summary  •Dizziness makes you feel unsteady or light-headed. You may feel like you are about to pass out (faint).  •Drink enough fluid to keep your pee (urine) pale yellow. Do not drink alcohol.  •Avoid making quick movements if you feel dizzy.  •Watch your dizziness for any changes.    This information is not intended to replace advice given to you by your health care provider. Make sure you discuss any questions you have with your health care provider.

## 2022-10-25 NOTE — ED ADULT NURSE NOTE - CHIEF COMPLAINT QUOTE
dizziness,headache since yesterday with cyst to right side of neck,as per pt I fell yesterday ang hit my head did not go to hospital yesterday

## 2022-10-25 NOTE — ED PROVIDER NOTE - OBJECTIVE STATEMENT
77 yr old male with hx of HTn, HLD, BPh and right neck mass pending bx in 2 weeks presents to ed c/o dizziness and passed out hitting head 1 day ago. pt no cp, no palpitations, no n/v, no focal weakness, no n/v.  headache. post fall also having right knee pain with weigh bearing.

## 2022-10-25 NOTE — ED PROVIDER NOTE - MUSCULOSKELETAL, MLM
Spine appears normal, range of motion is not limited, no muscle. right leg- mild swelling at right lateral knee or no deformity. neg valgus and varus. no locking of joint. ambulating with slight baseline limp

## 2022-10-25 NOTE — ED ADULT TRIAGE NOTE - CHIEF COMPLAINT QUOTE
dizziness,headache since yesterday with cyst to right side of neck dizziness,headache since yesterday with cyst to right side of neck,as per pt I fell yesterday ang hit my head did not go to hospital yesterday

## 2022-10-25 NOTE — ED PROVIDER NOTE - CARE PLAN
Principal Discharge DX:	Dizziness   1 Principal Discharge DX:	Dizziness  Secondary Diagnosis:	Head injury

## 2022-10-25 NOTE — ED ADULT NURSE NOTE - OBJECTIVE STATEMENT
Pt came to er c/o dizziness and headache x yesterday. Pt verbalized cyst to right side to neck. Per pt he had a fall yesterday and hit his head

## 2022-10-25 NOTE — HISTORY OF PRESENT ILLNESS
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

## 2022-10-26 VITALS
OXYGEN SATURATION: 99 % | SYSTOLIC BLOOD PRESSURE: 152 MMHG | TEMPERATURE: 98 F | RESPIRATION RATE: 17 BRPM | DIASTOLIC BLOOD PRESSURE: 80 MMHG | HEART RATE: 77 BPM

## 2022-10-26 PROCEDURE — 70450 CT HEAD/BRAIN W/O DYE: CPT | Mod: 26,MA

## 2022-10-27 ENCOUNTER — NON-APPOINTMENT (OUTPATIENT)
Age: 77
End: 2022-10-27

## 2022-10-28 LAB
CHOLEST SERPL-MCNC: 159 MG/DL
CREAT SPEC-SCNC: 362 MG/DL
ESTIMATED AVERAGE GLUCOSE: 148 MG/DL
HBA1C MFR BLD HPLC: 6.8 %
HDLC SERPL-MCNC: 40 MG/DL
LDLC SERPL CALC-MCNC: 92 MG/DL
MICROALBUMIN 24H UR DL<=1MG/L-MCNC: 5.3 MG/DL
MICROALBUMIN/CREAT 24H UR-RTO: 15 MG/G
NONHDLC SERPL-MCNC: 119 MG/DL
TRIGL SERPL-MCNC: 139 MG/DL

## 2022-10-28 PROCEDURE — 77300 RADIATION THERAPY DOSE PLAN: CPT | Mod: 26

## 2022-10-28 PROCEDURE — 77338 DESIGN MLC DEVICE FOR IMRT: CPT | Mod: 26

## 2022-10-28 PROCEDURE — 77301 RADIOTHERAPY DOSE PLAN IMRT: CPT | Mod: 26

## 2022-10-31 ENCOUNTER — APPOINTMENT (OUTPATIENT)
Dept: INFUSION THERAPY | Facility: HOSPITAL | Age: 77
End: 2022-10-31

## 2022-11-01 ENCOUNTER — NON-APPOINTMENT (OUTPATIENT)
Age: 77
End: 2022-11-01

## 2022-11-01 ENCOUNTER — APPOINTMENT (OUTPATIENT)
Dept: THORACIC SURGERY | Facility: CLINIC | Age: 77
End: 2022-11-01

## 2022-11-01 VITALS
SYSTOLIC BLOOD PRESSURE: 121 MMHG | HEART RATE: 95 BPM | HEIGHT: 69 IN | RESPIRATION RATE: 18 BRPM | OXYGEN SATURATION: 98 % | BODY MASS INDEX: 25.18 KG/M2 | WEIGHT: 170 LBS | DIASTOLIC BLOOD PRESSURE: 80 MMHG

## 2022-11-01 DIAGNOSIS — I72.9 ANEURYSM OF UNSPECIFIED SITE: ICD-10-CM

## 2022-11-01 PROCEDURE — 99205 OFFICE O/P NEW HI 60 MIN: CPT

## 2022-11-01 RX ORDER — TAMSULOSIN HYDROCHLORIDE 0.4 MG/1
0.4 CAPSULE ORAL
Qty: 30 | Refills: 3 | Status: DISCONTINUED | COMMUNITY
Start: 2021-12-24 | End: 2022-11-01

## 2022-11-01 RX ORDER — ATENOLOL AND CHLORTHALIDONE 50; 25 MG/1; MG/1
50-25 TABLET ORAL DAILY
Qty: 30 | Refills: 3 | Status: DISCONTINUED | COMMUNITY
Start: 2022-08-26 | End: 2022-11-01

## 2022-11-01 RX ORDER — GLIMEPIRIDE 2 MG/1
2 TABLET ORAL
Refills: 0 | Status: DISCONTINUED | COMMUNITY
Start: 2022-08-08 | End: 2022-11-01

## 2022-11-02 ENCOUNTER — NON-APPOINTMENT (OUTPATIENT)
Age: 77
End: 2022-11-02

## 2022-11-02 PROBLEM — N18.30 CHRONIC KIDNEY DISEASE, STAGE 3 UNSPECIFIED: Chronic | Status: ACTIVE | Noted: 2022-10-28

## 2022-11-02 NOTE — HISTORY OF PRESENT ILLNESS
[FreeTextEntry1] : Mr. 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 Referred by Dr. Segundo Velasquez for a PEG tube placement. \par \par PET/CT on 10/06/2022:\par - Markedly FDG avid large ill-defined bulky exophytic mass at central and right base of tongue, extending into midline of vallecula (SUV 21.9; image 67 of dedicated head and neck series), is not significantly changed as compared to CT dated 8/18/2022, corresponding to known malignancy.\par - Metastatic, partially necrotic Right level II and III cervical lymphadenopathy. Few small, minimally FDG-avid BILATERAL level II-IV cervical lymph nodes are nonspecific. Further evaluation may be performed with ultrasound and percutaneous needle biopsy, as indicated.\par - Nonspecific colonic hypermetabolism probably is related to metformin.\par \par Patient is f/u with Dr. Pittman and Dr. Segundo Velasquez for Chemo/RT. \par \par Patient is here today for CT surgery consultation. + weight loss; Experiences dysphagia with some solids, but no dysphagia with liquids. Today, patient denies worsening SOB, chest pain, cough, hemoptysis, fever, chills, night sweats, lightheadedness or dizziness.\par

## 2022-11-02 NOTE — PHYSICAL EXAM
[Restricted in physically strenuous activity but ambulatory and able to carry out work of a light or sedentary nature] : Status 1- Restricted in physically strenuous activity but ambulatory and able to carry out work of a light or sedentary nature, e.g., light house work, office work [General Appearance - Alert] : alert [General Appearance - In No Acute Distress] : in no acute distress [General Appearance - Well Nourished] : well nourished [Sclera] : the sclera and conjunctiva were normal [Extraocular Movements] : extraocular movements were intact [Outer Ear] : the ears and nose were normal in appearance [Hearing Threshold Finger Rub Not [place]] : hearing was normal [Neck Appearance] : the appearance of the neck was normal [Neck Cervical Mass (___cm)] : no neck mass was observed [] : no respiratory distress [Respiration, Rhythm And Depth] : normal respiratory rhythm and effort [Exaggerated Use Of Accessory Muscles For Inspiration] : no accessory muscle use [Auscultation Breath Sounds / Voice Sounds] : lungs were clear to auscultation bilaterally [Heart Rate And Rhythm] : heart rate was normal and rhythm regular [Examination Of The Chest] : the chest was normal in appearance [Chest Visual Inspection Thoracic Asymmetry] : no chest asymmetry [Diminished Respiratory Excursion] : normal chest expansion [2+] : left 2+ [Breast Appearance] : normal in appearance [Breast Palpation Mass] : no palpable masses [Bowel Sounds] : normal bowel sounds [Abdomen Soft] : soft [Abdomen Tenderness] : non-tender [Cervical Lymph Nodes Enlarged Posterior Bilaterally] : posterior cervical [Cervical Lymph Nodes Enlarged Anterior Bilaterally] : anterior cervical [Supraclavicular Lymph Nodes Enlarged Bilaterally] : supraclavicular [No CVA Tenderness] : no ~M costovertebral angle tenderness [No Spinal Tenderness] : no spinal tenderness [Involuntary Movements] : no involuntary movements were seen [Skin Color & Pigmentation] : normal skin color and pigmentation [No Focal Deficits] : no focal deficits [Oriented To Time, Place, And Person] : oriented to person, place, and time [FreeTextEntry1] : Deferred

## 2022-11-02 NOTE — CONSULT LETTER
[Dear  ___] : Dear  [unfilled], [Consult Letter:] : I had the pleasure of evaluating your patient, [unfilled]. [( Thank you for referring [unfilled] for consultation for _____ )] : Thank you for referring [unfilled] for consultation for [unfilled] [Please see my note below.] : Please see my note below. [Consult Closing:] : Thank you very much for allowing me to participate in the care of this patient.  If you have any questions, please do not hesitate to contact me. [Sincerely,] : Sincerely, [FreeTextEntry2] : Dr. Segundo Velasquez (Rad/Onc/Ref) [FreeTextEntry3] : Tico Sultana MD, FACS \par Chief, Division of Thoracic Surgery \par Director, Minimally Invasive Thoracic Surgery \par Department of Cardiovascular and Thoracic Surgery \par [facility] \par , Cardiovascular and Thoracic Surgery\par \par

## 2022-11-02 NOTE — ASSESSMENT
[FreeTextEntry1] : Mr. 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 Referred by Dr. Segundo Velasquez for a PEG tube placement. \par \par I have independently reviewed the medical records and imaging at the time of this office consultation, and discussed the following interpretations with the patient:\par - Discussed upper endoscopy, PEG tube placement for nutritional support during treatment. Risks, benefits and alternatives explained to patient; All questions answered and patient agrees to proceed with surgery. \par - COVID PCR and Medical Clearance required prior to surgery. \par \par Recommendations reviewed with patient during this office visit, and all questions answered; Patient instructed on the importance of follow up and verbalizes understanding.\par \par I personally performed the services described in the documentation, reviewed the documentation recorded by the scribe in my presence and it accurately and completely records my words and actions.\par \par I, NITISH Villegas-C, am scribing for and the presence of RYLEY Guerrero, the following sections HISTORY OF PRESENT ILLNESS, PAST MEDICAL/FAMILY/SOCIAL HISTORY; REVIEW OF SYSTEMS; VITAL SIGNS; PHYSICAL EXAM; DISPOSITION.\par \par \par

## 2022-11-03 ENCOUNTER — APPOINTMENT (OUTPATIENT)
Dept: NEUROSURGERY | Facility: CLINIC | Age: 77
End: 2022-11-03

## 2022-11-04 ENCOUNTER — APPOINTMENT (OUTPATIENT)
Dept: INFUSION THERAPY | Facility: HOSPITAL | Age: 77
End: 2022-11-04

## 2022-11-04 ENCOUNTER — NON-APPOINTMENT (OUTPATIENT)
Age: 77
End: 2022-11-04

## 2022-11-04 ENCOUNTER — RESULT REVIEW (OUTPATIENT)
Age: 77
End: 2022-11-04

## 2022-11-04 LAB
ALBUMIN SERPL ELPH-MCNC: 4 G/DL — SIGNIFICANT CHANGE UP (ref 3.3–5)
ALP SERPL-CCNC: 90 U/L — SIGNIFICANT CHANGE UP (ref 40–120)
ALT FLD-CCNC: 11 U/L — SIGNIFICANT CHANGE UP (ref 10–45)
ANION GAP SERPL CALC-SCNC: 16 MMOL/L — SIGNIFICANT CHANGE UP (ref 5–17)
AST SERPL-CCNC: 29 U/L — SIGNIFICANT CHANGE UP (ref 10–40)
BASOPHILS # BLD AUTO: 0.05 K/UL — SIGNIFICANT CHANGE UP (ref 0–0.2)
BASOPHILS NFR BLD AUTO: 0.7 % — SIGNIFICANT CHANGE UP (ref 0–2)
BILIRUB SERPL-MCNC: 0.4 MG/DL — SIGNIFICANT CHANGE UP (ref 0.2–1.2)
BUN SERPL-MCNC: 38 MG/DL — HIGH (ref 7–23)
CALCIUM SERPL-MCNC: 9.2 MG/DL — SIGNIFICANT CHANGE UP (ref 8.4–10.5)
CHLORIDE SERPL-SCNC: 101 MMOL/L — SIGNIFICANT CHANGE UP (ref 96–108)
CO2 SERPL-SCNC: 21 MMOL/L — LOW (ref 22–31)
CREAT SERPL-MCNC: 2.3 MG/DL — HIGH (ref 0.5–1.3)
EGFR: 29 ML/MIN/1.73M2 — LOW
EOSINOPHIL # BLD AUTO: 0.05 K/UL — SIGNIFICANT CHANGE UP (ref 0–0.5)
EOSINOPHIL NFR BLD AUTO: 0.7 % — SIGNIFICANT CHANGE UP (ref 0–6)
GLUCOSE SERPL-MCNC: 111 MG/DL — HIGH (ref 70–99)
HCT VFR BLD CALC: 42.6 % — SIGNIFICANT CHANGE UP (ref 39–50)
HGB BLD-MCNC: 13.6 G/DL — SIGNIFICANT CHANGE UP (ref 13–17)
IMM GRANULOCYTES NFR BLD AUTO: 0.5 % — SIGNIFICANT CHANGE UP (ref 0–0.9)
LYMPHOCYTES # BLD AUTO: 1.56 K/UL — SIGNIFICANT CHANGE UP (ref 1–3.3)
LYMPHOCYTES # BLD AUTO: 21 % — SIGNIFICANT CHANGE UP (ref 13–44)
MCHC RBC-ENTMCNC: 27.2 PG — SIGNIFICANT CHANGE UP (ref 27–34)
MCHC RBC-ENTMCNC: 31.9 G/DL — LOW (ref 32–36)
MCV RBC AUTO: 85.2 FL — SIGNIFICANT CHANGE UP (ref 80–100)
MONOCYTES # BLD AUTO: 0.7 K/UL — SIGNIFICANT CHANGE UP (ref 0–0.9)
MONOCYTES NFR BLD AUTO: 9.4 % — SIGNIFICANT CHANGE UP (ref 2–14)
NEUTROPHILS # BLD AUTO: 5.03 K/UL — SIGNIFICANT CHANGE UP (ref 1.8–7.4)
NEUTROPHILS NFR BLD AUTO: 67.7 % — SIGNIFICANT CHANGE UP (ref 43–77)
NRBC # BLD: 0 /100 WBCS — SIGNIFICANT CHANGE UP (ref 0–0)
PLATELET # BLD AUTO: 289 K/UL — SIGNIFICANT CHANGE UP (ref 150–400)
POTASSIUM SERPL-MCNC: 6 MMOL/L — HIGH (ref 3.5–5.3)
POTASSIUM SERPL-SCNC: 6 MMOL/L — HIGH (ref 3.5–5.3)
PROT SERPL-MCNC: 7.6 G/DL — SIGNIFICANT CHANGE UP (ref 6–8.3)
RBC # BLD: 5 M/UL — SIGNIFICANT CHANGE UP (ref 4.2–5.8)
RBC # FLD: 14.7 % — HIGH (ref 10.3–14.5)
SARS-COV-2 N GENE NPH QL NAA+PROBE: NOT DETECTED
SODIUM SERPL-SCNC: 138 MMOL/L — SIGNIFICANT CHANGE UP (ref 135–145)
TSH SERPL-MCNC: 0.98 UIU/ML — SIGNIFICANT CHANGE UP (ref 0.27–4.2)
WBC # BLD: 7.43 K/UL — SIGNIFICANT CHANGE UP (ref 3.8–10.5)
WBC # FLD AUTO: 7.43 K/UL — SIGNIFICANT CHANGE UP (ref 3.8–10.5)

## 2022-11-04 PROCEDURE — G6002: CPT | Mod: 26

## 2022-11-04 NOTE — ASU PATIENT PROFILE, ADULT - NSICDXPASTMEDICALHX_GEN_ALL_CORE_FT
PAST MEDICAL HISTORY:  Hypercholesterolemia     Hypertension     Stage 3 chronic kidney disease

## 2022-11-04 NOTE — ASU PATIENT PROFILE, ADULT - FALL HARM RISK - UNIVERSAL INTERVENTIONS
Bed in lowest position, wheels locked, appropriate side rails in place/Call bell, personal items and telephone in reach/Instruct patient to call for assistance before getting out of bed or chair/Non-slip footwear when patient is out of bed/[place] to call system/Physically safe environment - no spills, clutter or unnecessary equipment/Purposeful Proactive Rounding/Room/bathroom lighting operational, light cord in reach

## 2022-11-05 ENCOUNTER — NON-APPOINTMENT (OUTPATIENT)
Age: 77
End: 2022-11-05

## 2022-11-05 DIAGNOSIS — E11.9 TYPE 2 DIABETES MELLITUS W/OUT COMPLICATIONS: ICD-10-CM

## 2022-11-05 DIAGNOSIS — N18.30 CHRONIC KIDNEY DISEASE, STAGE 3 UNSPECIFIED: ICD-10-CM

## 2022-11-05 NOTE — RESULTS/DATA
[] : results reviewed [de-identified] : SR @ 82bpm, normal axis, normal intervals, no STTW changes

## 2022-11-05 NOTE — ASSESSMENT
[High Risk Surgery - Intraperitoneal, Intrathoracic or Supringuinal Vascular Procedures] : High Risk Surgery - Intraperitoneal, Intrathoracic or Supringuinal Vascular Procedures - No (0) [Ischemic Heart Disease] : Ischemic Heart Disease - No (0) [Congestive Heart Failure] : Congestive Heart Failure - No (0) [Prior Cerebrovascular Accident or TIA] : Prior Cerebrovascular Accident or TIA - No (0) [Creatinine >= 2mg/dL (1 Point)] : Creatinine >= 2mg/dL - Yes (1) [Insulin-dependent Diabetic (1 Point)] : Insulin-dependent Diabetic - No (0) [1] : 1 , RCRI Class: II, Risk of Post-Op Cardiac Complications: 6.0%, 95% CI for Risk Estimate: 4.9% - 7.4% [Modify medications prior to procedure] : Modify medications prior to procedure [As per surgery] : as per surgery [FreeTextEntry7] : discontinue metformin

## 2022-11-05 NOTE — HISTORY OF PRESENT ILLNESS
[No Pertinent Cardiac History] : no history of aortic stenosis, atrial fibrillation, coronary artery disease, recent myocardial infarction, or implantable device/pacemaker [No Pertinent Pulmonary History] : no history of asthma, COPD, sleep apnea, or smoking [No Adverse Anesthesia Reaction] : no adverse anesthesia reaction in self or family member [Diabetes] : diabetes [(Patient denies any chest pain, claudication, dyspnea on exertion, orthopnea, palpitations or syncope)] : Patient denies any chest pain, claudication, dyspnea on exertion, orthopnea, palpitations or syncope [Good (7-10 METs)] : Good (7-10 METs) [FreeTextEntry1] : gastrostomy tube [FreeTextEntry4] : 77yoM, retired security PMH head and neck cancer,  pancreatic mass, DM2, HTN, intracranial aneurysm presents for preop assessment for G tube

## 2022-11-07 ENCOUNTER — OUTPATIENT (OUTPATIENT)
Dept: OUTPATIENT SERVICES | Facility: HOSPITAL | Age: 77
LOS: 1 days | Discharge: HOME | End: 2022-11-07

## 2022-11-07 ENCOUNTER — APPOINTMENT (OUTPATIENT)
Dept: THORACIC SURGERY | Facility: HOSPITAL | Age: 77
End: 2022-11-07

## 2022-11-07 ENCOUNTER — NON-APPOINTMENT (OUTPATIENT)
Age: 77
End: 2022-11-07

## 2022-11-07 VITALS
RESPIRATION RATE: 18 BRPM | SYSTOLIC BLOOD PRESSURE: 130 MMHG | HEART RATE: 87 BPM | DIASTOLIC BLOOD PRESSURE: 82 MMHG | OXYGEN SATURATION: 98 %

## 2022-11-07 VITALS
DIASTOLIC BLOOD PRESSURE: 81 MMHG | HEART RATE: 90 BPM | WEIGHT: 149.91 LBS | HEIGHT: 69 IN | TEMPERATURE: 98 F | SYSTOLIC BLOOD PRESSURE: 112 MMHG | OXYGEN SATURATION: 96 % | RESPIRATION RATE: 16 BRPM

## 2022-11-07 DIAGNOSIS — R13.10 DYSPHAGIA, UNSPECIFIED: ICD-10-CM

## 2022-11-07 DIAGNOSIS — Z51.11 ENCOUNTER FOR ANTINEOPLASTIC CHEMOTHERAPY: ICD-10-CM

## 2022-11-07 DIAGNOSIS — R11.2 NAUSEA WITH VOMITING, UNSPECIFIED: ICD-10-CM

## 2022-11-07 DIAGNOSIS — E86.0 DEHYDRATION: ICD-10-CM

## 2022-11-07 LAB
GLUCOSE BLDC GLUCOMTR-MCNC: 113 MG/DL — HIGH (ref 70–99)
GLUCOSE BLDC GLUCOMTR-MCNC: 114 MG/DL — HIGH (ref 70–99)

## 2022-11-07 PROCEDURE — 43246 EGD PLACE GASTROSTOMY TUBE: CPT | Mod: AS,52

## 2022-11-07 PROCEDURE — 43246 EGD PLACE GASTROSTOMY TUBE: CPT | Mod: 52

## 2022-11-07 PROCEDURE — G6002: CPT | Mod: 26

## 2022-11-07 DEVICE — FEEDING TUBE PEG KIT MIC 20FR PULL: Type: IMPLANTABLE DEVICE | Status: FUNCTIONAL

## 2022-11-08 ENCOUNTER — NON-APPOINTMENT (OUTPATIENT)
Age: 77
End: 2022-11-08

## 2022-11-08 VITALS
HEART RATE: 94 BPM | DIASTOLIC BLOOD PRESSURE: 84 MMHG | BODY MASS INDEX: 23.97 KG/M2 | HEIGHT: 69 IN | TEMPERATURE: 97.16 F | SYSTOLIC BLOOD PRESSURE: 130 MMHG | WEIGHT: 161.82 LBS | RESPIRATION RATE: 18 BRPM | OXYGEN SATURATION: 98 %

## 2022-11-08 PROCEDURE — G6002: CPT | Mod: 26

## 2022-11-08 RX ORDER — METFORMIN HYDROCHLORIDE 1000 MG/1
1000 TABLET, COATED ORAL TWICE DAILY
Qty: 60 | Refills: 3 | Status: DISCONTINUED | COMMUNITY
Start: 2022-08-08 | End: 2022-11-08

## 2022-11-08 RX ORDER — NUT.TX.IMPAIRED DIGESTIVE FXN 0.035-1/ML
LIQUID (ML) ORAL
Qty: 6 | Refills: 12 | Status: DISCONTINUED | COMMUNITY
Start: 2022-11-01 | End: 2022-11-08

## 2022-11-08 NOTE — VITALS
[Maximal Pain Intensity: 5/10] : 5/10 [Least Pain Intensity: 3/10] : 3/10 [Pain Location: ___] : Pain Location: [unfilled] [NoTreatment Scheduled] : no treatment scheduled

## 2022-11-08 NOTE — PHYSICAL EXAM
[Normal] : well developed, well nourished, in no acute distress [Feeding Tube] : a feeding tube was present

## 2022-11-08 NOTE — REVIEW OF SYSTEMS
[Dysphagia: Grade 2 - Symptomatic and altered eating/swallowing] : Dysphagia: Grade 2 - Symptomatic and altered eating/swallowing

## 2022-11-09 ENCOUNTER — APPOINTMENT (OUTPATIENT)
Dept: THORACIC SURGERY | Facility: CLINIC | Age: 77
End: 2022-11-09

## 2022-11-09 ENCOUNTER — NON-APPOINTMENT (OUTPATIENT)
Age: 77
End: 2022-11-09

## 2022-11-09 PROCEDURE — 77014: CPT | Mod: 26

## 2022-11-10 ENCOUNTER — APPOINTMENT (OUTPATIENT)
Dept: INFUSION THERAPY | Facility: HOSPITAL | Age: 77
End: 2022-11-10

## 2022-11-10 ENCOUNTER — RESULT REVIEW (OUTPATIENT)
Age: 77
End: 2022-11-10

## 2022-11-10 LAB
ALBUMIN SERPL ELPH-MCNC: 3.7 G/DL — SIGNIFICANT CHANGE UP (ref 3.3–5)
ALP SERPL-CCNC: 82 U/L — SIGNIFICANT CHANGE UP (ref 40–120)
ALT FLD-CCNC: 10 U/L — SIGNIFICANT CHANGE UP (ref 10–45)
ANION GAP SERPL CALC-SCNC: 19 MMOL/L — HIGH (ref 5–17)
AST SERPL-CCNC: 16 U/L — SIGNIFICANT CHANGE UP (ref 10–40)
BILIRUB SERPL-MCNC: 0.7 MG/DL — SIGNIFICANT CHANGE UP (ref 0.2–1.2)
BUN SERPL-MCNC: 34 MG/DL — HIGH (ref 7–23)
CALCIUM SERPL-MCNC: 9.3 MG/DL — SIGNIFICANT CHANGE UP (ref 8.4–10.5)
CHLORIDE SERPL-SCNC: 98 MMOL/L — SIGNIFICANT CHANGE UP (ref 96–108)
CO2 SERPL-SCNC: 24 MMOL/L — SIGNIFICANT CHANGE UP (ref 22–31)
CREAT SERPL-MCNC: 1.42 MG/DL — HIGH (ref 0.5–1.3)
EGFR: 52 ML/MIN/1.73M2 — LOW
GLUCOSE SERPL-MCNC: 96 MG/DL — SIGNIFICANT CHANGE UP (ref 70–99)
POTASSIUM SERPL-MCNC: 4.5 MMOL/L — SIGNIFICANT CHANGE UP (ref 3.5–5.3)
POTASSIUM SERPL-SCNC: 4.5 MMOL/L — SIGNIFICANT CHANGE UP (ref 3.5–5.3)
PROT SERPL-MCNC: 6.9 G/DL — SIGNIFICANT CHANGE UP (ref 6–8.3)
SODIUM SERPL-SCNC: 142 MMOL/L — SIGNIFICANT CHANGE UP (ref 135–145)

## 2022-11-10 PROCEDURE — G6002: CPT | Mod: 26

## 2022-11-10 PROCEDURE — 77427 RADIATION TX MANAGEMENT X5: CPT

## 2022-11-11 ENCOUNTER — RESULT REVIEW (OUTPATIENT)
Age: 77
End: 2022-11-11

## 2022-11-11 ENCOUNTER — NON-APPOINTMENT (OUTPATIENT)
Age: 77
End: 2022-11-11

## 2022-11-11 ENCOUNTER — APPOINTMENT (OUTPATIENT)
Dept: INFUSION THERAPY | Facility: HOSPITAL | Age: 77
End: 2022-11-11

## 2022-11-11 DIAGNOSIS — C76.0 MALIGNANT NEOPLASM OF HEAD, FACE AND NECK: ICD-10-CM

## 2022-11-11 LAB
ALBUMIN SERPL ELPH-MCNC: 3.7 G/DL — SIGNIFICANT CHANGE UP (ref 3.3–5)
ALP SERPL-CCNC: 76 U/L — SIGNIFICANT CHANGE UP (ref 40–120)
ALT FLD-CCNC: 16 U/L — SIGNIFICANT CHANGE UP (ref 10–45)
ANION GAP SERPL CALC-SCNC: 15 MMOL/L — SIGNIFICANT CHANGE UP (ref 5–17)
AST SERPL-CCNC: 39 U/L — SIGNIFICANT CHANGE UP (ref 10–40)
BASOPHILS # BLD AUTO: 0.03 K/UL — SIGNIFICANT CHANGE UP (ref 0–0.2)
BASOPHILS NFR BLD AUTO: 0.4 % — SIGNIFICANT CHANGE UP (ref 0–2)
BILIRUB SERPL-MCNC: 0.7 MG/DL — SIGNIFICANT CHANGE UP (ref 0.2–1.2)
BUN SERPL-MCNC: 27 MG/DL — HIGH (ref 7–23)
CALCIUM SERPL-MCNC: 9 MG/DL — SIGNIFICANT CHANGE UP (ref 8.4–10.5)
CHLORIDE SERPL-SCNC: 100 MMOL/L — SIGNIFICANT CHANGE UP (ref 96–108)
CO2 SERPL-SCNC: 22 MMOL/L — SIGNIFICANT CHANGE UP (ref 22–31)
CREAT SERPL-MCNC: 1.06 MG/DL — SIGNIFICANT CHANGE UP (ref 0.5–1.3)
EGFR: 73 ML/MIN/1.73M2 — SIGNIFICANT CHANGE UP
EOSINOPHIL # BLD AUTO: 0.03 K/UL — SIGNIFICANT CHANGE UP (ref 0–0.5)
EOSINOPHIL NFR BLD AUTO: 0.4 % — SIGNIFICANT CHANGE UP (ref 0–6)
GLUCOSE SERPL-MCNC: 124 MG/DL — HIGH (ref 70–99)
HCT VFR BLD CALC: 39.2 % — SIGNIFICANT CHANGE UP (ref 39–50)
HGB BLD-MCNC: 12.3 G/DL — LOW (ref 13–17)
IMM GRANULOCYTES NFR BLD AUTO: 0.9 % — SIGNIFICANT CHANGE UP (ref 0–0.9)
LYMPHOCYTES # BLD AUTO: 0.34 K/UL — LOW (ref 1–3.3)
LYMPHOCYTES # BLD AUTO: 4.4 % — LOW (ref 13–44)
MCHC RBC-ENTMCNC: 27.5 PG — SIGNIFICANT CHANGE UP (ref 27–34)
MCHC RBC-ENTMCNC: 31.4 G/DL — LOW (ref 32–36)
MCV RBC AUTO: 87.5 FL — SIGNIFICANT CHANGE UP (ref 80–100)
MONOCYTES # BLD AUTO: 0.37 K/UL — SIGNIFICANT CHANGE UP (ref 0–0.9)
MONOCYTES NFR BLD AUTO: 4.8 % — SIGNIFICANT CHANGE UP (ref 2–14)
NEUTROPHILS # BLD AUTO: 6.86 K/UL — SIGNIFICANT CHANGE UP (ref 1.8–7.4)
NEUTROPHILS NFR BLD AUTO: 89.1 % — HIGH (ref 43–77)
NRBC # BLD: 0 /100 WBCS — SIGNIFICANT CHANGE UP (ref 0–0)
PLATELET # BLD AUTO: 286 K/UL — SIGNIFICANT CHANGE UP (ref 150–400)
POTASSIUM SERPL-MCNC: 7.6 MMOL/L — CRITICAL HIGH (ref 3.5–5.3)
POTASSIUM SERPL-SCNC: 7.6 MMOL/L — CRITICAL HIGH (ref 3.5–5.3)
PROT SERPL-MCNC: 7.7 G/DL — SIGNIFICANT CHANGE UP (ref 6–8.3)
RBC # BLD: 4.48 M/UL — SIGNIFICANT CHANGE UP (ref 4.2–5.8)
RBC # FLD: 14.3 % — SIGNIFICANT CHANGE UP (ref 10.3–14.5)
SODIUM SERPL-SCNC: 137 MMOL/L — SIGNIFICANT CHANGE UP (ref 135–145)
TSH SERPL-MCNC: 0.44 UIU/ML — SIGNIFICANT CHANGE UP (ref 0.27–4.2)
WBC # BLD: 7.7 K/UL — SIGNIFICANT CHANGE UP (ref 3.8–10.5)
WBC # FLD AUTO: 7.7 K/UL — SIGNIFICANT CHANGE UP (ref 3.8–10.5)

## 2022-11-11 PROCEDURE — G6002: CPT | Mod: 26

## 2022-11-12 ENCOUNTER — APPOINTMENT (OUTPATIENT)
Dept: MRI IMAGING | Facility: IMAGING CENTER | Age: 77
End: 2022-11-12

## 2022-11-14 PROCEDURE — G6002: CPT | Mod: 26

## 2022-11-14 NOTE — VITALS
Called pt to advise of results   [Maximal Pain Intensity: 0/10] : 0/10 [Least Pain Intensity: 0/10] : 0/10

## 2022-11-15 ENCOUNTER — NON-APPOINTMENT (OUTPATIENT)
Age: 77
End: 2022-11-15

## 2022-11-15 VITALS
OXYGEN SATURATION: 100 % | WEIGHT: 159.5 LBS | HEART RATE: 90 BPM | BODY MASS INDEX: 23.62 KG/M2 | SYSTOLIC BLOOD PRESSURE: 136 MMHG | TEMPERATURE: 97.16 F | RESPIRATION RATE: 17 BRPM | HEIGHT: 69 IN | DIASTOLIC BLOOD PRESSURE: 84 MMHG

## 2022-11-15 PROCEDURE — G6002: CPT | Mod: 26

## 2022-11-15 NOTE — DATA REVIEWED
[de-identified] : [facility] imaging: 10/26- CT head- [facility]

## 2022-11-15 NOTE — ASSESSMENT
[FreeTextEntry1] : IMPRESSION: \par 77M with PMH of metastatic SCC to head, neck, tongue, starting CT on 11/15, pancreatic mass, HTN, HLD, DM 2, CKD 3, GERD, BPH, with incidental intracranial aneurysm per pt report on MRI brain at [facility] on w/u for oropharyngeal cancer. CDs not available for review?\par \par \par \par \par PLAN:

## 2022-11-15 NOTE — DATA REVIEWED
[de-identified] : [facility] imaging: 10/26- CT head- [facility]

## 2022-11-15 NOTE — ASSESSMENT
[FreeTextEntry1] : IMPRESSION: \par 77M with PMH of metastatic SCC to head, neck, tongue, starting CT on 11/15, pancreatic mass, HTN, HLD, DM 2, CKD 3, GERD, BPH, with incidental intracranial aneurysm per pt report on MRI brain at [facility] on w/u for oropharyngeal cancer. CDs not available for review?\par \par \par \par \par PLAN:

## 2022-11-16 ENCOUNTER — APPOINTMENT (OUTPATIENT)
Dept: NEUROSURGERY | Facility: CLINIC | Age: 77
End: 2022-11-16

## 2022-11-16 PROCEDURE — 77014: CPT | Mod: 26

## 2022-11-17 PROCEDURE — G6002: CPT | Mod: 26

## 2022-11-17 PROCEDURE — 77427 RADIATION TX MANAGEMENT X5: CPT

## 2022-11-18 ENCOUNTER — RESULT REVIEW (OUTPATIENT)
Age: 77
End: 2022-11-18

## 2022-11-18 ENCOUNTER — APPOINTMENT (OUTPATIENT)
Dept: INFUSION THERAPY | Facility: HOSPITAL | Age: 77
End: 2022-11-18

## 2022-11-18 LAB
ALBUMIN SERPL ELPH-MCNC: 3.6 G/DL — SIGNIFICANT CHANGE UP (ref 3.3–5)
ALP SERPL-CCNC: 69 U/L — SIGNIFICANT CHANGE UP (ref 40–120)
ALT FLD-CCNC: 13 U/L — SIGNIFICANT CHANGE UP (ref 10–45)
ANION GAP SERPL CALC-SCNC: 15 MMOL/L — SIGNIFICANT CHANGE UP (ref 5–17)
AST SERPL-CCNC: 17 U/L — SIGNIFICANT CHANGE UP (ref 10–40)
BASOPHILS # BLD AUTO: 0.03 K/UL — SIGNIFICANT CHANGE UP (ref 0–0.2)
BASOPHILS NFR BLD AUTO: 0.7 % — SIGNIFICANT CHANGE UP (ref 0–2)
BILIRUB SERPL-MCNC: 0.4 MG/DL — SIGNIFICANT CHANGE UP (ref 0.2–1.2)
BUN SERPL-MCNC: 17 MG/DL — SIGNIFICANT CHANGE UP (ref 7–23)
CALCIUM SERPL-MCNC: 8.7 MG/DL — SIGNIFICANT CHANGE UP (ref 8.4–10.5)
CHLORIDE SERPL-SCNC: 99 MMOL/L — SIGNIFICANT CHANGE UP (ref 96–108)
CO2 SERPL-SCNC: 22 MMOL/L — SIGNIFICANT CHANGE UP (ref 22–31)
CREAT SERPL-MCNC: 1.13 MG/DL — SIGNIFICANT CHANGE UP (ref 0.5–1.3)
EGFR: 68 ML/MIN/1.73M2 — SIGNIFICANT CHANGE UP
EOSINOPHIL # BLD AUTO: 0.1 K/UL — SIGNIFICANT CHANGE UP (ref 0–0.5)
EOSINOPHIL NFR BLD AUTO: 2.3 % — SIGNIFICANT CHANGE UP (ref 0–6)
GLUCOSE SERPL-MCNC: 100 MG/DL — HIGH (ref 70–99)
HCT VFR BLD CALC: 37.7 % — LOW (ref 39–50)
HGB BLD-MCNC: 12.1 G/DL — LOW (ref 13–17)
IMM GRANULOCYTES NFR BLD AUTO: 2.1 % — HIGH (ref 0–0.9)
LYMPHOCYTES # BLD AUTO: 0.74 K/UL — LOW (ref 1–3.3)
LYMPHOCYTES # BLD AUTO: 17.2 % — SIGNIFICANT CHANGE UP (ref 13–44)
MCHC RBC-ENTMCNC: 27.4 PG — SIGNIFICANT CHANGE UP (ref 27–34)
MCHC RBC-ENTMCNC: 32.1 G/DL — SIGNIFICANT CHANGE UP (ref 32–36)
MCV RBC AUTO: 85.5 FL — SIGNIFICANT CHANGE UP (ref 80–100)
MONOCYTES # BLD AUTO: 0.22 K/UL — SIGNIFICANT CHANGE UP (ref 0–0.9)
MONOCYTES NFR BLD AUTO: 5.1 % — SIGNIFICANT CHANGE UP (ref 2–14)
NEUTROPHILS # BLD AUTO: 3.11 K/UL — SIGNIFICANT CHANGE UP (ref 1.8–7.4)
NEUTROPHILS NFR BLD AUTO: 72.6 % — SIGNIFICANT CHANGE UP (ref 43–77)
NRBC # BLD: 0 /100 WBCS — SIGNIFICANT CHANGE UP (ref 0–0)
PLATELET # BLD AUTO: 285 K/UL — SIGNIFICANT CHANGE UP (ref 150–400)
POTASSIUM SERPL-MCNC: 5 MMOL/L — SIGNIFICANT CHANGE UP (ref 3.5–5.3)
POTASSIUM SERPL-SCNC: 5 MMOL/L — SIGNIFICANT CHANGE UP (ref 3.5–5.3)
PROT SERPL-MCNC: 6.8 G/DL — SIGNIFICANT CHANGE UP (ref 6–8.3)
RBC # BLD: 4.41 M/UL — SIGNIFICANT CHANGE UP (ref 4.2–5.8)
RBC # FLD: 14.1 % — SIGNIFICANT CHANGE UP (ref 10.3–14.5)
SODIUM SERPL-SCNC: 137 MMOL/L — SIGNIFICANT CHANGE UP (ref 135–145)
WBC # BLD: 4.29 K/UL — SIGNIFICANT CHANGE UP (ref 3.8–10.5)
WBC # FLD AUTO: 4.29 K/UL — SIGNIFICANT CHANGE UP (ref 3.8–10.5)

## 2022-11-18 PROCEDURE — G6002: CPT | Mod: 26

## 2022-11-19 LAB — TSH SERPL-MCNC: 0.35 UIU/ML — SIGNIFICANT CHANGE UP (ref 0.27–4.2)

## 2022-11-20 PROCEDURE — G6002: CPT | Mod: 26

## 2022-11-21 PROCEDURE — G6002: CPT | Mod: 26

## 2022-11-22 ENCOUNTER — NON-APPOINTMENT (OUTPATIENT)
Age: 77
End: 2022-11-22

## 2022-11-22 VITALS
BODY MASS INDEX: 22.61 KG/M2 | HEART RATE: 105 BPM | WEIGHT: 153.11 LBS | TEMPERATURE: 96.98 F | RESPIRATION RATE: 16 BRPM | DIASTOLIC BLOOD PRESSURE: 86 MMHG | SYSTOLIC BLOOD PRESSURE: 147 MMHG

## 2022-11-22 PROCEDURE — G6002: CPT | Mod: 26

## 2022-11-22 NOTE — DISEASE MANAGEMENT
[Clinical] : TNM Stage: c [I] : I [TTNM] : 3 [NTNM] : 1 [MTNM] : 0 [de-identified] : 095 [de-identified] : 7000 cGy [de-identified] : Oropharynx/Neck

## 2022-11-22 NOTE — VITALS
[Maximal Pain Intensity: 10/10] : 10/10 [Least Pain Intensity: 10/10] : 10/10 [Pain Location: ___] : Pain Location: [unfilled] [Opioid] : opioid [Adjuvant (neuroleptics)] : adjuvant (neuroleptics)

## 2022-11-22 NOTE — REVIEW OF SYSTEMS
[Dysphagia: Grade 2 - Symptomatic and altered eating/swallowing] : Dysphagia: Grade 2 - Symptomatic and altered eating/swallowing [Fatigue: Grade 2 - Fatigue not relieved by rest; limiting instrumental ADL] : Fatigue: Grade 2 - Fatigue not relieved by rest; limiting instrumental ADL [Dermatitis Radiation: Grade 0] : Dermatitis Radiation: Grade 0

## 2022-11-22 NOTE — HISTORY OF PRESENT ILLNESS
[FreeTextEntry1] : Mr Sierra is a 77 year old male non-smoker with PMH of IPMN of the pancreas (on surveillance pending H&N treatment), Intracranial Aneurysm with new diagnosis of AJCC 8th Ed. at least Stage I ([id number]) and AJCC 7th Ed. at least Stage SAVAGE ([id number]) p16+ SCC of BOT.\par Plan is Concurrent chemoRadiation with 70 Gy.\par \par 11/8/2022 Completed 3/35 Fx. Patient presents for on treatment visit. He has baseline dysphagia at presentation. S/p PEG tube placement on 11/7/2022. He had moderate pain to the site which has decreased since  last night.\par Noted with weight decline of 10 lbs, he has been NPO since 10/6/22 midnight. Encouraged to increase hydration.\par Dysphagia is at baseline.\par Weekly Carbo/Taxol Cycle #1  on 11/4/2022\par \par 11/15/2022 Completed 8/35 Fx. Patient presents for on treatment visit

## 2022-11-22 NOTE — HISTORY OF PRESENT ILLNESS
[FreeTextEntry1] : Mr Sierra is a 77 year old male non-smoker with PMH of IPMN of the pancreas (on surveillance pending H&N treatment), Intracranial Aneurysm with new diagnosis of AJCC 8th Ed. at least Stage I ([id number]) and AJCC 7th Ed. at least Stage SAVAGE ([id number]) p16+ SCC of BOT.\par Plan is Concurrent chemoRadiation with 70 Gy.\par \par 4/8/2022 Completed 3/35 Fx. Patient presents for on treatment visit. He has baseline dysphagia at presentation. S/p PEG tube placement on 11/7/2022. He had moderate pain to the site which has decreased since  last night.\par Noted with weight decline of 10 lbs, he has been NPO since 10/6/22 midnight. Encouraged to increase hydration.\par Dysphagia is at baseline.\par Weekly Carbo/Taxol Cycle #1  on 11/4/2022

## 2022-11-22 NOTE — DISEASE MANAGEMENT
[Clinical] : TNM Stage: c [I] : I [TTNM] : 3 [NTNM] : 1 [MTNM] : 0 [de-identified] : 810 [de-identified] : 7000 cGy [de-identified] : Oropharynx/Neck

## 2022-11-23 PROCEDURE — G6002: CPT | Mod: 26

## 2022-11-23 PROCEDURE — 77427 RADIATION TX MANAGEMENT X5: CPT

## 2022-11-25 ENCOUNTER — RESULT REVIEW (OUTPATIENT)
Age: 77
End: 2022-11-25

## 2022-11-25 ENCOUNTER — APPOINTMENT (OUTPATIENT)
Dept: INFUSION THERAPY | Facility: HOSPITAL | Age: 77
End: 2022-11-25

## 2022-11-25 LAB
ALBUMIN SERPL ELPH-MCNC: 3.4 G/DL — SIGNIFICANT CHANGE UP (ref 3.3–5)
ALP SERPL-CCNC: 71 U/L — SIGNIFICANT CHANGE UP (ref 40–120)
ALT FLD-CCNC: 16 U/L — SIGNIFICANT CHANGE UP (ref 10–45)
ANION GAP SERPL CALC-SCNC: 18 MMOL/L — HIGH (ref 5–17)
AST SERPL-CCNC: 13 U/L — SIGNIFICANT CHANGE UP (ref 10–40)
BASOPHILS # BLD AUTO: 0 K/UL — SIGNIFICANT CHANGE UP (ref 0–0.2)
BASOPHILS NFR BLD AUTO: 0 % — SIGNIFICANT CHANGE UP (ref 0–2)
BILIRUB SERPL-MCNC: 0.6 MG/DL — SIGNIFICANT CHANGE UP (ref 0.2–1.2)
BUN SERPL-MCNC: 18 MG/DL — SIGNIFICANT CHANGE UP (ref 7–23)
CALCIUM SERPL-MCNC: 8.6 MG/DL — SIGNIFICANT CHANGE UP (ref 8.4–10.5)
CHLORIDE SERPL-SCNC: 100 MMOL/L — SIGNIFICANT CHANGE UP (ref 96–108)
CO2 SERPL-SCNC: 21 MMOL/L — LOW (ref 22–31)
CREAT SERPL-MCNC: 1.13 MG/DL — SIGNIFICANT CHANGE UP (ref 0.5–1.3)
EGFR: 68 ML/MIN/1.73M2 — SIGNIFICANT CHANGE UP
EOSINOPHIL # BLD AUTO: 0 K/UL — SIGNIFICANT CHANGE UP (ref 0–0.5)
EOSINOPHIL NFR BLD AUTO: 0 % — SIGNIFICANT CHANGE UP (ref 0–6)
GLUCOSE SERPL-MCNC: 96 MG/DL — SIGNIFICANT CHANGE UP (ref 70–99)
HCT VFR BLD CALC: 36.1 % — LOW (ref 39–50)
HGB BLD-MCNC: 11.6 G/DL — LOW (ref 13–17)
LYMPHOCYTES # BLD AUTO: 0.69 K/UL — LOW (ref 1–3.3)
LYMPHOCYTES # BLD AUTO: 24 % — SIGNIFICANT CHANGE UP (ref 13–44)
MCHC RBC-ENTMCNC: 27.7 PG — SIGNIFICANT CHANGE UP (ref 27–34)
MCHC RBC-ENTMCNC: 32.1 G/DL — SIGNIFICANT CHANGE UP (ref 32–36)
MCV RBC AUTO: 86.2 FL — SIGNIFICANT CHANGE UP (ref 80–100)
MONOCYTES # BLD AUTO: 0.69 K/UL — SIGNIFICANT CHANGE UP (ref 0–0.9)
MONOCYTES NFR BLD AUTO: 24 % — HIGH (ref 2–14)
NEUTROPHILS # BLD AUTO: 1.49 K/UL — LOW (ref 1.8–7.4)
NEUTROPHILS NFR BLD AUTO: 52 % — SIGNIFICANT CHANGE UP (ref 43–77)
NRBC # BLD: 1 /100 — HIGH (ref 0–0)
NRBC # BLD: SIGNIFICANT CHANGE UP /100 WBCS (ref 0–0)
PLAT MORPH BLD: NORMAL — SIGNIFICANT CHANGE UP
PLATELET # BLD AUTO: 301 K/UL — SIGNIFICANT CHANGE UP (ref 150–400)
POTASSIUM SERPL-MCNC: 5.4 MMOL/L — HIGH (ref 3.5–5.3)
POTASSIUM SERPL-SCNC: 5.4 MMOL/L — HIGH (ref 3.5–5.3)
PROT SERPL-MCNC: 6.5 G/DL — SIGNIFICANT CHANGE UP (ref 6–8.3)
RBC # BLD: 4.19 M/UL — LOW (ref 4.2–5.8)
RBC # FLD: 14.6 % — HIGH (ref 10.3–14.5)
RBC BLD AUTO: SIGNIFICANT CHANGE UP
SODIUM SERPL-SCNC: 140 MMOL/L — SIGNIFICANT CHANGE UP (ref 135–145)
TSH SERPL-MCNC: 0.26 UIU/ML — LOW (ref 0.27–4.2)
WBC # BLD: 2.86 K/UL — LOW (ref 3.8–10.5)
WBC # FLD AUTO: 2.86 K/UL — LOW (ref 3.8–10.5)

## 2022-11-27 NOTE — HISTORY OF PRESENT ILLNESS
[de-identified] : HILLARY DREW is a 77 year old male with PMH of metastatic SCC to head, neck, and tongue (starting chemotherapy 11/15/22), pancreatic mass, HTN, HLD, DM 2, CKD 3, GERD, BPH. Intracranial aneurysm reportedly found on MRI brain at [facility] during work-up for metastatic head and neck cancer. (CDs not available for review). On 10/25/22, recent admission to [facility] ED s/p fall, CT unremarkable. s/p PEG placement 11/7/22. He is a retired .\par \par Repeat? MRI 11/12? [de-identified] : 77yoM, retired security PMH head and neck cancer,  pancreatic mass, DM2, HTN, intracranial aneurysm presents for preop assessment for G tube

## 2022-11-27 NOTE — HISTORY OF PRESENT ILLNESS
[de-identified] : HILLARY DREW is a 77 year old male with PMH of metastatic SCC to head, neck, and tongue (starting chemotherapy 11/15/22), pancreatic mass, HTN, HLD, DM 2, CKD 3, GERD, BPH. Intracranial aneurysm reportedly found on MRI brain at [facility] during work-up for metastatic head and neck cancer. (CDs not available for review). On 10/25/22, recent admission to [facility] ED s/p fall, CT unremarkable. s/p PEG placement 11/7/22. He is a retired .\par \par Repeat? MRI 11/12? [de-identified] : 77yoM, retired security PMH head and neck cancer,  pancreatic mass, DM2, HTN, intracranial aneurysm presents for preop assessment for G tube

## 2022-11-28 ENCOUNTER — APPOINTMENT (OUTPATIENT)
Dept: INTERNAL MEDICINE | Facility: CLINIC | Age: 77
End: 2022-11-28

## 2022-11-28 VITALS
OXYGEN SATURATION: 96 % | SYSTOLIC BLOOD PRESSURE: 108 MMHG | DIASTOLIC BLOOD PRESSURE: 68 MMHG | HEART RATE: 120 BPM | RESPIRATION RATE: 15 BRPM | HEIGHT: 69 IN | TEMPERATURE: 97.8 F

## 2022-11-28 PROCEDURE — G6002: CPT | Mod: 26

## 2022-11-28 PROCEDURE — 99214 OFFICE O/P EST MOD 30 MIN: CPT

## 2022-11-28 RX ORDER — FINASTERIDE 5 MG/1
5 TABLET, FILM COATED ORAL DAILY
Qty: 30 | Refills: 5 | Status: DISCONTINUED | COMMUNITY
Start: 2022-08-26 | End: 2022-11-28

## 2022-11-28 RX ORDER — OLMESARTAN MEDOXOMIL 20 MG/1
20 TABLET, FILM COATED ORAL
Qty: 30 | Refills: 5 | Status: DISCONTINUED | COMMUNITY
Start: 2022-08-09 | End: 2022-11-28

## 2022-11-28 RX ORDER — ATORVASTATIN CALCIUM 20 MG/1
20 TABLET, FILM COATED ORAL
Qty: 30 | Refills: 5 | Status: DISCONTINUED | COMMUNITY
Start: 2022-08-09 | End: 2022-11-28

## 2022-11-28 NOTE — PHYSICAL EXAM
[General Appearance - Alert] : alert [General Appearance - In No Acute Distress] : in no acute distress [Thin] : thin [] : no respiratory distress [Respiration, Rhythm And Depth] : normal respiratory rhythm and effort [Exaggerated Use Of Accessory Muscles For Inspiration] : no accessory muscle use [Feeding Tube] : a feeding tube was present [Oriented To Time, Place, And Person] : oriented to person, place, and time

## 2022-11-29 ENCOUNTER — NON-APPOINTMENT (OUTPATIENT)
Age: 77
End: 2022-11-29

## 2022-11-29 ENCOUNTER — OUTPATIENT (OUTPATIENT)
Dept: OUTPATIENT SERVICES | Facility: HOSPITAL | Age: 77
LOS: 1 days | Discharge: HOME | End: 2022-11-29

## 2022-11-29 VITALS
WEIGHT: 153 LBS | HEIGHT: 69 IN | HEART RATE: 83 BPM | TEMPERATURE: 97.7 F | OXYGEN SATURATION: 98 % | DIASTOLIC BLOOD PRESSURE: 80 MMHG | RESPIRATION RATE: 16 BRPM | SYSTOLIC BLOOD PRESSURE: 119 MMHG | BODY MASS INDEX: 22.66 KG/M2

## 2022-11-29 DIAGNOSIS — C02.9 MALIGNANT NEOPLASM OF TONGUE, UNSPECIFIED: ICD-10-CM

## 2022-11-29 PROCEDURE — G6002: CPT | Mod: 26

## 2022-11-29 NOTE — DISEASE MANAGEMENT
[Clinical] : TNM Stage: c [I] : I [TTNM] : 3 [NTNM] : 1 [MTNM] : 0 [de-identified] : 2800 cGy [de-identified] : [address] [facility] [de-identified] : Oropharynx/Neck

## 2022-11-29 NOTE — VITALS
[Pain Location: ___] : Pain Location: [unfilled] [Adjuvant (neuroleptics)] : adjuvant (neuroleptics) [Maximal Pain Intensity: 5/10] : 5/10 [Pain Interferes with ADLs] : Pain interferes with activities of daily living.

## 2022-11-29 NOTE — REVIEW OF SYSTEMS
[Dysphagia: Grade 2 - Symptomatic and altered eating/swallowing] : Dysphagia: Grade 2 - Symptomatic and altered eating/swallowing [Fatigue: Grade 2 - Fatigue not relieved by rest; limiting instrumental ADL] : Fatigue: Grade 2 - Fatigue not relieved by rest; limiting instrumental ADL [Dermatitis Radiation: Grade 0] : Dermatitis Radiation: Grade 0 [Salivary duct inflammation: Grade 2 - Thick, ropy, sticky saliva; markedly altered taste; alteration in diet indicated; secretion-induced symptoms; limiting instrumental ADL] : Salivary duct inflammation: Grade 2 - Thick, ropy, sticky saliva; markedly altered taste; alteration in diet indicated; secretion-induced symptoms; limiting instrumental ADL [Alopecia: Grade 1 - Hair loss of <50% of normal for that individual that is not obvious from a distance but only on close inspection; a different hair style may be required to cover the hair loss but it does not require a wig or hair piece to camouflage] : Alopecia: Grade 1 - Hair loss of <50% of normal for that individual that is not obvious from a distance but only on close inspection; a different hair style may be required to cover the hair loss but it does not require a wig or hair piece to camouflage

## 2022-11-29 NOTE — PHYSICAL EXAM
[General Appearance - Alert] : alert [General Appearance - In No Acute Distress] : in no acute distress [] : no respiratory distress [Respiration, Rhythm And Depth] : normal respiratory rhythm and effort [Exaggerated Use Of Accessory Muscles For Inspiration] : no accessory muscle use [Feeding Tube] : a feeding tube was present [Oriented To Time, Place, And Person] : oriented to person, place, and time [Thin] : thin [de-identified] : No evidence of thrush, grade 1 mucositis posteriorly

## 2022-11-29 NOTE — HISTORY OF PRESENT ILLNESS
[FreeTextEntry1] : Mr Sierra is a 77 year old male non-smoker with PMH of IPMN of the pancreas (on surveillance pending H&N treatment), Intracranial Aneurysm with new diagnosis of AJCC 8th Ed. at least Stage I ([id number]) and AJCC 7th Ed. at least Stage SAVAGE ([id number]) p16+ SCC of BOT.\par Plan is Concurrent chemoRadiation with 70 Gy.\par \par 11/8/2022 Completed 3/35 Fx. Patient presents for on treatment visit. He has baseline dysphagia at presentation. S/p PEG tube placement on 11/7/2022. He had moderate pain to the site which has decreased since  last night.\par Noted with weight decline of 10 lbs, he has been NPO since 10/6/22 midnight. Encouraged to increase hydration.\par Dysphagia is at baseline.\par Weekly Carbo/Taxol Cycle #1  on 11/4/2022\par \par 11/15/2022 Completed 8/35 Fx. Patient presents for on treatment visit\par \par 11/22/22: 14/35 fx: Notes throat pain, dysphagia & dysgeusia. Approximately 6 lb weight loss. Skin care with Neisha lotion\par \par \par \par \par \par \par \par \par

## 2022-11-30 PROCEDURE — 77014: CPT | Mod: 26

## 2022-11-30 NOTE — PHYSICAL EXAM
[No Edema] : there was no peripheral edema [Normal] : affect was normal and insight and judgment were intact [de-identified] : decrease size right neck mass [de-identified] : sinus tachycardia [de-identified] : G tube intact

## 2022-11-30 NOTE — HISTORY OF PRESENT ILLNESS
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

## 2022-12-01 PROCEDURE — G6002: CPT | Mod: 26

## 2022-12-02 ENCOUNTER — APPOINTMENT (OUTPATIENT)
Dept: INFUSION THERAPY | Facility: HOSPITAL | Age: 77
End: 2022-12-02

## 2022-12-02 ENCOUNTER — RESULT REVIEW (OUTPATIENT)
Age: 77
End: 2022-12-02

## 2022-12-02 ENCOUNTER — APPOINTMENT (OUTPATIENT)
Dept: HEMATOLOGY ONCOLOGY | Facility: CLINIC | Age: 77
End: 2022-12-02

## 2022-12-02 LAB
ALBUMIN SERPL ELPH-MCNC: 3.4 G/DL — SIGNIFICANT CHANGE UP (ref 3.3–5)
ALP SERPL-CCNC: 69 U/L — SIGNIFICANT CHANGE UP (ref 40–120)
ALT FLD-CCNC: 25 U/L — SIGNIFICANT CHANGE UP (ref 10–45)
ANION GAP SERPL CALC-SCNC: 19 MMOL/L — HIGH (ref 5–17)
AST SERPL-CCNC: 19 U/L — SIGNIFICANT CHANGE UP (ref 10–40)
BASOPHILS # BLD AUTO: 0.04 K/UL — SIGNIFICANT CHANGE UP (ref 0–0.2)
BASOPHILS NFR BLD AUTO: 0.9 % — SIGNIFICANT CHANGE UP (ref 0–2)
BILIRUB SERPL-MCNC: 0.4 MG/DL — SIGNIFICANT CHANGE UP (ref 0.2–1.2)
BUN SERPL-MCNC: 19 MG/DL — SIGNIFICANT CHANGE UP (ref 7–23)
CALCIUM SERPL-MCNC: 9 MG/DL — SIGNIFICANT CHANGE UP (ref 8.4–10.5)
CHLORIDE SERPL-SCNC: 99 MMOL/L — SIGNIFICANT CHANGE UP (ref 96–108)
CO2 SERPL-SCNC: 22 MMOL/L — SIGNIFICANT CHANGE UP (ref 22–31)
CREAT SERPL-MCNC: 1.09 MG/DL — SIGNIFICANT CHANGE UP (ref 0.5–1.3)
EGFR: 71 ML/MIN/1.73M2 — SIGNIFICANT CHANGE UP
EOSINOPHIL # BLD AUTO: 0.01 K/UL — SIGNIFICANT CHANGE UP (ref 0–0.5)
EOSINOPHIL NFR BLD AUTO: 0.2 % — SIGNIFICANT CHANGE UP (ref 0–6)
GLUCOSE SERPL-MCNC: 130 MG/DL — HIGH (ref 70–99)
HCT VFR BLD CALC: 38.7 % — LOW (ref 39–50)
HGB BLD-MCNC: 11.9 G/DL — LOW (ref 13–17)
IMM GRANULOCYTES NFR BLD AUTO: 2.4 % — HIGH (ref 0–0.9)
LYMPHOCYTES # BLD AUTO: 0.54 K/UL — LOW (ref 1–3.3)
LYMPHOCYTES # BLD AUTO: 12 % — LOW (ref 13–44)
MCHC RBC-ENTMCNC: 27.2 PG — SIGNIFICANT CHANGE UP (ref 27–34)
MCHC RBC-ENTMCNC: 30.7 G/DL — LOW (ref 32–36)
MCV RBC AUTO: 88.6 FL — SIGNIFICANT CHANGE UP (ref 80–100)
MONOCYTES # BLD AUTO: 0.53 K/UL — SIGNIFICANT CHANGE UP (ref 0–0.9)
MONOCYTES NFR BLD AUTO: 11.8 % — SIGNIFICANT CHANGE UP (ref 2–14)
NEUTROPHILS # BLD AUTO: 3.26 K/UL — SIGNIFICANT CHANGE UP (ref 1.8–7.4)
NEUTROPHILS NFR BLD AUTO: 72.7 % — SIGNIFICANT CHANGE UP (ref 43–77)
NRBC # BLD: 0 /100 WBCS — SIGNIFICANT CHANGE UP (ref 0–0)
PLATELET # BLD AUTO: 292 K/UL — SIGNIFICANT CHANGE UP (ref 150–400)
POTASSIUM SERPL-MCNC: 4.8 MMOL/L — SIGNIFICANT CHANGE UP (ref 3.5–5.3)
POTASSIUM SERPL-SCNC: 4.8 MMOL/L — SIGNIFICANT CHANGE UP (ref 3.5–5.3)
PROT SERPL-MCNC: 6.9 G/DL — SIGNIFICANT CHANGE UP (ref 6–8.3)
RBC # BLD: 4.37 M/UL — SIGNIFICANT CHANGE UP (ref 4.2–5.8)
RBC # FLD: 14.5 % — SIGNIFICANT CHANGE UP (ref 10.3–14.5)
SODIUM SERPL-SCNC: 139 MMOL/L — SIGNIFICANT CHANGE UP (ref 135–145)
TSH SERPL-MCNC: 0.49 UIU/ML — SIGNIFICANT CHANGE UP (ref 0.27–4.2)
WBC # BLD: 4.49 K/UL — SIGNIFICANT CHANGE UP (ref 3.8–10.5)
WBC # FLD AUTO: 4.49 K/UL — SIGNIFICANT CHANGE UP (ref 3.8–10.5)

## 2022-12-02 PROCEDURE — G6002: CPT | Mod: 26

## 2022-12-02 PROCEDURE — 99214 OFFICE O/P EST MOD 30 MIN: CPT

## 2022-12-02 PROCEDURE — 77427 RADIATION TX MANAGEMENT X5: CPT

## 2022-12-02 NOTE — CONSULT LETTER
[Please see my note below.] : Please see my note below. [Consult Closing:] : Thank you very much for allowing me to participate in the care of this patient.  If you have any questions, please do not hesitate to contact me. [Sincerely,] : Sincerely, [DrSatya  ___] : Dr. ALAS [FreeTextEntry3] : Abdoulaye Pittman MD\par \par

## 2022-12-02 NOTE — REVIEW OF SYSTEMS
[Dysphagia] : dysphagia [Negative] : Psychiatric [Recent Change In Weight] : ~T recent weight change [Odynophagia] : odynophagia [Mucosal Pain] : mucosal pain [Diarrhea] : diarrhea [Fever] : no fever [Chills] : no chills [Shortness Of Breath] : no shortness of breath [Cough] : no cough [Abdominal Pain] : no abdominal pain [Skin Rash] : no skin rash [FreeTextEntry2] : weight loss [FreeTextEntry7] : occasional diarrhea with tube feeds

## 2022-12-02 NOTE — PHYSICAL EXAM
[Restricted in physically strenuous activity but ambulatory and able to carry out work of a light or sedentary nature] : Status 1- Restricted in physically strenuous activity but ambulatory and able to carry out work of a light or sedentary nature, e.g., light house work, office work [Normal] : affect appropriate [Thin] : thin [Ulcers] : ulcers [Mucositis] : mucositis [de-identified] : significant weight loss

## 2022-12-02 NOTE — HISTORY OF PRESENT ILLNESS
[Disease: _____________________] : Disease: [unfilled] [T: ___] : T[unfilled] [N: ___] : N[unfilled] [M: ___] : M[unfilled] [AJCC Stage: ____] : AJCC Stage: [unfilled] [Treatment Protocol] : Treatment Protocol [de-identified] : Mr. Sierra is accompanied by his family member for consultation of BOT mass. The patient his a 77yoM with a pancreatic IPMN, DM II, HTN, and intracranial aneurysm. Denies tobacco or etoh use. Complained of dysphagia since 12/2021 worse when eating food along with an excess amount of phlegm and noted voice changes. CT Neck 8/18/22: There is a bulky tumor at the central and right base of tongue, likely squamous cell carcinoma, with right cervical lymphadenopathy, as above. This noncontrast CT examination is inadequate for accurate tumor and althea staging; for example, extension into the extrinsic tongue musculature is suspected but should be verified on a contrast enhanced study. 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 IHC positive for p16 and p40. LINDA EDISON and HPV 16 negative. A PET/CT on 10/3/22 showing 1. Markedly FDG avid large ill-defined bulky exophytic mass at central and right base of tongue, extending into midline of vallecula (SUV 21.9; image 67 of dedicated head and neck series), is not significantly changed as compared to CT dated 8/18/2022, corresponding to known malignancy. 2. Metastatic, partially necrotic Right level II and III cervical lymphadenopathy. Few small, minimally FDG-avid BILATERAL level II-IV cervical lymph nodes are nonspecific. Further evaluation may be performed with ultrasound and percutaneous needle biopsy, as indicated. 3. Nonspecific colonic hypermetabolism probably is related to metformin. 4. The remainder of the study demonstrates no evidence of FDG-avid disease. \par \par States he is still having trouble swallowing and eating solid food. Denies pain or any other symptoms. Eager to begin treatment. Of note states he was being worked up for an IPMN in his pancreas where a resection was recommended, received a second opinion from [facility] where this was also the recommendation. Was planning for this procedure however BOT mass found and surgery deferred for now. \par \par \par 10/18/22: Pt has seen rad onc and plan is for CRT. He notes pain in rt side of throat radiating to head, has been taking Excedrin migraine which has not helped much. He also has odynophagia. \par \par 12/2/22: Patient seen today for follow up. He is receiving weekly carbo/taxol along with radiation. Reports oral pain, was prescribed gabapentin as well as MMW but is no longer using either because he feels they did not alleviate his pain. He has lost a significant amount of weight and is now PEG dependent for nutrition. Mentions occasional loose stool with tube feeds.  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 IHC positive for p16 and p40. LINDA EDISON and HPV 16 negative [de-identified] : CEA 4.0 [de-identified] : CEA being tracked from prior IPMN work up [FreeTextEntry1] : weekly carbo/taxol with concurrent radiation started on 11/4/2022

## 2022-12-02 NOTE — ASSESSMENT
[FreeTextEntry1] : Mr. Sierra is a 78 yo M with a pancreatic IPMN, DM II, HTN, and intracranial aneurysm with dysphagia since 12/2021 found to have a BOT mass, biopsied on 9/7/22 resulted as SCC p16+, PET/CT with localized disease mass 3.5cm and ipsilateral LNs, clinical stage 1 \par \par #HNSCC\par - Dysphagia since 12/2021, found to have BOT mass on imaging 8/18/22 with ipsilateral LNs s/p US guided biopsy of LNs showing SCC, p16 positive, HPV 16 negative. PET/CT with locoregional disease. Clinical stage 1. On mirror exam 10/18/22, OP mass seems a little larger than prior. Rt 2 cm LAD was also appreciated. Discussed standard of care involves likely concurrent chemoRT, but pt had several questions pertaining to long term AEs and if he really needs to do the treatment. Discussed the alternate option of supportive care only and perhaps consideration of hospice. After extensive discussion, pt decided on proceeding with tx. Discussed with Dr. Velasquez and we agreed CRT is best approach. I would have preferred weekly cisplatin but pt has CKD sec to HTN and DM, which has worsened with NSAID use hence plan to use carboplatin as radiosensitizer. We discussed schedule, dose, potential AEs of carboplatin and pt signed informed consent. He had a G tube placed for nutrition by Dr. Sultana. He started weekly carbo/taxol as well as radiation on 11/4/2022 with plan to complete 35fx of radiation. \par \par - Continue current treatment \par - Continue to use PEG for nutrition \par - Oral pain: no longer using gabapentin or MMW. Will try oral viscous lidocaine \par - OV in 1 week with treatment \par \par

## 2022-12-05 DIAGNOSIS — E86.0 DEHYDRATION: ICD-10-CM

## 2022-12-05 DIAGNOSIS — Z51.11 ENCOUNTER FOR ANTINEOPLASTIC CHEMOTHERAPY: ICD-10-CM

## 2022-12-05 DIAGNOSIS — R11.2 NAUSEA WITH VOMITING, UNSPECIFIED: ICD-10-CM

## 2022-12-05 PROCEDURE — G6002: CPT | Mod: 26

## 2022-12-06 ENCOUNTER — NON-APPOINTMENT (OUTPATIENT)
Age: 77
End: 2022-12-06

## 2022-12-06 PROCEDURE — G6002: CPT | Mod: 26

## 2022-12-07 ENCOUNTER — NON-APPOINTMENT (OUTPATIENT)
Age: 77
End: 2022-12-07

## 2022-12-07 VITALS
TEMPERATURE: 97.52 F | BODY MASS INDEX: 21.37 KG/M2 | HEIGHT: 69 IN | WEIGHT: 144.29 LBS | SYSTOLIC BLOOD PRESSURE: 107 MMHG | HEART RATE: 57 BPM | OXYGEN SATURATION: 100 % | DIASTOLIC BLOOD PRESSURE: 74 MMHG | RESPIRATION RATE: 17 BRPM

## 2022-12-07 PROCEDURE — 77014: CPT | Mod: 26

## 2022-12-08 PROCEDURE — G6002: CPT | Mod: 26

## 2022-12-09 ENCOUNTER — APPOINTMENT (OUTPATIENT)
Dept: INFUSION THERAPY | Facility: HOSPITAL | Age: 77
End: 2022-12-09

## 2022-12-09 ENCOUNTER — NON-APPOINTMENT (OUTPATIENT)
Age: 77
End: 2022-12-09

## 2022-12-09 ENCOUNTER — RESULT REVIEW (OUTPATIENT)
Age: 77
End: 2022-12-09

## 2022-12-09 ENCOUNTER — APPOINTMENT (OUTPATIENT)
Dept: HEMATOLOGY ONCOLOGY | Facility: CLINIC | Age: 77
End: 2022-12-09

## 2022-12-09 DIAGNOSIS — K12.30 ORAL MUCOSITIS (ULCERATIVE), UNSPECIFIED: ICD-10-CM

## 2022-12-09 DIAGNOSIS — R07.0 PAIN IN THROAT: ICD-10-CM

## 2022-12-09 DIAGNOSIS — R13.10 DYSPHAGIA, UNSPECIFIED: ICD-10-CM

## 2022-12-09 LAB
ALBUMIN SERPL ELPH-MCNC: 3.4 G/DL — SIGNIFICANT CHANGE UP (ref 3.3–5)
ALP SERPL-CCNC: 72 U/L — SIGNIFICANT CHANGE UP (ref 40–120)
ALT FLD-CCNC: 47 U/L — HIGH (ref 10–45)
ANION GAP SERPL CALC-SCNC: 17 MMOL/L — SIGNIFICANT CHANGE UP (ref 5–17)
AST SERPL-CCNC: 31 U/L — SIGNIFICANT CHANGE UP (ref 10–40)
BASOPHILS # BLD AUTO: 0.03 K/UL — SIGNIFICANT CHANGE UP (ref 0–0.2)
BASOPHILS NFR BLD AUTO: 0.8 % — SIGNIFICANT CHANGE UP (ref 0–2)
BILIRUB SERPL-MCNC: 0.6 MG/DL — SIGNIFICANT CHANGE UP (ref 0.2–1.2)
BUN SERPL-MCNC: 25 MG/DL — HIGH (ref 7–23)
CALCIUM SERPL-MCNC: 8.9 MG/DL — SIGNIFICANT CHANGE UP (ref 8.4–10.5)
CHLORIDE SERPL-SCNC: 98 MMOL/L — SIGNIFICANT CHANGE UP (ref 96–108)
CO2 SERPL-SCNC: 24 MMOL/L — SIGNIFICANT CHANGE UP (ref 22–31)
CREAT SERPL-MCNC: 1.31 MG/DL — HIGH (ref 0.5–1.3)
EGFR: 57 ML/MIN/1.73M2 — LOW
EOSINOPHIL # BLD AUTO: 0.01 K/UL — SIGNIFICANT CHANGE UP (ref 0–0.5)
EOSINOPHIL NFR BLD AUTO: 0.3 % — SIGNIFICANT CHANGE UP (ref 0–6)
GLUCOSE SERPL-MCNC: 131 MG/DL — HIGH (ref 70–99)
HCT VFR BLD CALC: 34.9 % — LOW (ref 39–50)
HGB BLD-MCNC: 10.9 G/DL — LOW (ref 13–17)
IMM GRANULOCYTES NFR BLD AUTO: 1.4 % — HIGH (ref 0–0.9)
LYMPHOCYTES # BLD AUTO: 0.46 K/UL — LOW (ref 1–3.3)
LYMPHOCYTES # BLD AUTO: 12.6 % — LOW (ref 13–44)
MCHC RBC-ENTMCNC: 27.7 PG — SIGNIFICANT CHANGE UP (ref 27–34)
MCHC RBC-ENTMCNC: 31.2 G/DL — LOW (ref 32–36)
MCV RBC AUTO: 88.6 FL — SIGNIFICANT CHANGE UP (ref 80–100)
MONOCYTES # BLD AUTO: 0.77 K/UL — SIGNIFICANT CHANGE UP (ref 0–0.9)
MONOCYTES NFR BLD AUTO: 21 % — HIGH (ref 2–14)
NEUTROPHILS # BLD AUTO: 2.34 K/UL — SIGNIFICANT CHANGE UP (ref 1.8–7.4)
NEUTROPHILS NFR BLD AUTO: 63.9 % — SIGNIFICANT CHANGE UP (ref 43–77)
NRBC # BLD: 0 /100 WBCS — SIGNIFICANT CHANGE UP (ref 0–0)
PLATELET # BLD AUTO: 224 K/UL — SIGNIFICANT CHANGE UP (ref 150–400)
POTASSIUM SERPL-MCNC: 4.9 MMOL/L — SIGNIFICANT CHANGE UP (ref 3.5–5.3)
POTASSIUM SERPL-SCNC: 4.9 MMOL/L — SIGNIFICANT CHANGE UP (ref 3.5–5.3)
PROT SERPL-MCNC: 6.5 G/DL — SIGNIFICANT CHANGE UP (ref 6–8.3)
RBC # BLD: 3.94 M/UL — LOW (ref 4.2–5.8)
RBC # FLD: 14.9 % — HIGH (ref 10.3–14.5)
SODIUM SERPL-SCNC: 139 MMOL/L — SIGNIFICANT CHANGE UP (ref 135–145)
TSH SERPL-MCNC: 0.6 UIU/ML — SIGNIFICANT CHANGE UP (ref 0.27–4.2)
WBC # BLD: 3.66 K/UL — LOW (ref 3.8–10.5)
WBC # FLD AUTO: 3.66 K/UL — LOW (ref 3.8–10.5)

## 2022-12-09 PROCEDURE — G6002: CPT | Mod: 26

## 2022-12-09 PROCEDURE — 99214 OFFICE O/P EST MOD 30 MIN: CPT

## 2022-12-09 PROCEDURE — 77427 RADIATION TX MANAGEMENT X5: CPT

## 2022-12-09 NOTE — HISTORY OF PRESENT ILLNESS
[Disease: _____________________] : Disease: [unfilled] [T: ___] : T[unfilled] [N: ___] : N[unfilled] [M: ___] : M[unfilled] [AJCC Stage: ____] : AJCC Stage: [unfilled] [Treatment Protocol] : Treatment Protocol [de-identified] : Mr. Sierra is accompanied by his family member for consultation of BOT mass. The patient his a 77yoM with a pancreatic IPMN, DM II, HTN, and intracranial aneurysm. Denies tobacco or etoh use. Complained of dysphagia since 12/2021 worse when eating food along with an excess amount of phlegm and noted voice changes. CT Neck 8/18/22: There is a bulky tumor at the central and right base of tongue, likely squamous cell carcinoma, with right cervical lymphadenopathy, as above. This noncontrast CT examination is inadequate for accurate tumor and althea staging; for example, extension into the extrinsic tongue musculature is suspected but should be verified on a contrast enhanced study. 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 IHC positive for p16 and p40. LINDA EDISON and HPV 16 negative. A PET/CT on 10/3/22 showing 1. Markedly FDG avid large ill-defined bulky exophytic mass at central and right base of tongue, extending into midline of vallecula (SUV 21.9; image 67 of dedicated head and neck series), is not significantly changed as compared to CT dated 8/18/2022, corresponding to known malignancy. 2. Metastatic, partially necrotic Right level II and III cervical lymphadenopathy. Few small, minimally FDG-avid BILATERAL level II-IV cervical lymph nodes are nonspecific. Further evaluation may be performed with ultrasound and percutaneous needle biopsy, as indicated. 3. Nonspecific colonic hypermetabolism probably is related to metformin. 4. The remainder of the study demonstrates no evidence of FDG-avid disease. \par \par States he is still having trouble swallowing and eating solid food. Denies pain or any other symptoms. Eager to begin treatment. Of note states he was being worked up for an IPMN in his pancreas where a resection was recommended, received a second opinion from [facility] where this was also the recommendation. Was planning for this procedure however BOT mass found and surgery deferred for now. \par \par \par 10/18/22: Pt has seen rad onc and plan is for CRT. He notes pain in rt side of throat radiating to head, has been taking Excedrin migraine which has not helped much. He also has odynophagia. \par \par 12/2/22: Patient seen today for follow up. He is receiving weekly carbo/taxol along with radiation. Reports oral pain, was prescribed gabapentin as well as MMW but is no longer using either because he feels they did not alleviate his pain. He has lost a significant amount of weight and is now PEG dependent for nutrition. Mentions occasional loose stool with tube feeds. \par \par 12/9/22: Patient seen today in treatment room for follow up. Continues radiation. Reports viscous lidocaine prescribed at prior visit is somewhat helping his oral pain  more than MMW. Mentions that oftentimes when he lays down, he feels like the tube feeds move up to his throat and makes him want to throw up.  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 IHC positive for p16 and p40. LINDA EDISON and HPV 16 negative [de-identified] : CEA 4.0 [de-identified] : CEA being tracked from prior IPMN work up [FreeTextEntry1] : weekly carbo/taxol with concurrent radiation started on 11/4/2022

## 2022-12-09 NOTE — HISTORY OF PRESENT ILLNESS
[FreeTextEntry1] : Mr Sierra is a 77 year old male non-smoker with PMH of IPMN of the pancreas (on surveillance pending H&N treatment), Intracranial Aneurysm with new diagnosis of AJCC 8th Ed. at least Stage I ([id number]) and AJCC 7th Ed. at least Stage SAVAGE ([id number]) p16+ SCC of BOT.\par Plan is Concurrent chemoRadiation with 70 Gy.\par \par 11/8/2022 Completed 3/35 Fx. Patient presents for on treatment visit. He has baseline dysphagia at presentation. S/p PEG tube placement on 11/7/2022. He had moderate pain to the site which has decreased since  last night.\par Noted with weight decline of 10 lbs, he has been NPO since 10/6/22 midnight. Encouraged to increase hydration.\par Dysphagia is at baseline.\par Weekly Carbo/Taxol Cycle #1  on 11/4/2022\par \par 11/15/2022 Completed 8/35 Fx. Patient presents for on treatment visit\par \par 11/22/22: 14/35 fx: Notes throat pain, dysphagia & dysgeusia. Approximately 6 lb weight loss. Skin care with Neisha lotion\par \par 11/29/2022 Fx completed. He continues to have throat pain, worse with swallowing. 3 lbs weight loss in 1 week noted today. He did not continue with MMW after 2 uses due to viscosity. Able to tolerate water by mouth. Totally on PEG dependent for nutrition, taking  typically 3-4 cans/day, however took only 3 cans yesterday. Tolerating weekly chemo (Fridays)\par Will start trial Gabapentin 300MG 3x/day for throat pain. (He is currently not taking Nortriptyline)\par Nutrition services following.\par \par \par \par \par \par

## 2022-12-09 NOTE — PHYSICAL EXAM
[Restricted in physically strenuous activity but ambulatory and able to carry out work of a light or sedentary nature] : Status 1- Restricted in physically strenuous activity but ambulatory and able to carry out work of a light or sedentary nature, e.g., light house work, office work [Thin] : thin [Ulcers] : ulcers [Mucositis] : mucositis [Normal] : affect appropriate [de-identified] : significant weight loss

## 2022-12-09 NOTE — ASSESSMENT
[FreeTextEntry1] : Mr. Sierra is a 78 yo M with a pancreatic IPMN, DM II, HTN, and intracranial aneurysm with dysphagia since 12/2021 found to have a BOT mass, biopsied on 9/7/22 resulted as SCC p16+, PET/CT with localized disease mass 3.5cm and ipsilateral LNs, clinical stage 1 \par \par #HNSCC\par - Dysphagia since 12/2021, found to have BOT mass on imaging 8/18/22 with ipsilateral LNs s/p US guided biopsy of LNs showing SCC, p16 positive, HPV 16 negative. PET/CT with locoregional disease. Clinical stage 1. On mirror exam 10/18/22, OP mass seems a little larger than prior. Rt 2 cm LAD was also appreciated. Discussed standard of care involves likely concurrent chemoRT, but pt had several questions pertaining to long term AEs and if he really needs to do the treatment. Discussed the alternate option of supportive care only and perhaps consideration of hospice. After extensive discussion, pt decided on proceeding with tx. Discussed with Dr. Velasquez and we agreed CRT is best approach. I would have preferred weekly cisplatin but pt has CKD sec to HTN and DM, which has worsened with NSAID use hence plan to use carboplatin as radiosensitizer. We discussed schedule, dose, potential AEs of carboplatin and pt signed informed consent. He had a G tube placed for nutrition by Dr. Sultana. He started weekly carbo/taxol as well as radiation on 11/4/2022 with plan to complete 35fx of radiation. \par \par - Continue current treatment \par - Continue to use PEG for nutrition \par - Encouraged using Reglan at night prior to sleeping to aid with gastric motility of tube feeds and emesis \par - Oral pain: no longer using gabapentin or MMW. Continue oral viscous lidocaine \par - OV in 1 week with treatment \par \par

## 2022-12-09 NOTE — REVIEW OF SYSTEMS
[Recent Change In Weight] : ~T recent weight change [Dysphagia] : dysphagia [Odynophagia] : odynophagia [Mucosal Pain] : mucosal pain [Diarrhea] : diarrhea [Negative] : Psychiatric [Fever] : no fever [Chills] : no chills [Shortness Of Breath] : no shortness of breath [Cough] : no cough [Abdominal Pain] : no abdominal pain [Vomiting] : vomiting [Skin Rash] : no skin rash [FreeTextEntry2] : weight loss [FreeTextEntry7] : see above; occasional diarrhea with tube feeds

## 2022-12-09 NOTE — DISEASE MANAGEMENT
[Clinical] : TNM Stage: c [I] : I [TTNM] : 3 [NTNM] : 1 [MTNM] : 0 [de-identified] : [address] [de-identified] : [id number] [facility] [place] [de-identified] : Oropharynx/Neck

## 2022-12-12 ENCOUNTER — RX RENEWAL (OUTPATIENT)
Age: 77
End: 2022-12-12

## 2022-12-12 PROCEDURE — G6002: CPT | Mod: 26

## 2022-12-12 NOTE — REVIEW OF SYSTEMS
[Dysphagia: Grade 2 - Symptomatic and altered eating/swallowing] : Dysphagia: Grade 2 - Symptomatic and altered eating/swallowing [Fatigue: Grade 2 - Fatigue not relieved by rest; limiting instrumental ADL] : Fatigue: Grade 2 - Fatigue not relieved by rest; limiting instrumental ADL [Salivary duct inflammation: Grade 2 - Thick, ropy, sticky saliva; markedly altered taste; alteration in diet indicated; secretion-induced symptoms; limiting instrumental ADL] : Salivary duct inflammation: Grade 2 - Thick, ropy, sticky saliva; markedly altered taste; alteration in diet indicated; secretion-induced symptoms; limiting instrumental ADL [Alopecia: Grade 1 - Hair loss of <50% of normal for that individual that is not obvious from a distance but only on close inspection; a different hair style may be required to cover the hair loss but it does not require a wig or hair piece to camouflage] : Alopecia: Grade 1 - Hair loss of <50% of normal for that individual that is not obvious from a distance but only on close inspection; a different hair style may be required to cover the hair loss but it does not require a wig or hair piece to camouflage [Dermatitis Radiation: Grade 0] : Dermatitis Radiation: Grade 0

## 2022-12-12 NOTE — PHYSICAL EXAM
[de-identified] : Trismus (sharp pain with wide jaw opening). Grade 1 mucositis in oropharynx.  [de-identified] : Grade 1 dermatitis

## 2022-12-12 NOTE — VITALS
[Maximal Pain Intensity: 5/10] : 5/10 [Pain Location: ___] : Pain Location: [unfilled] [Pain Interferes with ADLs] : Pain interferes with activities of daily living. [Adjuvant (neuroleptics)] : adjuvant (neuroleptics)

## 2022-12-12 NOTE — DISEASE MANAGEMENT
[TTNM] : 3 [NTNM] : 1 [MTNM] : 0 [de-identified] : [address] [de-identified] : [address] [facility] [place]de-identified] : Oropharynx/Neck

## 2022-12-12 NOTE — HISTORY OF PRESENT ILLNESS
[FreeTextEntry1] : Mr Sierra is a 77 year old male non-smoker with PMH of IPMN of the pancreas (on surveillance pending H&N treatment), Intracranial Aneurysm with new diagnosis of AJCC 8th Ed. at least Stage I ([id number]) and AJCC 7th Ed. at least Stage SAVAGE ([id number]) p16+ SCC of BOT.\par Plan is Concurrent chemoRadiation with 70 Gy.\par \par 11/8/2022 Completed 3/35 Fx. Patient presents for on treatment visit. He has baseline dysphagia at presentation. S/p PEG tube placement on 11/7/2022. He had moderate pain to the site which has decreased since  last night.\par Noted with weight decline of 10 lbs, he has been NPO since 10/6/22 midnight. Encouraged to increase hydration.\par Dysphagia is at baseline.\par Weekly Carbo/Taxol Cycle #1  on 11/4/2022\par \par 11/15/2022 Completed 8/35 Fx. Patient presents for on treatment visit\par \par 11/22/22: 14/35 fx: Notes throat pain, dysphagia & dysgeusia. Approximately 6 lb weight loss. Skin care with Neisha lotion\par \par 11/29/2022 17/35 Fx completed. He continues to have throat pain, worse with swallowing. 3 lbs weight loss in 1 week noted today. He did not continue with MMW after 2 uses due to viscosity. Able to tolerate water by mouth. Totally on PEG dependent for nutrition, taking  typically 3-4 cans/day, however took only 3 cans yesterday. Tolerating weekly chemo (Fridays)\par Will start trial Gabapentin 300MG 3x/day for throat pain. (He is currently not taking Nortriptyline)\par Nutrition services following.\par \par \par 12/6/2022 23/35 Fx completed. He was not taking the MMW and Gabapentin. He is strongly averse to oxycodone at this time due to concern over side effects and addiction. [facility] recently started viscous lidocaine. Of note, he has begun taking PO nutritional shakes by mouth, 1 per day, though weight remains low at 144 (170lb at start of treatment). \par \par \par \par \par \par

## 2022-12-13 ENCOUNTER — APPOINTMENT (OUTPATIENT)
Dept: INFUSION THERAPY | Facility: HOSPITAL | Age: 77
End: 2022-12-13

## 2022-12-13 ENCOUNTER — NON-APPOINTMENT (OUTPATIENT)
Age: 77
End: 2022-12-13

## 2022-12-13 ENCOUNTER — RESULT REVIEW (OUTPATIENT)
Age: 77
End: 2022-12-13

## 2022-12-13 VITALS
TEMPERATURE: 97.7 F | DIASTOLIC BLOOD PRESSURE: 75 MMHG | HEART RATE: 84 BPM | HEIGHT: 69 IN | RESPIRATION RATE: 18 BRPM | SYSTOLIC BLOOD PRESSURE: 113 MMHG | BODY MASS INDEX: 21.06 KG/M2 | WEIGHT: 142.2 LBS | OXYGEN SATURATION: 100 %

## 2022-12-13 DIAGNOSIS — R23.4 CHANGES IN SKIN TEXTURE: ICD-10-CM

## 2022-12-13 LAB
ALBUMIN SERPL ELPH-MCNC: 3.3 G/DL — SIGNIFICANT CHANGE UP (ref 3.3–5)
ALP SERPL-CCNC: 74 U/L — SIGNIFICANT CHANGE UP (ref 40–120)
ALT FLD-CCNC: 68 U/L — HIGH (ref 10–45)
ANION GAP SERPL CALC-SCNC: 12 MMOL/L — SIGNIFICANT CHANGE UP (ref 5–17)
AST SERPL-CCNC: 56 U/L — HIGH (ref 10–40)
BILIRUB SERPL-MCNC: 0.6 MG/DL — SIGNIFICANT CHANGE UP (ref 0.2–1.2)
BUN SERPL-MCNC: 26 MG/DL — HIGH (ref 7–23)
CALCIUM SERPL-MCNC: 8.5 MG/DL — SIGNIFICANT CHANGE UP (ref 8.4–10.5)
CHLORIDE SERPL-SCNC: 99 MMOL/L — SIGNIFICANT CHANGE UP (ref 96–108)
CO2 SERPL-SCNC: 26 MMOL/L — SIGNIFICANT CHANGE UP (ref 22–31)
CREAT SERPL-MCNC: 1.08 MG/DL — SIGNIFICANT CHANGE UP (ref 0.5–1.3)
EGFR: 72 ML/MIN/1.73M2 — SIGNIFICANT CHANGE UP
GLUCOSE SERPL-MCNC: 161 MG/DL — HIGH (ref 70–99)
POTASSIUM SERPL-MCNC: 5.3 MMOL/L — SIGNIFICANT CHANGE UP (ref 3.5–5.3)
POTASSIUM SERPL-SCNC: 5.3 MMOL/L — SIGNIFICANT CHANGE UP (ref 3.5–5.3)
PROT SERPL-MCNC: 6.7 G/DL — SIGNIFICANT CHANGE UP (ref 6–8.3)
SODIUM SERPL-SCNC: 137 MMOL/L — SIGNIFICANT CHANGE UP (ref 135–145)

## 2022-12-13 PROCEDURE — G6002: CPT | Mod: 26

## 2022-12-13 RX ORDER — GABAPENTIN 300 MG/1
300 CAPSULE ORAL 3 TIMES DAILY
Qty: 30 | Refills: 0 | Status: DISCONTINUED | COMMUNITY
Start: 2022-11-29 | End: 2022-12-13

## 2022-12-13 NOTE — DISEASE MANAGEMENT
[Clinical] : TNM Stage: c [I] : I [TTNM] : 3 [NTNM] : 1 [MTNM] : 0 [de-identified] : [address] [de-identified] : [address] [facility] [place] [de-identified] : Oropharynx/Neck

## 2022-12-13 NOTE — PHYSICAL EXAM
[General Appearance - Alert] : alert [General Appearance - In No Acute Distress] : in no acute distress [Thin] : thin [] : no respiratory distress [Respiration, Rhythm And Depth] : normal respiratory rhythm and effort [Exaggerated Use Of Accessory Muscles For Inspiration] : no accessory muscle use [Feeding Tube] : a feeding tube was present [Oriented To Time, Place, And Person] : oriented to person, place, and time [de-identified] : Trismus (sharp pain with wide jaw opening). Grade 1 mucositis in oropharynx.  [de-identified] : Grade 1 dermatitis

## 2022-12-13 NOTE — HISTORY OF PRESENT ILLNESS
[FreeTextEntry1] : Mr Sierra is a 77 year old male non-smoker with PMH of IPMN of the pancreas (on surveillance pending H&N treatment), Intracranial Aneurysm with new diagnosis of AJCC 8th Ed. at least Stage I ([id number]) and AJCC 7th Ed. at least Stage SAVAGE ([id number]) p16+ SCC of BOT.\par Plan is Concurrent chemoRadiation with 70 Gy.\par \par 11/8/2022 Completed 3/35 Fx. Patient presents for on treatment visit. He has baseline dysphagia at presentation. S/p PEG tube placement on 11/7/2022. He had moderate pain to the site which has decreased since  last night.\par Noted with weight decline of 10 lbs, he has been NPO since 10/6/22 midnight. Encouraged to increase hydration.\par Dysphagia is at baseline.\par Weekly Carbo/Taxol Cycle #1  on 11/4/2022\par \par 11/15/2022 Completed 8/35 Fx. Patient presents for on treatment visit\par \par 11/22/22: 14/35 fx: Notes throat pain, dysphagia & dysgeusia. Approximately 6 lb weight loss. Skin care with Neisha lotion\par \par 11/29/2022 17/35 Fx completed. He continues to have throat pain, worse with swallowing. 3 lbs weight loss in 1 week noted today. He did not continue with MMW after 2 uses due to viscosity. Able to tolerate water by mouth. Totally on PEG dependent for nutrition, taking  typically 3-4 cans/day, however took only 3 cans yesterday. Tolerating weekly chemo (Fridays)\par Will start trial Gabapentin 300MG 3x/day for throat pain. (He is currently not taking Nortriptyline)\par Nutrition services following.\par \par \par 12/6/2022 23/35 Fx completed. He was not taking the MMW and Gabapentin. He is strongly averse to oxycodone at this time due to concern over side effects and addiction. [facility] recently started viscous lidocaine. Of note, he has begun taking PO nutritional shakes by mouth, 1 per day, though weight remains low at 144 (170lb at start of treatment). \par \par \par 12/13/2022   27/35 Fx completed. Skin breakdown noted in treated neck, multiple open areas of dry desquamation surrounded by severe hyperpigmentation to neck. Started using Silvadene to open areas and Aquaphor to hyperpimented areas. He will  Oxycodone liquid at [facility] today and start trial at [facility] in addition to gabapentin 300 mg TID.\par \par \par \par

## 2022-12-14 PROCEDURE — 77014: CPT | Mod: 26

## 2022-12-15 ENCOUNTER — RESULT REVIEW (OUTPATIENT)
Age: 77
End: 2022-12-15

## 2022-12-15 ENCOUNTER — APPOINTMENT (OUTPATIENT)
Dept: INFUSION THERAPY | Facility: HOSPITAL | Age: 77
End: 2022-12-15

## 2022-12-15 LAB
ALBUMIN SERPL ELPH-MCNC: 3.2 G/DL — LOW (ref 3.3–5)
ALP SERPL-CCNC: 75 U/L — SIGNIFICANT CHANGE UP (ref 40–120)
ALT FLD-CCNC: 51 U/L — HIGH (ref 10–45)
ANION GAP SERPL CALC-SCNC: 15 MMOL/L — SIGNIFICANT CHANGE UP (ref 5–17)
AST SERPL-CCNC: 25 U/L — SIGNIFICANT CHANGE UP (ref 10–40)
BASOPHILS # BLD AUTO: 0.02 K/UL — SIGNIFICANT CHANGE UP (ref 0–0.2)
BASOPHILS NFR BLD AUTO: 1 % — SIGNIFICANT CHANGE UP (ref 0–2)
BILIRUB SERPL-MCNC: 0.6 MG/DL — SIGNIFICANT CHANGE UP (ref 0.2–1.2)
BUN SERPL-MCNC: 18 MG/DL — SIGNIFICANT CHANGE UP (ref 7–23)
CALCIUM SERPL-MCNC: 8 MG/DL — LOW (ref 8.4–10.5)
CHLORIDE SERPL-SCNC: 102 MMOL/L — SIGNIFICANT CHANGE UP (ref 96–108)
CO2 SERPL-SCNC: 25 MMOL/L — SIGNIFICANT CHANGE UP (ref 22–31)
CREAT SERPL-MCNC: 1.03 MG/DL — SIGNIFICANT CHANGE UP (ref 0.5–1.3)
EGFR: 76 ML/MIN/1.73M2 — SIGNIFICANT CHANGE UP
EOSINOPHIL # BLD AUTO: 0.02 K/UL — SIGNIFICANT CHANGE UP (ref 0–0.5)
EOSINOPHIL NFR BLD AUTO: 1 % — SIGNIFICANT CHANGE UP (ref 0–6)
GLUCOSE SERPL-MCNC: 136 MG/DL — HIGH (ref 70–99)
HCT VFR BLD CALC: 28.1 % — LOW (ref 39–50)
HGB BLD-MCNC: 8.8 G/DL — LOW (ref 13–17)
IMM GRANULOCYTES NFR BLD AUTO: 1.9 % — HIGH (ref 0–0.9)
LYMPHOCYTES # BLD AUTO: 0.17 K/UL — LOW (ref 1–3.3)
LYMPHOCYTES # BLD AUTO: 8.1 % — LOW (ref 13–44)
MCHC RBC-ENTMCNC: 27.8 PG — SIGNIFICANT CHANGE UP (ref 27–34)
MCHC RBC-ENTMCNC: 31.3 G/DL — LOW (ref 32–36)
MCV RBC AUTO: 88.9 FL — SIGNIFICANT CHANGE UP (ref 80–100)
MONOCYTES # BLD AUTO: 0.42 K/UL — SIGNIFICANT CHANGE UP (ref 0–0.9)
MONOCYTES NFR BLD AUTO: 20.1 % — HIGH (ref 2–14)
NEUTROPHILS # BLD AUTO: 1.42 K/UL — LOW (ref 1.8–7.4)
NEUTROPHILS NFR BLD AUTO: 67.9 % — SIGNIFICANT CHANGE UP (ref 43–77)
NRBC # BLD: 0 /100 WBCS — SIGNIFICANT CHANGE UP (ref 0–0)
PLATELET # BLD AUTO: 189 K/UL — SIGNIFICANT CHANGE UP (ref 150–400)
POTASSIUM SERPL-MCNC: 3.8 MMOL/L — SIGNIFICANT CHANGE UP (ref 3.5–5.3)
POTASSIUM SERPL-SCNC: 3.8 MMOL/L — SIGNIFICANT CHANGE UP (ref 3.5–5.3)
PROT SERPL-MCNC: 6.1 G/DL — SIGNIFICANT CHANGE UP (ref 6–8.3)
RBC # BLD: 3.16 M/UL — LOW (ref 4.2–5.8)
RBC # FLD: 15.1 % — HIGH (ref 10.3–14.5)
SODIUM SERPL-SCNC: 141 MMOL/L — SIGNIFICANT CHANGE UP (ref 135–145)
WBC # BLD: 2.09 K/UL — LOW (ref 3.8–10.5)
WBC # FLD AUTO: 2.09 K/UL — LOW (ref 3.8–10.5)

## 2022-12-15 PROCEDURE — G6002: CPT | Mod: 26

## 2022-12-16 ENCOUNTER — RESULT REVIEW (OUTPATIENT)
Age: 77
End: 2022-12-16

## 2022-12-16 ENCOUNTER — APPOINTMENT (OUTPATIENT)
Dept: INFUSION THERAPY | Facility: HOSPITAL | Age: 77
End: 2022-12-16

## 2022-12-16 LAB
ALBUMIN SERPL ELPH-MCNC: 3.1 G/DL — LOW (ref 3.3–5)
ALP SERPL-CCNC: 80 U/L — SIGNIFICANT CHANGE UP (ref 40–120)
ALT FLD-CCNC: 46 U/L — HIGH (ref 10–45)
ANION GAP SERPL CALC-SCNC: 14 MMOL/L — SIGNIFICANT CHANGE UP (ref 5–17)
AST SERPL-CCNC: 19 U/L — SIGNIFICANT CHANGE UP (ref 10–40)
BILIRUB SERPL-MCNC: 0.6 MG/DL — SIGNIFICANT CHANGE UP (ref 0.2–1.2)
BUN SERPL-MCNC: 17 MG/DL — SIGNIFICANT CHANGE UP (ref 7–23)
CALCIUM SERPL-MCNC: 8.1 MG/DL — LOW (ref 8.4–10.5)
CHLORIDE SERPL-SCNC: 100 MMOL/L — SIGNIFICANT CHANGE UP (ref 96–108)
CO2 SERPL-SCNC: 26 MMOL/L — SIGNIFICANT CHANGE UP (ref 22–31)
CREAT SERPL-MCNC: 1.16 MG/DL — SIGNIFICANT CHANGE UP (ref 0.5–1.3)
EGFR: 66 ML/MIN/1.73M2 — SIGNIFICANT CHANGE UP
GLUCOSE SERPL-MCNC: 158 MG/DL — HIGH (ref 70–99)
POTASSIUM SERPL-MCNC: 4.2 MMOL/L — SIGNIFICANT CHANGE UP (ref 3.5–5.3)
POTASSIUM SERPL-SCNC: 4.2 MMOL/L — SIGNIFICANT CHANGE UP (ref 3.5–5.3)
PROT SERPL-MCNC: 6.1 G/DL — SIGNIFICANT CHANGE UP (ref 6–8.3)
SODIUM SERPL-SCNC: 140 MMOL/L — SIGNIFICANT CHANGE UP (ref 135–145)
TSH SERPL-MCNC: 0.54 UIU/ML — SIGNIFICANT CHANGE UP (ref 0.27–4.2)

## 2022-12-16 PROCEDURE — G6002: CPT | Mod: 26

## 2022-12-16 PROCEDURE — 77427 RADIATION TX MANAGEMENT X5: CPT

## 2022-12-19 ENCOUNTER — RESULT REVIEW (OUTPATIENT)
Age: 77
End: 2022-12-19

## 2022-12-19 ENCOUNTER — APPOINTMENT (OUTPATIENT)
Dept: INFUSION THERAPY | Facility: HOSPITAL | Age: 77
End: 2022-12-19

## 2022-12-19 ENCOUNTER — NON-APPOINTMENT (OUTPATIENT)
Age: 77
End: 2022-12-19

## 2022-12-19 LAB
ALBUMIN SERPL ELPH-MCNC: 3 G/DL — LOW (ref 3.3–5)
ALP SERPL-CCNC: 76 U/L — SIGNIFICANT CHANGE UP (ref 40–120)
ALT FLD-CCNC: 36 U/L — SIGNIFICANT CHANGE UP (ref 10–45)
ANION GAP SERPL CALC-SCNC: 16 MMOL/L — SIGNIFICANT CHANGE UP (ref 5–17)
AST SERPL-CCNC: 21 U/L — SIGNIFICANT CHANGE UP (ref 10–40)
BILIRUB SERPL-MCNC: 0.9 MG/DL — SIGNIFICANT CHANGE UP (ref 0.2–1.2)
BUN SERPL-MCNC: 19 MG/DL — SIGNIFICANT CHANGE UP (ref 7–23)
CALCIUM SERPL-MCNC: 8 MG/DL — LOW (ref 8.4–10.5)
CHLORIDE SERPL-SCNC: 98 MMOL/L — SIGNIFICANT CHANGE UP (ref 96–108)
CO2 SERPL-SCNC: 24 MMOL/L — SIGNIFICANT CHANGE UP (ref 22–31)
CREAT SERPL-MCNC: 1.06 MG/DL — SIGNIFICANT CHANGE UP (ref 0.5–1.3)
EGFR: 73 ML/MIN/1.73M2 — SIGNIFICANT CHANGE UP
GLUCOSE SERPL-MCNC: 176 MG/DL — HIGH (ref 70–99)
POTASSIUM SERPL-MCNC: 4.4 MMOL/L — SIGNIFICANT CHANGE UP (ref 3.5–5.3)
POTASSIUM SERPL-SCNC: 4.4 MMOL/L — SIGNIFICANT CHANGE UP (ref 3.5–5.3)
PROT SERPL-MCNC: 6 G/DL — SIGNIFICANT CHANGE UP (ref 6–8.3)
SODIUM SERPL-SCNC: 139 MMOL/L — SIGNIFICANT CHANGE UP (ref 135–145)

## 2022-12-19 NOTE — REASON FOR VISIT
[Emergent On-Treatment] : an emergent on-treatment visit for [Head and Neck Cancer] : head and neck cancer

## 2022-12-21 ENCOUNTER — RESULT REVIEW (OUTPATIENT)
Age: 77
End: 2022-12-21

## 2022-12-21 ENCOUNTER — APPOINTMENT (OUTPATIENT)
Dept: INFUSION THERAPY | Facility: HOSPITAL | Age: 77
End: 2022-12-21

## 2022-12-21 ENCOUNTER — NON-APPOINTMENT (OUTPATIENT)
Age: 77
End: 2022-12-21

## 2022-12-21 ENCOUNTER — APPOINTMENT (OUTPATIENT)
Dept: HEMATOLOGY ONCOLOGY | Facility: CLINIC | Age: 77
End: 2022-12-21

## 2022-12-21 ENCOUNTER — OUTPATIENT (OUTPATIENT)
Dept: OUTPATIENT SERVICES | Facility: HOSPITAL | Age: 77
LOS: 1 days | End: 2022-12-21
Payer: COMMERCIAL

## 2022-12-21 DIAGNOSIS — C02.9 MALIGNANT NEOPLASM OF TONGUE, UNSPECIFIED: ICD-10-CM

## 2022-12-21 LAB
ALBUMIN SERPL ELPH-MCNC: 3.1 G/DL — LOW (ref 3.3–5)
ALP SERPL-CCNC: 96 U/L — SIGNIFICANT CHANGE UP (ref 40–120)
ALT FLD-CCNC: 37 U/L — SIGNIFICANT CHANGE UP (ref 10–45)
ANION GAP SERPL CALC-SCNC: 14 MMOL/L — SIGNIFICANT CHANGE UP (ref 5–17)
AST SERPL-CCNC: 16 U/L — SIGNIFICANT CHANGE UP (ref 10–40)
BASOPHILS # BLD AUTO: 0.02 K/UL — SIGNIFICANT CHANGE UP (ref 0–0.2)
BASOPHILS NFR BLD AUTO: 3 % — HIGH (ref 0–2)
BILIRUB SERPL-MCNC: 0.8 MG/DL — SIGNIFICANT CHANGE UP (ref 0.2–1.2)
BUN SERPL-MCNC: 15 MG/DL — SIGNIFICANT CHANGE UP (ref 7–23)
CALCIUM SERPL-MCNC: 8.2 MG/DL — LOW (ref 8.4–10.5)
CHLORIDE SERPL-SCNC: 97 MMOL/L — SIGNIFICANT CHANGE UP (ref 96–108)
CO2 SERPL-SCNC: 28 MMOL/L — SIGNIFICANT CHANGE UP (ref 22–31)
CREAT SERPL-MCNC: 1.16 MG/DL — SIGNIFICANT CHANGE UP (ref 0.5–1.3)
EGFR: 66 ML/MIN/1.73M2 — SIGNIFICANT CHANGE UP
EOSINOPHIL # BLD AUTO: 0.02 K/UL — SIGNIFICANT CHANGE UP (ref 0–0.5)
EOSINOPHIL NFR BLD AUTO: 2 % — SIGNIFICANT CHANGE UP (ref 0–6)
GLUCOSE SERPL-MCNC: 192 MG/DL — HIGH (ref 70–99)
HCT VFR BLD CALC: 29.4 % — LOW (ref 39–50)
HGB BLD-MCNC: 9.4 G/DL — LOW (ref 13–17)
LYMPHOCYTES # BLD AUTO: 0.05 K/UL — LOW (ref 1–3.3)
LYMPHOCYTES # BLD AUTO: 6 % — LOW (ref 13–44)
MCHC RBC-ENTMCNC: 28.3 PG — SIGNIFICANT CHANGE UP (ref 27–34)
MCHC RBC-ENTMCNC: 32 G/DL — SIGNIFICANT CHANGE UP (ref 32–36)
MCV RBC AUTO: 88.6 FL — SIGNIFICANT CHANGE UP (ref 80–100)
METAMYELOCYTES # FLD: 1 % — HIGH (ref 0–0)
MONOCYTES # BLD AUTO: 0.1 K/UL — SIGNIFICANT CHANGE UP (ref 0–0.9)
MONOCYTES NFR BLD AUTO: 12 % — SIGNIFICANT CHANGE UP (ref 2–14)
MYELOCYTES NFR BLD: 1 % — HIGH (ref 0–0)
NEUTROPHILS # BLD AUTO: 0.62 K/UL — LOW (ref 1.8–7.4)
NEUTROPHILS NFR BLD AUTO: 75 % — SIGNIFICANT CHANGE UP (ref 43–77)
NRBC # BLD: 1 /100 — HIGH (ref 0–0)
NRBC # BLD: SIGNIFICANT CHANGE UP /100 WBCS (ref 0–0)
PLAT MORPH BLD: NORMAL — SIGNIFICANT CHANGE UP
PLATELET # BLD AUTO: SIGNIFICANT CHANGE UP K/UL (ref 150–400)
POTASSIUM SERPL-MCNC: 4.1 MMOL/L — SIGNIFICANT CHANGE UP (ref 3.5–5.3)
POTASSIUM SERPL-SCNC: 4.1 MMOL/L — SIGNIFICANT CHANGE UP (ref 3.5–5.3)
PROT SERPL-MCNC: 6.2 G/DL — SIGNIFICANT CHANGE UP (ref 6–8.3)
RBC # BLD: 3.32 M/UL — LOW (ref 4.2–5.8)
RBC # FLD: 15.1 % — HIGH (ref 10.3–14.5)
RBC BLD AUTO: SIGNIFICANT CHANGE UP
SODIUM SERPL-SCNC: 139 MMOL/L — SIGNIFICANT CHANGE UP (ref 135–145)
TOXIC GRANULES BLD QL SMEAR: PRESENT — SIGNIFICANT CHANGE UP
WBC # BLD: 0.83 K/UL — CRITICAL LOW (ref 3.8–10.5)
WBC # FLD AUTO: 0.83 K/UL — CRITICAL LOW (ref 3.8–10.5)

## 2022-12-21 PROCEDURE — 86901 BLOOD TYPING SEROLOGIC RH(D): CPT

## 2022-12-21 PROCEDURE — 99212 OFFICE O/P EST SF 10 MIN: CPT

## 2022-12-21 PROCEDURE — 86900 BLOOD TYPING SEROLOGIC ABO: CPT

## 2022-12-21 PROCEDURE — 86850 RBC ANTIBODY SCREEN: CPT

## 2022-12-22 ENCOUNTER — INPATIENT (INPATIENT)
Facility: HOSPITAL | Age: 77
LOS: 6 days | Discharge: HOME | DRG: 871 | End: 2022-12-29
Attending: STUDENT IN AN ORGANIZED HEALTH CARE EDUCATION/TRAINING PROGRAM | Admitting: STUDENT IN AN ORGANIZED HEALTH CARE EDUCATION/TRAINING PROGRAM
Payer: MEDICARE

## 2022-12-22 VITALS
TEMPERATURE: 102 F | RESPIRATION RATE: 16 BRPM | HEIGHT: 69 IN | DIASTOLIC BLOOD PRESSURE: 81 MMHG | OXYGEN SATURATION: 97 % | WEIGHT: 110.01 LBS | HEART RATE: 123 BPM | SYSTOLIC BLOOD PRESSURE: 140 MMHG

## 2022-12-22 LAB
ALBUMIN SERPL ELPH-MCNC: 1.9 G/DL — LOW (ref 3.5–5)
ALP SERPL-CCNC: 94 U/L — SIGNIFICANT CHANGE UP (ref 40–120)
ALT FLD-CCNC: 43 U/L DA — SIGNIFICANT CHANGE UP (ref 10–60)
ANION GAP SERPL CALC-SCNC: 10 MMOL/L — SIGNIFICANT CHANGE UP (ref 5–17)
APTT BLD: 25.9 SEC — LOW (ref 27.5–35.5)
AST SERPL-CCNC: 24 U/L — SIGNIFICANT CHANGE UP (ref 10–40)
BASOPHILS # BLD AUTO: 0 K/UL — SIGNIFICANT CHANGE UP (ref 0–0.2)
BASOPHILS NFR BLD AUTO: 0 % — SIGNIFICANT CHANGE UP (ref 0–2)
BILIRUB SERPL-MCNC: 0.8 MG/DL — SIGNIFICANT CHANGE UP (ref 0.2–1.2)
BUN SERPL-MCNC: 18 MG/DL — SIGNIFICANT CHANGE UP (ref 7–18)
CALCIUM SERPL-MCNC: 8.3 MG/DL — LOW (ref 8.4–10.5)
CHLORIDE SERPL-SCNC: 102 MMOL/L — SIGNIFICANT CHANGE UP (ref 96–108)
CO2 SERPL-SCNC: 29 MMOL/L — SIGNIFICANT CHANGE UP (ref 22–31)
CREAT SERPL-MCNC: 1.2 MG/DL — SIGNIFICANT CHANGE UP (ref 0.5–1.3)
EGFR: 63 ML/MIN/1.73M2 — SIGNIFICANT CHANGE UP
EOSINOPHIL # BLD AUTO: 0 K/UL — SIGNIFICANT CHANGE UP (ref 0–0.5)
EOSINOPHIL NFR BLD AUTO: 0 % — SIGNIFICANT CHANGE UP (ref 0–6)
FLUAV AG NPH QL: SIGNIFICANT CHANGE UP
FLUBV AG NPH QL: SIGNIFICANT CHANGE UP
GLUCOSE SERPL-MCNC: 174 MG/DL — HIGH (ref 70–99)
HCT VFR BLD CALC: 25.7 % — LOW (ref 39–50)
HGB BLD-MCNC: 7.9 G/DL — LOW (ref 13–17)
HYPOGRAN NEUTS BLD QL SMEAR: PRESENT — SIGNIFICANT CHANGE UP
INR BLD: 1.56 RATIO — HIGH (ref 0.88–1.16)
LACTATE SERPL-SCNC: 1.7 MMOL/L — SIGNIFICANT CHANGE UP (ref 0.7–2)
LYMPHOCYTES # BLD AUTO: 0.15 K/UL — LOW (ref 1–3.3)
LYMPHOCYTES # BLD AUTO: 11 % — LOW (ref 13–44)
MANUAL SMEAR VERIFICATION: SIGNIFICANT CHANGE UP
MCHC RBC-ENTMCNC: 27.6 PG — SIGNIFICANT CHANGE UP (ref 27–34)
MCHC RBC-ENTMCNC: 30.7 GM/DL — LOW (ref 32–36)
MCV RBC AUTO: 89.9 FL — SIGNIFICANT CHANGE UP (ref 80–100)
METAMYELOCYTES # FLD: 1 % — HIGH (ref 0–0)
MONOCYTES # BLD AUTO: 0.38 K/UL — SIGNIFICANT CHANGE UP (ref 0–0.9)
MONOCYTES NFR BLD AUTO: 28 % — HIGH (ref 2–14)
NEUTROPHILS # BLD AUTO: 0.81 K/UL — LOW (ref 1.8–7.4)
NEUTROPHILS NFR BLD AUTO: 56 % — SIGNIFICANT CHANGE UP (ref 43–77)
NEUTS BAND # BLD: 3 % — SIGNIFICANT CHANGE UP (ref 0–8)
NRBC # BLD: 0 /100 — SIGNIFICANT CHANGE UP (ref 0–0)
PLAT MORPH BLD: NORMAL — SIGNIFICANT CHANGE UP
PLATELET # BLD AUTO: 234 K/UL — SIGNIFICANT CHANGE UP (ref 150–400)
POTASSIUM SERPL-MCNC: 3.5 MMOL/L — SIGNIFICANT CHANGE UP (ref 3.5–5.3)
POTASSIUM SERPL-SCNC: 3.5 MMOL/L — SIGNIFICANT CHANGE UP (ref 3.5–5.3)
PROT SERPL-MCNC: 6.5 G/DL — SIGNIFICANT CHANGE UP (ref 6–8.3)
PROTHROM AB SERPL-ACNC: 18.6 SEC — HIGH (ref 10.5–13.4)
RBC # BLD: 2.86 M/UL — LOW (ref 4.2–5.8)
RBC # FLD: 14.9 % — HIGH (ref 10.3–14.5)
RBC BLD AUTO: NORMAL — SIGNIFICANT CHANGE UP
SARS-COV-2 RNA SPEC QL NAA+PROBE: DETECTED
SODIUM SERPL-SCNC: 141 MMOL/L — SIGNIFICANT CHANGE UP (ref 135–145)
VARIANT LYMPHS # BLD: 1 % — SIGNIFICANT CHANGE UP (ref 0–6)
WBC # BLD: 1.37 K/UL — LOW (ref 3.8–10.5)
WBC # FLD AUTO: 1.37 K/UL — LOW (ref 3.8–10.5)

## 2022-12-22 PROCEDURE — 99285 EMERGENCY DEPT VISIT HI MDM: CPT

## 2022-12-22 PROCEDURE — 71045 X-RAY EXAM CHEST 1 VIEW: CPT | Mod: 26

## 2022-12-22 RX ORDER — VANCOMYCIN HCL 1 G
1000 VIAL (EA) INTRAVENOUS ONCE
Refills: 0 | Status: COMPLETED | OUTPATIENT
Start: 2022-12-22 | End: 2022-12-22

## 2022-12-22 RX ORDER — SODIUM CHLORIDE 9 MG/ML
1550 INJECTION INTRAMUSCULAR; INTRAVENOUS; SUBCUTANEOUS ONCE
Refills: 0 | Status: COMPLETED | OUTPATIENT
Start: 2022-12-22 | End: 2022-12-22

## 2022-12-22 RX ORDER — ACETAMINOPHEN 500 MG
750 TABLET ORAL ONCE
Refills: 0 | Status: COMPLETED | OUTPATIENT
Start: 2022-12-22 | End: 2022-12-22

## 2022-12-22 RX ORDER — PIPERACILLIN AND TAZOBACTAM 4; .5 G/20ML; G/20ML
3.38 INJECTION, POWDER, LYOPHILIZED, FOR SOLUTION INTRAVENOUS ONCE
Refills: 0 | Status: COMPLETED | OUTPATIENT
Start: 2022-12-22 | End: 2022-12-22

## 2022-12-22 RX ORDER — CEFEPIME 1 G/1
2000 INJECTION, POWDER, FOR SOLUTION INTRAMUSCULAR; INTRAVENOUS ONCE
Refills: 0 | Status: COMPLETED | OUTPATIENT
Start: 2022-12-22 | End: 2022-12-22

## 2022-12-22 RX ORDER — MORPHINE SULFATE 50 MG/1
4 CAPSULE, EXTENDED RELEASE ORAL ONCE
Refills: 0 | Status: DISCONTINUED | OUTPATIENT
Start: 2022-12-22 | End: 2022-12-22

## 2022-12-22 RX ADMIN — Medication 250 MILLIGRAM(S): at 22:25

## 2022-12-22 RX ADMIN — MORPHINE SULFATE 4 MILLIGRAM(S): 50 CAPSULE, EXTENDED RELEASE ORAL at 21:52

## 2022-12-22 RX ADMIN — SODIUM CHLORIDE 1550 MILLILITER(S): 9 INJECTION INTRAMUSCULAR; INTRAVENOUS; SUBCUTANEOUS at 20:45

## 2022-12-22 RX ADMIN — MORPHINE SULFATE 4 MILLIGRAM(S): 50 CAPSULE, EXTENDED RELEASE ORAL at 22:26

## 2022-12-22 RX ADMIN — Medication 300 MILLIGRAM(S): at 21:37

## 2022-12-22 RX ADMIN — PIPERACILLIN AND TAZOBACTAM 200 GRAM(S): 4; .5 INJECTION, POWDER, LYOPHILIZED, FOR SOLUTION INTRAVENOUS at 20:43

## 2022-12-22 RX ADMIN — CEFEPIME 100 MILLIGRAM(S): 1 INJECTION, POWDER, FOR SOLUTION INTRAMUSCULAR; INTRAVENOUS at 21:35

## 2022-12-22 RX ADMIN — Medication 750 MILLIGRAM(S): at 22:25

## 2022-12-22 NOTE — ED PROVIDER NOTE - ENMT, MLM
Airway patent, Nasal mucosa clear. Throat has no vesicles, no oropharyngeal exudates and uvula is midline. Mucous membrane are dry.

## 2022-12-22 NOTE — ED PROVIDER NOTE - CLINICAL SUMMARY MEDICAL DECISION MAKING FREE TEXT BOX
77 year old male with history of neck mass s/p retention, on chemo and radiation here for fever. Code sepsis activated. Will check labs, imaging and reassess. 77 year old male with history of oropharyngeal mass s/p resection, on chemo and radiation here for fever. Code sepsis activated. Will check labs, imaging and reassess.

## 2022-12-22 NOTE — ED ADULT NURSE REASSESSMENT NOTE - NS ED NURSE REASSESS COMMENT FT1
Pt presents to ED with reports of fever for x1 day, pt states last chemo was Friday for h/o throat cancer. No active distress noted.

## 2022-12-22 NOTE — ED PROVIDER NOTE - NSICDXPASTMEDICALHX_GEN_ALL_CORE_FT
PAST MEDICAL HISTORY:  Hypercholesterolemia     Hypertension     Stage 3 chronic kidney disease

## 2022-12-22 NOTE — ED PROVIDER NOTE - OBJECTIVE STATEMENT
77 year old male with a PHMx of hypertension, hypercholesteremia, BPH and neck mass s/p retention, last dose of chemo was last week and radiation therapy presenting to the ED with complaints of irritation and burning in his throat. Also relates fever with nonproductive cough. Denies shortness of breath, urinary symptoms, or sick contacts. NKDA. 77 year old male with a PHMx of hypertension, hypercholesteremia, BPH and oropharyngeal mass s/p resection, on radiation and chemo (last dose of chemo was last week) presenting to the ED with complaints of irritation and burning in his throat. Also relates fever with nonproductive cough. Denies shortness of breath, chest pain, urinary symptoms, or sick contacts. NKDA.

## 2022-12-23 ENCOUNTER — APPOINTMENT (OUTPATIENT)
Dept: INFUSION THERAPY | Facility: HOSPITAL | Age: 77
End: 2022-12-23

## 2022-12-23 ENCOUNTER — APPOINTMENT (OUTPATIENT)
Dept: HEMATOLOGY ONCOLOGY | Facility: CLINIC | Age: 77
End: 2022-12-23

## 2022-12-23 DIAGNOSIS — A41.9 SEPSIS, UNSPECIFIED ORGANISM: ICD-10-CM

## 2022-12-23 DIAGNOSIS — U07.1 COVID-19: ICD-10-CM

## 2022-12-23 DIAGNOSIS — J39.2 OTHER DISEASES OF PHARYNX: ICD-10-CM

## 2022-12-23 DIAGNOSIS — Z29.9 ENCOUNTER FOR PROPHYLACTIC MEASURES, UNSPECIFIED: ICD-10-CM

## 2022-12-23 LAB
ALBUMIN SERPL ELPH-MCNC: 1.5 G/DL — LOW (ref 3.5–5)
ALP SERPL-CCNC: 79 U/L — SIGNIFICANT CHANGE UP (ref 40–120)
ALT FLD-CCNC: 33 U/L DA — SIGNIFICANT CHANGE UP (ref 10–60)
ANION GAP SERPL CALC-SCNC: 10 MMOL/L — SIGNIFICANT CHANGE UP (ref 5–17)
ANISOCYTOSIS BLD QL: SLIGHT — SIGNIFICANT CHANGE UP
APPEARANCE UR: CLEAR — SIGNIFICANT CHANGE UP
AST SERPL-CCNC: 16 U/L — SIGNIFICANT CHANGE UP (ref 10–40)
BACTERIA # UR AUTO: ABNORMAL /HPF
BASOPHILS # BLD AUTO: 0 K/UL — SIGNIFICANT CHANGE UP (ref 0–0.2)
BASOPHILS # BLD AUTO: SIGNIFICANT CHANGE UP K/UL (ref 0–0.2)
BASOPHILS NFR BLD AUTO: 0 % — SIGNIFICANT CHANGE UP (ref 0–2)
BASOPHILS NFR BLD AUTO: SIGNIFICANT CHANGE UP % (ref 0–2)
BILIRUB SERPL-MCNC: 0.6 MG/DL — SIGNIFICANT CHANGE UP (ref 0.2–1.2)
BILIRUB UR-MCNC: NEGATIVE — SIGNIFICANT CHANGE UP
BLD GP AB SCN SERPL QL: SIGNIFICANT CHANGE UP
BUN SERPL-MCNC: 16 MG/DL — SIGNIFICANT CHANGE UP (ref 7–18)
CALCIUM SERPL-MCNC: 7.6 MG/DL — LOW (ref 8.4–10.5)
CHLORIDE SERPL-SCNC: 107 MMOL/L — SIGNIFICANT CHANGE UP (ref 96–108)
CO2 SERPL-SCNC: 26 MMOL/L — SIGNIFICANT CHANGE UP (ref 22–31)
COLOR SPEC: YELLOW — SIGNIFICANT CHANGE UP
CREAT SERPL-MCNC: 1.01 MG/DL — SIGNIFICANT CHANGE UP (ref 0.5–1.3)
DIFF PNL FLD: ABNORMAL
EGFR: 78 ML/MIN/1.73M2 — SIGNIFICANT CHANGE UP
EOSINOPHIL # BLD AUTO: 0.02 K/UL — SIGNIFICANT CHANGE UP (ref 0–0.5)
EOSINOPHIL # BLD AUTO: SIGNIFICANT CHANGE UP K/UL (ref 0–0.5)
EOSINOPHIL NFR BLD AUTO: 1 % — SIGNIFICANT CHANGE UP (ref 0–6)
EOSINOPHIL NFR BLD AUTO: SIGNIFICANT CHANGE UP % (ref 0–6)
EPI CELLS # UR: SIGNIFICANT CHANGE UP /HPF
GLUCOSE BLDC GLUCOMTR-MCNC: 138 MG/DL — HIGH (ref 70–99)
GLUCOSE SERPL-MCNC: 156 MG/DL — HIGH (ref 70–99)
GLUCOSE UR QL: NEGATIVE — SIGNIFICANT CHANGE UP
GRAN CASTS # UR COMP ASSIST: ABNORMAL /LPF
HAPTOGLOB SERPL-MCNC: 429 MG/DL — HIGH (ref 34–200)
HCT VFR BLD CALC: 18.2 % — CRITICAL LOW (ref 39–50)
HCT VFR BLD CALC: 24.1 % — LOW (ref 39–50)
HCT VFR BLD CALC: 29.5 % — LOW (ref 39–50)
HGB BLD-MCNC: 5.6 G/DL — CRITICAL LOW (ref 13–17)
HGB BLD-MCNC: 7.4 G/DL — LOW (ref 13–17)
HGB BLD-MCNC: 9.4 G/DL — LOW (ref 13–17)
IMM GRANULOCYTES NFR BLD AUTO: SIGNIFICANT CHANGE UP % (ref 0–0.9)
KETONES UR-MCNC: NEGATIVE — SIGNIFICANT CHANGE UP
LDH SERPL L TO P-CCNC: 133 U/L — SIGNIFICANT CHANGE UP (ref 120–225)
LEUKOCYTE ESTERASE UR-ACNC: NEGATIVE — SIGNIFICANT CHANGE UP
LYMPHOCYTES # BLD AUTO: 0.49 K/UL — LOW (ref 1–3.3)
LYMPHOCYTES # BLD AUTO: 24 % — SIGNIFICANT CHANGE UP (ref 13–44)
LYMPHOCYTES # BLD AUTO: SIGNIFICANT CHANGE UP % (ref 13–44)
LYMPHOCYTES # BLD AUTO: SIGNIFICANT CHANGE UP K/UL (ref 1–3.3)
MAGNESIUM SERPL-MCNC: 1.3 MG/DL — LOW (ref 1.6–2.6)
MANUAL SMEAR VERIFICATION: SIGNIFICANT CHANGE UP
MCHC RBC-ENTMCNC: 27.7 PG — SIGNIFICANT CHANGE UP (ref 27–34)
MCHC RBC-ENTMCNC: 28.3 PG — SIGNIFICANT CHANGE UP (ref 27–34)
MCHC RBC-ENTMCNC: 28.4 PG — SIGNIFICANT CHANGE UP (ref 27–34)
MCHC RBC-ENTMCNC: 30.7 GM/DL — LOW (ref 32–36)
MCHC RBC-ENTMCNC: 30.8 GM/DL — LOW (ref 32–36)
MCHC RBC-ENTMCNC: 31.9 GM/DL — LOW (ref 32–36)
MCV RBC AUTO: 89.1 FL — SIGNIFICANT CHANGE UP (ref 80–100)
MCV RBC AUTO: 90.3 FL — SIGNIFICANT CHANGE UP (ref 80–100)
MCV RBC AUTO: 91.9 FL — SIGNIFICANT CHANGE UP (ref 80–100)
MONOCYTES # BLD AUTO: 0.25 K/UL — SIGNIFICANT CHANGE UP (ref 0–0.9)
MONOCYTES # BLD AUTO: SIGNIFICANT CHANGE UP K/UL (ref 0–0.9)
MONOCYTES NFR BLD AUTO: 12 % — SIGNIFICANT CHANGE UP (ref 2–14)
MONOCYTES NFR BLD AUTO: SIGNIFICANT CHANGE UP % (ref 2–14)
NEUTROPHILS # BLD AUTO: 1.3 K/UL — LOW (ref 1.8–7.4)
NEUTROPHILS # BLD AUTO: SIGNIFICANT CHANGE UP K/UL (ref 1.8–7.4)
NEUTROPHILS NFR BLD AUTO: 63 % — SIGNIFICANT CHANGE UP (ref 43–77)
NEUTROPHILS NFR BLD AUTO: SIGNIFICANT CHANGE UP % (ref 43–77)
NITRITE UR-MCNC: NEGATIVE — SIGNIFICANT CHANGE UP
NRBC # BLD: 0 /100 — SIGNIFICANT CHANGE UP (ref 0–0)
NRBC # BLD: 3 /100 WBCS — HIGH (ref 0–0)
NRBC # BLD: SIGNIFICANT CHANGE UP /100 WBCS (ref 0–0)
OVALOCYTES BLD QL SMEAR: SLIGHT — SIGNIFICANT CHANGE UP
PH UR: 5 — SIGNIFICANT CHANGE UP (ref 5–8)
PHOSPHATE SERPL-MCNC: 1.5 MG/DL — LOW (ref 2.5–4.5)
PHOSPHATE SERPL-MCNC: 2 MG/DL — LOW (ref 2.5–4.5)
PLAT MORPH BLD: NORMAL — SIGNIFICANT CHANGE UP
PLATELET # BLD AUTO: 158 K/UL — SIGNIFICANT CHANGE UP (ref 150–400)
PLATELET # BLD AUTO: 221 K/UL — SIGNIFICANT CHANGE UP (ref 150–400)
PLATELET # BLD AUTO: 223 K/UL — SIGNIFICANT CHANGE UP (ref 150–400)
POIKILOCYTOSIS BLD QL AUTO: SLIGHT — SIGNIFICANT CHANGE UP
POTASSIUM SERPL-MCNC: 3.3 MMOL/L — LOW (ref 3.5–5.3)
POTASSIUM SERPL-SCNC: 3.3 MMOL/L — LOW (ref 3.5–5.3)
PROT SERPL-MCNC: 6.1 G/DL — SIGNIFICANT CHANGE UP (ref 6–8.3)
PROT UR-MCNC: 100
RBC # BLD: 1.98 M/UL — LOW (ref 4.2–5.8)
RBC # BLD: 1.98 M/UL — LOW (ref 4.2–5.8)
RBC # BLD: 2.67 M/UL — LOW (ref 4.2–5.8)
RBC # BLD: 3.31 M/UL — LOW (ref 4.2–5.8)
RBC # FLD: 15.3 % — HIGH (ref 10.3–14.5)
RBC # FLD: 15.4 % — HIGH (ref 10.3–14.5)
RBC # FLD: 15.7 % — HIGH (ref 10.3–14.5)
RBC BLD AUTO: ABNORMAL
RBC CASTS # UR COMP ASSIST: >50 /HPF (ref 0–2)
RETICS #: 15 K/UL — LOW (ref 25–125)
RETICS/RBC NFR: 0.8 % — SIGNIFICANT CHANGE UP (ref 0.5–2.5)
SODIUM SERPL-SCNC: 143 MMOL/L — SIGNIFICANT CHANGE UP (ref 135–145)
SP GR SPEC: 1.01 — SIGNIFICANT CHANGE UP (ref 1.01–1.02)
UROBILINOGEN FLD QL: NEGATIVE — SIGNIFICANT CHANGE UP
WBC # BLD: 1.24 K/UL — LOW (ref 3.8–10.5)
WBC # BLD: 2.06 K/UL — LOW (ref 3.8–10.5)
WBC # BLD: 2.3 K/UL — LOW (ref 3.8–10.5)
WBC # FLD AUTO: 1.24 K/UL — LOW (ref 3.8–10.5)
WBC # FLD AUTO: 2.06 K/UL — LOW (ref 3.8–10.5)
WBC # FLD AUTO: 2.3 K/UL — LOW (ref 3.8–10.5)
WBC UR QL: SIGNIFICANT CHANGE UP /HPF (ref 0–5)

## 2022-12-23 PROCEDURE — 99223 1ST HOSP IP/OBS HIGH 75: CPT

## 2022-12-23 PROCEDURE — 12345: CPT | Mod: NC

## 2022-12-23 PROCEDURE — 99223 1ST HOSP IP/OBS HIGH 75: CPT | Mod: GC

## 2022-12-23 PROCEDURE — 93010 ELECTROCARDIOGRAM REPORT: CPT

## 2022-12-23 RX ORDER — REMDESIVIR 5 MG/ML
100 INJECTION INTRAVENOUS EVERY 24 HOURS
Refills: 0 | Status: COMPLETED | OUTPATIENT
Start: 2022-12-24 | End: 2022-12-25

## 2022-12-23 RX ORDER — CEFEPIME 1 G/1
INJECTION, POWDER, FOR SOLUTION INTRAMUSCULAR; INTRAVENOUS
Refills: 0 | Status: DISCONTINUED | OUTPATIENT
Start: 2022-12-23 | End: 2022-12-26

## 2022-12-23 RX ORDER — POTASSIUM PHOSPHATE, MONOBASIC POTASSIUM PHOSPHATE, DIBASIC 236; 224 MG/ML; MG/ML
30 INJECTION, SOLUTION INTRAVENOUS ONCE
Refills: 0 | Status: COMPLETED | OUTPATIENT
Start: 2022-12-23 | End: 2022-12-23

## 2022-12-23 RX ORDER — REMDESIVIR 5 MG/ML
200 INJECTION INTRAVENOUS EVERY 24 HOURS
Refills: 0 | Status: COMPLETED | OUTPATIENT
Start: 2022-12-23 | End: 2022-12-23

## 2022-12-23 RX ORDER — MAGNESIUM SULFATE 500 MG/ML
2 VIAL (ML) INJECTION ONCE
Refills: 0 | Status: COMPLETED | OUTPATIENT
Start: 2022-12-23 | End: 2022-12-23

## 2022-12-23 RX ORDER — CEFEPIME 1 G/1
1000 INJECTION, POWDER, FOR SOLUTION INTRAMUSCULAR; INTRAVENOUS EVERY 12 HOURS
Refills: 0 | Status: DISCONTINUED | OUTPATIENT
Start: 2022-12-23 | End: 2022-12-23

## 2022-12-23 RX ORDER — CEFEPIME 1 G/1
2000 INJECTION, POWDER, FOR SOLUTION INTRAMUSCULAR; INTRAVENOUS EVERY 12 HOURS
Refills: 0 | Status: DISCONTINUED | OUTPATIENT
Start: 2022-12-24 | End: 2022-12-26

## 2022-12-23 RX ORDER — REMDESIVIR 5 MG/ML
INJECTION INTRAVENOUS
Refills: 0 | Status: COMPLETED | OUTPATIENT
Start: 2022-12-23 | End: 2022-12-25

## 2022-12-23 RX ORDER — CEFEPIME 1 G/1
2000 INJECTION, POWDER, FOR SOLUTION INTRAMUSCULAR; INTRAVENOUS ONCE
Refills: 0 | Status: COMPLETED | OUTPATIENT
Start: 2022-12-23 | End: 2022-12-23

## 2022-12-23 RX ORDER — CEFEPIME 1 G/1
2000 INJECTION, POWDER, FOR SOLUTION INTRAMUSCULAR; INTRAVENOUS EVERY 8 HOURS
Refills: 0 | Status: DISCONTINUED | OUTPATIENT
Start: 2022-12-23 | End: 2022-12-23

## 2022-12-23 RX ORDER — POTASSIUM CHLORIDE 20 MEQ
40 PACKET (EA) ORAL ONCE
Refills: 0 | Status: COMPLETED | OUTPATIENT
Start: 2022-12-23 | End: 2022-12-23

## 2022-12-23 RX ORDER — ACETAMINOPHEN 500 MG
750 TABLET ORAL ONCE
Refills: 0 | Status: DISCONTINUED | OUTPATIENT
Start: 2022-12-23 | End: 2022-12-29

## 2022-12-23 RX ORDER — SODIUM CHLORIDE 9 MG/ML
1000 INJECTION INTRAMUSCULAR; INTRAVENOUS; SUBCUTANEOUS
Refills: 0 | Status: DISCONTINUED | OUTPATIENT
Start: 2022-12-23 | End: 2022-12-25

## 2022-12-23 RX ORDER — HEPARIN SODIUM 5000 [USP'U]/ML
5000 INJECTION INTRAVENOUS; SUBCUTANEOUS EVERY 8 HOURS
Refills: 0 | Status: DISCONTINUED | OUTPATIENT
Start: 2022-12-23 | End: 2022-12-24

## 2022-12-23 RX ADMIN — CEFEPIME 100 MILLIGRAM(S): 1 INJECTION, POWDER, FOR SOLUTION INTRAMUSCULAR; INTRAVENOUS at 19:11

## 2022-12-23 RX ADMIN — HEPARIN SODIUM 5000 UNIT(S): 5000 INJECTION INTRAVENOUS; SUBCUTANEOUS at 06:36

## 2022-12-23 RX ADMIN — CEFEPIME 100 MILLIGRAM(S): 1 INJECTION, POWDER, FOR SOLUTION INTRAMUSCULAR; INTRAVENOUS at 06:36

## 2022-12-23 RX ADMIN — POTASSIUM PHOSPHATE, MONOBASIC POTASSIUM PHOSPHATE, DIBASIC 83.33 MILLIMOLE(S): 236; 224 INJECTION, SOLUTION INTRAVENOUS at 13:22

## 2022-12-23 RX ADMIN — Medication 150 GRAM(S): at 13:37

## 2022-12-23 RX ADMIN — SODIUM CHLORIDE 80 MILLILITER(S): 9 INJECTION INTRAMUSCULAR; INTRAVENOUS; SUBCUTANEOUS at 08:34

## 2022-12-23 RX ADMIN — REMDESIVIR 200 MILLIGRAM(S): 5 INJECTION INTRAVENOUS at 16:01

## 2022-12-23 RX ADMIN — HEPARIN SODIUM 5000 UNIT(S): 5000 INJECTION INTRAVENOUS; SUBCUTANEOUS at 16:11

## 2022-12-23 RX ADMIN — Medication 40 MILLIEQUIVALENT(S): at 13:37

## 2022-12-23 NOTE — H&P ADULT - ATTENDING COMMENTS
Vital Signs Last 24 Hrs  T(C): 37.3 (23 Dec 2022 00:10), Max: 38.7 (22 Dec 2022 18:13)  T(F): 99.2 (23 Dec 2022 00:10), Max: 101.7 (22 Dec 2022 18:13)  HR: 107 (23 Dec 2022 00:10) (107 - 123)  BP: 119/69 (23 Dec 2022 00:10) (119/69 - 156/82)  BP(mean): --  RR: 19 (23 Dec 2022 00:10) (16 - 19)  SpO2: 98% (23 Dec 2022 00:10) (95% - 98%)    Parameters below as of 23 Dec 2022 00:10  Patient On (Oxygen Delivery Method): room air    Labs and imaging studies noted    Assessment and plan: Vital Signs Last 24 Hrs  T(C): 37.3 (23 Dec 2022 00:10), Max: 38.7 (22 Dec 2022 18:13)  T(F): 99.2 (23 Dec 2022 00:10), Max: 101.7 (22 Dec 2022 18:13)  HR: 107 (23 Dec 2022 00:10) (107 - 123)  BP: 119/69 (23 Dec 2022 00:10) (119/69 - 156/82)  BP(mean): --  RR: 19 (23 Dec 2022 00:10) (16 - 19)  SpO2: 98% (23 Dec 2022 00:10) (95% - 98%)  Parameters below as of 23 Dec 2022 00:10  Patient On (Oxygen Delivery Method): room air  AAOx3, no acute resp distress      Labs and imaging studies noted    Assessment and plan: Vital Signs Last 24 Hrs  T(C): 37.3 (23 Dec 2022 00:10), Max: 38.7 (22 Dec 2022 18:13)  T(F): 99.2 (23 Dec 2022 00:10), Max: 101.7 (22 Dec 2022 18:13)  HR: 107 (23 Dec 2022 00:10) (107 - 123)  BP: 119/69 (23 Dec 2022 00:10) (119/69 - 156/82)  BP(mean): --  RR: 19 (23 Dec 2022 00:10) (16 - 19)  SpO2: 98% (23 Dec 2022 00:10) (95% - 98%)  Parameters below as of 23 Dec 2022 00:10  Patient On (Oxygen Delivery Method): room air  AAOx3, no acute resp distress  skin excoriating flanking right aspect of neck  chest CTA b/l, RRR, no MRG  abd soft, nt,nd PEG tube in place  no FND  no leg edema    Labs and imaging studies noted    Assessment and plan: Patient is a 76 yo male with PHMx of hypertension, hypercholesteremia, BPH and oropharyngeal mass s/p resection, on radiation and chemo (last dose of chemo was last week) presenting to the ED with complaints of irritation and burning in his throat for three days. Patient also endorses a dry cough and states he has generalized weakness but denies any fever, chills, nausea, vomiting, chest pain, SOB, abdominal pain, or change in bowel habits. Patient recently had a PEG placed after the mass was resected.    Vital Signs Last 24 Hrs  T(C): 37.3 (23 Dec 2022 00:10), Max: 38.7 (22 Dec 2022 18:13)  T(F): 99.2 (23 Dec 2022 00:10), Max: 101.7 (22 Dec 2022 18:13)  HR: 107 (23 Dec 2022 00:10) (107 - 123)  BP: 119/69 (23 Dec 2022 00:10) (119/69 - 156/82)  BP(mean): --  RR: 19 (23 Dec 2022 00:10) (16 - 19)  SpO2: 98% (23 Dec 2022 00:10) (95% - 98%)  Parameters below as of 23 Dec 2022 00:10  Patient On (Oxygen Delivery Method): room air  AAOx3, no acute resp distress  skin excoriating flanking right aspect of neck  chest CTA b/l, RRR, no MRG  abd soft, nt,nd PEG tube in place  no FND  no leg edema    Labs and imaging studies noted  wbc 1.37k,ANC~ 800, hb 7.9 ( baseline 8.8), plt 234k, PT /INR 25/1.56  COVID PCR+  CXR no infiltrates ( pending official read)    Assessment and plan: 76 yo male with PHMx of hypertension, hypercholesteremia, BPH and oropharyngeal mass s/p resection, on radiation and chemo (last dose of chemo was last week) presenting to the ED with complaints of irritation and burning in his throat for three days being admitted for sepsis, post radiation/chemo mucositis.    Sepsis   Neutropenic fever  Post radiation/chemo mucositis  COVID 19 infection- no hypoxia  Anemia  oropharyngeal mass s/p resection, on radiation and chemo (last dose of chemo was last week)  HTN  HLD  BPH    - p/w fever 101F, dry cough , oral pain x 3 days; patient currently on active chemo and radiation  - labs, wbc 1.37k, , plt 234k, 25/1.56,   - fever in setting of neutropenia, active chemo radiation, mucosal and skin excoriation  - COVID 19 +, no hypoxia, CXR clear   - s/p broad spectrum abx in ED, c/w iv cefepime  - ID consult Dr. Olmedo  - follow blood cx, urine cx  - hb 7.9, baseline 9, check LDH, r/o hemolysis, monitor H and H  - iv fluids, pain control   - Neutropenic precautions  - heme onc consult in am   - PEG tube feeding

## 2022-12-23 NOTE — H&P ADULT - PROBLEM SELECTOR PLAN 4
IMPROVE VTE Individual Risk Assessment          RISK                                                          Points  [  ] Previous VTE                                                3  [  ] Thrombophilia                                             2  [  ] Lower limb paralysis                                   2        (unable to hold up >15 seconds)    [  ] Current Cancer                                             2         (within 6 months)  [  x] Immobilization > 24 hrs                              1  [  ] ICU/CCU stay > 24 hours                             1  [x  ] Age > 60                                                         1    IMPROVE VTE Score:         [   2      ]      Heparin SubQ

## 2022-12-23 NOTE — H&P ADULT - NSHPPHYSICALEXAM_GEN_ALL_CORE
PHYSICAL EXAMINATION:  GENERAL: NAD,   HEAD:  Atraumatic, Normocephalic  EYES:  conjunctiva and sclera clear  NECK: Supple, No JVD, Normal thyroid, discoloration, peeling   CHEST/LUNG: Clear to auscultation. Clear to percussion bilaterally; No rales, rhonchi, wheezing, or rubs  HEART: Regular rate and rhythm; No murmurs, rubs, or gallops  ABDOMEN: Soft, Nontender, Nondistended; Bowel sounds present, PEG tube  NERVOUS SYSTEM:  Alert & Oriented X3,    EXTREMITIES:  2+ Peripheral Pulses, No clubbing, cyanosis, or edema  SKIN: warm dry

## 2022-12-23 NOTE — PHARMACOTHERAPY INTERVENTION NOTE - COMMENTS
As per policy, adjusted cefepime dose from 1g IV q12h to 2g IV q12h since the creatinine clearance is 45 mL/min.    Regine Kramer, PharmD, [facility]  Clinical Pharmacy Specialist, Infectious Diseases  Tele-Antimicrobial Stewardship Program (Tele-ASP)  Tele-ASP Phone: [phone]

## 2022-12-23 NOTE — H&P ADULT - NSHPLABSRESULTS_GEN_ALL_CORE
LABS:                        7.9    1.37  )-----------( 234      ( 22 Dec 2022 20:20 )             25.7                         9.4    0.83  )-----------( Clumped    ( 21 Dec 2022 11:39 )             29.4     12-22    141  |  102  |  18  ----------------------------<  174<H>  3.5   |  29  |  1.20    Ca    8.3<L>      22 Dec 2022 20:20    TPro  6.5  /  Alb  1.9<L>  /  TBili  0.8  /  DBili  x   /  AST  24  /  ALT  43  /  AlkPhos  94  12-22    PT/INR - ( 22 Dec 2022 20:20 )   PT: 18.6 sec;   INR: 1.56 ratio         PTT - ( 22 Dec 2022 20:20 )  PTT:25.9 sec    LIVER FUNCTIONS - ( 22 Dec 2022 20:20 )  Alb: 1.9 g/dL / Pro: 6.5 g/dL / ALK PHOS: 94 U/L / ALT: 43 U/L DA / AST: 24 U/L / GGT: x         LIVER FUNCTIONS - ( 21 Dec 2022 11:39 )  Alb: 3.1 g/dL / Pro: 6.2 g/dL / ALK PHOS: 96 U/L / ALT: 37 U/L / AST: 16 U/L / GGT: x           Lactate, Blood: 1.7 mmol/L (12-22-22 @ 20:20)

## 2022-12-23 NOTE — H&P ADULT - ASSESSMENT
77 year old male with a PHMx of hypertension, hypercholesteremia, BPH and oropharyngeal mass s/p resection, on radiation and chemo (last dose of chemo was last week) presenting to the ED with complaints of irritation and burning in his throat for three days, Patient was found to be septic 2/2 COVID +. Admitted for further management.

## 2022-12-23 NOTE — H&P ADULT - PROBLEM SELECTOR PLAN 3
oropharyngeal mass s/p resection  on radiation and chemo (last dose of chemo was last week)  follows up with Dr. Segundo Velasquez

## 2022-12-23 NOTE — PATIENT PROFILE ADULT - MEDICATIONS/VISITS
no
Quality 226: Preventive Care And Screening: Tobacco Use: Screening And Cessation Intervention: Patient screened for tobacco use and is an ex/non-smoker
Detail Level: Detailed

## 2022-12-23 NOTE — H&P ADULT - NSHPREVIEWOFSYSTEMS_GEN_ALL_CORE
REVIEW OF SYSTEMS:    CONSTITUTIONAL: + weakness ,no  fevers or chills  EYES/ENT: No visual changes;  No vertigo + throat pain   NECK: No pain or stiffness  RESPIRATORY: + cough, no wheezing, hemoptysis; No shortness of breath  CARDIOVASCULAR: No chest pain or palpitations  GASTROINTESTINAL: No abdominal or epigastric pain. No nausea, vomiting, or hematemesis; No diarrhea or constipation. No melena or hematochezia.  GENITOURINARY: No dysuria, frequency or hematuria  NEUROLOGICAL: No numbness or weakness  SKIN: No itching, burning, rashes, or lesions   All other review of systems is negative unless indicated above.

## 2022-12-23 NOTE — H&P ADULT - HISTORY OF PRESENT ILLNESS
77 year old male with a PHMx of hypertension, hypercholesteremia, BPH and oropharyngeal mass s/p resection, on radiation and chemo (last dose of chemo was last week) presenting to the ED with complaints of irritation and burning in his throat for three days. Patient also endorses a dry cough and states he has generalized weakness but denies any fever, chills, nausea, vomiting, chest pain, SOB, abdominal pain, or change in bowel habits. Patient recently had a PEG placed after the mass was resected.    In the ED:  Temp 101.7, -123, /65, 98% on RA  WBC 1,37   CXR no infiltrates  COVID +  s/p zosyn + cefepime, 1.5L bolus

## 2022-12-23 NOTE — CHART NOTE - NSCHARTNOTEFT_GEN_A_CORE
EVENT: 77 year old male with a PHMx of hypertension, hypercholesteremia, BPH and oropharyngeal mass s/p resection, on radiation and chemo (last dose of chemo was last Friday) presenting to the ED with complaints of irritation and burning in his throat for three days. Patient also endorses a dry cough and states he has generalized weakness but denies any fever, chills, nausea, vomiting, chest pain, SOB, abdominal pain, or change in bowel habits. Patient recently had a PEG placed after the mass was resected.  Admitted for Sepsis with neutropenic fever likely due to chemo/radiation. + mucositis, Also found COVID +. on room air.      SUBJECTIVE: Pt states irritation in throat, with some mucus, no other complaints.     OBJECTIVE:  PHYSICAL EXAM:  GENERAL: NAD  EYES: clear conjunctiva; EOMI  ENMT: Moist mucous membranes  NECK: Supple, No JVD, Normal thyroid, Excoriation on anterior neck  CHEST/LUNG: Clear to auscultation bilaterally; No rales, rhonchi, wheezing, or rubs  HEART: S1, S2, Regular rate and rhythm  ABDOMEN: Soft, Nontender, Nondistended; Bowel sounds present, + Peg, foul odor with excoriation.   NEURO: Alert & Oriented X3  EXTREMITIES: No LE edema, no calf tenderness  LYMPH: No lymphadenopathy noted  SKIN: No rashes or lesions, dry scaly skin    Vital Signs Last 24 Hrs  T(C): 36.8 (23 Dec 2022 07:54), Max: 38.7 (22 Dec 2022 18:13)  T(F): 98.3 (23 Dec 2022 07:54), Max: 101.7 (22 Dec 2022 18:13)  HR: 114 (23 Dec 2022 07:54) (107 - 123)  BP: 142/91 (23 Dec 2022 07:54) (119/69 - 156/82)  BP(mean): --  RR: 18 (23 Dec 2022 07:54) (16 - 19)  SpO2: 95% (23 Dec 2022 07:54) (95% - 98%)    Parameters below as of 23 Dec 2022 06:44  Patient On (Oxygen Delivery Method): room air    LABS:                        7.9    1.37  )-----------( 234      ( 22 Dec 2022 20:20 )             25.7     12-22    141  |  102  |  18  ----------------------------<  174<H>  3.5   |  29  |  1.20    Ca    8.3<L>      22 Dec 2022 20:20    TPro  6.5  /  Alb  1.9<L>  /  TBili  0.8  /  DBili  x   /  AST  24  /  ALT  43  /  AlkPhos  94  12-22      IMGAGING:   < from: Xray Chest 2 Views PA/Lat (10.25.22 @ 21:32) >  ACC: [id number] EXAM:  XR CHEST PA LAT 2V                        PROCEDURE DATE:  10/25/2022    INTERPRETATION:  INDICATION: Dizziness  Chest 2 view  COMPARISON: 2/16/2022  FINDINGS:  Heart/Vascular: The heart size is normal. The aorta is tortuous. The   hilum and mediastinum are within normal limits.  Pulmonary: The lungs are clear. There is no pleural effusion.  Bones: No fracture. Degenerative changes of the spine and shoulders.  Impression:  The lungs are clear.  --- End of Report ---  < end of copied text >    ASSESSMENT:  HPI:  77 year old male with a PHMx of hypertension, hypercholesteremia, BPH and oropharyngeal mass s/p resection, on radiation and chemo (last dose of chemo was last week) presenting to the ED with complaints of irritation and burning in his throat for three days. Patient also endorses a dry cough and states he has generalized weakness but denies any fever, chills, nausea, vomiting, chest pain, SOB, abdominal pain, or change in bowel habits. Patient recently had a PEG placed after the mass was resected.    In the ED:  Temp 101.7, -123, /65, 98% on RA  WBC 1,37   CXR no infiltrates  COVID +  s/p zosyn + cefepime, 1.5L bolus       (23 Dec 2022 03:28)      PLAN:   Sepsis   Neutropenic fever likely due to chemo/radiation, wbc 1.37k,   Post radiation/chemo mucositis  COVID 19 infection- no hypoxia, CXR clear  Anemia-- hb 7.9, baseline 9   oropharyngeal mass s/p resection, on radiation and chemo (last dose of chemo was last week)  HTN  HLD  BPH  PEG placement- excoriated site  Excoriated anterior neck    - s/p broad spectrum abx in ED, c/w iv cefepime  - ID consult Dr. Olmedo  - follow blood cx, urine cx  - check LDH  - monitor H and H  - c/w IVF  - pain management  - Neutropenic precautions  - heme onc consult QMA consulted  - PEG tube feeding. Nutrition consulted  - Wound care consult    Pt is aggressive during exam, refusing to provide medication information. will contact PMD and pharmacy. EVENT: 77 year old male with a PHMx of hypertension, hypercholesteremia, BPH and oropharyngeal mass s/p resection, on radiation and chemo (last dose of chemo was last Friday) presenting to the ED with complaints of irritation and burning in his throat for three days. Patient also endorses a dry cough and states he has generalized weakness but denies any fever, chills, nausea, vomiting, chest pain, SOB, abdominal pain, or change in bowel habits. Patient recently had a PEG placed after the mass was resected.  Admitted for Sepsis with neutropenic fever likely due to chemo/radiation. + mucositis, Also found COVID +. on room air.      SUBJECTIVE: Pt states irritation in throat, with some mucus, no other complaints.     OBJECTIVE:  PHYSICAL EXAM:  GENERAL: NAD  EYES: clear conjunctiva; EOMI  ENMT: Moist mucous membranes  NECK: Supple, No JVD, Normal thyroid, Excoriation on anterior neck  CHEST/LUNG: Clear to auscultation bilaterally; No rales, rhonchi, wheezing, or rubs  HEART: S1, S2, Regular rate and rhythm  ABDOMEN: Soft, Nontender, Nondistended; Bowel sounds present, + Peg, foul odor with excoriation.   NEURO: Alert & Oriented X3  EXTREMITIES: No LE edema, no calf tenderness  LYMPH: No lymphadenopathy noted  SKIN: No rashes or lesions, dry scaly skin    Vital Signs Last 24 Hrs  T(C): 36.8 (23 Dec 2022 07:54), Max: 38.7 (22 Dec 2022 18:13)  T(F): 98.3 (23 Dec 2022 07:54), Max: 101.7 (22 Dec 2022 18:13)  HR: 114 (23 Dec 2022 07:54) (107 - 123)  BP: 142/91 (23 Dec 2022 07:54) (119/69 - 156/82)  BP(mean): --  RR: 18 (23 Dec 2022 07:54) (16 - 19)  SpO2: 95% (23 Dec 2022 07:54) (95% - 98%)    Parameters below as of 23 Dec 2022 06:44  Patient On (Oxygen Delivery Method): room air    LABS:                        7.9    1.37  )-----------( 234      ( 22 Dec 2022 20:20 )             25.7     12-22    141  |  102  |  18  ----------------------------<  174<H>  3.5   |  29  |  1.20    Ca    8.3<L>      22 Dec 2022 20:20    TPro  6.5  /  Alb  1.9<L>  /  TBili  0.8  /  DBili  x   /  AST  24  /  ALT  43  /  AlkPhos  94  12-22      IMGAGING:   < from: Xray Chest 2 Views PA/Lat (10.25.22 @ 21:32) >  ACC: [id number] EXAM:  XR CHEST PA LAT 2V                        PROCEDURE DATE:  10/25/2022    INTERPRETATION:  INDICATION: Dizziness  Chest 2 view  COMPARISON: 2/16/2022  FINDINGS:  Heart/Vascular: The heart size is normal. The aorta is tortuous. The   hilum and mediastinum are within normal limits.  Pulmonary: The lungs are clear. There is no pleural effusion.  Bones: No fracture. Degenerative changes of the spine and shoulders.  Impression:  The lungs are clear.  --- End of Report ---  < end of copied text >    ASSESSMENT:  HPI:  77 year old male with a PHMx of hypertension, hypercholesteremia, BPH and oropharyngeal mass s/p resection, on radiation and chemo (last dose of chemo was last week) presenting to the ED with complaints of irritation and burning in his throat for three days. Patient also endorses a dry cough and states he has generalized weakness but denies any fever, chills, nausea, vomiting, chest pain, SOB, abdominal pain, or change in bowel habits. Patient recently had a PEG placed after the mass was resected.    In the ED:  Temp 101.7, -123, /65, 98% on RA  WBC 1,37   CXR no infiltrates  COVID +  s/p zosyn + cefepime, 1.5L bolus       (23 Dec 2022 03:28)      PLAN:   Sepsis   Neutropenic fever likely due to chemo/radiation, wbc 1.37k,   Post radiation/chemo mucositis  COVID 19 infection- no hypoxia, CXR clear  Anemia-- hb 7.9, baseline 9   oropharyngeal mass s/p resection, on radiation and chemo (last dose of chemo was last week)  HTN  HLD  BPH  PEG placement- excoriated site  Excoriated anterior neck    - s/p broad spectrum abx in ED, c/w iv cefepime  - ID consult Dr. Olmedo  - follow blood cx, urine cx  - check LDH  - monitor H and H  - c/w IVF  - pain management  - Neutropenic precautions  - heme onc consult QMA consulted  - PEG tube feeding- Glucerna, Nutrition consulted  - Wound care consult    Pt is aggressive during exam, refusing to provide medication information.   contacted PMD Dr. Bowen, [place] [phone], no longer working @ [facility]   Will contact Pharmacy as listed for Med Rec EVENT: 77 year old male with a PHMx of hypertension, hypercholesteremia, BPH and oropharyngeal mass s/p resection, on radiation and chemo (last dose of chemo was last Friday) presenting to the ED with complaints of irritation and burning in his throat for three days. Patient also endorses a dry cough and states he has generalized weakness but denies any fever, chills, nausea, vomiting, chest pain, SOB, abdominal pain, or change in bowel habits. Patient recently had a PEG placed after the mass was resected.  Admitted for Sepsis with neutropenic fever likely due to chemo/radiation. + mucositis, Also found COVID +. on room air.      SUBJECTIVE: Pt states irritation in throat, with some mucus, no other complaints.     OBJECTIVE:  PHYSICAL EXAM:  GENERAL: NAD  EYES: clear conjunctiva; EOMI  ENMT: Moist mucous membranes  NECK: Supple, No JVD, Normal thyroid, Excoriation on anterior neck  CHEST/LUNG: Clear to auscultation bilaterally; No rales, rhonchi, wheezing, or rubs  HEART: S1, S2, Regular rate and rhythm  ABDOMEN: Soft, Nontender, Nondistended; Bowel sounds present, + Peg, foul odor with excoriation.   NEURO: Alert & Oriented X3  EXTREMITIES: No LE edema, no calf tenderness  LYMPH: No lymphadenopathy noted  SKIN: No rashes or lesions, dry scaly skin    Vital Signs Last 24 Hrs  T(C): 36.8 (23 Dec 2022 07:54), Max: 38.7 (22 Dec 2022 18:13)  T(F): 98.3 (23 Dec 2022 07:54), Max: 101.7 (22 Dec 2022 18:13)  HR: 114 (23 Dec 2022 07:54) (107 - 123)  BP: 142/91 (23 Dec 2022 07:54) (119/69 - 156/82)  BP(mean): --  RR: 18 (23 Dec 2022 07:54) (16 - 19)  SpO2: 95% (23 Dec 2022 07:54) (95% - 98%)    Parameters below as of 23 Dec 2022 06:44  Patient On (Oxygen Delivery Method): room air    LABS:                        7.9    1.37  )-----------( 234      ( 22 Dec 2022 20:20 )             25.7     12-22    141  |  102  |  18  ----------------------------<  174<H>  3.5   |  29  |  1.20    Ca    8.3<L>      22 Dec 2022 20:20    TPro  6.5  /  Alb  1.9<L>  /  TBili  0.8  /  DBili  x   /  AST  24  /  ALT  43  /  AlkPhos  94  12-22      IMGAGING:   < from: Xray Chest 2 Views PA/Lat (10.25.22 @ 21:32) >  ACC: [id number] EXAM:  XR CHEST PA LAT 2V                        PROCEDURE DATE:  10/25/2022    INTERPRETATION:  INDICATION: Dizziness  Chest 2 view  COMPARISON: 2/16/2022  FINDINGS:  Heart/Vascular: The heart size is normal. The aorta is tortuous. The   hilum and mediastinum are within normal limits.  Pulmonary: The lungs are clear. There is no pleural effusion.  Bones: No fracture. Degenerative changes of the spine and shoulders.  Impression:  The lungs are clear.  --- End of Report ---  < end of copied text >    ASSESSMENT:  HPI:  77 year old male with a PHMx of hypertension, hypercholesteremia, BPH and oropharyngeal mass s/p resection, on radiation and chemo (last dose of chemo was last week) presenting to the ED with complaints of irritation and burning in his throat for three days. Patient also endorses a dry cough and states he has generalized weakness but denies any fever, chills, nausea, vomiting, chest pain, SOB, abdominal pain, or change in bowel habits. Patient recently had a PEG placed after the mass was resected.    In the ED:  Temp 101.7, -123, /65, 98% on RA  WBC 1,37   CXR no infiltrates  COVID +  s/p zosyn + cefepime, 1.5L bolus       (23 Dec 2022 03:28)      PLAN:   Sepsis   Neutropenic fever likely due to chemo/radiation, wbc 1.37k,   Post radiation/chemo mucositis  COVID 19 infection- no hypoxia, CXR clear  Anemia-- hb 7.9--5.6, baseline 9   oropharyngeal mass s/p resection, on radiation and chemo (last dose of chemo was last week)  HTN  HLD  BPH  PEG placement- excoriated site  Excoriated anterior neck    - Give 2 PRBC, T/P 12/21  -monitor H and H  -  s/p broad spectrum abx in ED, c/w iv cefepime  - ID consult Dr. Olmedo  - follow blood cx, urine cx  - check LDH  - c/w IVF  - pain management  - Neutropenic precautions  - heme onc consult QMA consulted  - PEG tube feeding- Glucerna, Nutrition consulted  - Wound care consult    Pt is aggressive during exam, refusing to provide medication information.   contacted PMD Dr. Bowen, [place] [phone], no longer working @ [facility]   Will contact Pharmacy as listed for Med Rec EVENT: 77 year old male with a PHMx of hypertension, hypercholesteremia, BPH and oropharyngeal mass s/p resection, on radiation and chemo (last dose of chemo was last Friday) presenting to the ED with complaints of irritation and burning in his throat for three days. Patient also endorses a dry cough and states he has generalized weakness but denies any fever, chills, nausea, vomiting, chest pain, SOB, abdominal pain, or change in bowel habits. Patient recently had a PEG placed after the mass was resected.  Admitted for Sepsis with neutropenic fever likely due to chemo/radiation. + mucositis, Also found COVID +. on room air.      SUBJECTIVE: Pt states irritation in throat, with some mucus, no other complaints.     OBJECTIVE:  PHYSICAL EXAM:  GENERAL: NAD  EYES: clear conjunctiva; EOMI  ENMT: Moist mucous membranes  NECK: Supple, No JVD, Normal thyroid, Excoriation on anterior neck  CHEST/LUNG: Clear to auscultation bilaterally; No rales, rhonchi, wheezing, or rubs  HEART: S1, S2, Regular rate and rhythm  ABDOMEN: Soft, Nontender, Nondistended; Bowel sounds present, + Peg, foul odor with excoriation.   NEURO: Alert & Oriented X3  EXTREMITIES: No LE edema, no calf tenderness  LYMPH: No lymphadenopathy noted  SKIN: No rashes or lesions, dry scaly skin    Vital Signs Last 24 Hrs  T(C): 36.8 (23 Dec 2022 07:54), Max: 38.7 (22 Dec 2022 18:13)  T(F): 98.3 (23 Dec 2022 07:54), Max: 101.7 (22 Dec 2022 18:13)  HR: 114 (23 Dec 2022 07:54) (107 - 123)  BP: 142/91 (23 Dec 2022 07:54) (119/69 - 156/82)  BP(mean): --  RR: 18 (23 Dec 2022 07:54) (16 - 19)  SpO2: 95% (23 Dec 2022 07:54) (95% - 98%)    Parameters below as of 23 Dec 2022 06:44  Patient On (Oxygen Delivery Method): room air    LABS:                        7.9    1.37  )-----------( 234      ( 22 Dec 2022 20:20 )             25.7     12-22    141  |  102  |  18  ----------------------------<  174<H>  3.5   |  29  |  1.20    Ca    8.3<L>      22 Dec 2022 20:20    TPro  6.5  /  Alb  1.9<L>  /  TBili  0.8  /  DBili  x   /  AST  24  /  ALT  43  /  AlkPhos  94  12-22      IMGAGING:   < from: Xray Chest 2 Views PA/Lat (10.25.22 @ 21:32) >  ACC: [id number] EXAM:  XR CHEST PA LAT 2V                        PROCEDURE DATE:  10/25/2022    INTERPRETATION:  INDICATION: Dizziness  Chest 2 view  COMPARISON: 2/16/2022  FINDINGS:  Heart/Vascular: The heart size is normal. The aorta is tortuous. The   hilum and mediastinum are within normal limits.  Pulmonary: The lungs are clear. There is no pleural effusion.  Bones: No fracture. Degenerative changes of the spine and shoulders.  Impression:  The lungs are clear.  --- End of Report ---  < end of copied text >    ASSESSMENT:  HPI:  77 year old male with a PHMx of hypertension, hypercholesteremia, BPH and oropharyngeal mass s/p resection, on radiation and chemo (last dose of chemo was last week) presenting to the ED with complaints of irritation and burning in his throat for three days. Patient also endorses a dry cough and states he has generalized weakness but denies any fever, chills, nausea, vomiting, chest pain, SOB, abdominal pain, or change in bowel habits. Patient recently had a PEG placed after the mass was resected.    In the ED:  Temp 101.7, -123, /65, 98% on RA  WBC 1,37   CXR no infiltrates  COVID +  s/p zosyn + cefepime, 1.5L bolus       (23 Dec 2022 03:28)      PLAN:   Sepsis   Neutropenic fever likely due to chemo/radiation, wbc 1.37k,   Post radiation/chemo mucositis  COVID 19 infection- no hypoxia, CXR clear  Anemia-- hb 7.9--5.6, baseline 9   oropharyngeal mass s/p resection, on radiation and chemo (last dose of chemo was last week)  HTN  HLD  BPH  PEG placement- excoriated site  Excoriated anterior neck    - Give 2 PRBC, T/P   -monitor H and H  -  s/p broad spectrum abx in ED, c/w iv cefepime  - ID consult Dr. Olmedo  - follow blood cx, urine cx  - check LDH  - c/w IVF  - pain management  - Neutropenic precautions  - heme onc consult QMA consulted  - PEG tube feeding- Glucerna, Nutrition consulted  - Wound care consult    Pt is aggressive during exam, refusing to provide medication information.   contacted PMD Dr. Bowen, [place] [phone], no longer working @ [facility]   Will contact Pharmacy as listed for Med Rec EVENT: 77 year old male with a PHMx of hypertension, hypercholesteremia, BPH and oropharyngeal mass s/p resection, on radiation and chemo (last dose of chemo was last Friday) presenting to the ED with complaints of irritation and burning in his throat for three days. Patient also endorses a dry cough and states he has generalized weakness but denies any fever, chills, nausea, vomiting, chest pain, SOB, abdominal pain, or change in bowel habits. Patient recently had a PEG placed after the mass was resected.  Admitted for Sepsis with neutropenic fever likely due to chemo/radiation. + mucositis, Also found COVID +. on room air.      SUBJECTIVE: Pt states irritation in throat, with some mucus, no other complaints.     OBJECTIVE:  PHYSICAL EXAM:  GENERAL: NAD  EYES: clear conjunctiva; EOMI  ENMT: Moist mucous membranes  NECK: Supple, No JVD, Normal thyroid, Excoriation on anterior neck  CHEST/LUNG: Clear to auscultation bilaterally; No rales, rhonchi, wheezing, or rubs  HEART: S1, S2, Regular rate and rhythm  ABDOMEN: Soft, Nontender, Nondistended; Bowel sounds present, + Peg, foul odor with excoriation.   NEURO: Alert & Oriented X3  EXTREMITIES: No LE edema, no calf tenderness  LYMPH: No lymphadenopathy noted  SKIN: No rashes or lesions, dry scaly skin    Vital Signs Last 24 Hrs  T(C): 36.8 (23 Dec 2022 07:54), Max: 38.7 (22 Dec 2022 18:13)  T(F): 98.3 (23 Dec 2022 07:54), Max: 101.7 (22 Dec 2022 18:13)  HR: 114 (23 Dec 2022 07:54) (107 - 123)  BP: 142/91 (23 Dec 2022 07:54) (119/69 - 156/82)  BP(mean): --  RR: 18 (23 Dec 2022 07:54) (16 - 19)  SpO2: 95% (23 Dec 2022 07:54) (95% - 98%)    Parameters below as of 23 Dec 2022 06:44  Patient On (Oxygen Delivery Method): room air    LABS:                        7.9    1.37  )-----------( 234      ( 22 Dec 2022 20:20 )             25.7     12-22    141  |  102  |  18  ----------------------------<  174<H>  3.5   |  29  |  1.20    Ca    8.3<L>      22 Dec 2022 20:20    TPro  6.5  /  Alb  1.9<L>  /  TBili  0.8  /  DBili  x   /  AST  24  /  ALT  43  /  AlkPhos  94  12-22      IMGAGING:   < from: Xray Chest 2 Views PA/Lat (10.25.22 @ 21:32) >  ACC: [id number] EXAM:  XR CHEST PA LAT 2V                        PROCEDURE DATE:  10/25/2022    INTERPRETATION:  INDICATION: Dizziness  Chest 2 view  COMPARISON: 2/16/2022  FINDINGS:  Heart/Vascular: The heart size is normal. The aorta is tortuous. The   hilum and mediastinum are within normal limits.  Pulmonary: The lungs are clear. There is no pleural effusion.  Bones: No fracture. Degenerative changes of the spine and shoulders.  Impression:  The lungs are clear.  --- End of Report ---  < end of copied text >    ASSESSMENT:  HPI:  77 year old male with a PHMx of hypertension, hypercholesteremia, BPH and oropharyngeal mass s/p resection, on radiation and chemo (last dose of chemo was last week) presenting to the ED with complaints of irritation and burning in his throat for three days. Patient also endorses a dry cough and states he has generalized weakness but denies any fever, chills, nausea, vomiting, chest pain, SOB, abdominal pain, or change in bowel habits. Patient recently had a PEG placed after the mass was resected.    In the ED:  Temp 101.7, -123, /65, 98% on RA  WBC 1,37   CXR no infiltrates  COVID +  s/p zosyn + cefepime, 1.5L bolus       (23 Dec 2022 03:28)      PLAN:   Sepsis   Neutropenic fever likely due to chemo/radiation, wbc 1.37k,   Post radiation/chemo mucositis  COVID 19 infection- no hypoxia, CXR clear  Acute anemia-- hb 7.9--5.6, baseline 9 , UA shows large blood  oropharyngeal mass s/p resection, on radiation and chemo (last dose of chemo was last week)  HTN  HLD  BPH  PEG placement- excoriated site  Excoriated anterior neck  Electrolyte imbalnace-Phos 1.5    - Give 2 PRBC, T/P   -monitor H and H  -give K phos IV  -  s/p broad spectrum abx in ED, c/w iv cefepime  - ID consult Dr. Olmedo  - follow blood cx, urine cx  - check LDH  - c/w IVF  - pain management  - Neutropenic precautions  - heme onc consult QMA consulted  - PEG tube feeding- Glucerna, Nutrition consulted  - Wound care consult    Pt is aggressive during exam, refusing to provide medication information.   contacted PMD Dr. Bowen, [place] [phone], no longer working @ [facility]   Will contact Pharmacy as listed for Med Rec EVENT: 77 year old male with a PHMx of hypertension, hypercholesteremia, BPH and oropharyngeal mass s/p resection, on radiation and chemo (last dose of chemo was last Friday) presenting to the ED with complaints of irritation and burning in his throat for three days. Patient also endorses a dry cough and states he has generalized weakness but denies any fever, chills, nausea, vomiting, chest pain, SOB, abdominal pain, or change in bowel habits. Patient recently had a PEG placed after the mass was resected.  Admitted for Sepsis with neutropenic fever likely due to chemo/radiation. + mucositis, Also found COVID +. on room air.      SUBJECTIVE: Pt states irritation in throat, with some mucus, no other complaints.     OBJECTIVE:  PHYSICAL EXAM:  GENERAL: NAD  EYES: clear conjunctiva; EOMI  ENMT: Moist mucous membranes  NECK: Supple, No JVD, Normal thyroid, Excoriation on anterior neck  CHEST/LUNG: Clear to auscultation bilaterally; No rales, rhonchi, wheezing, or rubs  HEART: S1, S2, Regular rate and rhythm  ABDOMEN: Soft, Nontender, Nondistended; Bowel sounds present, + Peg, foul odor with excoriation.   NEURO: Alert & Oriented X3  EXTREMITIES: No LE edema, no calf tenderness  LYMPH: No lymphadenopathy noted  SKIN: No rashes or lesions, dry scaly skin    Vital Signs Last 24 Hrs  T(C): 36.8 (23 Dec 2022 07:54), Max: 38.7 (22 Dec 2022 18:13)  T(F): 98.3 (23 Dec 2022 07:54), Max: 101.7 (22 Dec 2022 18:13)  HR: 114 (23 Dec 2022 07:54) (107 - 123)  BP: 142/91 (23 Dec 2022 07:54) (119/69 - 156/82)  BP(mean): --  RR: 18 (23 Dec 2022 07:54) (16 - 19)  SpO2: 95% (23 Dec 2022 07:54) (95% - 98%)    Parameters below as of 23 Dec 2022 06:44  Patient On (Oxygen Delivery Method): room air    LABS:                        7.9    1.37  )-----------( 234      ( 22 Dec 2022 20:20 )             25.7     12-22    141  |  102  |  18  ----------------------------<  174<H>  3.5   |  29  |  1.20    Ca    8.3<L>      22 Dec 2022 20:20    TPro  6.5  /  Alb  1.9<L>  /  TBili  0.8  /  DBili  x   /  AST  24  /  ALT  43  /  AlkPhos  94  12-22      IMGAGING:   < from: Xray Chest 2 Views PA/Lat (10.25.22 @ 21:32) >  ACC: [id number] EXAM:  XR CHEST PA LAT 2V                        PROCEDURE DATE:  10/25/2022    INTERPRETATION:  INDICATION: Dizziness  Chest 2 view  COMPARISON: 2/16/2022  FINDINGS:  Heart/Vascular: The heart size is normal. The aorta is tortuous. The   hilum and mediastinum are within normal limits.  Pulmonary: The lungs are clear. There is no pleural effusion.  Bones: No fracture. Degenerative changes of the spine and shoulders.  Impression:  The lungs are clear.  --- End of Report ---  < end of copied text >    ASSESSMENT:  HPI:  77 year old male with a PHMx of hypertension, hypercholesteremia, BPH and oropharyngeal mass s/p resection, on radiation and chemo (last dose of chemo was last week) presenting to the ED with complaints of irritation and burning in his throat for three days. Patient also endorses a dry cough and states he has generalized weakness but denies any fever, chills, nausea, vomiting, chest pain, SOB, abdominal pain, or change in bowel habits. Patient recently had a PEG placed after the mass was resected.    In the ED:  Temp 101.7, -123, /65, 98% on RA  WBC 1,37   CXR no infiltrates  COVID +  s/p zosyn + cefepime, 1.5L bolus       (23 Dec 2022 03:28)      PLAN:   Sepsis   Neutropenic fever likely due to chemo/radiation, wbc 1.37k,   Post radiation/chemo mucositis  COVID 19 infection- no hypoxia, CXR clear  Acute anemia-- hb 7.9--5.6, baseline 9 , UA shows large blood  oropharyngeal mass s/p resection, on radiation and chemo (last dose of chemo was last week)  HTN  HLD  BPH  PEG placement- excoriated site  Excoriated anterior neck  Electrolyte imbalnace-Phos 1.5    - s/p broad spectrum abx in ED, c/w iv cefepime  - ID consult Dr. alvarez consulted  - heme onc consult [facility] consulted  - give 3 days Remdesivir per ID  - follow blood cx, urine cx  - Give 2 PRBC, T/P   - monitor H and H, ANC   - give K phos IV  - Monitor electrolytes, K, Ca, phos, mag  - check LDH  - c/w IVF  - pain management  - Neutropenic precautions    - PEG tube feeding- Glucerna, Nutrition consulted  - Wound care consult    Pt is aggressive during exam, refusing to provide medication information.   contacted PMD Dr. Bowen, [place] [phone], no longer working @ [facility]   Will contact Pharmacy as listed for Med Rec EVENT: 77 year old male with a PHMx of hypertension, hypercholesteremia, BPH and oropharyngeal mass s/p resection, on radiation and chemo (last dose of chemo was last Friday) presenting to the ED with complaints of irritation and burning in his throat for three days. Patient also endorses a dry cough and states he has generalized weakness but denies any fever, chills, nausea, vomiting, chest pain, SOB, abdominal pain, or change in bowel habits. Patient recently had a PEG placed after the mass was resected.  Admitted for Sepsis with neutropenic fever likely due to chemo/radiation. + mucositis, Also found COVID +. on room air.      SUBJECTIVE: Pt states irritation in throat, with some mucus, no other complaints.     OBJECTIVE:  PHYSICAL EXAM:  GENERAL: NAD  EYES: clear conjunctiva; EOMI  ENMT: Moist mucous membranes  NECK: Supple, No JVD, Normal thyroid, Excoriation on anterior neck  CHEST/LUNG: Clear to auscultation bilaterally; No rales, rhonchi, wheezing, or rubs  HEART: S1, S2, Regular rate and rhythm  ABDOMEN: Soft, Nontender, Nondistended; Bowel sounds present, + Peg, foul odor with excoriation.   NEURO: Alert & Oriented X3  EXTREMITIES: No LE edema, no calf tenderness  LYMPH: No lymphadenopathy noted  SKIN: No rashes or lesions, dry scaly skin    Vital Signs Last 24 Hrs  T(C): 36.8 (23 Dec 2022 07:54), Max: 38.7 (22 Dec 2022 18:13)  T(F): 98.3 (23 Dec 2022 07:54), Max: 101.7 (22 Dec 2022 18:13)  HR: 114 (23 Dec 2022 07:54) (107 - 123)  BP: 142/91 (23 Dec 2022 07:54) (119/69 - 156/82)  BP(mean): --  RR: 18 (23 Dec 2022 07:54) (16 - 19)  SpO2: 95% (23 Dec 2022 07:54) (95% - 98%)    Parameters below as of 23 Dec 2022 06:44  Patient On (Oxygen Delivery Method): room air    LABS:                        7.9    1.37  )-----------( 234      ( 22 Dec 2022 20:20 )             25.7     12-22    141  |  102  |  18  ----------------------------<  174<H>  3.5   |  29  |  1.20    Ca    8.3<L>      22 Dec 2022 20:20    TPro  6.5  /  Alb  1.9<L>  /  TBili  0.8  /  DBili  x   /  AST  24  /  ALT  43  /  AlkPhos  94  12-22      IMGAGING:   < from: Xray Chest 2 Views PA/Lat (10.25.22 @ 21:32) >  ACC: [id number] EXAM:  XR CHEST PA LAT 2V                        PROCEDURE DATE:  10/25/2022    INTERPRETATION:  INDICATION: Dizziness  Chest 2 view  COMPARISON: 2/16/2022  FINDINGS:  Heart/Vascular: The heart size is normal. The aorta is tortuous. The   hilum and mediastinum are within normal limits.  Pulmonary: The lungs are clear. There is no pleural effusion.  Bones: No fracture. Degenerative changes of the spine and shoulders.  Impression:  The lungs are clear.  --- End of Report ---  < end of copied text >    ASSESSMENT:  HPI:  77 year old male with a PHMx of hypertension, hypercholesteremia, BPH and oropharyngeal mass s/p resection, on radiation and chemo (last dose of chemo was last week) presenting to the ED with complaints of irritation and burning in his throat for three days. Patient also endorses a dry cough and states he has generalized weakness but denies any fever, chills, nausea, vomiting, chest pain, SOB, abdominal pain, or change in bowel habits. Patient recently had a PEG placed after the mass was resected.    In the ED:  Temp 101.7, -123, /65, 98% on RA  WBC 1,37   CXR no infiltrates  COVID +  s/p zosyn + cefepime, 1.5L bolus       (23 Dec 2022 03:28)      PLAN:   Sepsis   Neutropenic fever likely due to chemo/radiation, wbc 1.37k,   Post radiation/chemo mucositis  COVID 19 infection- no hypoxia, CXR clear  Acute anemia-- hb 7.9--5.6, baseline 9 , UA shows large blood  oropharyngeal mass s/p resection, on radiation and chemo (last dose of chemo was last week)  HTN  HLD  BPH  PEG placement- excoriated site  Excoriated anterior neck  Electrolyte imbalnace-Phos 1.5    - s/p broad spectrum abx in ED, c/w iv cefepime 2g per ID  - ID consult Dr. alvarez consulted  - heme onc consult QMA consulted  - give 3 days Remdesivir per ID  - follow blood cx, urine cx  - Give 1 unit PRBC, T/P done  - monitor H and H, ANC   - give K phos IV  - Monitor electrolytes, K, Ca, phos, mag  - check LDH  - c/w IVF  - pain management  - Neutropenic precautions    - PEG tube feeding- Glucerna, Nutrition consulted  - Wound care consult    Pt is aggressive during exam, refusing to provide medication information.   contacted PMD Dr. Bowen, [place] [phone], no longer working @ [facility]   Will contact Pharmacy as listed for Med Rec EVENT: 77 year old male with a PHMx of hypertension, hypercholesteremia, BPH and oropharyngeal mass s/p resection, on radiation and chemo (last dose of chemo was last Friday) presenting to the ED with complaints of irritation and burning in his throat for three days. Patient also endorses a dry cough and states he has generalized weakness but denies any fever, chills, nausea, vomiting, chest pain, SOB, abdominal pain, or change in bowel habits. Patient recently had a PEG placed after the mass was resected.  Admitted for Sepsis with neutropenic fever likely due to chemo/radiation. + mucositis, Also found COVID +. on room air.      SUBJECTIVE: Pt states irritation in throat, with some mucus, no other complaints.     OBJECTIVE:  PHYSICAL EXAM:  GENERAL: NAD  EYES: clear conjunctiva; EOMI  ENMT: Moist mucous membranes  NECK: Supple, No JVD, Normal thyroid, Excoriation on anterior neck  CHEST/LUNG: Clear to auscultation bilaterally; No rales, rhonchi, wheezing, or rubs  HEART: S1, S2, Regular rate and rhythm  ABDOMEN: Soft, Nontender, Nondistended; Bowel sounds present, + Peg, foul odor with excoriation.   NEURO: Alert & Oriented X3  EXTREMITIES: No LE edema, no calf tenderness  LYMPH: No lymphadenopathy noted  SKIN: No rashes or lesions, dry scaly skin    Vital Signs Last 24 Hrs  T(C): 36.8 (23 Dec 2022 07:54), Max: 38.7 (22 Dec 2022 18:13)  T(F): 98.3 (23 Dec 2022 07:54), Max: 101.7 (22 Dec 2022 18:13)  HR: 114 (23 Dec 2022 07:54) (107 - 123)  BP: 142/91 (23 Dec 2022 07:54) (119/69 - 156/82)  BP(mean): --  RR: 18 (23 Dec 2022 07:54) (16 - 19)  SpO2: 95% (23 Dec 2022 07:54) (95% - 98%)    Parameters below as of 23 Dec 2022 06:44  Patient On (Oxygen Delivery Method): room air    LABS:                        7.9    1.37  )-----------( 234      ( 22 Dec 2022 20:20 )             25.7     12-22    141  |  102  |  18  ----------------------------<  174<H>  3.5   |  29  |  1.20    Ca    8.3<L>      22 Dec 2022 20:20    TPro  6.5  /  Alb  1.9<L>  /  TBili  0.8  /  DBili  x   /  AST  24  /  ALT  43  /  AlkPhos  94  12-22      IMGAGING:   < from: Xray Chest 2 Views PA/Lat (10.25.22 @ 21:32) >  ACC: [id number] EXAM:  XR CHEST PA LAT 2V                        PROCEDURE DATE:  10/25/2022    INTERPRETATION:  INDICATION: Dizziness  Chest 2 view  COMPARISON: 2/16/2022  FINDINGS:  Heart/Vascular: The heart size is normal. The aorta is tortuous. The   hilum and mediastinum are within normal limits.  Pulmonary: The lungs are clear. There is no pleural effusion.  Bones: No fracture. Degenerative changes of the spine and shoulders.  Impression:  The lungs are clear.  --- End of Report ---  < end of copied text >    ASSESSMENT:  HPI:  77 year old male with a PHMx of hypertension, hypercholesteremia, BPH and oropharyngeal mass s/p resection, on radiation and chemo (last dose of chemo was last week) presenting to the ED with complaints of irritation and burning in his throat for three days. Patient also endorses a dry cough and states he has generalized weakness but denies any fever, chills, nausea, vomiting, chest pain, SOB, abdominal pain, or change in bowel habits. Patient recently had a PEG placed after the mass was resected.    In the ED:  Temp 101.7, -123, /65, 98% on RA  WBC 1,37   CXR no infiltrates  COVID +  s/p zosyn + cefepime, 1.5L bolus       (23 Dec 2022 03:28)      PLAN:   Sepsis   Neutropenic fever likely due to chemo/radiation, wbc 1.37k,   Post radiation/chemo mucositis  COVID 19 infection- no hypoxia, CXR clear  Acute anemia-- hb 7.9--5.6, baseline 9 , UA shows large blood  oropharyngeal mass s/p resection, on radiation and chemo (last dose of chemo was last week)  HTN  HLD  BPH  PEG placement- excoriated site  Excoriated anterior neck  Electrolyte imbalnace-Phos 1.5    - s/p broad spectrum abx in ED, c/w iv cefepime 2g per ID  - ID consult Dr. alvarez consulted  - heme onc consult QMA consulted  - give 3 days Remdesivir per ID  - follow blood cx, urine cx  - Give 1 unit PRBC, T/P done  - monitor H and H, ANC   - give K phos IV  - Monitor electrolytes, K, Ca, phos, mag  - check LDH  - c/w IVF  - pain management  - Neutropenic precautions  - PEG tube feeding- Glucerna, Nutrition consulted  - Wound care consult, silvadene cream for now    Pt is aggressive during exam, refusing to provide medication information.   contacted PMD Dr. Bowen, [place] [phone], no longer working @ [facility]   contacted Pharmacy as listed for Med Rec, no answer, will follow up.

## 2022-12-23 NOTE — CONSULT NOTE ADULT - SUBJECTIVE AND OBJECTIVE BOX
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 Patient also endorses a cough for several wks and states he has generalized weakness but denies any chills, nausea, vomiting, chest pain, SOB, abdominal pain, or change in bowel habits. Pt has episodes of feeling warm.  Patient recently had a PEG placed . Dx with sepsis in the ED and given 1 dose of pip/tazo, 1 dose of vanco, and started on cefepime. Also found to be COVID positive.    Pt is followed by Heme/Onc on [facility] (Dr. JONNY [email or web address]-[phone]): pt has h/o mucositis with c/o throat/mouth discomfort likely due to chemo. Has been on lidocaine mouthwash. Desquamation at base of neck also reported. Labs from 12/15: WBC 2.09, plt 189k; : H/H 9.4/29.4, WBC 0.83, creat. 1.16;    we were called for further evaluation  of management of neutropenic fever         PAST MEDICAL & SURGICAL HISTORY:    Hypertension    Hypercholesterolemia    Stage 3 chronic kidney disease    Oropharyngeal CA (squamous cell)    S/P gastrostromy           MEDS:  acetaminophen   IVPB .. 750 milliGRAM(s) IV Intermittent once PRN  cefepime   IVPB 1000 milliGRAM(s) IV Intermittent every 12 hours (D1)  heparin   Injectable 5000 Unit(s) SubCutaneous every 8 hours  potassium phosphate IVPB 30 milliMole(s) IV Intermittent once  sodium chloride 0.9%. 1000 milliLiter(s) IV Continuous <Continuous>      ALLERGIES  No Known Allergies      SOCIAL HISTORY: lives with roommate; has a niece and nephew living in [facility]; denies cigs, ETOH; retired      FAMILY HISTORY: DM-father; lung CA-mother; coagulopathy--sibling      ROS:    General: febrile feeling; weakness	    Skin: pain at the base of his neck  d/t  skin denuded  	  HEENT: c/o stomatitis,  mucositis     Respiratory and Thorax: + cough xwks; no SOB  	  Cardiovascular:	no CP    GI: no N, V, diarrhea	    Genitourinary:	no specific complaints    Hematology/onc  oropharyngeal squamous cell CA    Endocrine:	DM      PHYSICAL EXAM:    Vital Signs Last 24 Hrs  T(C): 36.1 (23 Dec 2022 11:24), Max: 38.7 (22 Dec 2022 18:13)  T(F): 97 (23 Dec 2022 11:24), Max: 101.7 (22 Dec 2022 18:13)  HR: 101 (23 Dec 2022 11:24) (101 - 123)  BP: 137/67 (23 Dec 2022 11:24) (119/69 - 156/82)  BP(mean): --  RR: 18 (23 Dec 2022 11:24) (16 - 19)  SpO2: 98% (23 Dec 2022 11:24) (95% - 98%) (R.A.)    Parameters below as of 23 Dec 2022 06:44  Patient On (Oxygen Delivery Method): room air      Gen: WD thin B male in NAD    HEENT: NC/AT, PEERLA, anicteric   mouth: small ulcers on inside of mouth/tongue    Neck: left  neck area of denuded skin at base of anterior portion of neck;     Lymph Nodes: cd not palpate any enlarged cervical or axillary LNs      Lungs  clear to auscultation    Cardiovascular: S1S2 reg with no murmurs, gallops, rubs    ABD: G-tube in place; BS active; soft and non-tender to palpation  Extremities: no c/c/e     Neurological: A+O x3; no focal signs    Skin:  +  radiation dermatitis on L neck     Psychiatric: no agitation, no confusion       LABS/DIAGNOSTIC TESTS:    CBC Full  -  ( 23 Dec 2022 12:03 )  WBC Count : 2.06 K/uL  RBC Count : 2.67 M/uL  Hemoglobin : 7.4 g/dL  Hematocrit : 24.1 %  Platelet Count - Automated : 221 K/uL  Mean Cell Volume : 90.3 fl  Mean Cell Hemoglobin : 27.7 pg  Mean Cell Hemoglobin Concentration : 30.7 gm/dL  Auto Neutrophil # : 1.30 K/uL  Auto Lymphocyte # : 0.49 K/uL  Auto Monocyte # : 0.25 K/uL  Auto Eosinophil # : 0.02 K/uL  Auto Basophil # : 0.00 K/uL  Auto Neutrophil % : 63.0 %  Auto Lymphocyte % : 24.0 %  Auto Monocyte % : 12.0 %  Auto Eosinophil % : 1.0 %  Auto Basophil % : 0.0 %                                5.6    1.24  )-----------( 158      ( 23 Dec 2022 10:17 )             18.2     WBC Count: 1.24 K/uL ( @ 10:17)  WBC Count: 1.37 K/uL ( @ 20:20)  WBC Count: 0.83 K/uL ( @ 11:39)          141  |  102  |  18  ----------------------------<  174<H>  3.5   |  29  |  1.20    Ca    8.3<L>      22 Dec 2022 20:20  Phos  SEE NOTE         TPro  6.5  /  Alb  1.9<L>  /  TBili  0.8  /  DBili  x   /  AST  24  /  ALT  43  /  AlkPhos  94        Urinalysis Basic - ( 23 Dec 2022 09:32 )    Color: Yellow / Appearance: Clear / S.015 / pH: x  Gluc: x / Ketone: Negative  / Bili: Negative / Urobili: Negative   Blood: x / Protein: 100 / Nitrite: Negative   Leuk Esterase: Negative / RBC: >50 /HPF / WBC 0-2 /HPF   Sq Epi: x / Non Sq Epi: Few /HPF / Bacteria: Moderate /HPF        LIVER FUNCTIONS - ( 22 Dec 2022 20:20 )  Alb: 1.9 g/dL / Pro: 6.5 g/dL / ALK PHOS: 94 U/L / ALT: 43 U/L DA / AST: 24 U/L / GGT: x             PT/INR - ( 22 Dec 2022 20:20 )   PT: 18.6 sec;   INR: 1.56 ratio         PTT - ( 22 Dec 2022 20:20 )  PTT:25.9 sec    LACTATE:Lactate, Blood: 1.7 mmol/L ( @ 20:20)        CULTURES:       RADIOLOGY  < from: Xray Chest 1 View- PORTABLE-Urgent (22 @ 20:58) >  ACC: [id number] EXAM:  XR CHEST PORTABLE URGENT 1V                          PROCEDURE DATE:  2022          INTERPRETATION:  INDICATION: Throat pain. Sepsis    Portable chest 8:42 PM    COMPARISON: 10/25/2022    Overlying tubing.    FINDINGS:  Heart/Vascular: The heart size, mediastinum, hilum and aorta are within   normal limits for projection.  Pulmonary: Midline trachea. There is no focal infiltrate, congestion or   effusion.    Bones: There is no fracture.  Lines and catheter: None    Impression:    No acute pulmonary disease.

## 2022-12-23 NOTE — H&P ADULT - PROBLEM SELECTOR PLAN 1
hx of oropharyngeal mass s/p resection, on radiation and chemo   complaints of irritation and burning in his throat for three days, hx of oropharyngeal mass s/p resection, on radiation and chemo   complaints of irritation and burning in his throat for three days  Temp 101.7, -123, /65, 98% on RA  WBC 1,37   CXR no infiltrates  COVID +  s/p zosyn + cefepime, 1.5L bolus   Will continue empirically w/ cefepime as pt is immunocompromised   f/u UA and blood cx hx of oropharyngeal mass s/p resection, on radiation and chemo   complaints of irritation and burning in his throat for three days  Temp 101.7, -123, /65, 98% on RA  WBC 1,37   CXR no infiltrates  COVID +  s/p zosyn + cefepime, 1.5L bolus   Will continue empirically w/ cefepime as pt is immunocompromised   f/u UA and blood cx  Consulted Dr. pantoja

## 2022-12-23 NOTE — CONSULT NOTE ADULT - SUBJECTIVE AND OBJECTIVE BOX
HPI:  77 year old male with a PHMx of hypertension, hypercholesteremia, BPH and oropharyngeal mass s/p resection, on radiation and chemo (Taxol and carbaplatin) with last dose of chemo last week presenting to the ED with complaints of irritation and burning in his throat for three days. Patient also endorses a dry cough and states he has generalized weakness but denies any fever, chills, nausea, vomiting, chest pain, SOB, abdominal pain, or change in bowel habits. Patient recently had a PEG placed after the mass was resected. Dx with sepsis in the ED and given 1 dose of pip/tazo, 1 dose of vanco, and started on cefepime. Also found to be COVID positive.         PAST MEDICAL & SURGICAL HISTORY:  Hypertension    Hypercholesterolemia    Stage 3 chronic kidney disease    No significant past surgical history    Oropharyngeal CA    S/P gastrostromy           MEDS:  acetaminophen   IVPB .. 750 milliGRAM(s) IV Intermittent once PRN  cefepime   IVPB 1000 milliGRAM(s) IV Intermittent every 12 hours (D1)  heparin   Injectable 5000 Unit(s) SubCutaneous every 8 hours  potassium phosphate IVPB 30 milliMole(s) IV Intermittent once  sodium chloride 0.9%. 1000 milliLiter(s) IV Continuous <Continuous>      ALLERGIES  No Known Allergies      SOCIAL HISTORY:      FAMILY HISTORY:      ROS:    General:	    Skin:  	  HEENT:    Respiratory and Thorax:  	  Cardiovascular:	    Abdomen:	    Genitourinary:	    Musculoskeletal:	    Neurological:	    Psychiatric:	    Hematology/Lymphatics:	    Endocrine:	    PHYSICAL EXAM:    Vital Signs Last 24 Hrs  T(C): 36.1 (23 Dec 2022 11:24), Max: 38.7 (22 Dec 2022 18:13)  T(F): 97 (23 Dec 2022 11:24), Max: 101.7 (22 Dec 2022 18:13)  HR: 101 (23 Dec 2022 11:24) (101 - 123)  BP: 137/67 (23 Dec 2022 11:24) (119/69 - 156/82)  BP(mean): --  RR: 18 (23 Dec 2022 11:24) (16 - 19)  SpO2: 98% (23 Dec 2022 11:24) (95% - 98%)    Parameters below as of 23 Dec 2022 06:44  Patient On (Oxygen Delivery Method): room air          Gen:    HEENT:    Neck:    Lymph Nodes:    Breasts:    Back:    Chest/Thorax:    Cardiovascular:    Gastrointestinal:    Genitourinary:    Rectal:    Extremities:    Vascular:    Neurological:    Skin:    Psychiatric:    LABS/DIAGNOSTIC TESTS:                          5.6    1.24  )-----------( 158      ( 23 Dec 2022 10:17 )             18.2     WBC Count: 1.24 K/uL ( @ 10:17)  WBC Count: 1.37 K/uL ( @ 20:20)  WBC Count: 0.83 K/uL ( @ 11:39)          141  |  102  |  18  ----------------------------<  174<H>  3.5   |  29  |  1.20    Ca    8.3<L>      22 Dec 2022 20:20  Phos  SEE NOTE         TPro  6.5  /  Alb  1.9<L>  /  TBili  0.8  /  DBili  x   /  AST  24  /  ALT  43  /  AlkPhos  94        Urinalysis Basic - ( 23 Dec 2022 09:32 )    Color: Yellow / Appearance: Clear / S.015 / pH: x  Gluc: x / Ketone: Negative  / Bili: Negative / Urobili: Negative   Blood: x / Protein: 100 / Nitrite: Negative   Leuk Esterase: Negative / RBC: >50 /HPF / WBC 0-2 /HPF   Sq Epi: x / Non Sq Epi: Few /HPF / Bacteria: Moderate /HPF        LIVER FUNCTIONS - ( 22 Dec 2022 20:20 )  Alb: 1.9 g/dL / Pro: 6.5 g/dL / ALK PHOS: 94 U/L / ALT: 43 U/L DA / AST: 24 U/L / GGT: x             PT/INR - ( 22 Dec 2022 20:20 )   PT: 18.6 sec;   INR: 1.56 ratio         PTT - ( 22 Dec 2022 20:20 )  PTT:25.9 sec    LACTATE:Lactate, Blood: 1.7 mmol/L ( @ 20:20)        CULTURES:       RADIOLOGY  < from: Xray Chest 1 View- PORTABLE-Urgent (22 @ 20:58) >  ACC: [id number] EXAM:  XR CHEST PORTABLE URGENT 1V                          PROCEDURE DATE:  2022          INTERPRETATION:  INDICATION: Throat pain. Sepsis    Portable chest 8:42 PM    COMPARISON: 10/25/2022    Overlying tubing.    FINDINGS:  Heart/Vascular: The heart size, mediastinum, hilum and aorta are within   normal limits for projection.  Pulmonary: Midline trachea. There is no focal infiltrate, congestion or   effusion.    Bones: There is no fracture.  Lines and catheter: None    Impression:    No acute pulmonary disease.                     HPI (Hx obtained from chart, patient, review of outpatient [employer] EMR, and conversation with Dr. 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 Patient also endorses a cough for several wks and states he has generalized weakness but denies any chills, nausea, vomiting, chest pain, SOB, abdominal pain, or change in bowel habits. Pt has episodes of feeling warm.  Patient recently had a PEG placed  after the mass was resected. Dx with sepsis in the ED and given 1 dose of pip/tazo, 1 dose of vanco, and started on cefepime. Also found to be COVID positive.     Additional PMH: Pt is followed by Heme/Onc on [facility] (Dr. JONNY PittmanZpymfqtqht-[phone]): pt has h/o mucositis with c/o throat/mouth discomfort likely due to chemo. Has been on lidocaine mouthwash. Desquamation at base of neck also reported. Labs from 12/15: WBC 2.09, plt 189k; : H/H 9.4/29.4, WBC 0.83, creat. 1.16; receives chemo thru peripheral IVs (no port).        PAST MEDICAL & SURGICAL HISTORY:    Hypertension    Hypercholesterolemia    Stage 3 chronic kidney disease    Oropharyngeal CA (squamous cell)    S/P gastrostromy           MEDS:  acetaminophen   IVPB .. 750 milliGRAM(s) IV Intermittent once PRN  cefepime   IVPB 1000 milliGRAM(s) IV Intermittent every 12 hours (D1)  heparin   Injectable 5000 Unit(s) SubCutaneous every 8 hours  potassium phosphate IVPB 30 milliMole(s) IV Intermittent once  sodium chloride 0.9%. 1000 milliLiter(s) IV Continuous <Continuous>      ALLERGIES  No Known Allergies      SOCIAL HISTORY: lives with roommate; has a niece and nephew living in [facility]; denies cigs, ETOH; retired      FAMILY HISTORY: DM-father; lung CA-mother; coagulopathy--sibling      ROS:    General: febrile feeling; weakness	    Skin: pain at the base of his neck where skin denuded  	  HEENT: c/o mouth ulcers    Respiratory and Thorax: + cough xwks; no SOB  	  Cardiovascular:	no CP    GI: no N, V, diarrhea	    Genitourinary:	no specific complaints    Hematology/Lymphatics:	 oropharyngeal squamous cell CA    Endocrine:	DM      PHYSICAL EXAM:    Vital Signs Last 24 Hrs  T(C): 36.1 (23 Dec 2022 11:24), Max: 38.7 (22 Dec 2022 18:13)  T(F): 97 (23 Dec 2022 11:24), Max: 101.7 (22 Dec 2022 18:13)  HR: 101 (23 Dec 2022 11:24) (101 - 123)  BP: 137/67 (23 Dec 2022 11:24) (119/69 - 156/82)  BP(mean): --  RR: 18 (23 Dec 2022 11:24) (16 - 19)  SpO2: 98% (23 Dec 2022 11:24) (95% - 98%)    Parameters below as of 23 Dec 2022 06:44  Patient On (Oxygen Delivery Method): room air      Gen: WD thin B male in NAD    HEENT: Eyes: conj. clear; mouth: small ulcers on inside of mouth/tongue    Neck: lg area of denuded skin at base of anterior portion of neck; FROM    Lymph Nodes: cd not palpate any enlarged cervical or axillary LNs    Back: no vertebral or CVA tenderness    Chest/Thorax:  clear to auscultation    Cardiovascular: S1S2 reg with no murmurs, gallops, rubs    ABD: G-tube in place; BS active; soft and non-tender to palpation    Genitourinary: uncircumsized; no tran in place; no perineal lesions noted    Rectal: no rosy-rectal lesions noted    Extremities: muscle wasting    Neurological: A+O x3; muscle strength 5/5 in UE and LE bilat    Skin: see above    Psychiatric: affect appropriate      LABS/DIAGNOSTIC TESTS:                          5.6    1.24  )-----------( 158      ( 23 Dec 2022 10:17 )             18.2     WBC Count: 1.24 K/uL ( @ 10:17)  WBC Count: 1.37 K/uL ( @ 20:20)  WBC Count: 0.83 K/uL ( @ 11:39)          141  |  102  |  18  ----------------------------<  174<H>  3.5   |  29  |  1.20    Ca    8.3<L>      22 Dec 2022 20:20  Phos  SEE NOTE         TPro  6.5  /  Alb  1.9<L>  /  TBili  0.8  /  DBili  x   /  AST  24  /  ALT  43  /  AlkPhos  94        Urinalysis Basic - ( 23 Dec 2022 09:32 )    Color: Yellow / Appearance: Clear / S.015 / pH: x  Gluc: x / Ketone: Negative  / Bili: Negative / Urobili: Negative   Blood: x / Protein: 100 / Nitrite: Negative   Leuk Esterase: Negative / RBC: >50 /HPF / WBC 0-2 /HPF   Sq Epi: x / Non Sq Epi: Few /HPF / Bacteria: Moderate /HPF        LIVER FUNCTIONS - ( 22 Dec 2022 20:20 )  Alb: 1.9 g/dL / Pro: 6.5 g/dL / ALK PHOS: 94 U/L / ALT: 43 U/L DA / AST: 24 U/L / GGT: x             PT/INR - ( 22 Dec 2022 20:20 )   PT: 18.6 sec;   INR: 1.56 ratio         PTT - ( 22 Dec 2022 20:20 )  PTT:25.9 sec    LACTATE:Lactate, Blood: 1.7 mmol/L ( @ 20:20)        CULTURES:       RADIOLOGY  < from: Xray Chest 1 View- PORTABLE-Urgent (22 @ 20:58) >  ACC: [id number] EXAM:  XR CHEST PORTABLE URGENT 1V                          PROCEDURE DATE:  2022          INTERPRETATION:  INDICATION: Throat pain. Sepsis    Portable chest 8:42 PM    COMPARISON: 10/25/2022    Overlying tubing.    FINDINGS:  Heart/Vascular: The heart size, mediastinum, hilum and aorta are within   normal limits for projection.  Pulmonary: Midline trachea. There is no focal infiltrate, congestion or   effusion.    Bones: There is no fracture.  Lines and catheter: None    Impression:    No acute pulmonary disease.                     HPI (Hx obtained from chart, patient, review of outpatient [employer] EMR, and conversation with Dr. 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 Patient also endorses a cough for several wks and states he has generalized weakness but denies any chills, nausea, vomiting, chest pain, SOB, abdominal pain, or change in bowel habits. Pt has episodes of feeling warm.  Patient recently had a PEG placed . Dx with sepsis in the ED and given 1 dose of pip/tazo, 1 dose of vanco, and started on cefepime. Also found to be COVID positive.     Additional PMH: Pt is followed by Heme/Onc on [facility] (Dr. JONNY PittmanUuxcxfpvwm-[phone]): pt has h/o mucositis with c/o throat/mouth discomfort likely due to chemo. Has been on lidocaine mouthwash. Desquamation at base of neck also reported. Labs from 12/15: WBC 2.09, plt 189k; : H/H 9.4/29.4, WBC 0.83, creat. 1.16; receives chemo thru peripheral IVs (no port).        PAST MEDICAL & SURGICAL HISTORY:    Hypertension    Hypercholesterolemia    Stage 3 chronic kidney disease    Oropharyngeal CA (squamous cell)    S/P gastrostromy           MEDS:  acetaminophen   IVPB .. 750 milliGRAM(s) IV Intermittent once PRN  cefepime   IVPB 1000 milliGRAM(s) IV Intermittent every 12 hours (D1)  heparin   Injectable 5000 Unit(s) SubCutaneous every 8 hours  potassium phosphate IVPB 30 milliMole(s) IV Intermittent once  sodium chloride 0.9%. 1000 milliLiter(s) IV Continuous <Continuous>      ALLERGIES  No Known Allergies      SOCIAL HISTORY: lives with roommate; has a niece and nephew living in [facility]; denies cigs, ETOH; retired      FAMILY HISTORY: DM-father; lung CA-mother; coagulopathy--sibling      ROS:    General: febrile feeling; weakness	    Skin: pain at the base of his neck where skin denuded  	  HEENT: c/o mouth ulcers    Respiratory and Thorax: + cough xwks; no SOB  	  Cardiovascular:	no CP    GI: no N, V, diarrhea	    Genitourinary:	no specific complaints    Hematology/Lymphatics:	 oropharyngeal squamous cell CA    Endocrine:	DM      PHYSICAL EXAM:    Vital Signs Last 24 Hrs  T(C): 36.1 (23 Dec 2022 11:24), Max: 38.7 (22 Dec 2022 18:13)  T(F): 97 (23 Dec 2022 11:24), Max: 101.7 (22 Dec 2022 18:13)  HR: 101 (23 Dec 2022 11:24) (101 - 123)  BP: 137/67 (23 Dec 2022 11:24) (119/69 - 156/82)  BP(mean): --  RR: 18 (23 Dec 2022 11:24) (16 - 19)  SpO2: 98% (23 Dec 2022 11:24) (95% - 98%) (R.A.)    Parameters below as of 23 Dec 2022 06:44  Patient On (Oxygen Delivery Method): room air      Gen: WD thin B male in NAD    HEENT: Eyes: conj. clear; mouth: small ulcers on inside of mouth/tongue    Neck: lg area of denuded skin at base of anterior portion of neck; FROM    Lymph Nodes: cd not palpate any enlarged cervical or axillary LNs    Back: no vertebral or CVA tenderness    Chest/Thorax:  clear to auscultation    Cardiovascular: S1S2 reg with no murmurs, gallops, rubs    ABD: G-tube in place; BS active; soft and non-tender to palpation    Genitourinary: uncircumsized; no tran in place; no perineal lesions noted    Rectal: no rosy-rectal lesions noted    Extremities: muscle wasting    Neurological: A+O x3; muscle strength 5/5 in UE and LE bilat    Skin: see above    Psychiatric: affect appropriate      LABS/DIAGNOSTIC TESTS:    Complete Blood Count + Automated Diff (22 @ 12:03)   WBC Count: 2.06 K/uL   RBC Count: 2.67 M/uL   Hemoglobin: 7.4 g/dL   Hematocrit: 24.1 %   Platelet Count - Automated: 221 K/uL   Auto Neutrophil %: 63.0: Differential percentages must be correlated with absolute numbers for   clinical significance. %   Auto Lymphocyte %: 24.0 %   Auto Monocyte %: 12.0 %   Auto Eosinophil %: 1.0 %   Auto Basophil %: 0.0 %                               5.6    1.24  )-----------( 158      ( 23 Dec 2022 10:17 )             18.2     WBC Count: 1.24 K/uL ( @ 10:17)  WBC Count: 1.37 K/uL ( @ 20:20)  WBC Count: 0.83 K/uL ( @ 11:39)          141  |  102  |  18  ----------------------------<  174<H>  3.5   |  29  |  1.20    Ca    8.3<L>      22 Dec 2022 20:20  Phos  SEE NOTE         TPro  6.5  /  Alb  1.9<L>  /  TBili  0.8  /  DBili  x   /  AST  24  /  ALT  43  /  AlkPhos  94        Urinalysis Basic - ( 23 Dec 2022 09:32 )    Color: Yellow / Appearance: Clear / S.015 / pH: x  Gluc: x / Ketone: Negative  / Bili: Negative / Urobili: Negative   Blood: x / Protein: 100 / Nitrite: Negative   Leuk Esterase: Negative / RBC: >50 /HPF / WBC 0-2 /HPF   Sq Epi: x / Non Sq Epi: Few /HPF / Bacteria: Moderate /HPF        LIVER FUNCTIONS - ( 22 Dec 2022 20:20 )  Alb: 1.9 g/dL / Pro: 6.5 g/dL / ALK PHOS: 94 U/L / ALT: 43 U/L DA / AST: 24 U/L / GGT: x             PT/INR - ( 22 Dec 2022 20:20 )   PT: 18.6 sec;   INR: 1.56 ratio         PTT - ( 22 Dec 2022 20:20 )  PTT:25.9 sec    LACTATE:Lactate, Blood: 1.7 mmol/L ( @ 20:20)        CULTURES:       RADIOLOGY  < from: Xray Chest 1 View- PORTABLE-Urgent (22 @ 20:58) >  ACC: [id number] EXAM:  XR CHEST PORTABLE URGENT 1V                          PROCEDURE DATE:  2022          INTERPRETATION:  INDICATION: Throat pain. Sepsis    Portable chest 8:42 PM    COMPARISON: 10/25/2022    Overlying tubing.    FINDINGS:  Heart/Vascular: The heart size, mediastinum, hilum and aorta are within   normal limits for projection.  Pulmonary: Midline trachea. There is no focal infiltrate, congestion or   effusion.    Bones: There is no fracture.  Lines and catheter: None    Impression:    No acute pulmonary disease.

## 2022-12-24 DIAGNOSIS — Z93.1 GASTROSTOMY STATUS: ICD-10-CM

## 2022-12-24 LAB
24R-OH-CALCIDIOL SERPL-MCNC: 34.9 NG/ML — SIGNIFICANT CHANGE UP (ref 30–80)
ALBUMIN SERPL ELPH-MCNC: 1.8 G/DL — LOW (ref 3.5–5)
ALP SERPL-CCNC: 97 U/L — SIGNIFICANT CHANGE UP (ref 40–120)
ALT FLD-CCNC: 35 U/L DA — SIGNIFICANT CHANGE UP (ref 10–60)
ANION GAP SERPL CALC-SCNC: 16 MMOL/L — SIGNIFICANT CHANGE UP (ref 5–17)
AST SERPL-CCNC: 19 U/L — SIGNIFICANT CHANGE UP (ref 10–40)
BASOPHILS # BLD AUTO: 0.07 K/UL — SIGNIFICANT CHANGE UP (ref 0–0.2)
BASOPHILS NFR BLD AUTO: 2.3 % — HIGH (ref 0–2)
BILIRUB SERPL-MCNC: 0.9 MG/DL — SIGNIFICANT CHANGE UP (ref 0.2–1.2)
BUN SERPL-MCNC: 19 MG/DL — HIGH (ref 7–18)
CALCIUM SERPL-MCNC: 8.3 MG/DL — LOW (ref 8.4–10.5)
CHLORIDE SERPL-SCNC: 106 MMOL/L — SIGNIFICANT CHANGE UP (ref 96–108)
CO2 SERPL-SCNC: 22 MMOL/L — SIGNIFICANT CHANGE UP (ref 22–31)
CREAT SERPL-MCNC: 1.06 MG/DL — SIGNIFICANT CHANGE UP (ref 0.5–1.3)
CULTURE RESULTS: SIGNIFICANT CHANGE UP
D DIMER BLD IA.RAPID-MCNC: 3101 NG/ML DDU — HIGH
EGFR: 73 ML/MIN/1.73M2 — SIGNIFICANT CHANGE UP
EOSINOPHIL # BLD AUTO: 0.02 K/UL — SIGNIFICANT CHANGE UP (ref 0–0.5)
EOSINOPHIL NFR BLD AUTO: 0.6 % — SIGNIFICANT CHANGE UP (ref 0–6)
FERRITIN SERPL-MCNC: 1863 NG/ML — HIGH (ref 30–400)
FOLATE SERPL-MCNC: 4.5 NG/ML — LOW
GLUCOSE SERPL-MCNC: 161 MG/DL — HIGH (ref 70–99)
HCT VFR BLD CALC: 34.6 % — LOW (ref 39–50)
HGB BLD-MCNC: 10.8 G/DL — LOW (ref 13–17)
IMM GRANULOCYTES NFR BLD AUTO: 7.4 % — HIGH (ref 0–0.9)
IRON SATN MFR SERPL: 34 % — SIGNIFICANT CHANGE UP (ref 20–55)
IRON SATN MFR SERPL: 40 UG/DL — LOW (ref 65–170)
LYMPHOCYTES # BLD AUTO: 0.24 K/UL — LOW (ref 1–3.3)
LYMPHOCYTES # BLD AUTO: 7.8 % — LOW (ref 13–44)
MAGNESIUM SERPL-MCNC: 1.9 MG/DL — SIGNIFICANT CHANGE UP (ref 1.6–2.6)
MCHC RBC-ENTMCNC: 27.6 PG — SIGNIFICANT CHANGE UP (ref 27–34)
MCHC RBC-ENTMCNC: 31.2 GM/DL — LOW (ref 32–36)
MCV RBC AUTO: 88.3 FL — SIGNIFICANT CHANGE UP (ref 80–100)
MONOCYTES # BLD AUTO: 0.66 K/UL — SIGNIFICANT CHANGE UP (ref 0–0.9)
MONOCYTES NFR BLD AUTO: 21.4 % — HIGH (ref 2–14)
NEUTROPHILS # BLD AUTO: 1.87 K/UL — SIGNIFICANT CHANGE UP (ref 1.8–7.4)
NEUTROPHILS NFR BLD AUTO: 60.5 % — SIGNIFICANT CHANGE UP (ref 43–77)
NRBC # BLD: 3 /100 WBCS — HIGH (ref 0–0)
PHOSPHATE SERPL-MCNC: 2.6 MG/DL — SIGNIFICANT CHANGE UP (ref 2.5–4.5)
PLATELET # BLD AUTO: 275 K/UL — SIGNIFICANT CHANGE UP (ref 150–400)
POTASSIUM SERPL-MCNC: 3.2 MMOL/L — LOW (ref 3.5–5.3)
POTASSIUM SERPL-SCNC: 3.2 MMOL/L — LOW (ref 3.5–5.3)
PROT SERPL-MCNC: 6.8 G/DL — SIGNIFICANT CHANGE UP (ref 6–8.3)
RBC # BLD: 3.92 M/UL — LOW (ref 4.2–5.8)
RBC # FLD: 16.2 % — HIGH (ref 10.3–14.5)
SODIUM SERPL-SCNC: 144 MMOL/L — SIGNIFICANT CHANGE UP (ref 135–145)
SPECIMEN SOURCE: SIGNIFICANT CHANGE UP
TIBC SERPL-MCNC: 119 UG/DL — LOW (ref 250–450)
UIBC SERPL-MCNC: 79 UG/DL — LOW (ref 110–370)
VIT B12 SERPL-MCNC: >2000 PG/ML — HIGH (ref 232–1245)
WBC # BLD: 3.09 K/UL — LOW (ref 3.8–10.5)
WBC # FLD AUTO: 3.09 K/UL — LOW (ref 3.8–10.5)

## 2022-12-24 PROCEDURE — 99232 SBSQ HOSP IP/OBS MODERATE 35: CPT

## 2022-12-24 RX ORDER — POTASSIUM CHLORIDE 20 MEQ
10 PACKET (EA) ORAL
Refills: 0 | Status: DISCONTINUED | OUTPATIENT
Start: 2022-12-24 | End: 2022-12-24

## 2022-12-24 RX ORDER — POTASSIUM CHLORIDE 20 MEQ
40 PACKET (EA) ORAL
Refills: 0 | Status: COMPLETED | OUTPATIENT
Start: 2022-12-24 | End: 2022-12-24

## 2022-12-24 RX ORDER — SODIUM CHLORIDE 9 MG/ML
1000 INJECTION INTRAMUSCULAR; INTRAVENOUS; SUBCUTANEOUS
Refills: 0 | Status: DISCONTINUED | OUTPATIENT
Start: 2022-12-24 | End: 2022-12-25

## 2022-12-24 RX ORDER — DIPHENHYDRAMINE HYDROCHLORIDE AND LIDOCAINE HYDROCHLORIDE AND ALUMINUM HYDROXIDE AND MAGNESIUM HYDRO
10 KIT EVERY 6 HOURS
Refills: 0 | Status: DISCONTINUED | OUTPATIENT
Start: 2022-12-24 | End: 2022-12-29

## 2022-12-24 RX ORDER — HYDRALAZINE HCL 50 MG
10 TABLET ORAL ONCE
Refills: 0 | Status: COMPLETED | OUTPATIENT
Start: 2022-12-24 | End: 2022-12-24

## 2022-12-24 RX ORDER — ENOXAPARIN SODIUM 100 MG/ML
40 INJECTION SUBCUTANEOUS EVERY 12 HOURS
Refills: 0 | Status: DISCONTINUED | OUTPATIENT
Start: 2022-12-24 | End: 2022-12-29

## 2022-12-24 RX ADMIN — Medication 40 MILLIEQUIVALENT(S): at 12:36

## 2022-12-24 RX ADMIN — REMDESIVIR 200 MILLIGRAM(S): 5 INJECTION INTRAVENOUS at 16:41

## 2022-12-24 RX ADMIN — Medication 10 MILLIGRAM(S): at 01:28

## 2022-12-24 RX ADMIN — SODIUM CHLORIDE 80 MILLILITER(S): 9 INJECTION INTRAMUSCULAR; INTRAVENOUS; SUBCUTANEOUS at 01:29

## 2022-12-24 RX ADMIN — Medication 40 MILLIEQUIVALENT(S): at 16:45

## 2022-12-24 RX ADMIN — Medication 1 APPLICATION(S): at 12:33

## 2022-12-24 RX ADMIN — CEFEPIME 100 MILLIGRAM(S): 1 INJECTION, POWDER, FOR SOLUTION INTRAMUSCULAR; INTRAVENOUS at 16:41

## 2022-12-24 RX ADMIN — CEFEPIME 100 MILLIGRAM(S): 1 INJECTION, POWDER, FOR SOLUTION INTRAMUSCULAR; INTRAVENOUS at 05:42

## 2022-12-24 RX ADMIN — ENOXAPARIN SODIUM 40 MILLIGRAM(S): 100 INJECTION SUBCUTANEOUS at 17:42

## 2022-12-24 NOTE — CONSULT NOTE ADULT - TONSILS
Quality 110: Preventive Care And Screening: Influenza Immunization: Influenza immunization was not ordered or administered, reason not given Quality 431: Preventive Care And Screening: Unhealthy Alcohol Use - Screening: Patient screened for unhealthy alcohol use using a single question and scores less than 2 times per year Quality 402: Tobacco Use And Help With Quitting Among Adolescents: Patient screened for tobacco and never smoked Quality 130: Documentation Of Current Medications In The Medical Record: Current Medications Documented Detail Level: Detailed no redness/no swelling

## 2022-12-24 NOTE — PROGRESS NOTE ADULT - PROBLEM SELECTOR PLAN 4
IMPROVE VTE Individual Risk Assessment          RISK                                                          Points  [  ] Previous VTE                                                3  [  ] Thrombophilia                                             2  [  ] Lower limb paralysis                                   2        (unable to hold up >15 seconds)    [  ] Current Cancer                                             2         (within 6 months)  [  x] Immobilization > 24 hrs                              1  [  ] ICU/CCU stay > 24 hours                             1  [x  ] Age > 60                                                         1    IMPROVE VTE Score:         [   2      ]      Heparin SubQ oropharyngeal mass s/p resection  on radiation and chemo (last dose of chemo was last week)  follows up with Dr. Segundo Velasquez

## 2022-12-24 NOTE — CONSULT NOTE ADULT - SUBJECTIVE AND OBJECTIVE BOX
[  ] STAT REQUEST              [ X ]ROUTINE REQUEST    Patient is a 77 year old male with dysphagia and Peg tube malfunction. GI consulted to evaluate.       HPI:  77 year old male with past medical history significant for hypertension, hypercholesteremia, BPH and oropharyngeal mass s/p resection, on radiation and chemo (last dose of chemo was last week) presenting to the ED with complaints of irritation and burning in his throat for three days. Patient also c/o leaking peg tube and irritation at peg site. Patient denies abdominal pain, nausea, vomiting, hematemesis, hematochezia, melena, fever, chills, chest pain, SOB, palpitation, hematuria, dysuria or diarrhea.           PAIN MANAGEMENT:  Pain Scale:                0 /10  Pain Location:         PAST MEDICAL HISTORY  Hypertension  Hypercholesterolemia  Stage 3 chronic kidney disease        PAST SURGICAL HISTORY  Resection oropharyngeal mass        Allergies    No Known Allergies    Intolerances  None         MEDICATIONS  (STANDING):  cefepime   IVPB      cefepime   IVPB 2000 milliGRAM(s) IV Intermittent every 12 hours  enoxaparin Injectable 40 milliGRAM(s) SubCutaneous every 12 hours  potassium chloride   Powder 40 milliEquivalent(s) Oral two times a day  remdesivir  IVPB   IV Intermittent   remdesivir  IVPB 100 milliGRAM(s) IV Intermittent every 24 hours  silver sulfADIAZINE 1% Cream 1 Application(s) Topical daily  sodium chloride 0.9%. 1000 milliLiter(s) (80 mL/Hr) IV Continuous <Continuous>  sodium chloride 0.9%. 1000 milliLiter(s) (80 mL/Hr) IV Continuous <Continuous>    MEDICATIONS  (PRN):  acetaminophen   IVPB .. 750 milliGRAM(s) IV Intermittent once PRN Temp greater or equal to 38C (100.4F), Mild Pain (1 - 3)  FIRST- Mouthwash  BLM 10 milliLiter(s) Swish and Spit every 6 hours PRN Mouth Care      SOCIAL HISTORY  Advanced Directives:       [ X ] Full Code       [  ] DNR  Marital Status:         [  ] M      [ X ] S      [  ] D       [  ] W  Children:       [ X ] Yes      [  ] No  Occupation:        [  ] Employed       [ X ] Unemployed       [  ] Retired  Diet:       [ X ] Regular       [  ] PEG feeding          [  ] NG tube feeding  Drug Use:           [ X ] Patient denied          [  ] Yes  Alcohol:           [ X ] No             [  ] Yes (socially)         [  ] Yes (chronic)  Tobacco:           [  ] Yes           [ X ] No      FAMILY HISTORY  [ X ] Heart Disease            [ X ] Diabetes             [ X ] HTN             [  ] Colon Cancer             [  ] Stomach Cancer              [  ] Pancreatic Cancer      VITAL SIGNS   Vital Signs Last 24 Hrs  T(C): 36 (24 Dec 2022 06:25), Max: 37.2 (23 Dec 2022 17:00)  T(F): 96.8 (24 Dec 2022 06:25), Max: 98.9 (23 Dec 2022 17:00)  HR: 102 (24 Dec 2022 06:25) (94 - 106)  BP: 142/86 (24 Dec 2022 06:25) (142/86 - 170/90)  BP(mean): 111 (23 Dec 2022 20:05) (111 - 111)  RR: 20 (24 Dec 2022 06:25) (18 - 20)  SpO2: 100% (24 Dec 2022 06:25) (96% - 100%)  Parameters below as of 24 Dec 2022 06:25  Patient On (Oxygen Delivery Method): room air          144  |  106  |  19<H>  ----------------------------<  161<H>  3.2<L>   |  22  |  1.06    Ca    8.3<L>      24 Dec 2022 06:21  Phos  2.6     12-24  Mg     1.9     12-24    TPro  6.8  /  Alb  1.8<L>  /  TBili  0.9  /  DBili  x   /  AST  19  /  ALT  35  /  AlkPhos  97  12-24     Hepatitis C Virus Genotype --    PT/INR - ( 22 Dec 2022 20:20 )   PT: 18.6 sec;   INR: 1.56 ratio       PTT - ( 22 Dec 2022 20:20 )  PTT:25.9 sec    Iron with Total Binding Capacity in AM (12.24.22 @ 06:21)   Iron - Total Binding Capacity.: 119 ug/dL   % Saturation, Iron: 34 %   Iron Total, Serum: 40 ug/dL   Unsaturated Iron Binding Capacity: 79 ug/dL     Urinalysis (12.23.22 @ 09:32)   Glucose Qualitative, Urine: Negative   Blood, Urine: Large   pH Urine: 5.0   Color: Yellow   Urine Appearance: Clear   Bilirubin: Negative   Ketone - Urine: Negative   Specific Gravity: 1.015   Protein, Urine: 100   Urobilinogen: Negative   Nitrite: Negative   Leukocyte Esterase Concentration: Negative       ECG  Ventricular Rate 105 BPM    Atrial Rate 105 BPM    P-R Interval 150 ms    QRS Duration 80 ms    Q-T Interval 354 ms    QTC Calculation(Bazett) 467 ms    P Axis 58 degrees    R Axis 70 degrees    T Axis 65 degrees    Diagnosis Line *** Poor data quality, interpretation may be adversely affected  Sinus tachycardia  Nonspecific T wave abnormality       RADIOLOGY/IMAGING                  ACC: [id number] EXAM:  XR CHEST PORTABLE URGENT 1V                          PROCEDURE DATE:  12/22/2022          INTERPRETATION:  INDICATION: Throat pain. Sepsis    Portable chest 8:42 PM    COMPARISON: 10/25/2022    Overlying tubing.    FINDINGS:  Heart/Vascular: The heart size, mediastinum, hilum and aorta are within   normal limits for projection.  Pulmonary: Midline trachea. There is no focal infiltrate, congestion or   effusion.    Bones: There is no fracture.  Lines and catheter: None    Impression:    No acute pulmonary disease.

## 2022-12-24 NOTE — CHART NOTE - NSCHARTNOTEFT_GEN_A_CORE
EVENT: RN reports patient's NGT leaking    HPI: 77 year old male with a PHMx of hypertension, hypercholesteremia, BPH and oropharyngeal mass s/p resection, on radiation and chemo (last dose of chemo was last week) presenting to the ED with complaints of irritation and burning in his throat for three days. Patient also endorses a dry cough and states he has generalized weakness but denies any fever, chills, nausea, vomiting, chest pain, SOB, abdominal pain, or change in bowel habits. Patient recently had a PEG placed after the mass was resected.    24HR: Patient now presents with tube feedings leaking around outside of NGT to abdomen exterior.    OBJECTIVE:  Vital Signs Last 24 Hrs  T(C): 36.9 (23 Dec 2022 20:59), Max: 37.2 (23 Dec 2022 17:00)  T(F): 98.5 (23 Dec 2022 20:59), Max: 98.9 (23 Dec 2022 17:00)  HR: 96 (23 Dec 2022 20:59) (94 - 114)  BP: 156/78 (24 Dec 2022 02:30) (137/67 - 170/90)  BP(mean): 111 (23 Dec 2022 20:05) (111 - 111)  RR: 18 (23 Dec 2022 20:59) (17 - 19)  SpO2: 100% (23 Dec 2022 20:59) (95% - 100%)    Parameters below as of 23 Dec 2022 20:59  Patient On (Oxygen Delivery Method): room air        FOCUSED PHYSICAL EXAM:  GENERAL: NAD  EYES: clear conjunctiva; EOMI  ENMT: Moist mucous membranes  NECK: Supple, No JVD, Normal thyroid, Excoriation on anterior neck  CHEST/LUNG: Clear to auscultation bilaterally; No rales, rhonchi, wheezing, or rubs  HEART: S1, S2, Regular rate and rhythm  ABDOMEN: Soft, Nontender, Nondistended; Bowel sounds present, + Peg, foul odor with excoriation, leaking NGT site  NEURO: Alert & Oriented X3  EXTREMITIES: No LE edema, no calf tenderness  LYMPH: No lymphadenopathy noted  SKIN: No rashes or lesions, dry scaly skin        LABS:                        9.4    2.30  )-----------( 223      ( 23 Dec 2022 22:57 )             29.5     12-23    143  |  107  |  16  ----------------------------<  156<H>  3.3<L>   |  26  |  1.01    Ca    7.6<L>      23 Dec 2022 12:03  Phos  2.0     12-23  Mg     1.3     12-23    TPro  6.1  /  Alb  1.5<L>  /  TBili  0.6  /  DBili  x   /  AST  16  /  ALT  33  /  AlkPhos  79  12-23      ASSESSMENT/PROBLEM:  1)Gastrostomy Malfunction- PEG tube site leaking feedings around outside of tube    PLAN:   1)Tube feedings put on hold for now- f/u with GI in AM  2)IV Fluids continued to maintain hydration  3)Dressing changes as needed to keep site clean and dry.

## 2022-12-24 NOTE — PROGRESS NOTE ADULT - PROBLEM SELECTOR PLAN 1
hx of oropharyngeal mass s/p resection, on radiation and chemo   complaints of irritation and burning in his throat for three days  Temp 101.7, -123, /65, 98% on RA  WBC 1,37   CXR no infiltrates  COVID +  s/p zosyn + cefepime, 1.5L bolus   Will continue empirically w/ cefepime as pt is immunocompromised   f/u UA and blood cx  Consulted Dr. pantoja hx of oropharyngeal mass s/p resection, on radiation and chemo   complaints of irritation and burning in his throat for three days  Temp 101.7, -123, /65, 98% on RA  ANC > 1000 currently  CXR no infiltrates  COVID +  continue cefepime 2gm q24 hour  Blood cx pre [facility] NGTD  Consulted Dr. pantoja/Dr. Mittal

## 2022-12-24 NOTE — CHART NOTE - NSCHARTNOTEFT_GEN_A_CORE
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 presented to the [facility] ED with complaints of irritation and burning in his throat. Patient met criteria for severe sepsis on admission and also found to be COVID positive. Informed by RN peg tube feedings on hold 2/2 leaking.    Severe sepsis  BC's neg  Afebrile  monitor CBC currently not neutropenic     COVID  started on remdesivir  Saturating 100% on RA  DDIMER 3100 - subq lovenox 40 mg BID    Immunodeficiency   on chemo to treat oropharyngeal CA  adequate nutrition  Heme/Onc following    Hypokalemia   monitor lab and replete prn    Mucositis  2/2 chemo and RT  oral hygiene  magic mouthwash    Peg tube malfunction   peg tub adjusted and no leakage noted  feeds resumed  monitor peg

## 2022-12-24 NOTE — CONSULT NOTE ADULT - NEGATIVE ENMT SYMPTOMS
no hearing difficulty/no ear pain/no tinnitus/no vertigo/no nose bleeds/no gum bleeding/no dry mouth/no throat pain

## 2022-12-24 NOTE — PROGRESS NOTE ADULT - ASSESSMENT
# Severe sepsis-  # neutropenic sepsis   pt undergoing chemoXRT for H/N CA   abx as per ID   f/u cultures   noted on  CBC initially ANC < 1000, repeated CBC with ANC> 1000  today noted improcement of WBC/ANC    does not require  growth factors support for   ANC>1000      # H/N  CA  undergoing chemoXRT , last tx 12/16/22  hold tx during hospitalization   # COVID +  TX as per ID    # Mucositis- d/t  - chemo-XRT-   --"miracle" mouthwash  use feeding tube  for feeing and meds PRN  s/p PEG mulfunction , seen by  GI, improved   cont nutritional supp     #XRT dermatitis  L neck   agree with  silvadene cream to affected area       call with questions [phone]

## 2022-12-24 NOTE — PROGRESS NOTE ADULT - SUBJECTIVE AND OBJECTIVE BOX
Patient is a 77y old  Male who presents with a chief complaint of sepsis (24 Dec 2022 10:53)      SUBJECTIVE / OVERNIGHT EVENTS:  ADDITIONAL REVIEW OF SYSTEMS:    MEDICATIONS  (STANDING):  cefepime   IVPB      cefepime   IVPB 2000 milliGRAM(s) IV Intermittent every 12 hours  enoxaparin Injectable 40 milliGRAM(s) SubCutaneous every 12 hours  potassium chloride   Powder 40 milliEquivalent(s) Oral two times a day  remdesivir  IVPB   IV Intermittent   remdesivir  IVPB 100 milliGRAM(s) IV Intermittent every 24 hours  silver sulfADIAZINE 1% Cream 1 Application(s) Topical daily  sodium chloride 0.9%. 1000 milliLiter(s) (80 mL/Hr) IV Continuous <Continuous>  sodium chloride 0.9%. 1000 milliLiter(s) (80 mL/Hr) IV Continuous <Continuous>    MEDICATIONS  (PRN):  acetaminophen   IVPB .. 750 milliGRAM(s) IV Intermittent once PRN Temp greater or equal to 38C (100.4F), Mild Pain (1 - 3)  FIRST- Mouthwash  BLM 10 milliLiter(s) Swish and Spit every 6 hours PRN Mouth Care      CAPILLARY BLOOD GLUCOSE      POCT Blood Glucose.: 138 mg/dL (23 Dec 2022 21:19)    I&O's Summary      PHYSICAL EXAM:  Vital Signs Last 24 Hrs  T(C): 36.1 (24 Dec 2022 14:34), Max: 37.2 (23 Dec 2022 17:00)  T(F): 96.9 (24 Dec 2022 14:34), Max: 98.9 (23 Dec 2022 17:00)  HR: 102 (24 Dec 2022 14:34) (94 - 106)  BP: 148/85 (24 Dec 2022 14:34) (142/86 - 170/90)  BP(mean): 111 (23 Dec 2022 20:05) (111 - 111)  RR: 18 (24 Dec 2022 14:34) (18 - 20)  SpO2: 99% (24 Dec 2022 14:34) (96% - 100%)    Parameters below as of 24 Dec 2022 14:34  Patient On (Oxygen Delivery Method): room air      CONSTITUTIONAL: NAD, well-developed, well-groomed  EYES: PERRLA; conjunctiva and sclera clear  ENMT: Moist oral mucosa, no pharyngeal injection or exudates; normal dentition  NECK: Supple, no palpable masses; no thyromegaly  RESPIRATORY: Normal respiratory effort; lungs are clear to auscultation bilaterally  CARDIOVASCULAR: Regular rate and rhythm, normal S1 and S2, no murmur/rub/gallop; No lower extremity edema; Peripheral pulses are 2+ bilaterally  ABDOMEN: Nontender to palpation, normoactive bowel sounds, no rebound/guarding; No hepatosplenomegaly  MUSCLOSKELETAL:  Normal gait; no clubbing or cyanosis of digits; no joint swelling or tenderness to palpation  PSYCH: A+O to person, place, and time; affect appropriate  NEUROLOGY: CN 2-12 are intact and symmetric; no gross sensory deficits;   SKIN: No rashes; no palpable lesions    LABS:                        10.8   3.09  )-----------( 275      ( 24 Dec 2022 06:21 )             34.6     12-24    144  |  106  |  19<H>  ----------------------------<  161<H>  3.2<L>   |  22  |  1.06    Ca    8.3<L>      24 Dec 2022 06:21  Phos  2.6     12-24  Mg     1.9     12-24    TPro  6.8  /  Alb  1.8<L>  /  TBili  0.9  /  DBili  x   /  AST  19  /  ALT  35  /  AlkPhos  97  12-24    PT/INR - ( 22 Dec 2022 20:20 )   PT: 18.6 sec;   INR: 1.56 ratio         PTT - ( 22 Dec 2022 20:20 )  PTT:25.9 sec      Urinalysis Basic - ( 23 Dec 2022 09:32 )    Color: Yellow / Appearance: Clear / S.015 / pH: x  Gluc: x / Ketone: Negative  / Bili: Negative / Urobili: Negative   Blood: x / Protein: 100 / Nitrite: Negative   Leuk Esterase: Negative / RBC: >50 /HPF / WBC 0-2 /HPF   Sq Epi: x / Non Sq Epi: Few /HPF / Bacteria: Moderate /HPF        Culture - Urine (collected 23 Dec 2022 09:32)  Source: Clean Catch Clean Catch (Midstream)  Final Report (24 Dec 2022 15:23):    <10,000 CFU/mL Normal Urogenital Leilani    Culture - Blood (collected 22 Dec 2022 20:35)  Source: .Blood Blood-Peripheral  Preliminary Report (24 Dec 2022 02:02):    No growth to date.    Culture - Blood (collected 22 Dec 2022 20:20)  Source: .Blood Blood-Peripheral  Preliminary Report (24 Dec 2022 02:02):    No growth to date.      Trend Procalcitonin        Ferritin, Serum: 1863 ng/mL (22 @ 06:21)    D-Dimer:  3101 ng/mL DDU (22 @ 06:21)      RADIOLOGY & ADDITIONAL TESTS:  Results Reviewed:   Imaging Personally Reviewed:  Electrocardiogram Personally Reviewed:    COORDINATION OF CARE:  Care Discussed with Consultants/Other Providers [Y/N]:  Prior or Outpatient Records Reviewed [Y/N]:   Patient is a 77y old  Male who presents with a chief complaint of sepsis (24 Dec 2022 10:53)      SUBJECTIVE / OVERNIGHT EVENTS: RANDOLPH overnight, concern for G tube leaking but when evaluated leakage was noted on the connection on the tube feed side.   ADDITIONAL REVIEW OF SYSTEMS: negative as per above    MEDICATIONS  (STANDING):  cefepime   IVPB      cefepime   IVPB 2000 milliGRAM(s) IV Intermittent every 12 hours  enoxaparin Injectable 40 milliGRAM(s) SubCutaneous every 12 hours  potassium chloride   Powder 40 milliEquivalent(s) Oral two times a day  remdesivir  IVPB   IV Intermittent   remdesivir  IVPB 100 milliGRAM(s) IV Intermittent every 24 hours  silver sulfADIAZINE 1% Cream 1 Application(s) Topical daily  sodium chloride 0.9%. 1000 milliLiter(s) (80 mL/Hr) IV Continuous <Continuous>  sodium chloride 0.9%. 1000 milliLiter(s) (80 mL/Hr) IV Continuous <Continuous>    MEDICATIONS  (PRN):  acetaminophen   IVPB .. 750 milliGRAM(s) IV Intermittent once PRN Temp greater or equal to 38C (100.4F), Mild Pain (1 - 3)  FIRST- Mouthwash  BLM 10 milliLiter(s) Swish and Spit every 6 hours PRN Mouth Care      CAPILLARY BLOOD GLUCOSE      POCT Blood Glucose.: 138 mg/dL (23 Dec 2022 21:19)    I&O's Summary      PHYSICAL EXAM:  Vital Signs Last 24 Hrs  T(C): 36.1 (24 Dec 2022 14:34), Max: 37.2 (23 Dec 2022 17:00)  T(F): 96.9 (24 Dec 2022 14:34), Max: 98.9 (23 Dec 2022 17:00)  HR: 102 (24 Dec 2022 14:34) (94 - 106)  BP: 148/85 (24 Dec 2022 14:34) (142/86 - 170/90)  BP(mean): 111 (23 Dec 2022 20:05) (111 - 111)  RR: 18 (24 Dec 2022 14:34) (18 - 20)  SpO2: 99% (24 Dec 2022 14:34) (96% - 100%)    Parameters below as of 24 Dec 2022 14:34  Patient On (Oxygen Delivery Method): room air      GENERAL: NAD  HEAD:  Atraumatic, Normocephalic  EYES: EOMI, PERRLA, conjunctiva and sclera clear  NECK: R sided neck lesion  CHEST/LUNG: Clear to auscultation bilaterally  HEART: Regular rate and rhythm  ABDOMEN: Soft, Nontender  EXTREMITIES:  2+ Peripheral Pulses, No clubbing, cyanosis, or edema  PSYCH: AAOx3    LABS:                        10.8   3.09  )-----------( 275      ( 24 Dec 2022 06:21 )             34.6         144  |  106  |  19<H>  ----------------------------<  161<H>  3.2<L>   |  22  |  1.06    Ca    8.3<L>      24 Dec 2022 06:21  Phos  2.6       Mg     1.9         TPro  6.8  /  Alb  1.8<L>  /  TBili  0.9  /  DBili  x   /  AST  19  /  ALT  35  /  AlkPhos  97      PT/INR - ( 22 Dec 2022 20:20 )   PT: 18.6 sec;   INR: 1.56 ratio         PTT - ( 22 Dec 2022 20:20 )  PTT:25.9 sec      Urinalysis Basic - ( 23 Dec 2022 09:32 )    Color: Yellow / Appearance: Clear / S.015 / pH: x  Gluc: x / Ketone: Negative  / Bili: Negative / Urobili: Negative   Blood: x / Protein: 100 / Nitrite: Negative   Leuk Esterase: Negative / RBC: >50 /HPF / WBC 0-2 /HPF   Sq Epi: x / Non Sq Epi: Few /HPF / Bacteria: Moderate /HPF        Culture - Urine (collected 23 Dec 2022 09:32)  Source: Clean Catch Clean Catch (Midstream)  Final Report (24 Dec 2022 15:23):    <10,000 CFU/mL Normal Urogenital Leilani    Culture - Blood (collected 22 Dec 2022 20:35)  Source: .Blood Blood-Peripheral  Preliminary Report (24 Dec 2022 02:02):    No growth to date.    Culture - Blood (collected 22 Dec 2022 20:20)  Source: .Blood Blood-Peripheral  Preliminary Report (24 Dec 2022 02:02):    No growth to date.      Trend Procalcitonin        Ferritin, Serum: 1863 ng/mL (22 @ 06:21)    D-Dimer:  3101 ng/mL DDU (22 @ 06:21)      RADIOLOGY & ADDITIONAL TESTS:  Results Reviewed:   Imaging Personally Reviewed:  Electrocardiogram Personally Reviewed:    COORDINATION OF CARE:  Care Discussed with Consultants/Other Providers [Y/N]:  Prior or Outpatient Records Reviewed [Y/N]:

## 2022-12-24 NOTE — CONSULT NOTE ADULT - ASSESSMENT
# Severe sepsis-  # neutropenic sepsis   pt undergoing chemoXRT for H/N CA   abx as per ID   f/u cultures   noted on  CBC initially ANC < 1000, repeated CBC with ANC> 1000  f/u CBC we will recommend growth factors support if  ANC<1000      # H/N  CA  undergoing chemoXRT , last tx 12/16/22  hold tx during hospitalization   # COVID +  TX as per ID    # Mucositis- d/t  - chemo-XRT-   --"miracle" mouthwash  use feeding tube  for feeing and meds PRN    #XRT dermatitis  L neck   agree with  silvadene cream to affected area       call with questions [phone]     Thank you for the consult   
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 Dx with sepsis in the ED and given 1 dose of pip/tazo, 1 dose of vanco, and started on cefepime. Also found to be COVID positive.     # Severe sepsis--meets criteria for severe sepsis and on cefepime to provide empiric bacterial coverage. Pt not neutropenic.   --increase cefepime to 2g q12h  --f/u CBC with diff  --f/u BC results      # + COVID-- pt with normal R.A. 02 sat. However, in view of increased risk for severe disease, wd give pt remdesivir x3d.    # Mucositis-- chemo- and RT- related  --"miracle" mouthwash    # Denuding of skin at base of neck  --silvadene cream to affected area (prescribed as outpt)    Discussed above with Parish Paez (hospitalist) and Toya (outpt Heme/Onc).
1. Dysphagia  2. Peg tube malfunction adjusted functioning well at present time  3. Anemia (etiology multifactorial)  4. No evidence of acute GI bleeding    Suggestions:    1. Peg tube can be used for feeding and medication  2. Aspiration precaution  3. Protonix daily  4. Avoid NSAID  5. Monitor H/H  6. Transfuse PRBC as needed  7. Hematology follow up  8. DVT prophylaxis

## 2022-12-24 NOTE — PROGRESS NOTE ADULT - SUBJECTIVE AND OBJECTIVE BOX
Subjective/Objective:      MEDS  acetaminophen   IVPB .. 750 milliGRAM(s) IV Intermittent once PRN  cefepime   IVPB   (D2)   cefepime   IVPB 2000 milliGRAM(s) IV Intermittent every 12 hours  heparin   Injectable 5000 Unit(s) SubCutaneous every 8 hours  potassium chloride   Powder 40 milliEquivalent(s) Oral two times a day  remdesivir  IVPB   IV Intermittent (D2)  remdesivir  IVPB 100 milliGRAM(s) IV Intermittent every 24 hours  silver sulfADIAZINE 1% Cream 1 Application(s) Topical daily  sodium chloride 0.9%. 1000 milliLiter(s) IV Continuous <Continuous>  sodium chloride 0.9%. 1000 milliLiter(s) IV Continuous <Continuous>      PHYSICAL EXAM:    Vital Signs Last 24 Hrs  T(C): 36 (24 Dec 2022 06:25), Max: 37.2 (23 Dec 2022 17:00)  T(F): 96.8 (24 Dec 2022 06:25), Max: 98.9 (23 Dec 2022 17:00)  HR: 102 (24 Dec 2022 06:25) (94 - 106)  BP: 142/86 (24 Dec 2022 06:25) (137/67 - 170/90)  BP(mean): 111 (23 Dec 2022 20:05) (111 - 111)  RR: 20 (24 Dec 2022 06:25) (18 - 20)  SpO2: 100% (24 Dec 2022 06:25) (96% - 100%)    Parameters below as of 24 Dec 2022 06:25  Patient On (Oxygen Delivery Method): room air        GEN:    HEENT:    CHEST/Respiratory:    Cardiovascular:    Abdomen:    Genitourinary:    Extremities:     Neurological:    Skin:      LABS/DIAGNOSTIC TESTS                            10.8   3.09  )-----------( 275      ( 24 Dec 2022 06:21 )             34.6       WBC Count: 3.09 K/uL ( @ 06:21)  WBC Count: 2.30 K/uL ( @ 22:57)  WBC Count: 2.06 K/uL ( @ 12:03)  WBC Count: 1.24 K/uL ( @ 10:17)  WBC Count: 1.37 K/uL ( @ 20:20)  WBC Count: 0.83 K/uL ( @ 11:39)          144  |  106  |  19<H>  ----------------------------<  161<H>  3.2<L>   |  22  |  1.06    Ca    8.3<L>      24 Dec 2022 06:21  Phos  2.6       Mg     1.9         TPro  6.8  /  Alb  1.8<L>  /  TBili  0.9  /  DBili  x   /  AST  19  /  ALT  35  /  AlkPhos  97        Urinalysis Basic - ( 23 Dec 2022 09:32 )    Color: Yellow / Appearance: Clear / S.015 / pH: x  Gluc: x / Ketone: Negative  / Bili: Negative / Urobili: Negative   Blood: x / Protein: 100 / Nitrite: Negative   Leuk Esterase: Negative / RBC: >50 /HPF / WBC 0-2 /HPF   Sq Epi: x / Non Sq Epi: Few /HPF / Bacteria: Moderate /HPF        PT/INR - ( 22 Dec 2022 20:20 )   PT: 18.6 sec;   INR: 1.56 ratio         PTT - ( 22 Dec 2022 20:20 )  PTT:25.9 sec      CULTURES    Culture - Blood (collected 22 @ 20:35)  Source: .Blood Blood-Peripheral  Preliminary Report (22 @ 02:02):    No growth to date.    Culture - Blood (collected 22 @ 20:20)  Source: .Blood Blood-Peripheral  Preliminary Report (22 @ 02:02):    No growth to date.          RADIOLOGY               Subjective/Objective: Pt feeling better today; still c/o throat discomfort      MEDS  acetaminophen   IVPB .. 750 milliGRAM(s) IV Intermittent once PRN  cefepime   IVPB   (D2)   cefepime   IVPB 2000 milliGRAM(s) IV Intermittent every 12 hours  heparin   Injectable 5000 Unit(s) SubCutaneous every 8 hours  potassium chloride   Powder 40 milliEquivalent(s) Oral two times a day  remdesivir  IVPB   IV Intermittent (D2)  remdesivir  IVPB 100 milliGRAM(s) IV Intermittent every 24 hours  silver sulfADIAZINE 1% Cream 1 Application(s) Topical daily  sodium chloride 0.9%. 1000 milliLiter(s) IV Continuous <Continuous>  sodium chloride 0.9%. 1000 milliLiter(s) IV Continuous <Continuous>      PHYSICAL EXAM:    Vital Signs Last 24 Hrs  T(C): 36 (24 Dec 2022 06:25), Max: 37.2 (23 Dec 2022 17:00)  T(F): 96.8 (24 Dec 2022 06:25), Max: 98.9 (23 Dec 2022 17:00)  HR: 102 (24 Dec 2022 06:25) (94 - 106)  BP: 142/86 (24 Dec 2022 06:25) (137/67 - 170/90)  BP(mean): 111 (23 Dec 2022 20:05) (111 - 111)  RR: 20 (24 Dec 2022 06:25) (18 - 20)  SpO2: 100% (24 Dec 2022 06:25) (96% - 100%)    Parameters below as of 24 Dec 2022 06:25  Patient On (Oxygen Delivery Method): room air    Gen: WD thin B male in NAD    HEENT: Eyes: conj. clear; mouth: small ulcers on inside of mouth/tongue    Neck: lg area of denuded skin at base of anterior portion of neck; FROM    Chest/Thorax:  clear to auscultation (rhonchi clear with coughing)    Cardiovascular: S1S2 reg with no murmurs, gallops, rubs    ABD: G-tube in place; BS active; soft and non-tender to palpation    Genitourinary: no tran in place    Rectal: no rosy-rectal lesions noted    Extremities: muscle wasting    Neurological: A+O x3          LABS/DIAGNOSTIC TESTS                              10.8   3.09  )-----------( 275      ( 24 Dec 2022 06:21 ) with 60.5% PMNs             34.6       WBC Count: 3.09 K/uL ( @ 06:21)  WBC Count: 2.30 K/uL ( @ 22:57)  WBC Count: 2.06 K/uL ( @ 12:03)  WBC Count: 1.24 K/uL ( @ 10:17)  WBC Count: 1.37 K/uL ( @ 20:20)  WBC Count: 0.83 K/uL ( @ 11:39)          144  |  106  |  19<H>  ----------------------------<  161<H>  3.2<L>   |  22  |  1.06    Ca    8.3<L>      24 Dec 2022 06:21  Phos  2.6       Mg     1.9         TPro  6.8  /  Alb  1.8<L>  /  TBili  0.9  /  DBili  x   /  AST  19  /  ALT  35  /  AlkPhos  97        Urinalysis Basic - ( 23 Dec 2022 09:32 )    Color: Yellow / Appearance: Clear / S.015 / pH: x  Gluc: x / Ketone: Negative  / Bili: Negative / Urobili: Negative   Blood: x / Protein: 100 / Nitrite: Negative   Leuk Esterase: Negative / RBC: >50 /HPF / WBC 0-2 /HPF   Sq Epi: x / Non Sq Epi: Few /HPF / Bacteria: Moderate /HPF        PT/INR - ( 22 Dec 2022 20:20 )   PT: 18.6 sec;   INR: 1.56 ratio         PTT - ( 22 Dec 2022 20:20 )  PTT:25.9 sec      CULTURES    Culture - Blood (collected 22 @ 20:35)  Source: .Blood Blood-Peripheral  Preliminary Report (22 @ 02:02):    No growth to date.    Culture - Blood (collected 22 @ 20:20)  Source: .Blood Blood-Peripheral  Preliminary Report (22 @ 02:02):    No growth to date.

## 2022-12-24 NOTE — CONSULT NOTE ADULT - NEGATIVE GASTROINTESTINAL SYMPTOMS
no nausea/no vomiting/no diarrhea/no abdominal pain/no melena/no hematochezia/no jaundice/no hiccoughs

## 2022-12-24 NOTE — PROGRESS NOTE ADULT - PROBLEM SELECTOR PLAN 3
oropharyngeal mass s/p resection  on radiation and chemo (last dose of chemo was last week)  follows up with Dr. Segundo Velasquez Patient with concern for tube feed leakage but was resumed again after reconnecting by NP, will continue to monitor  resume tube feeds

## 2022-12-24 NOTE — PROGRESS NOTE ADULT - ASSESSMENT
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 Dx with sepsis in the ED and given 1 dose of pip/tazo, 1 dose of vanco, and started on cefepime. Also found to be COVID positive. Pt clinically better today. BCs negative and no elevated temps while in hospital.     # Severe sepsis--met criteria for severe sepsis on admission and on cefepime to provide empiric bacterial coverage. Pt not neutropenic.   --cont. cefepime pending final Cx results  --f/u CBC with diff  --f/u fnal BC results    # + COVID-- pt with normal R.A. 02 sat. However, in view of increased risk for severe disease, pt started on remdesivir yesterday to complete a 3d course.    # Mucositis-- chemo- and RT- related  --"miracle" mouthwash    # Denuding of skin at base of neck  --silvadene cream to affected area    #Immunodeficiency-- pt on chemo to Rx oropharyngeal CA. Appreciate Heme/Onc input.       Discussed above with medicine team, including Dr. Paez.

## 2022-12-24 NOTE — PROGRESS NOTE ADULT - SUBJECTIVE AND OBJECTIVE BOX
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 ED with complaints of irritation and burning in his throat. Patient also endorses a cough for several wks and states he has generalized weakness but denies any chills, nausea, vomiting, chest pain, SOB, abdominal pain, or change in bowel habits. Pt has episodes of feeling warm.  Patient recently had a PEG placed . Dx with sepsis in the ED and given 1 dose of pip/tazo, 1 dose of vanco, and started on cefepime. Also found to be COVID positive.    Pt is followed by Heme/Onc on [facility]INO (Dr. JONNY PittmanUbzloqttut-[phone]): pt has h/o mucositis with c/o throat/mouth discomfort likely due to chemo. Has been on lidocaine mouthwash. Desquamation at base of neck also reported. Labs from 12/15: WBC 2.09, plt 189k; : H/H 9.4/29.4, WBC 0.83, creat. 1.16;    we were called for further evaluation  of management of neutropenic fever     Today : feeling a little better, c/o XRT dermatitis on the neck, mucositis  s/p feeding tube malfunction  s/p GI  eval , improved         PAST MEDICAL & SURGICAL HISTORY:    Hypertension    Hypercholesterolemia    Stage 3 chronic kidney disease    Oropharyngeal CA (squamous cell)    S/P gastrostromy           MEDS:  acetaminophen   IVPB .. 750 milliGRAM(s) IV Intermittent once PRN  cefepime   IVPB 1000 milliGRAM(s) IV Intermittent every 12 hours (D1)  heparin   Injectable 5000 Unit(s) SubCutaneous every 8 hours  potassium phosphate IVPB 30 milliMole(s) IV Intermittent once  sodium chloride 0.9%. 1000 milliLiter(s) IV Continuous <Continuous>      ALLERGIES  No Known Allergies      SOCIAL HISTORY: lives with roommate; has a niece and nephew living in [place] [facility]; denies cigs, ETOH; retired      FAMILY HISTORY: DM-father; lung CA-mother; coagulopathy--sibling      ROS:    General: febrile feeling; weakness	    Skin: pain at the base of his neck  d/t  skin denuded  	  HEENT: c/o stomatitis,  mucositis     Respiratory and Thorax: + cough xwks; no SOB  	  Cardiovascular:	no CP    GI: no N, V, diarrhea	    Genitourinary:	no specific complaints    Hematology/onc  oropharyngeal squamous cell CA    Endocrine:	DM      PHYSICAL EXAM:    ICU Vital Signs Last 24 Hrs  T(C): 36.1 (24 Dec 2022 14:34), Max: 37.2 (23 Dec 2022 17:00)  T(F): 96.9 (24 Dec 2022 14:34), Max: 98.9 (23 Dec 2022 17:00)  HR: 102 (24 Dec 2022 14:34) (94 - 106)  BP: 148/85 (24 Dec 2022 14:34) (142/86 - 170/90)  BP(mean): 111 (23 Dec 2022 20:05) (111 - 111)  ABP: --  ABP(mean): --  RR: 18 (24 Dec 2022 14:34) (18 - 20)  SpO2: 99% (24 Dec 2022 14:34) (96% - 100%)    O2 Parameters below as of 24 Dec 2022 14:34  Patient On (Oxygen Delivery Method): room air              Gen: WD thin B male in NAD    HEENT: NC/AT, PEERLA, anicteric   mouth: small ulcers on inside of mouth/tongue    Neck: neck area of denuded skin at base of anterior portion of neck; c/w XRT dermititis    Lungs  clear to auscultation    Cardiovascular: S1S2 reg with no murmurs, gallops, rubs    ABD: G-tube in place; BS active; soft and non-tender to palpation  Extremities: no c/c/e     Neurological: A+O x3; no focal signs    Skin:  +  radiation dermatitis on L neck     Psychiatric: no agitation, no confusion       LABS/DIAGNOSTIC TESTS:                            10.8   3.09  )-----------( 275      ( 24 Dec 2022 06:21 )             34.6   CBC Full  -  ( 24 Dec 2022 06:21 )  WBC Count : 3.09 K/uL  RBC Count : 3.92 M/uL  Hemoglobin : 10.8 g/dL  Hematocrit : 34.6 %  Platelet Count - Automated : 275 K/uL  Mean Cell Volume : 88.3 fl  Mean Cell Hemoglobin : 27.6 pg  Mean Cell Hemoglobin Concentration : 31.2 gm/dL  Auto Neutrophil # : 1.87 K/uL  Auto Lymphocyte # : 0.24 K/uL  Auto Monocyte # : 0.66 K/uL  Auto Eosinophil # : 0.02 K/uL  Auto Basophil # : 0.07 K/uL  Auto Neutrophil % : 60.5 %  Auto Lymphocyte % : 7.8 %  Auto Monocyte % : 21.4 %  Auto Eosinophil % : 0.6 %  Auto Basophil % : 2.3 %        Urinalysis Basic - ( 23 Dec 2022 09:32 )    Color: Yellow / Appearance: Clear / S.015 / pH: x  Gluc: x / Ketone: Negative  / Bili: Negative / Urobili: Negative   Blood: x / Protein: 100 / Nitrite: Negative   Leuk Esterase: Negative / RBC: >50 /HPF / WBC 0-2 /HPF   Sq Epi: x / Non Sq Epi: Few /HPF / Bacteria: Moderate /HPF  12-24    144  |  106  |  19<H>  ----------------------------<  161<H>  3.2<L>   |  22  |  1.06    Ca    8.3<L>      24 Dec 2022 06:21  Phos  2.6     12-  Mg     1.9     -    TPro  6.8  /  Alb  1.8<L>  /  TBili  0.9  /  DBili  x   /  AST  19  /  ALT  35  /  AlkPhos  97  12-        LIVER FUNCTIONS - ( 22 Dec 2022 20:20 )  Alb: 1.9 g/dL / Pro: 6.5 g/dL / ALK PHOS: 94 U/L / ALT: 43 U/L DA / AST: 24 U/L / GGT: x             PT/INR - ( 22 Dec 2022 20:20 )   PT: 18.6 sec;   INR: 1.56 ratio         PTT - ( 22 Dec 2022 20:20 )  PTT:25.9 sec    LACTATE:Lactate, Blood: 1.7 mmol/L ( @ 20:20)        CULTURES:       RADIOLOGY  < from: Xray Chest 1 View- PORTABLE-Urgent (22 @ 20:58) >  ACC: [id number] EXAM:  XR CHEST PORTABLE URGENT 1V                          PROCEDURE DATE:  2022          INTERPRETATION:  INDICATION: Throat pain. Sepsis    Portable chest 8:42 PM    COMPARISON: 10/25/2022    Overlying tubing.    FINDINGS:  Heart/Vascular: The heart size, mediastinum, hilum and aorta are within   normal limits for projection.  Pulmonary: Midline trachea. There is no focal infiltrate, congestion or   effusion.    Bones: There is no fracture.  Lines and catheter: None    Impression:    No acute pulmonary disease.

## 2022-12-25 ENCOUNTER — TRANSCRIPTION ENCOUNTER (OUTPATIENT)
Age: 77
End: 2022-12-25

## 2022-12-25 LAB
ALBUMIN SERPL ELPH-MCNC: 1.6 G/DL — LOW (ref 3.5–5)
ALP SERPL-CCNC: 81 U/L — SIGNIFICANT CHANGE UP (ref 40–120)
ALT FLD-CCNC: 32 U/L DA — SIGNIFICANT CHANGE UP (ref 10–60)
ANION GAP SERPL CALC-SCNC: 11 MMOL/L — SIGNIFICANT CHANGE UP (ref 5–17)
AST SERPL-CCNC: 20 U/L — SIGNIFICANT CHANGE UP (ref 10–40)
BASOPHILS # BLD AUTO: 0.06 K/UL — SIGNIFICANT CHANGE UP (ref 0–0.2)
BASOPHILS NFR BLD AUTO: 2 % — SIGNIFICANT CHANGE UP (ref 0–2)
BILIRUB SERPL-MCNC: 0.7 MG/DL — SIGNIFICANT CHANGE UP (ref 0.2–1.2)
BUN SERPL-MCNC: 19 MG/DL — HIGH (ref 7–18)
CALCIUM SERPL-MCNC: 8.3 MG/DL — LOW (ref 8.4–10.5)
CHLORIDE SERPL-SCNC: 109 MMOL/L — HIGH (ref 96–108)
CO2 SERPL-SCNC: 27 MMOL/L — SIGNIFICANT CHANGE UP (ref 22–31)
CREAT SERPL-MCNC: 0.96 MG/DL — SIGNIFICANT CHANGE UP (ref 0.5–1.3)
EGFR: 82 ML/MIN/1.73M2 — SIGNIFICANT CHANGE UP
EOSINOPHIL # BLD AUTO: 0.01 K/UL — SIGNIFICANT CHANGE UP (ref 0–0.5)
EOSINOPHIL NFR BLD AUTO: 0.3 % — SIGNIFICANT CHANGE UP (ref 0–6)
GLUCOSE SERPL-MCNC: 141 MG/DL — HIGH (ref 70–99)
HCT VFR BLD CALC: 31.4 % — LOW (ref 39–50)
HGB BLD-MCNC: 9.8 G/DL — LOW (ref 13–17)
IMM GRANULOCYTES NFR BLD AUTO: 8.9 % — HIGH (ref 0–0.9)
LYMPHOCYTES # BLD AUTO: 0.33 K/UL — LOW (ref 1–3.3)
LYMPHOCYTES # BLD AUTO: 10.8 % — LOW (ref 13–44)
MAGNESIUM SERPL-MCNC: 1.7 MG/DL — SIGNIFICANT CHANGE UP (ref 1.6–2.6)
MCHC RBC-ENTMCNC: 27.7 PG — SIGNIFICANT CHANGE UP (ref 27–34)
MCHC RBC-ENTMCNC: 31.2 GM/DL — LOW (ref 32–36)
MCV RBC AUTO: 88.7 FL — SIGNIFICANT CHANGE UP (ref 80–100)
MONOCYTES # BLD AUTO: 0.58 K/UL — SIGNIFICANT CHANGE UP (ref 0–0.9)
MONOCYTES NFR BLD AUTO: 19 % — HIGH (ref 2–14)
NEUTROPHILS # BLD AUTO: 1.8 K/UL — SIGNIFICANT CHANGE UP (ref 1.8–7.4)
NEUTROPHILS NFR BLD AUTO: 59 % — SIGNIFICANT CHANGE UP (ref 43–77)
NRBC # BLD: 2 /100 WBCS — HIGH (ref 0–0)
PHOSPHATE SERPL-MCNC: 2.2 MG/DL — LOW (ref 2.5–4.5)
PLATELET # BLD AUTO: 288 K/UL — SIGNIFICANT CHANGE UP (ref 150–400)
POTASSIUM SERPL-MCNC: 3.7 MMOL/L — SIGNIFICANT CHANGE UP (ref 3.5–5.3)
POTASSIUM SERPL-SCNC: 3.7 MMOL/L — SIGNIFICANT CHANGE UP (ref 3.5–5.3)
PROT SERPL-MCNC: 5.9 G/DL — LOW (ref 6–8.3)
RBC # BLD: 3.54 M/UL — LOW (ref 4.2–5.8)
RBC # FLD: 16.7 % — HIGH (ref 10.3–14.5)
SODIUM SERPL-SCNC: 147 MMOL/L — HIGH (ref 135–145)
WBC # BLD: 3.05 K/UL — LOW (ref 3.8–10.5)
WBC # FLD AUTO: 3.05 K/UL — LOW (ref 3.8–10.5)

## 2022-12-25 PROCEDURE — 99232 SBSQ HOSP IP/OBS MODERATE 35: CPT

## 2022-12-25 RX ORDER — SODIUM,POTASSIUM PHOSPHATES 278-250MG
1 POWDER IN PACKET (EA) ORAL
Refills: 0 | Status: COMPLETED | OUTPATIENT
Start: 2022-12-25 | End: 2022-12-26

## 2022-12-25 RX ADMIN — Medication 1 APPLICATION(S): at 11:54

## 2022-12-25 RX ADMIN — Medication 1 PACKET(S): at 17:55

## 2022-12-25 RX ADMIN — ENOXAPARIN SODIUM 40 MILLIGRAM(S): 100 INJECTION SUBCUTANEOUS at 05:37

## 2022-12-25 RX ADMIN — CEFEPIME 100 MILLIGRAM(S): 1 INJECTION, POWDER, FOR SOLUTION INTRAMUSCULAR; INTRAVENOUS at 05:37

## 2022-12-25 RX ADMIN — CEFEPIME 100 MILLIGRAM(S): 1 INJECTION, POWDER, FOR SOLUTION INTRAMUSCULAR; INTRAVENOUS at 17:55

## 2022-12-25 RX ADMIN — REMDESIVIR 200 MILLIGRAM(S): 5 INJECTION INTRAVENOUS at 15:57

## 2022-12-25 RX ADMIN — ENOXAPARIN SODIUM 40 MILLIGRAM(S): 100 INJECTION SUBCUTANEOUS at 17:55

## 2022-12-25 NOTE — DISCHARGE NOTE PROVIDER - ATTENDING DISCHARGE PHYSICAL EXAMINATION:
Vital Signs Last 24 Hrs  T(C): 36.9 (29 Dec 2022 13:32), Max: 36.9 (29 Dec 2022 13:32)  T(F): 98.4 (29 Dec 2022 13:32), Max: 98.4 (29 Dec 2022 13:32)  HR: 100 (29 Dec 2022 13:32) (93 - 109)  BP: 105/70 (29 Dec 2022 13:32) (105/70 - 143/86)  BP(mean): --  RR: 16 (29 Dec 2022 13:32) (16 - 19)  SpO2: 99% (29 Dec 2022 13:32) (98% - 100%)    Parameters below as of 29 Dec 2022 13:32  Patient On (Oxygen Delivery Method): room air    PHYSICAL EXAM:  GENERAL: NAD  HEAD:  Atraumatic, Normocephalic  EYES: conjunctiva and sclera clear  ENMT:  Moist mucous membranes  NECK:  No JVD  NERVOUS SYSTEM:  Alert & Oriented  CHEST/LUNG: C/ta bilaterally; No rales, rhonchi, wheezing  HEART: Regular rate and rhythm  ABDOMEN: Soft, Nontender, Nondistended; Bowel sounds present, PEG in place   EXTREMITIES:  2+ Peripheral Pulses  SKIN: large area of denuded skin over anterior aspect of neck (radiation induced)

## 2022-12-25 NOTE — DISCHARGE NOTE PROVIDER - CARE PROVIDER_API CALL
Dr. Segundo Velasquez., Oncology  Phone: (   )    -  Fax: (   )    -  Follow Up Time:     Dave Bowen (DO)  [facility]  [address]  [place] [id number]  Phone: [phone]  Fax: [phone]  Follow Up Time:    Dave Bowen ()  Family Medicine  [address] [facility] [facility]  [place] [address]  Phone: [phone]  Fax: [phone]  Follow Up Time:     Dr. Segundo Velasquez., Oncology  Phone: (   )    -  Fax: (   )    -  Follow Up Time:     Abdoulaye Pittman (MD; MBBS)  Hematology; [facility]; Medical Oncology  [address] [place] [address]  Phone: [phone]  Fax: [phone]  Established Patient  Follow Up Time: 2 weeks

## 2022-12-25 NOTE — DISCHARGE NOTE PROVIDER - NSDCHHNEEDSERVICE_GEN_ALL_CORE
Medication teaching and assessment/Rehabilitation services/Wound care and assessment Medication teaching and assessment/Ostomy care and management/Rehabilitation services/Wound care and assessment

## 2022-12-25 NOTE — PROGRESS NOTE ADULT - PROBLEM SELECTOR PLAN 3
Patient with concern for tube feed leakage but was resumed again after reconnecting by NP, will continue to monitor  resume tube feeds

## 2022-12-25 NOTE — DIETITIAN NUTRITION RISK NOTIFICATION - TREATMENT: THE FOLLOWING DIET HAS BEEN RECOMMENDED
Diet, NPO with Tube Feed:   Tube Feeding Modality: Gastrostomy  Glucerna 1.5 Earl  Total Volume for 24 Hours (mL): 480  Continuous  Starting Tube Feed Rate {mL per Hour}: 10  Increase Tube Feed Rate by (mL): 10     Every 6 hours  Until Goal Tube Feed Rate (mL per Hour): 20  Tube Feed Duration (in Hours): 24  Tube Feed Start Time: 21:00  Bolus   Frequency: Every 4 Hours   Total Daily Volume of Flush (mL): 100    Start Time: 00:01 (12-23-22 @ 20:49) [Active]

## 2022-12-25 NOTE — DISCHARGE NOTE PROVIDER - NSDCCPCAREPLAN_GEN_ALL_CORE_FT
PRINCIPAL DISCHARGE DIAGNOSIS  Diagnosis: Sepsis  Assessment and Plan of Treatment: You were followed by infectious disease team for sepsis likely due to COIVD infection and recent chemo therapy. blood and urine culture negative. discontinued antibiotic as per ID recommendation.   No apparent signs of infection at this time.   Follow up with your primary MD after discharge.   If you develop fever, chills, malaise, or change in mental status call your Health Care Provider or go to the Emergency Department.  Nutrition is important, eat small frequent meals to help ensure you get adequate calories.  Do not stay in bed all day!  Increase your activity daily as tolerated.        SECONDARY DISCHARGE DIAGNOSES  Diagnosis: 2019 novel coronavirus disease (COVID-19)  Assessment and Plan of Treatment: You don't require oxygen therapy or treatment for COVId infection.   CORONAVIRUS INSTRUCTIONS: Based on your current clinical status and stability, it has been determined that you no longer need hospitalization and can recover while remaining in self-quarantine at home. You should follow the prevention steps below until a healthcare provider or local or state health department says you can return to your normal activities. 1. You should restrict activities outside your home, except for getting medical care. 2. Do not go to work, school, or public areas. 3. Avoid using public transportation, ride-sharing, or taxis. 4. Separate yourself from other people and animals in your home as much as possible.  When you are around other people (e.g., sharing a room or vehicle) you should wear a facemask.  5. Wash your hands often with soap and water for at least 20 seconds, especially after blowing your nose, coughing, or sneezing; going to the bathroom; and before eating or preparing food.6. Cover your mouth and nose with a tissue when you cough or sneeze. Throw used tissues in a lined trash can. Immediately wash your hands with soap and water for at least 20 seconds7. High touch surfaces include counters, tabletops, doorknobs, bathroom fixtures, toilets, phones, keyboards, tablets, and bedside tables.8. Avoid sharing dishes, drinking glasses, cups, eating utensils, towels, or bedding with other people or pets in your home. After using these items, they should be washed thoroughly with soap and water.You are strongly advised to seek prompt medical attention if your illness worsens or you develop new symptoms like fever or difficulty breathing.      Diagnosis: Neutropenic fever  Assessment and Plan of Treatment: Resolved.   likely due to recent COVID infection and chemo therapy.   Continue following up with your oncologist.    Diagnosis: Oropharyngeal mass  Assessment and Plan of Treatment: Follow up with your oncololgy. continue pain management.    Diagnosis: Oral mucositis  Assessment and Plan of Treatment: continue oral magic mouth wash as prescribed. Maintain good oral care. Follow up with your primary MD.    Diagnosis: Skin excoriation  Assessment and Plan of Treatment: continue topical ointment apply to your neck excoriated area. maintain clean and dry to affected site. follow up with your primary MD.    Diagnosis: Anemia of chronic disease  Assessment and Plan of Treatment: You received 1 unit blood transfusion for worsening anemia and now improving. Conitnue follow up with hematology after discharge.    Diagnosis: HTN (hypertension)  Assessment and Plan of Treatment: Low salt diet  Activity as tolerated.  Take all medication as prescribed.  Follow up with your medical doctor for routine blood pressure monitoring at your next visit.  Notify your doctor if you have any of the following symptoms:   Dizziness, Lightheadedness, Blurry vision, Headache, Chest pain, Shortness of breath      Diagnosis: BPH (benign prostatic hyperplasia)  Assessment and Plan of Treatment: continue home medication as prescribed.   Follow up with your PMD.   You have an enlarged prostate gland which gets bigger as men get older - it is a very common problem and has nothing to do with prostate cancer  Call your doctor if you are urinating more frequently, have trouble starting to urinate, have weak stream, urine leaking or dribbling, and feeling as though bladder is not empty after urination      Diagnosis: Type 2 diabetes mellitus  Assessment and Plan of Treatment: HgA1C -------this admission.  Make sure you get your HgA1c checked every three months.  continue home medicaitons as prescribed.  follow up with your PMD after discharge.  Monitor blood sugar befor bolus feeding.   If you have not seen your ophthalmologist this year call for appointment.  Check your feet daily for redness, sores, or openings. Do not self treat. If no improvement in two days call your primary care physician for an appointment.  Low blood sugar (hypoglycemia) is a blood sugar below 70mg/dl. Check your blood sugar if you feel signs/symptoms of hypoglycemia. If your blood sugar is below 70 take 15 grams of carbohydrates (ex 4 oz of apple juice, 3-4 glucose tablets, or 4-6 oz of regular soda) wait 15 minutes and repeat blood sugar to make sure it comes up above 70.  If your blood sugar is above 70 and you are due for a meal, have a meal.  If you are not due for a meal have a snack.  This snack helps keeps your blood sugar at a safe range.      Diagnosis: Gastrostomy tube dependent  Assessment and Plan of Treatment: continue tube feeding as prescribed.   Follow up with your primary MD.     PRINCIPAL DISCHARGE DIAGNOSIS  Diagnosis: Sepsis  Assessment and Plan of Treatment: You were followed by infectious disease team for sepsis likely due to COIVD infection and recent chemo therapy. blood and urine culture negative. discontinued antibiotic as per ID recommendation.   No apparent signs of infection at this time.   Follow up with your primary MD after discharge.   If you develop fever, chills, malaise, or change in mental status call your Health Care Provider or go to the Emergency Department.  Nutrition is important, eat small frequent meals to help ensure you get adequate calories.  Do not stay in bed all day!  Increase your activity daily as tolerated.        SECONDARY DISCHARGE DIAGNOSES  Diagnosis: 2019 novel coronavirus disease (COVID-19)  Assessment and Plan of Treatment: You don't require oxygen therapy or treatment for COVId infection.   CORONAVIRUS INSTRUCTIONS: Based on your current clinical status and stability, it has been determined that you no longer need hospitalization and can recover while remaining in self-quarantine at home. You should follow the prevention steps below until a healthcare provider or local or state health department says you can return to your normal activities. 1. You should restrict activities outside your home, except for getting medical care. 2. Do not go to work, school, or public areas. 3. Avoid using public transportation, ride-sharing, or taxis. 4. Separate yourself from other people and animals in your home as much as possible.  When you are around other people (e.g., sharing a room or vehicle) you should wear a facemask.  5. Wash your hands often with soap and water for at least 20 seconds, especially after blowing your nose, coughing, or sneezing; going to the bathroom; and before eating or preparing food.6. Cover your mouth and nose with a tissue when you cough or sneeze. Throw used tissues in a lined trash can. Immediately wash your hands with soap and water for at least 20 seconds7. High touch surfaces include counters, tabletops, doorknobs, bathroom fixtures, toilets, phones, keyboards, tablets, and bedside tables.8. Avoid sharing dishes, drinking glasses, cups, eating utensils, towels, or bedding with other people or pets in your home. After using these items, they should be washed thoroughly with soap and water.You are strongly advised to seek prompt medical attention if your illness worsens or you develop new symptoms like fever or difficulty breathing.      Diagnosis: Neutropenic fever  Assessment and Plan of Treatment: Resolved.   likely due to recent COVID infection and chemo therapy.   Continue following up with your oncologist.    Diagnosis: Oropharyngeal mass  Assessment and Plan of Treatment: Follow up with your oncololgy. continue pain management.    Diagnosis: Oral mucositis  Assessment and Plan of Treatment: continue oral magic mouth wash as prescribed. Maintain good oral care. Follow up with your primary MD.    Diagnosis: Skin excoriation  Assessment and Plan of Treatment: continue topical ointment apply to your neck excoriated area. maintain clean and dry to affected site. follow up with your primary MD.    Diagnosis: Anemia of chronic disease  Assessment and Plan of Treatment: You received 1 unit blood transfusion for worsening anemia and now improving. Conitnue follow up with hematology after discharge.    Diagnosis: HTN (hypertension)  Assessment and Plan of Treatment: Low salt diet  Activity as tolerated.  Take all medication as prescribed.  Follow up with your medical doctor for routine blood pressure monitoring at your next visit.  Notify your doctor if you have any of the following symptoms:   Dizziness, Lightheadedness, Blurry vision, Headache, Chest pain, Shortness of breath      Diagnosis: BPH (benign prostatic hyperplasia)  Assessment and Plan of Treatment: continue home medication as prescribed.   Follow up with your PMD.   You have an enlarged prostate gland which gets bigger as men get older - it is a very common problem and has nothing to do with prostate cancer  Call your doctor if you are urinating more frequently, have trouble starting to urinate, have weak stream, urine leaking or dribbling, and feeling as though bladder is not empty after urination      Diagnosis: Type 2 diabetes mellitus  Assessment and Plan of Treatment: Make sure you get your HgA1c checked every three months.  continue home medicaitons as prescribed.  follow up with your PMD after discharge.  Monitor blood sugar befor bolus feeding.   If you have not seen your ophthalmologist this year call for appointment.  Check your feet daily for redness, sores, or openings. Do not self treat. If no improvement in two days call your primary care physician for an appointment.  Low blood sugar (hypoglycemia) is a blood sugar below 70mg/dl. Check your blood sugar if you feel signs/symptoms of hypoglycemia. If your blood sugar is below 70 take 15 grams of carbohydrates (ex 4 oz of apple juice, 3-4 glucose tablets, or 4-6 oz of regular soda) wait 15 minutes and repeat blood sugar to make sure it comes up above 70.  If your blood sugar is above 70 and you are due for a meal, have a meal.  If you are not due for a meal have a snack.  This snack helps keeps your blood sugar at a safe range.      Diagnosis: Gastrostomy tube dependent  Assessment and Plan of Treatment: continue tube feeding as prescribed.   Follow up with your primary MD.     PRINCIPAL DISCHARGE DIAGNOSIS  Diagnosis: Severe sepsis  Assessment and Plan of Treatment: You met severe sepsis criteria on admission, you were empirically treated with antibiotics. You were evaluated by infectious disease expert, later all antibiotocs were stopped. You ahve no further fever and clinically improved.      SECONDARY DISCHARGE DIAGNOSES  Diagnosis: 2019 novel coronavirus disease (COVID-19)  Assessment and Plan of Treatment: You don't require oxygen therapy or treatment for COVId infection.   CORONAVIRUS INSTRUCTIONS: Based on your current clinical status and stability, it has been determined that you no longer need hospitalization and can recover while remaining in self-quarantine at home. You should follow the prevention steps below until a healthcare provider or local or state health department says you can return to your normal activities. 1. You should restrict activities outside your home, except for getting medical care. 2. Do not go to work, school, or public areas. 3. Avoid using public transportation, ride-sharing, or taxis. 4. Separate yourself from other people and animals in your home as much as possible.  When you are around other people (e.g., sharing a room or vehicle) you should wear a facemask.  5. Wash your hands often with soap and water for at least 20 seconds, especially after blowing your nose, coughing, or sneezing; going to the bathroom; and before eating or preparing food.6. Cover your mouth and nose with a tissue when you cough or sneeze. Throw used tissues in a lined trash can. Immediately wash your hands with soap and water for at least 20 seconds7. High touch surfaces include counters, tabletops, doorknobs, bathroom fixtures, toilets, phones, keyboards, tablets, and bedside tables.8. Avoid sharing dishes, drinking glasses, cups, eating utensils, towels, or bedding with other people or pets in your home. After using these items, they should be washed thoroughly with soap and water.You are strongly advised to seek prompt medical attention if your illness worsens or you develop new symptoms like fever or difficulty breathing.      Diagnosis: Oral mucositis  Assessment and Plan of Treatment: continue oral magic mouth wash as prescribed. Maintain good oral care. Follow up with your primary MD.    Diagnosis: Radiation-induced dermatosis  Assessment and Plan of Treatment: C/w cream as prescribed. Local wound care. Follow up with your oncologist.    Diagnosis: Oropharyngeal neoplasm  Assessment and Plan of Treatment: Follow up with your oncololgy. continue pain management.    Diagnosis: Anemia of chronic disease  Assessment and Plan of Treatment: You received 1 unit blood transfusion for worsening anemia and now improving. Conitnue follow up with hematology after discharge.    Diagnosis: HTN (hypertension)  Assessment and Plan of Treatment: Low salt diet  Activity as tolerated.  Take all medication as prescribed.  Follow up with your medical doctor for routine blood pressure monitoring at your next visit.  Notify your doctor if you have any of the following symptoms:   Dizziness, Lightheadedness, Blurry vision, Headache, Chest pain, Shortness of breath      Diagnosis: BPH (benign prostatic hyperplasia)  Assessment and Plan of Treatment: continue home medication as prescribed.   Follow up with your PMD.   You have an enlarged prostate gland which gets bigger as men get older - it is a very common problem and has nothing to do with prostate cancer  Call your doctor if you are urinating more frequently, have trouble starting to urinate, have weak stream, urine leaking or dribbling, and feeling as though bladder is not empty after urination      Diagnosis: Type 2 diabetes mellitus  Assessment and Plan of Treatment: Make sure you get your HgA1c checked every three months.  continue home medicaitons as prescribed.  follow up with your PMD after discharge.  Monitor blood sugar befor bolus feeding.   If you have not seen your ophthalmologist this year call for appointment.  Check your feet daily for redness, sores, or openings. Do not self treat. If no improvement in two days call your primary care physician for an appointment.  Low blood sugar (hypoglycemia) is a blood sugar below 70mg/dl. Check your blood sugar if you feel signs/symptoms of hypoglycemia. If your blood sugar is below 70 take 15 grams of carbohydrates (ex 4 oz of apple juice, 3-4 glucose tablets, or 4-6 oz of regular soda) wait 15 minutes and repeat blood sugar to make sure it comes up above 70.  If your blood sugar is above 70 and you are due for a meal, have a meal.  If you are not due for a meal have a snack.  This snack helps keeps your blood sugar at a safe range.      Diagnosis: Gastrostomy tube dependent  Assessment and Plan of Treatment: continue tube feeding as prescribed.   Follow up with your primary MD.

## 2022-12-25 NOTE — PROGRESS NOTE ADULT - SUBJECTIVE AND OBJECTIVE BOX
Patient is a 77y old  Male who presents with a chief complaint of Infection due to severe acute respiratory syndrome coronavirus 2 (SARS-CoV-2)     (25 Dec 2022 11:43)      SUBJECTIVE / OVERNIGHT EVENTS: RANDOLPH events, patient no longer with fevers. Tolerating tube feeds well  ADDITIONAL REVIEW OF SYSTEMS: negative as per above    MEDICATIONS  (STANDING):  cefepime   IVPB      cefepime   IVPB 2000 milliGRAM(s) IV Intermittent every 12 hours  enoxaparin Injectable 40 milliGRAM(s) SubCutaneous every 12 hours  potassium phosphate / sodium phosphate Powder (PHOS-NaK) 1 Packet(s) Oral two times a day  silver sulfADIAZINE 1% Cream 1 Application(s) Topical daily    MEDICATIONS  (PRN):  acetaminophen   IVPB .. 750 milliGRAM(s) IV Intermittent once PRN Temp greater or equal to 38C (100.4F), Mild Pain (1 - 3)  FIRST- Mouthwash  BLM 10 milliLiter(s) Swish and Spit every 6 hours PRN Mouth Care      CAPILLARY BLOOD GLUCOSE        I&O's Summary      PHYSICAL EXAM:  Vital Signs Last 24 Hrs  T(C): 36.4 (25 Dec 2022 11:48), Max: 36.5 (25 Dec 2022 05:24)  T(F): 97.6 (25 Dec 2022 11:48), Max: 97.7 (25 Dec 2022 05:24)  HR: 104 (25 Dec 2022 11:48) (86 - 104)  BP: 159/89 (25 Dec 2022 11:48) (144/86 - 159/89)  BP(mean): --  RR: 18 (25 Dec 2022 11:48) (18 - 20)  SpO2: 100% (25 Dec 2022 11:48) (98% - 100%)    Parameters below as of 25 Dec 2022 11:48  Patient On (Oxygen Delivery Method): room air      CONSTITUTIONAL: NAD, well-developed, well-groomed  EYES: PERRLA; conjunctiva and sclera clear  ENMT: Moist oral mucosa, no pharyngeal injection or exudates; normal dentition  NECK: Supple, no palpable masses; no thyromegaly  RESPIRATORY: Normal respiratory effort; lungs are clear to auscultation bilaterally  CARDIOVASCULAR: Regular rate and rhythm, normal S1 and S2, no murmur/rub/gallop; No lower extremity edema; Peripheral pulses are 2+ bilaterally  ABDOMEN: Nontender to palpation, normoactive bowel sounds, no rebound/guarding; No hepatosplenomegaly  MUSCLOSKELETAL:  Normal gait; no clubbing or cyanosis of digits; no joint swelling or tenderness to palpation  PSYCH: A+O to person, place, and time; affect appropriate  NEUROLOGY: CN 2-12 are intact and symmetric; no gross sensory deficits;   SKIN: No rashes; no palpable lesions    LABS:                        9.8    3.05  )-----------( 288      ( 25 Dec 2022 07:14 )             31.4     12-25    147<H>  |  109<H>  |  19<H>  ----------------------------<  141<H>  3.7   |  27  |  0.96    Ca    8.3<L>      25 Dec 2022 07:14  Phos  2.2     12-25  Mg     1.7     12-25    TPro  5.9<L>  /  Alb  1.6<L>  /  TBili  0.7  /  DBili  x   /  AST  20  /  ALT  32  /  AlkPhos  81  12-25              Culture - Urine (collected 23 Dec 2022 09:32)  Source: Clean Catch Clean Catch (Midstream)  Final Report (24 Dec 2022 15:23):    <10,000 CFU/mL Normal Urogenital Leilani    Culture - Blood (collected 22 Dec 2022 20:35)  Source: .Blood Blood-Peripheral  Preliminary Report (24 Dec 2022 02:02):    No growth to date.    Culture - Blood (collected 22 Dec 2022 20:20)  Source: .Blood Blood-Peripheral  Preliminary Report (24 Dec 2022 02:02):    No growth to date.      Trend Procalcitonin        Ferritin, Serum: 1863 ng/mL (12-24-22 @ 06:21)    D-Dimer:  3101 ng/mL DDU (12-24-22 @ 06:21)      RADIOLOGY & ADDITIONAL TESTS:  Results Reviewed:   Imaging Personally Reviewed:  Electrocardiogram Personally Reviewed:    COORDINATION OF CARE:  Care Discussed with Consultants/Other Providers [Y/N]:  Prior or Outpatient Records Reviewed [Y/N]:

## 2022-12-25 NOTE — DISCHARGE NOTE PROVIDER - PROVIDER TOKENS
FREE:[LAST:[Dr. Segundo Velasquez.],FIRST:[Oncology],PHONE:[(   )    -],FAX:[(   )    -]],PROVIDER:[TOKEN:[[id number]]] PROVIDER:[TOKEN:[[id number]]],FREE:[LAST:[Dr. Segundo Velasquez.],FIRST:[Oncology],PHONE:[(   )    -],FAX:[(   )    -]],PROVIDER:[TOKEN:[[id number]],FOLLOWUP:[2 weeks],ESTABLISHEDPATIENT:[T]]

## 2022-12-25 NOTE — DISCHARGE NOTE PROVIDER - NSDCFUSCHEDAPPT_GEN_ALL_CORE_FT
[facility]  [facility]  Scheduled Appointment: 12/28/2022    [facility]  Scheduled Appointment: 12/31/2022    Valeria Moran  [facility]  INTMED [address]  Scheduled Appointment: 01/06/2023    Khadra Rousseau  [facility]  NEUROLOGY [address]  Scheduled Appointment: 01/19/2023    Yossi Vee  [facility]  CARDIOLOGY [address]  Scheduled Appointment: 02/14/2023    Segundo Velasquez  [facility]  RADMED [address]  Scheduled Appointment: 02/15/2023     [facility] Physician [facility]  [facility]  Scheduled Appointment: 12/31/2022    Valeria Moran  [facility]  INTMED [address]  Scheduled Appointment: 01/06/2023    Khadra Rousseau  [facility]  NEUROLOGY [address]  Scheduled Appointment: 01/19/2023    Yossi Vee  [facility]  CARDIOLOGY [address]  Scheduled Appointment: 02/14/2023    Segundo Velasquez  [facility]  RADMED [address]  Scheduled Appointment: 02/15/2023

## 2022-12-25 NOTE — DIETITIAN INITIAL EVALUATION ADULT - ENTERAL
Glucerna 1.5 initial Goal Rate @  85 ml /hr x 16 hr as tolerated to Provide  1360 ml, 2040 Kcal ,  Protein 112 gms , free water 1032 ml /day.

## 2022-12-25 NOTE — DISCHARGE NOTE PROVIDER - HOSPITAL COURSE
77 year old male with a PHMx of hypertension, hypercholesteremia, BPH and oropharyngeal mass s/p resection, on radiation and chemo (last dose of chemo was last week) presenting to the ED with complaints of irritation and burning in his throat for three days, Patient was found to be septic 2/2 COVID +. Admitted for further management.   Sepsis: Pt with  hx of oropharyngeal mass s/p resection, on radiation and chemo . ANC > 1000 currently. CXR no infiltrates. COVID positive. Continue Remdesivir for 4 days. Monitor oxygenation.  Continue cefepime 2gm q24 hour. Blood cx pre parkinson NGTD. ID following.   Gastrostomy tube dependent ,with concern for tube feed leakage but was resumed again after reconnecting by NP, will continue to monitor . Resume tube feeds. Oropharyngeal mass s/p resection, on radiation and chemo (last dose of chemo was last week)  follows up with Dr. Segundo Velasquez.         <<-----Click on this checkbox to enter Post-Op Dx A 77 year old male with a PHMx of hypertension, hypercholesteremia, BPH and oropharyngeal mass s/p resection, on radiation and chemo (last dose of chemo was last week) presenting to the ED with complaints of irritation and burning in his throat for three days, Patient was admitted for Sepsis  2/2 COVID + and neutropenic fever. Admitted for further management. ID and Heme/onc consulted. started Cefepime per ID. s/p 1 unit PRBC for low H/H on 12/23. now improving Hgb. Fever resolved.   GI consulted for malfunction Peg tube, adjusted at bedside, now resumed feeding/med per GI.   urine /blood culture negative. D/C'd ABT per ID.   Pt with mild hypernatremia, started free water via PEG, pt refusing to repeat lab.    Pt has HHA service, CM to follow to reinstate service.     Patient is medically optimized for discharge home with home service and outpatient follow up.   Please refer to medical records /consult/progress notes for in depth hospital course.            A 77 year old male with a PHMx of hypertension, hypercholesteremia, BPH and oropharyngeal mass s/p resection, on radiation and chemo (last dose of chemo was last week) presenting to the ED with complaints of irritation and burning in his throat for three days, Patient was admitted for Sepsis  2/2 COVID + and neutropenic fever. Admitted for further management. ID and Heme/onc consulted. started Cefepime per ID. s/p 1 unit PRBC for low H/H on 12/23. now improving Hgb. Fever resolved.   GI consulted for malfunction Peg tube, adjusted at bedside, now resumed feeding/med per GI.   urine /blood culture negative. D/C'd ABT per ID.   Pt with mild hypernatremia, started free water via PEG, serum sodium improved to 143.  Pt requesting wound care home care services, CM following.    Patient is medically optimized for discharge home with home service and outpatient follow up.   Please refer to medical records /consult/progress notes for in depth hospital course.

## 2022-12-25 NOTE — DISCHARGE NOTE PROVIDER - DETAILS OF MALNUTRITION DIAGNOSIS/DIAGNOSES
This patient has been assessed with a concern for Malnutrition and was treated during this hospitalization for the following Nutrition diagnosis/diagnoses:     -  12/25/2022: Severe protein-calorie malnutrition

## 2022-12-25 NOTE — DIETITIAN INITIAL EVALUATION ADULT - NS FNS DIET ORDER
Pt carried to peds 53. Pt placed in gown. POC explained. Call light within reach. Denies needs at this time. Will continue to monitor.      NPO w TF Glucerna 1.5 20 ml /hr x 24 hr

## 2022-12-25 NOTE — DIETITIAN INITIAL EVALUATION ADULT - ENERGY INTAKE
Pt visited. Pt is On Airborne isolation. Pt with COVID +. Pt Reports At  Home on  Bolus  feed ~ 5 cans/ day.  Pt  S/P PEG ( 11/22) . Per pt +  significant weight loss . Per Pt  Lb. Current wt  140 lb Per Pt. Bed scale 140 Lb. + weight loss of ~ 49 LBS x  ~ 6 MONTHS . NFPE  Performed. At preset Pt is in Glucerna 1.5 @ 20 ml /hr x 16 hr  Providing 320 ml, 480 Kcal, Protein 26 gms, Free water 243 ml day. Current Tf tolerated. Per pt H/O DM used to take oral hypoglycemic agent but not on any more. D/W RN  TF tolerated.  Enteral feed NOT at goal rate

## 2022-12-25 NOTE — DIETITIAN INITIAL EVALUATION ADULT - PERTINENT MEDS FT
MEDICATIONS  (STANDING):  cefepime   IVPB      cefepime   IVPB 2000 milliGRAM(s) IV Intermittent every 12 hours  enoxaparin Injectable 40 milliGRAM(s) SubCutaneous every 12 hours  potassium phosphate / sodium phosphate Powder (PHOS-NaK) 1 Packet(s) Oral two times a day  remdesivir  IVPB   IV Intermittent   remdesivir  IVPB 100 milliGRAM(s) IV Intermittent every 24 hours  silver sulfADIAZINE 1% Cream 1 Application(s) Topical daily  sodium chloride 0.9%. 1000 milliLiter(s) (80 mL/Hr) IV Continuous <Continuous>  sodium chloride 0.9%. 1000 milliLiter(s) (80 mL/Hr) IV Continuous <Continuous>    MEDICATIONS  (PRN):  acetaminophen   IVPB .. 750 milliGRAM(s) IV Intermittent once PRN Temp greater or equal to 38C (100.4F), Mild Pain (1 - 3)  FIRST- Mouthwash  BLM 10 milliLiter(s) Swish and Spit every 6 hours PRN Mouth Care

## 2022-12-25 NOTE — DISCHARGE NOTE PROVIDER - CARE PROVIDERS DIRECT ADDRESSES
,DirectAddress_Unknown,[email or web address],DirectAddress_Unknown,[email or web address]

## 2022-12-25 NOTE — PROGRESS NOTE ADULT - PROBLEM SELECTOR PLAN 1
hx of oropharyngeal mass s/p resection, on radiation and chemo   complaints of irritation and burning in his throat for three days  Temp 101.7, -123, /65, 98% on RA  ANC > 1000 currently  CXR no infiltrates  COVID +  continue cefepime 2gm q24 hour, will dc abx tomorrow if final blood cx NGTD  Blood cx pre parkinson NGTD  Consulted Dr. pantoja/Dr. Mittal

## 2022-12-25 NOTE — DISCHARGE NOTE PROVIDER - NSDCMRMEDTOKEN_GEN_ALL_CORE_FT
acetaminophen 325 mg oral tablet: 2 tab(s) orally every 6 hours, As needed, Mild Pain (1 - 3)  alcohol swabs : Apply topically to affected area 4 times a day   Amaryl 2 mg oral tablet: 1 tab(s) orally once a day   amLODIPine 10 mg oral tablet: 1 tab(s) orally once a day  atorvastatin 40 mg oral tablet: 1 tab(s) orally once a day (at bedtime)  cefuroxime 250 mg oral tablet: 1 tab(s) orally 2 times a day   famotidine 10 mg oral tablet: 1 tab(s) orally 2 times a day   finasteride 5 mg oral tablet: 1 tab(s) orally once a day  gabapentin 100 mg oral capsule: 1 cap(s) orally 3 times a day  glucometer (per patient&#x27;s insurance): Test blood sugars four times a day. Dispense #1 glucometer.  metFORMIN 500 mg oral tablet: 1 tab(s) orally 2 times a day   metoclopramide 10 mg/mL oral concentrate: give 10 ml every 6 hrs as needed for nausea / vomitting  metoclopramide 10 mg/mL oral concentrate: take 10 ml  every 6 hours AS NEEDED for nausea   Pyridium 200 mg oral tablet: 1 tab(s) orally 3 times a day   tamsulosin 0.4 mg oral capsule: 1 cap(s) orally once a day  test strips (per patient&#x27;s insurance): 1 application subcutaneously 4 times a day. ** Compatible with patient&#x27;s glucometer **  traMADol 50 mg oral tablet: 1 tablet every 8 hour as needed    atorvastatin 20 mg oral tablet: 1 tab(s) orally once a day (at bedtime)  finasteride 5 mg oral tablet: 1 tab(s) orally once a day  olmesartan 20 mg oral tablet: 1 tab(s) orally once a day  Protonix 20 mg oral delayed release tablet: 1 tab(s) orally once a day   amLODIPine 10 mg oral tablet: 1 tab(s) orally once a day  atorvastatin 20 mg oral tablet: 1 tab(s) orally once a day (at bedtime)  diphenhydramine/lidocaine/aluminum hydroxide/magnesium hydroxide/simethicone mucous membrane suspension: 30 milliliter(s) mucous membrane once a day   finasteride 5 mg oral tablet: 1 tab(s) orally once a day  folic acid 1 mg oral tablet: 1 tab(s) orally once a day  mupirocin 2% topical ointment: Apply topically to affected area 2 times a day   olmesartan 20 mg oral tablet: 1 tab(s) orally once a day  pantoprazole 40 mg intravenous injection: 40 milligram(s) intravenous once a day  silver sulfADIAZINE 1% topical cream: 1 application topically once a day  tamsulosin 0.4 mg oral capsule: 1 cap(s) orally once a day (at bedtime)   amLODIPine 10 mg oral tablet: 1 tab(s) orally once a day  atorvastatin 20 mg oral tablet: 1 tab(s) orally once a day (at bedtime)  diphenhydramine/lidocaine/aluminum hydroxide/magnesium hydroxide/simethicone mucous membrane suspension: 30 milliliter(s) mucous membrane once a day   finasteride 5 mg oral tablet: 1 tab(s) orally once a day  folic acid 1 mg oral tablet: 1 tab(s) orally once a day  mupirocin 2% topical ointment: Apply topically to affected area 2 times a day   pantoprazole 40 mg intravenous injection: 40 milligram(s) intravenous once a day  silver sulfADIAZINE 1% topical cream: 1 application topically once a day  tamsulosin 0.4 mg oral capsule: 1 cap(s) orally once a day (at bedtime)  Xarelto 10 mg oral tablet: 1 tab(s) orally once a day

## 2022-12-25 NOTE — DIETITIAN INITIAL EVALUATION ADULT - PERTINENT LABORATORY DATA
12-25    147<H>  |  109<H>  |  19<H>  ----------------------------<  141<H>  3.7   |  27  |  0.96    Ca    8.3<L>      25 Dec 2022 07:14  Phos  2.2     12-25  Mg     1.7     12-25    TPro  5.9<L>  /  Alb  1.6<L>  /  TBili  0.7  /  DBili  x   /  AST  20  /  ALT  32  /  AlkPhos  81  12-25  A1C with Estimated Average Glucose Result: 14.1 % (02-17-22 @ 10:17)

## 2022-12-26 DIAGNOSIS — Z02.9 ENCOUNTER FOR ADMINISTRATIVE EXAMINATIONS, UNSPECIFIED: ICD-10-CM

## 2022-12-26 DIAGNOSIS — K12.30 ORAL MUCOSITIS (ULCERATIVE), UNSPECIFIED: ICD-10-CM

## 2022-12-26 DIAGNOSIS — T14.8XXA OTHER INJURY OF UNSPECIFIED BODY REGION, INITIAL ENCOUNTER: ICD-10-CM

## 2022-12-26 DIAGNOSIS — N40.0 BENIGN PROSTATIC HYPERPLASIA WITHOUT LOWER URINARY TRACT SYMPTOMS: ICD-10-CM

## 2022-12-26 DIAGNOSIS — I10 ESSENTIAL (PRIMARY) HYPERTENSION: ICD-10-CM

## 2022-12-26 DIAGNOSIS — D63.8 ANEMIA IN OTHER CHRONIC DISEASES CLASSIFIED ELSEWHERE: ICD-10-CM

## 2022-12-26 DIAGNOSIS — D70.9 NEUTROPENIA, UNSPECIFIED: ICD-10-CM

## 2022-12-26 DIAGNOSIS — E11.9 TYPE 2 DIABETES MELLITUS WITHOUT COMPLICATIONS: ICD-10-CM

## 2022-12-26 LAB
GLUCOSE BLDC GLUCOMTR-MCNC: 123 MG/DL — HIGH (ref 70–99)
GLUCOSE BLDC GLUCOMTR-MCNC: 133 MG/DL — HIGH (ref 70–99)
GLUCOSE BLDC GLUCOMTR-MCNC: 136 MG/DL — HIGH (ref 70–99)
MRSA PCR RESULT.: SIGNIFICANT CHANGE UP
S AUREUS DNA NOSE QL NAA+PROBE: DETECTED

## 2022-12-26 PROCEDURE — 99232 SBSQ HOSP IP/OBS MODERATE 35: CPT

## 2022-12-26 RX ORDER — INSULIN LISPRO 100/ML
VIAL (ML) SUBCUTANEOUS EVERY 6 HOURS
Refills: 0 | Status: DISCONTINUED | OUTPATIENT
Start: 2022-12-26 | End: 2022-12-29

## 2022-12-26 RX ORDER — DEXTROSE 50 % IN WATER 50 %
25 SYRINGE (ML) INTRAVENOUS ONCE
Refills: 0 | Status: DISCONTINUED | OUTPATIENT
Start: 2022-12-26 | End: 2022-12-29

## 2022-12-26 RX ORDER — DEXTROSE 50 % IN WATER 50 %
15 SYRINGE (ML) INTRAVENOUS ONCE
Refills: 0 | Status: DISCONTINUED | OUTPATIENT
Start: 2022-12-26 | End: 2022-12-29

## 2022-12-26 RX ORDER — DEXTROSE 50 % IN WATER 50 %
12.5 SYRINGE (ML) INTRAVENOUS ONCE
Refills: 0 | Status: DISCONTINUED | OUTPATIENT
Start: 2022-12-26 | End: 2022-12-29

## 2022-12-26 RX ORDER — SODIUM CHLORIDE 9 MG/ML
1000 INJECTION, SOLUTION INTRAVENOUS
Refills: 0 | Status: DISCONTINUED | OUTPATIENT
Start: 2022-12-26 | End: 2022-12-29

## 2022-12-26 RX ORDER — GLUCAGON INJECTION, SOLUTION 0.5 MG/.1ML
1 INJECTION, SOLUTION SUBCUTANEOUS ONCE
Refills: 0 | Status: DISCONTINUED | OUTPATIENT
Start: 2022-12-26 | End: 2022-12-29

## 2022-12-26 RX ORDER — AMLODIPINE BESYLATE 2.5 MG/1
10 TABLET ORAL DAILY
Refills: 0 | Status: DISCONTINUED | OUTPATIENT
Start: 2022-12-26 | End: 2022-12-29

## 2022-12-26 RX ORDER — TAMSULOSIN HYDROCHLORIDE 0.4 MG/1
0.4 CAPSULE ORAL AT BEDTIME
Refills: 0 | Status: DISCONTINUED | OUTPATIENT
Start: 2022-12-26 | End: 2022-12-29

## 2022-12-26 RX ADMIN — ENOXAPARIN SODIUM 40 MILLIGRAM(S): 100 INJECTION SUBCUTANEOUS at 17:51

## 2022-12-26 RX ADMIN — TAMSULOSIN HYDROCHLORIDE 0.4 MILLIGRAM(S): 0.4 CAPSULE ORAL at 22:33

## 2022-12-26 RX ADMIN — Medication 1 PACKET(S): at 05:39

## 2022-12-26 RX ADMIN — ENOXAPARIN SODIUM 40 MILLIGRAM(S): 100 INJECTION SUBCUTANEOUS at 05:38

## 2022-12-26 RX ADMIN — Medication 1 APPLICATION(S): at 11:39

## 2022-12-26 RX ADMIN — AMLODIPINE BESYLATE 10 MILLIGRAM(S): 2.5 TABLET ORAL at 19:02

## 2022-12-26 RX ADMIN — CEFEPIME 100 MILLIGRAM(S): 1 INJECTION, POWDER, FOR SOLUTION INTRAMUSCULAR; INTRAVENOUS at 05:38

## 2022-12-26 NOTE — PROGRESS NOTE ADULT - ASSESSMENT
# Severe sepsis-  # neutropenic sepsis   pt undergoing chemoXRT for H/N CA   cont abx as per ID      noted on  CBC initially ANC < 1000,  today noted improcement of WBC with adequate /ANC =1.8   does not require  growth factors support for   ANC>1000      # H/N  CA  undergoing chemoXRT , last tx 12/16/22  hold tx during hospitalization   # COVID +  TX as per ID    # Mucositis-  # dry mouth    d/t  - chemo-XRT-   cont miracle" mouthwash  use feeding tube  for feeing and meds PRN  cont nutritional supp     #XRT dermatitis  L neck   cont with  silvadene cream to affected area   explained to pt that it will take extended period of time to  improve       call with questions [phone]

## 2022-12-26 NOTE — PROGRESS NOTE ADULT - PROBLEM SELECTOR PLAN 1
hx of oropharyngeal mass s/p resection, on radiation and chemo. Immunocompromised status in the setting of recent chemo.   complaints of irritation and burning in his throat for three days  Temp 101.7, -123, /65, 98% on RA on admission  ANC > 1000 currently  CXR no infiltrates  COVID +  dc cefepime, blood cx NGTD  Consulted Dr. pantoja/Dr. Mittal

## 2022-12-26 NOTE — PROGRESS NOTE ADULT - SUBJECTIVE AND OBJECTIVE BOX
Patient is a 77y old  Male who presents with a chief complaint of Infection due to severe acute respiratory syndrome coronavirus 2 (SARS-CoV-2)     (25 Dec 2022 11:43)      SUBJECTIVE / OVERNIGHT EVENTS: RANDOLPH overnight, patient tolerating tube feeds  ADDITIONAL REVIEW OF SYSTEMS: negative as per above     MEDICATIONS  (STANDING):  enoxaparin Injectable 40 milliGRAM(s) SubCutaneous every 12 hours  silver sulfADIAZINE 1% Cream 1 Application(s) Topical daily    MEDICATIONS  (PRN):  acetaminophen   IVPB .. 750 milliGRAM(s) IV Intermittent once PRN Temp greater or equal to 38C (100.4F), Mild Pain (1 - 3)  FIRST- Mouthwash  BLM 10 milliLiter(s) Swish and Spit every 6 hours PRN Mouth Care      CAPILLARY BLOOD GLUCOSE        I&O's Summary      PHYSICAL EXAM:  Vital Signs Last 24 Hrs  T(C): 36.4 (26 Dec 2022 05:05), Max: 36.6 (25 Dec 2022 22:00)  T(F): 97.5 (26 Dec 2022 05:05), Max: 97.8 (25 Dec 2022 22:00)  HR: 90 (26 Dec 2022 05:05) (90 - 104)  BP: 155/93 (26 Dec 2022 05:05) (149/99 - 159/89)  BP(mean): --  RR: 18 (26 Dec 2022 05:05) (18 - 18)  SpO2: 100% (26 Dec 2022 05:05) (100% - 100%)    Parameters below as of 26 Dec 2022 05:05  Patient On (Oxygen Delivery Method): room air      GENERAL: NAD   HEAD:  Atraumatic, Normocephalic, L neck dermatitis   CHEST/LUNG: Clear to auscultation bilaterally   HEART: Regular rate and rhythm; No murmurs, rubs, or gallops  ABDOMEN: Soft, Nontender, G tube present  EXTREMITIES:  2+ Peripheral Pulses, No clubbing, cyanosis, or edema  PSYCH: AAOx3       LABS:                        9.8    3.05  )-----------( 288      ( 25 Dec 2022 07:14 )             31.4     12-25    147<H>  |  109<H>  |  19<H>  ----------------------------<  141<H>  3.7   |  27  |  0.96    Ca    8.3<L>      25 Dec 2022 07:14  Phos  2.2     12-25  Mg     1.7     12-25    TPro  5.9<L>  /  Alb  1.6<L>  /  TBili  0.7  /  DBili  x   /  AST  20  /  ALT  32  /  AlkPhos  81  12-25              Culture - Urine (collected 23 Dec 2022 09:32)  Source: Clean Catch Clean Catch (Midstream)  Final Report (24 Dec 2022 15:23):    <10,000 CFU/mL Normal Urogenital Leilani      Trend Procalcitonin        Ferritin, Serum: 1863 ng/mL (12-24-22 @ 06:21)    D-Dimer:  3101 ng/mL DDU (12-24-22 @ 06:21)      RADIOLOGY & ADDITIONAL TESTS:  Results Reviewed:   Imaging Personally Reviewed:  Electrocardiogram Personally Reviewed:    COORDINATION OF CARE:  Care Discussed with Consultants/Other Providers [Y/N]:  Prior or Outpatient Records Reviewed [Y/N]:

## 2022-12-26 NOTE — PROGRESS NOTE ADULT - PROBLEM SELECTOR PLAN 4
oropharyngeal mass s/p resection  on radiation and chemo (last dose of chemo was last week 12/16)  follows up with Dr. Segundo Velasquez  Protein calorie malnutrition/cachectic  PEG tube feeding, pt able to feed self with bolus  HEME/onc QMA consulted

## 2022-12-26 NOTE — PROGRESS NOTE ADULT - ASSESSMENT
1. Dysphagia  2. Peg tube malfunction adjusted functioning well at present time  3. Anemia (etiology multifactorial)  4. No evidence of acute GI bleeding    Suggestions:    1. Peg tube can be used for feeding and medication  2. Aspiration precaution  3. Protonix daily  4. Avoid NSAID  5. Monitor H/H  6. Transfuse PRBC as needed  7. Hematology follow up  8. DVT prophylaxis

## 2022-12-26 NOTE — PROGRESS NOTE ADULT - ASSESSMENT
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 Dx with sepsis in the ED and given 1 dose of pip/tazo, 1 dose of vanco, and started on cefepime. Also found to be COVID positive. Pt cont. to improve. BCs negative, no elevated temps while in hospital, and pt not neutropenic.     # Severe sepsis--met criteria for severe sepsis on admission and was on cefepime to provide empiric bacterial coverage.   --agree with discontinuation of antibiotics    # + COVID-- pt with normal R.A. 02 sat. However, in view of increased risk for severe disease, pt started on remdesivir to complete a 3d course.    # Dysphagia/mucositis-- chemo- and RT- related  --"miracle" mouthwash    # Denuding of skin at base of neck  --silvadene cream to affected area    #Immunodeficiency-- pt on chemo to Rx oropharyngeal CA. Appreciate Heme/Onc input.       Discussed with Dr. Paez. Please call again if needed.

## 2022-12-26 NOTE — PROGRESS NOTE ADULT - SUBJECTIVE AND OBJECTIVE BOX
NP Note discussed with  Primary Attending    INTERVAL HPI/OVERNIGHT EVENTS: no acute overnight event. afebrile. no complaints. He does not know about medications at home.     MEDICATIONS  (STANDING):  enoxaparin Injectable 40 milliGRAM(s) SubCutaneous every 12 hours  silver sulfADIAZINE 1% Cream 1 Application(s) Topical daily    MEDICATIONS  (PRN):  acetaminophen   IVPB .. 750 milliGRAM(s) IV Intermittent once PRN Temp greater or equal to 38C (100.4F), Mild Pain (1 - 3)  FIRST- Mouthwash  BLM 10 milliLiter(s) Swish and Spit every 6 hours PRN Mouth Care      __________________________________________________  REVIEW OF SYSTEMS:    CONSTITUTIONAL: No fever,   EYES: no acute visual disturbances  NECK: No pain or stiffness  RESPIRATORY: No cough; No shortness of breath  CARDIOVASCULAR: No chest pain, no palpitations  GASTROINTESTINAL: No pain. No nausea or vomiting; No diarrhea   NEUROLOGICAL: No headache or numbness, no tremors  MUSCULOSKELETAL: No joint pain, no muscle pain  GENITOURINARY: no dysuria, no frequency, no hesitancy  PSYCHIATRY: no depression , no anxiety  ALL OTHER  ROS negative        Vital Signs Last 24 Hrs  T(C): 36.6 (26 Dec 2022 14:40), Max: 36.6 (25 Dec 2022 22:00)  T(F): 97.8 (26 Dec 2022 14:40), Max: 97.8 (25 Dec 2022 22:00)  HR: 70 (26 Dec 2022 14:40) (70 - 91)  BP: 184/76 (26 Dec 2022 14:40) (149/99 - 184/76)  BP(mean): --  RR: 18 (26 Dec 2022 14:40) (18 - 18)  SpO2: 99% (26 Dec 2022 14:40) (99% - 100%)    Parameters below as of 26 Dec 2022 14:40  Patient On (Oxygen Delivery Method): room air    _______________________________________________  PHYSICAL EXAM:  GENERAL: NAD, cachetic  HEENT: Normocephalic;  conjunctivae and sclerae clear; moist mucous membranes;   NECK : supple  CHEST/LUNG: Clear to auscultation bilaterally with good air entry   HEART: S1 S2  regular; no murmurs, gallops or rubs  ABDOMEN: Soft, Nontender, Nondistended; Bowel sounds present, + Peg  EXTREMITIES: no cyanosis; no edema; no calf tenderness  SKIN: warm and dry; excoriation/dermatitis anterior neck and peg site with excoriation  NERVOUS SYSTEM:  Awake and alert; Oriented  to person and place.     _________________________________________________  LABS:                        9.8    3.05  )-----------( 288      ( 25 Dec 2022 07:14 )             31.4     12-25    147<H>  |  109<H>  |  19<H>  ----------------------------<  141<H>  3.7   |  27  |  0.96    Ca    8.3<L>      25 Dec 2022 07:14  Phos  2.2     12-25  Mg     1.7     12-25    TPro  5.9<L>  /  Alb  1.6<L>  /  TBili  0.7  /  DBili  x   /  AST  20  /  ALT  32  /  AlkPhos  81  12-25    CAPILLARY BLOOD GLUCOSE    RADIOLOGY & ADDITIONAL TESTS:    Imaging  Reviewed:  YES  < from: Xray Chest 1 View- PORTABLE-Urgent (12.22.22 @ 20:58) >    ACC: [id number] EXAM:  XR CHEST PORTABLE URGENT 1V                          PROCEDURE DATE:  12/22/2022          INTERPRETATION:  INDICATION: Throat pain. Sepsis    Portable chest 8:42 PM    COMPARISON: 10/25/2022    Overlying tubing.    FINDINGS:  Heart/Vascular: The heart size, mediastinum, hilum and aorta are within   normal limits for projection.  Pulmonary: Midline trachea. There is no focal infiltrate, congestion or   effusion.    Bones: There is no fracture.  Lines and catheter: None    Impression:    No acute pulmonary disease.    --- End of Report ---     YARIEL BOYD DO; Attending Radiologist  This document has been electronically signed. Dec 23 2022 11:02AM    < end of copied text >      Consultant(s) Notes Reviewed:   YES      Plan of care was discussed with patient and /or primary care giver; all questions and concerns were addressed

## 2022-12-26 NOTE — PROGRESS NOTE ADULT - ASSESSMENT
77 year old male with a PHMx of hypertension, hypercholesteremia, BPH and oropharyngeal mass s/p resection, on radiation and chemo (last dose of chemo was last week) presenting to the ED with complaints of irritation and burning in his throat for three days, Patient was admitted for Sepsis  2/2 COVID + and neutropenic fever. Admitted for further management. ID and Heme/onc consulted. started Cefepime per ID. s/p 1 unit PRBC for low H/H on 12/23. now improving Hgb.   GI consulted for malfunction Peg tube, adjusted at bedside, now resumed feeding/med per GI.   urine /blood culture negative. D/C'd ABT per ID.   Pt with mild hypernatremia, started free water via PEG, pt refusing to repeat lab today.   Pt has HHA service, CM to follow to reinstate service.       *****Pt does not recall medications, his PMD is no longer available. called pharmacy as listed multiple times, unable to reach. Please follow up for Med/Rec.

## 2022-12-26 NOTE — PROGRESS NOTE ADULT - PROBLEM SELECTOR PLAN 11
IMPROVE VTE Individual Risk Assessment          RISK                                                          Points  [  ] Previous VTE                                                3  [  ] Thrombophilia                                             2  [  ] Lower limb paralysis                                   2        (unable to hold up >15 seconds)    [  ] Current Cancer                                             2         (within 6 months)  [  x] Immobilization > 24 hrs                              1  [  ] ICU/CCU stay > 24 hours                             1  [x  ] Age > 60                                                         1    IMPROVE VTE Score:         [   2      ]      Heparin SubQ PEG tube feeding due to Oropharyngeal mass  noted malfunction  GI dr. Waddell followed and adjusted tube at bedside.  Resumed Feeding per GI

## 2022-12-26 NOTE — PROGRESS NOTE ADULT - PROBLEM SELECTOR PLAN 1
hx of oropharyngeal mass s/p resection, on radiation and chemo   complaints of irritation and burning in his throat for three days  Temp 101.7, -123, /65, 98% on RA  WBC 1,37   CXR no infiltrates  COVID + asymptomatic on room air.   s/p zosyn + cefepime, 1.5L bolus   Will continue empirically w/ cefepime as pt is immunocompromised   Urine and blood culture NGTD  Consulted ID Dr. Mittal  d/c'd Cefepime per ID.

## 2022-12-26 NOTE — PROGRESS NOTE ADULT - PROBLEM SELECTOR PLAN 3
Patient with concern for tube feed leakage but was resumed again after reconnecting by NP, will continue to monitor  resume tube feeds. Tolerating tube feeds well.  Add FWF given hypernatremia

## 2022-12-26 NOTE — PROGRESS NOTE ADULT - PROBLEM SELECTOR PLAN 7
wbc improving 3.05, Hgb 9.8  s/p 1 unit PRBC 12/23 for Hgb 7.4  UA shows + RBC  no gross hematuria  monitor CBC

## 2022-12-26 NOTE — PROGRESS NOTE ADULT - SUBJECTIVE AND OBJECTIVE BOX
[   ] ICU                                          [   ] CCU                                      [  X ] Medical Floor      Patient is a 77 year old male with dysphagia and Peg tube malfunction. GI consulted to evaluate.        77 year old male with past medical history significant for hypertension, hypercholesteremia, BPH and oropharyngeal mass s/p resection, on radiation and chemo (last dose of chemo was last week) presenting to the ED with complaints of irritation and burning in his throat for three days. Patient also c/o leaking peg tube and irritation at peg site. Patient denies abdominal pain, nausea, vomiting, hematemesis, hematochezia, melena, fever, chills, chest pain, SOB, palpitation, hematuria, dysuria or diarrhea.      Patient appears comfortable. No new complaints reported, No abdominal pain, N/V, hematemesis, hematochezia, melena, fever, chills, chest pain, SOB, cough or diarrhea reported.         PAIN MANAGEMENT:  Pain Scale:                0 /10  Pain Location:         PAST MEDICAL HISTORY  Hypertension  Hypercholesterolemia  Stage 3 chronic kidney disease        PAST SURGICAL HISTORY  Resection oropharyngeal mass        Allergies    No Known Allergies    Intolerances  None          SOCIAL HISTORY  Advanced Directives:       [ X ] Full Code       [  ] DNR  Marital Status:         [  ] M      [ X ] S      [  ] D       [  ] W  Children:       [ X ] Yes      [  ] No  Occupation:        [  ] Employed       [ X ] Unemployed       [  ] Retired  Diet:       [ X ] Regular       [  ] PEG feeding          [  ] NG tube feeding  Drug Use:           [ X ] Patient denied          [  ] Yes  Alcohol:           [ X ] No             [  ] Yes (socially)         [  ] Yes (chronic)  Tobacco:           [  ] Yes           [ X ] No      FAMILY HISTORY  [ X ] Heart Disease            [ X ] Diabetes             [ X ] HTN             [  ] Colon Cancer             [  ] Stomach Cancer              [  ] Pancreatic Cancer      VITALS  Vital Signs Last 24 Hrs  T(C): 36.4 (26 Dec 2022 05:05), Max: 36.6 (25 Dec 2022 22:00)  T(F): 97.5 (26 Dec 2022 05:05), Max: 97.8 (25 Dec 2022 22:00)  HR: 90 (26 Dec 2022 05:05) (90 - 91)  BP: 155/93 (26 Dec 2022 05:05) (149/99 - 155/93)   RR: 18 (26 Dec 2022 05:05) (18 - 18)  SpO2: 100% (26 Dec 2022 05:05) (100% - 100%)  Parameters below as of 26 Dec 2022 05:05  Patient On (Oxygen Delivery Method): room air       MEDICATIONS  (STANDING):  enoxaparin Injectable 40 milliGRAM(s) SubCutaneous every 12 hours  silver sulfADIAZINE 1% Cream 1 Application(s) Topical daily    MEDICATIONS  (PRN):  acetaminophen   IVPB .. 750 milliGRAM(s) IV Intermittent once PRN Temp greater or equal to 38C (100.4F), Mild Pain (1 - 3)  FIRST- Mouthwash  BLM 10 milliLiter(s) Swish and Spit every 6 hours PRN Mouth Care                            9.8    3.05  )-----------( 288      ( 25 Dec 2022 07:14 )             31.4       12-25    147<H>  |  109<H>  |  19<H>  ----------------------------<  141<H>  3.7   |  27  |  0.96    Ca    8.3<L>      25 Dec 2022 07:14  Phos  2.2     12-25  Mg     1.7     12-25    TPro  5.9<L>  /  Alb  1.6<L>  /  TBili  0.7  /  DBili  x   /  AST  20  /  ALT  32  /  AlkPhos  81  12-25

## 2022-12-26 NOTE — PROGRESS NOTE ADULT - SUBJECTIVE AND OBJECTIVE BOX
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 Patient also endorses a cough for several wks and states he has generalized weakness but denies any chills, nausea, vomiting, chest pain, SOB, abdominal pain, or change in bowel habits. Pt has episodes of feeling warm.  Patient recently had a PEG placed 11/22. Dx with sepsis in the ED and given 1 dose of pip/tazo, 1 dose of vanco, and started on cefepime. Also found to be COVID positive.    Pt is followed by Heme/Onc on [facility] (Dr. JONNY [phone]): pt has h/o mucositis with c/o throat/mouth discomfort likely due to chemo. Has been on lidocaine mouthwash. Desquamation at base of neck also reported. Labs from 12/15: WBC 2.09, plt 189k; 12/21: H/H 9.4/29.4, WBC 0.83, creat. 1.16;    we were called for further evaluation  of management of neutropenic fever     Today : feeling a little better, c/o XRT dermatitis on the neck, mucositis, + dry mouth  getting  feeding  via feeding tube         PAST MEDICAL & SURGICAL HISTORY:    Hypertension    Hypercholesterolemia    Stage 3 chronic kidney disease    Oropharyngeal CA (squamous cell)    S/P gastrostromy 11/22          MEDS:  acetaminophen   IVPB .. 750 milliGRAM(s) IV Intermittent once PRN  cefepime   IVPB 1000 milliGRAM(s) IV Intermittent every 12 hours (D1)  heparin   Injectable 5000 Unit(s) SubCutaneous every 8 hours  potassium phosphate IVPB 30 milliMole(s) IV Intermittent once  sodium chloride 0.9%. 1000 milliLiter(s) IV Continuous <Continuous>      ALLERGIES  No Known Allergies      SOCIAL HISTORY: lives with roommate; has a niece and nephew living in [facility]; denies cigs, ETOH; retired      FAMILY HISTORY: DM-father; lung CA-mother; coagulopathy--sibling          PHYSICAL EXAM:  ICU Vital Signs Last 24 Hrs  T(C): 36.4 (26 Dec 2022 05:05), Max: 36.6 (25 Dec 2022 22:00)  T(F): 97.5 (26 Dec 2022 05:05), Max: 97.8 (25 Dec 2022 22:00)  HR: 90 (26 Dec 2022 05:05) (90 - 91)  BP: 155/93 (26 Dec 2022 05:05) (149/99 - 155/93)  BP(mean): --  ABP: --  ABP(mean): --  RR: 18 (26 Dec 2022 05:05) (18 - 18)  SpO2: 100% (26 Dec 2022 05:05) (100% - 100%)    O2 Parameters below as of 26 Dec 2022 05:05  Patient On (Oxygen Delivery Method): room air                  Gen: WD thin B male in NAD    HEENT: NC/AT, PEERLA, anicteric   mouth: small ulcers on inside of mouth/tongue    Neck: neck area of denuded skin at base of anterior portion of neck; c/w XRT dermititis    Lungs  clear to auscultation    Cardiovascular: S1S2 reg with no murmurs, gallops, rubs    ABD: G-tube in place; BS active; soft and non-tender to palpation  Extremities: no c/c/e     Neurological: A+O x3; no focal signs    Skin:  +  radiation dermatitis on L neck     Psychiatric: no agitation, no confusion       LABS/DIAGNOSTIC TESTS:    CBC Full  -  ( 25 Dec 2022 07:14 )  WBC Count : 3.05 K/uL  RBC Count : 3.54 M/uL  Hemoglobin : 9.8 g/dL  Hematocrit : 31.4 %  Platelet Count - Automated : 288 K/uL  Mean Cell Volume : 88.7 fl  Mean Cell Hemoglobin : 27.7 pg  Mean Cell Hemoglobin Concentration : 31.2 gm/dL  Auto Neutrophil # : 1.80 K/uL  Auto Lymphocyte # : 0.33 K/uL  Auto Monocyte # : 0.58 K/uL  Auto Eosinophil # : 0.01 K/uL  Auto Basophil # : 0.06 K/uL  Auto Neutrophil % : 59.0 %  Auto Lymphocyte % : 10.8 %  Auto Monocyte % : 19.0 %  Auto Eosinophil % : 0.3 %  Auto Basophil % : 2.0 %    12-25    147<H>  |  109<H>  |  19<H>  ----------------------------<  141<H>  3.7   |  27  |  0.96    Ca    8.3<L>      25 Dec 2022 07:14  Phos  2.2     12-25  Mg     1.7     12-25    TPro  5.9<L>  /  Alb  1.6<L>  /  TBili  0.7  /  DBili  x   /  AST  20  /  ALT  32  /  AlkPhos  81  12-25              PT/INR - ( 22 Dec 2022 20:20 )   PT: 18.6 sec;   INR: 1.56 ratio         PTT - ( 22 Dec 2022 20:20 )  PTT:25.9 sec    LACTATE:Lactate, Blood: 1.7 mmol/L (12-22 @ 20:20)        CULTURES:       RADIOLOGY  < from: Xray Chest 1 View- PORTABLE-Urgent (12.22.22 @ 20:58) >  ACC: [id number] EXAM:  XR CHEST PORTABLE URGENT 1V                          PROCEDURE DATE:  12/22/2022          INTERPRETATION:  INDICATION: Throat pain. Sepsis    Portable chest 8:42 PM    COMPARISON: 10/25/2022    Overlying tubing.    FINDINGS:  Heart/Vascular: The heart size, mediastinum, hilum and aorta are within   normal limits for projection.  Pulmonary: Midline trachea. There is no focal infiltrate, congestion or   effusion.    Bones: There is no fracture.  Lines and catheter: None    Impression:    No acute pulmonary disease.

## 2022-12-26 NOTE — PROGRESS NOTE ADULT - SUBJECTIVE AND OBJECTIVE BOX
Subjective/Objective: Pt c/o dysphagia but overall feeling much better. Cefepime discontinued today      MEDS  acetaminophen   IVPB .. 750 milliGRAM(s) IV Intermittent once PRN  enoxaparin Injectable 40 milliGRAM(s) SubCutaneous every 12 hours  FIRST- Mouthwash  BLM 10 milliLiter(s) Swish and Spit every 6 hours PRN  silver sulfADIAZINE 1% Cream 1 Application(s) Topical daily      PHYSICAL EXAM:    Vital Signs Last 24 Hrs  T(C): 36.4 (26 Dec 2022 05:05), Max: 36.6 (25 Dec 2022 22:00)  T(F): 97.5 (26 Dec 2022 05:05), Max: 97.8 (25 Dec 2022 22:00)  HR: 90 (26 Dec 2022 05:05) (90 - 91)  BP: 155/93 (26 Dec 2022 05:05) (149/99 - 155/93)  BP(mean): --  RR: 18 (26 Dec 2022 05:05) (18 - 18)  SpO2: 100% (26 Dec 2022 05:05) (100% - 100%)    Parameters below as of 26 Dec 2022 05:05  Patient On (Oxygen Delivery Method): room air          Gen: WD thin B male in NAD    HEENT: Eyes: conj. clear; mouth: small ulcers on inside of mouth/tongue    Neck: lg area of denuded skin at base of anterior portion of neck with silvadene cream overlying; FROM    Chest/Thorax:  clear to auscultation     Cardiovascular: S1S2 reg with no murmurs, gallops, rubs    ABD: G-tube in place; BS active; soft and non-tender to palpation    Genitourinary: no tran in place    Extremities: muscle wasting    Neurological: A+O x3        LABS/DIAGNOSTIC TESTS                        9.8    3.05  )-----------( 288      ( 25 Dec 2022 07:14 )             31.4       WBC Count: 3.05 K/uL (12-25 @ 07:14) with 59% PMNs  WBC Count: 3.09 K/uL (12-24 @ 06:21)  WBC Count: 2.30 K/uL (12-23 @ 22:57)      12-25    147<H>  |  109<H>  |  19<H>  ----------------------------<  141<H>  3.7   |  27  |  0.96    Ca    8.3<L>      25 Dec 2022 07:14  Phos  2.2     12-25  Mg     1.7     12-25    TPro  5.9<L>  /  Alb  1.6<L>  /  TBili  0.7  /  DBili  x   /  AST  20  /  ALT  32  /  AlkPhos  81  12-25            CULTURES      Culture - Urine (collected 12-23-22 @ 09:32)  Source: Clean Catch Clean Catch (Midstream)  Final Report (12-24-22 @ 15:23):    <10,000 CFU/mL Normal Urogenital Leilani    Culture - Blood (collected 12-22-22 @ 20:35)  Source: .Blood Blood-Peripheral  Preliminary Report (12-24-22 @ 02:02):    No growth to date.    Culture - Blood (collected 12-22-22 @ 20:20)  Source: .Blood Blood-Peripheral  Preliminary Report (12-24-22 @ 02:02):    No growth to date.

## 2022-12-26 NOTE — PROGRESS NOTE ADULT - PROBLEM SELECTOR PLAN 12
From home, HHA service  pt will need to f/u outpt heme/onc  need HHA reinstatement, CM to follow Heparin SubQ    DC plan   From home, HHA service  pt will need to f/u outpt heme/onc  need HHA reinstatement, CM to follow

## 2022-12-26 NOTE — PROGRESS NOTE ADULT - PROBLEM SELECTOR PLAN 9
Pt records shows proscar, flomax  start Flomax  UA/CX negative Pt records shows proscar, flomax (pending MED rec)  start Flomax  UA/CX negative

## 2022-12-26 NOTE — PROGRESS NOTE ADULT - PROBLEM SELECTOR PLAN 10
PEG tube feeding due to Oropharyngeal mass  noted malfunction  GI dr. Waddell followed and adjusted tube at bedside.  Resumed Feeding per GI hx DM, takes Metformin and Amaryl in previous records   A1c 14.1 Feb 2022  on tube feeding  Monitor BS Q6 with TF  Goal BS bet 140-180  Need to know home med.  repeat A1C

## 2022-12-27 LAB
A1C WITH ESTIMATED AVERAGE GLUCOSE RESULT: 7.1 % — HIGH (ref 4–5.6)
ALBUMIN SERPL ELPH-MCNC: 1.9 G/DL — LOW (ref 3.5–5)
ALP SERPL-CCNC: 121 U/L — HIGH (ref 40–120)
ALT FLD-CCNC: 88 U/L DA — HIGH (ref 10–60)
ANION GAP SERPL CALC-SCNC: 7 MMOL/L — SIGNIFICANT CHANGE UP (ref 5–17)
AST SERPL-CCNC: 69 U/L — HIGH (ref 10–40)
BILIRUB SERPL-MCNC: 0.6 MG/DL — SIGNIFICANT CHANGE UP (ref 0.2–1.2)
BUN SERPL-MCNC: 20 MG/DL — HIGH (ref 7–18)
CALCIUM SERPL-MCNC: 7.8 MG/DL — LOW (ref 8.4–10.5)
CHLORIDE SERPL-SCNC: 106 MMOL/L — SIGNIFICANT CHANGE UP (ref 96–108)
CO2 SERPL-SCNC: 30 MMOL/L — SIGNIFICANT CHANGE UP (ref 22–31)
CREAT SERPL-MCNC: 0.99 MG/DL — SIGNIFICANT CHANGE UP (ref 0.5–1.3)
EGFR: 79 ML/MIN/1.73M2 — SIGNIFICANT CHANGE UP
ESTIMATED AVERAGE GLUCOSE: 157 MG/DL — HIGH (ref 68–114)
GLUCOSE BLDC GLUCOMTR-MCNC: 164 MG/DL — HIGH (ref 70–99)
GLUCOSE BLDC GLUCOMTR-MCNC: 172 MG/DL — HIGH (ref 70–99)
GLUCOSE BLDC GLUCOMTR-MCNC: 180 MG/DL — HIGH (ref 70–99)
GLUCOSE BLDC GLUCOMTR-MCNC: 236 MG/DL — HIGH (ref 70–99)
GLUCOSE SERPL-MCNC: 238 MG/DL — HIGH (ref 70–99)
HCT VFR BLD CALC: 34.8 % — LOW (ref 39–50)
HGB BLD-MCNC: 10.6 G/DL — LOW (ref 13–17)
MAGNESIUM SERPL-MCNC: 1.7 MG/DL — SIGNIFICANT CHANGE UP (ref 1.6–2.6)
MCHC RBC-ENTMCNC: 27.2 PG — SIGNIFICANT CHANGE UP (ref 27–34)
MCHC RBC-ENTMCNC: 30.5 GM/DL — LOW (ref 32–36)
MCV RBC AUTO: 89.5 FL — SIGNIFICANT CHANGE UP (ref 80–100)
NRBC # BLD: 0 /100 WBCS — SIGNIFICANT CHANGE UP (ref 0–0)
PHOSPHATE SERPL-MCNC: 2.7 MG/DL — SIGNIFICANT CHANGE UP (ref 2.5–4.5)
PLATELET # BLD AUTO: 376 K/UL — SIGNIFICANT CHANGE UP (ref 150–400)
POTASSIUM SERPL-MCNC: 3.6 MMOL/L — SIGNIFICANT CHANGE UP (ref 3.5–5.3)
POTASSIUM SERPL-SCNC: 3.6 MMOL/L — SIGNIFICANT CHANGE UP (ref 3.5–5.3)
PROT SERPL-MCNC: 6.2 G/DL — SIGNIFICANT CHANGE UP (ref 6–8.3)
RBC # BLD: 3.89 M/UL — LOW (ref 4.2–5.8)
RBC # FLD: 16.7 % — HIGH (ref 10.3–14.5)
SODIUM SERPL-SCNC: 143 MMOL/L — SIGNIFICANT CHANGE UP (ref 135–145)
WBC # BLD: 4.87 K/UL — SIGNIFICANT CHANGE UP (ref 3.8–10.5)
WBC # FLD AUTO: 4.87 K/UL — SIGNIFICANT CHANGE UP (ref 3.8–10.5)

## 2022-12-27 RX ORDER — MUPIROCIN 20 MG/G
1 OINTMENT TOPICAL
Refills: 0 | Status: DISCONTINUED | OUTPATIENT
Start: 2022-12-27 | End: 2022-12-29

## 2022-12-27 RX ORDER — MUPIROCIN 20 MG/G
1 OINTMENT TOPICAL
Refills: 0 | Status: DISCONTINUED | OUTPATIENT
Start: 2022-12-27 | End: 2022-12-27

## 2022-12-27 RX ORDER — AMLODIPINE BESYLATE 2.5 MG/1
1 TABLET ORAL
Qty: 0 | Refills: 0 | DISCHARGE

## 2022-12-27 RX ORDER — FOLIC ACID 0.8 MG
1 TABLET ORAL DAILY
Refills: 0 | Status: DISCONTINUED | OUTPATIENT
Start: 2022-12-27 | End: 2022-12-29

## 2022-12-27 RX ORDER — PANTOPRAZOLE SODIUM 20 MG/1
40 TABLET, DELAYED RELEASE ORAL DAILY
Refills: 0 | Status: DISCONTINUED | OUTPATIENT
Start: 2022-12-27 | End: 2022-12-29

## 2022-12-27 RX ORDER — TAMSULOSIN HYDROCHLORIDE 0.4 MG/1
1 CAPSULE ORAL
Qty: 0 | Refills: 0 | DISCHARGE

## 2022-12-27 RX ADMIN — Medication 1 APPLICATION(S): at 13:47

## 2022-12-27 RX ADMIN — Medication 1: at 12:17

## 2022-12-27 RX ADMIN — ENOXAPARIN SODIUM 40 MILLIGRAM(S): 100 INJECTION SUBCUTANEOUS at 17:12

## 2022-12-27 RX ADMIN — TAMSULOSIN HYDROCHLORIDE 0.4 MILLIGRAM(S): 0.4 CAPSULE ORAL at 21:59

## 2022-12-27 RX ADMIN — AMLODIPINE BESYLATE 10 MILLIGRAM(S): 2.5 TABLET ORAL at 06:10

## 2022-12-27 RX ADMIN — Medication 2: at 07:13

## 2022-12-27 RX ADMIN — MUPIROCIN 1 APPLICATION(S): 20 OINTMENT TOPICAL at 17:12

## 2022-12-27 RX ADMIN — Medication 1: at 17:13

## 2022-12-27 RX ADMIN — ENOXAPARIN SODIUM 40 MILLIGRAM(S): 100 INJECTION SUBCUTANEOUS at 06:11

## 2022-12-27 NOTE — PROGRESS NOTE ADULT - PROBLEM SELECTOR PLAN 7
wbc improving 3.05-->4.8, Hgb 9.8-->10.6  s/p 1 unit PRBC 12/23 for Hgb 7.4  UA shows + RBC  no gross hematuria  monitor CBC

## 2022-12-27 NOTE — ADVANCED PRACTICE NURSE CONSULT - ASSESSMENT
This is a 77yr old male patient admitted for LINDY-COV-2, presenting with the following:  -There is evidence of a Radiation Neck wound with pink tissue and scant drainage  -There is evidence of Peristomal Irritation at the PEG site

## 2022-12-27 NOTE — PROGRESS NOTE ADULT - NS ATTEND AMEND GEN_ALL_CORE FT
H/N  CA  NEUTROPENIC SEPSIS  XRT DERMATITIS  COVID  tx with chemo XRT as  an outpt   hold tx during hospitalization  noted improvement of WBC/ANC  cont abx as per ID  cont with silvadine cream to the neck  BID  -f/u with Dr. Pittman upon d/c
77 year old male with a PHMx of hypertension, hypercholesteremia, BPH and oropharyngeal mass s/p resection, on radiation and chemo (last dose of chemo was last week) presenting to the ED with complaints of irritation and burning in his throat for three days, Patient was found to be septic 2/2 COVID +. Admitted for further management.     Pt seen and examined on 12/27 afternoon, no new complaints.     Labs reviewed     A/P:  #Sepsis   #covid 19 infection  #Malfunctioning Gastrostomy tube -resolved   #Oropharyngeal mass/malignancy- on chemo     Plan:  -Medically stable for discharge home, needs HHA to be resumed. CM aware

## 2022-12-27 NOTE — PROGRESS NOTE ADULT - PROBLEM SELECTOR PLAN 12
Lovenox    DC planning pending:  PT eval  pt will need to f/u outpt heme/onc  need HHA reinstatement, CM to follow

## 2022-12-27 NOTE — PROGRESS NOTE ADULT - PROBLEM SELECTOR PLAN 4
oropharyngeal mass s/p resection  on radiation and chemo (last dose of chemo was last week 12/16)  follows up with Dr. Segundo Velasquez  Protein calorie malnutrition/cachectic  PEG tube feeding, pt able to feed self with bolus  HEME/onc [facility] following

## 2022-12-27 NOTE — PROGRESS NOTE ADULT - ASSESSMENT
complete note to follow     Assessment and Plan:   · Assessment	    # Severe sepsis-  # neutropenic sepsis   pt undergoing chemoXRT for H/N CA at [facility] with Dr. Pittman  cont abx as per ID   noted on CBC initially ANC < 1000,  improvement of WBC with adequate ANC =1.8 on 12/25  today WBC=4.8  -does not require growth factors support for ANC>1000  -f/u with Dr. Pittman upon d/c    # H/N  CA  undergoing chemoXRT , last tx 12/16/22  hold tx during hospitalization   # COVID +  TX as per ID    # Mucositis-  # dry mouth    d/t  - chemo-XRT-   cont miracle" mouthwash, pt states he does not need  use feeding tube  for feeding and meds PRN  cont nutritional supp     #XRT dermatitis  L neck   cont with  silvadene cream to affected area   explained to pt that it will take extended period of time to  improve   wound care following    #Folate Def  low folate  supplement through PEG tube  D/C pending PT eval     Thank you for the referral. Will continue to monitor the patient.  Please call with any questions [phone]  Above reviewed with Attending Dr. Castanon  [facility]  [address] [facility], [address], [place], [place]  [phone]  *Note not finalized until signed by Attending Physician

## 2022-12-27 NOTE — PROGRESS NOTE ADULT - PROBLEM SELECTOR PLAN 1
hx of oropharyngeal mass s/p resection, on radiation and chemo   complaints of irritation and burning in his throat for three days  ANC 1.80, wbc 4.8, afebrile  CXR no infiltrates  COVID + asymptomatic on room air.   s/p zosyn + cefepime, 1.5L bolus   Urine and blood culture NGTD  ID Dr. Mittal  -->resolved

## 2022-12-27 NOTE — PROGRESS NOTE ADULT - SUBJECTIVE AND OBJECTIVE BOX
Patient is a 77y old  Male who presents with a chief complaint of sepsis, neutropenic fever (26 Dec 2022 16:35)      INTERVAL HPI/OVERNIGHT EVENTS: no overnight events    I&O's Summary    Vital Signs Last 24 Hrs  T(C): 37.1 (27 Dec 2022 04:36), Max: 37.1 (27 Dec 2022 04:36)  T(F): 98.8 (27 Dec 2022 04:36), Max: 98.8 (27 Dec 2022 04:36)  HR: 97 (27 Dec 2022 04:36) (70 - 97)  BP: 111/67 (27 Dec 2022 04:36) (111/67 - 184/76)  BP(mean): --  RR: 17 (27 Dec 2022 04:36) (17 - 18)  SpO2: 97% (27 Dec 2022 04:36) (97% - 99%)    Parameters below as of 27 Dec 2022 04:36  Patient On (Oxygen Delivery Method): room air      PAST MEDICAL & SURGICAL HISTORY:  Hypertension      Hypercholesterolemia      Stage 3 chronic kidney disease      No significant past surgical history          SOCIAL HISTORY  Alcohol:  Tobacco:  Illicit substance use:      FAMILY HISTORY:      LABS:                        10.6   4.87  )-----------( 376      ( 27 Dec 2022 06:33 )             34.8     12-27    143  |  106  |  20<H>  ----------------------------<  238<H>  3.6   |  30  |  0.99    Ca    7.8<L>      27 Dec 2022 06:33  Phos  2.7     12-27  Mg     1.7     12-27    TPro  6.2  /  Alb  1.9<L>  /  TBili  0.6  /  DBili  x   /  AST  69<H>  /  ALT  88<H>  /  AlkPhos  121<H>  12-27        CAPILLARY BLOOD GLUCOSE      POCT Blood Glucose.: 180 mg/dL (27 Dec 2022 12:00)  POCT Blood Glucose.: 236 mg/dL (27 Dec 2022 07:01)  POCT Blood Glucose.: 123 mg/dL (26 Dec 2022 23:54)  POCT Blood Glucose.: 133 mg/dL (26 Dec 2022 21:40)  POCT Blood Glucose.: 136 mg/dL (26 Dec 2022 19:05)            MEDICATIONS  (STANDING):  amLODIPine   Tablet 10 milliGRAM(s) Oral daily  dextrose 5%. 1000 milliLiter(s) (50 mL/Hr) IV Continuous <Continuous>  dextrose 5%. 1000 milliLiter(s) (100 mL/Hr) IV Continuous <Continuous>  dextrose 50% Injectable 25 Gram(s) IV Push once  dextrose 50% Injectable 12.5 Gram(s) IV Push once  dextrose 50% Injectable 25 Gram(s) IV Push once  enoxaparin Injectable 40 milliGRAM(s) SubCutaneous every 12 hours  glucagon  Injectable 1 milliGRAM(s) IntraMuscular once  insulin lispro (ADMELOG) corrective regimen sliding scale   SubCutaneous every 6 hours  mupirocin 2% Ointment 1 Application(s) Both Nostrils two times a day  silver sulfADIAZINE 1% Cream 1 Application(s) Topical daily  tamsulosin 0.4 milliGRAM(s) Oral at bedtime    MEDICATIONS  (PRN):  acetaminophen   IVPB .. 750 milliGRAM(s) IV Intermittent once PRN Temp greater or equal to 38C (100.4F), Mild Pain (1 - 3)  dextrose Oral Gel 15 Gram(s) Oral once PRN Blood Glucose LESS THAN 70 milliGRAM(s)/deciliter  FIRST- Mouthwash  BLM 10 milliLiter(s) Swish and Spit every 6 hours PRN Mouth Care      REVIEW OF SYSTEMS:  CONSTITUTIONAL: No fever  EYES: No eye pain  ENMT:  No throat pain  NECK: No pain   RESPIRATORY: + cough, No shortness of breath  CARDIOVASCULAR: No chest pain  GASTROINTESTINAL: No abdominal pain. No nausea  GENITOURINARY: No dysuria  NEUROLOGICAL: No headaches  SKIN: neck wound    RADIOLOGY & ADDITIONAL TESTS:< from: Xray Chest 1 View- PORTABLE-Urgent (12.22.22 @ 20:58) >    ACC: [id number] EXAM:  XR CHEST PORTABLE URGENT 1V                          PROCEDURE DATE:  12/22/2022          INTERPRETATION:  INDICATION: Throat pain. Sepsis    Portable chest 8:42 PM    COMPARISON: 10/25/2022    Overlying tubing.    FINDINGS:  Heart/Vascular: The heart size, mediastinum, hilum and aorta are within   normal limits for projection.  Pulmonary: Midline trachea. There is no focal infiltrate, congestion or   effusion.    Bones: There is no fracture.  Lines and catheter: None    Impression:    No acute pulmonary disease.    --- End of Report ---       YARIEL A BOYD DO; Attending Radiologist  This document has been electronically signed. Dec 23 2022 11:02AM    < end of copied text >    Imaging Personally Reviewed:  [x ] YES  [ ] NO    Consultant(s) Notes Reviewed:  [ x] YES  [ ] NO    PHYSICAL EXAM:  GENERAL: NAD  HEAD:  Atraumatic, Normocephalic  EYES: conjunctiva and sclera clear  ENMT:  Moist mucous membranes  NECK:  No JVD  NERVOUS SYSTEM:  Alert & Oriented  CHEST/LUNG: C/ta bilaterally; No rales, rhonchi, wheezing  HEART: Regular rate and rhythm  ABDOMEN: Soft, Nontender, Nondistended; Bowel sounds present  EXTREMITIES:  2+ Peripheral Pulses  SKIN: neck wound    Care Collaborated Discussed with Consultants/Other Providers [x ] YES  [ ] NO

## 2022-12-27 NOTE — PROGRESS NOTE ADULT - PROBLEM SELECTOR PLAN 2
plan as above  D dimer 3101- d/c on oral AC- xarelto x 30 days ( attending recc)  on lovenox 40mg BID  s/p remdesivir  supportive care

## 2022-12-27 NOTE — PROGRESS NOTE ADULT - SUBJECTIVE AND OBJECTIVE BOX
Patient is a 77y old  Male who presents with a chief complaint of sepsis, neutropenic fever     SUBJECTIVE / OVERNIGHT EVENTS:      MEDICATIONS  (STANDING):  amLODIPine   Tablet 10 milliGRAM(s) Oral daily  dextrose 5%. 1000 milliLiter(s) (50 mL/Hr) IV Continuous <Continuous>  dextrose 5%. 1000 milliLiter(s) (100 mL/Hr) IV Continuous <Continuous>  dextrose 50% Injectable 25 Gram(s) IV Push once  dextrose 50% Injectable 12.5 Gram(s) IV Push once  dextrose 50% Injectable 25 Gram(s) IV Push once  enoxaparin Injectable 40 milliGRAM(s) SubCutaneous every 12 hours  folic acid 1 milliGRAM(s) Oral daily  glucagon  Injectable 1 milliGRAM(s) IntraMuscular once  insulin lispro (ADMELOG) corrective regimen sliding scale   SubCutaneous every 6 hours  mupirocin 2% Ointment 1 Application(s) Both Nostrils two times a day  pantoprazole  Injectable 40 milliGRAM(s) IV Push daily  silver sulfADIAZINE 1% Cream 1 Application(s) Topical daily  tamsulosin 0.4 milliGRAM(s) Oral at bedtime    MEDICATIONS  (PRN):  acetaminophen   IVPB .. 750 milliGRAM(s) IV Intermittent once PRN Temp greater or equal to 38C (100.4F), Mild Pain (1 - 3)  dextrose Oral Gel 15 Gram(s) Oral once PRN Blood Glucose LESS THAN 70 milliGRAM(s)/deciliter  FIRST- Mouthwash  BLM 10 milliLiter(s) Swish and Spit every 6 hours PRN Mouth Care    CAPILLARY BLOOD GLUCOSE      POCT Blood Glucose.: 180 mg/dL (27 Dec 2022 12:00)  POCT Blood Glucose.: 236 mg/dL (27 Dec 2022 07:01)  POCT Blood Glucose.: 123 mg/dL (26 Dec 2022 23:54)  POCT Blood Glucose.: 133 mg/dL (26 Dec 2022 21:40)  POCT Blood Glucose.: 136 mg/dL (26 Dec 2022 19:05)    I&O's Summary      PHYSICAL EXAM:  Vital Signs Last 24 Hrs  T(C): 37.1 (27 Dec 2022 04:36), Max: 37.1 (27 Dec 2022 04:36)  T(F): 98.8 (27 Dec 2022 04:36), Max: 98.8 (27 Dec 2022 04:36)  HR: 97 (27 Dec 2022 04:36) (70 - 97)  BP: 111/67 (27 Dec 2022 04:36) (111/67 - 184/76)  BP(mean): --  RR: 17 (27 Dec 2022 04:36) (17 - 18)  SpO2: 97% (27 Dec 2022 04:36) (97% - 99%)    Parameters below as of 27 Dec 2022 04:36  Patient On (Oxygen Delivery Method): room air        LABS:                        10.6   4.87  )-----------( 376      ( 27 Dec 2022 06:33 )             34.8     12-27    143  |  106  |  20<H>  ----------------------------<  238<H>  3.6   |  30  |  0.99    Ca    7.8<L>      27 Dec 2022 06:33  Phos  2.7     12-27  Mg     1.7     12-27    TPro  6.2  /  Alb  1.9<L>  /  TBili  0.6  /  DBili  x   /  AST  69<H>  /  ALT  88<H>  /  AlkPhos  121<H>  12-27                       Patient is a 77y old  Male who presents with a chief complaint of sepsis, neutropenic fever     SUBJECTIVE / OVERNIGHT EVENTS: events noted. No new complaints. Pt states he feels "great".      MEDICATIONS  (STANDING):  amLODIPine   Tablet 10 milliGRAM(s) Oral daily  dextrose 5%. 1000 milliLiter(s) (50 mL/Hr) IV Continuous <Continuous>  dextrose 5%. 1000 milliLiter(s) (100 mL/Hr) IV Continuous <Continuous>  dextrose 50% Injectable 25 Gram(s) IV Push once  dextrose 50% Injectable 12.5 Gram(s) IV Push once  dextrose 50% Injectable 25 Gram(s) IV Push once  enoxaparin Injectable 40 milliGRAM(s) SubCutaneous every 12 hours  folic acid 1 milliGRAM(s) Oral daily  glucagon  Injectable 1 milliGRAM(s) IntraMuscular once  insulin lispro (ADMELOG) corrective regimen sliding scale   SubCutaneous every 6 hours  mupirocin 2% Ointment 1 Application(s) Both Nostrils two times a day  pantoprazole  Injectable 40 milliGRAM(s) IV Push daily  silver sulfADIAZINE 1% Cream 1 Application(s) Topical daily  tamsulosin 0.4 milliGRAM(s) Oral at bedtime    MEDICATIONS  (PRN):  acetaminophen   IVPB .. 750 milliGRAM(s) IV Intermittent once PRN Temp greater or equal to 38C (100.4F), Mild Pain (1 - 3)  dextrose Oral Gel 15 Gram(s) Oral once PRN Blood Glucose LESS THAN 70 milliGRAM(s)/deciliter  FIRST- Mouthwash  BLM 10 milliLiter(s) Swish and Spit every 6 hours PRN Mouth Care    CAPILLARY BLOOD GLUCOSE      POCT Blood Glucose.: 180 mg/dL (27 Dec 2022 12:00)  POCT Blood Glucose.: 236 mg/dL (27 Dec 2022 07:01)  POCT Blood Glucose.: 123 mg/dL (26 Dec 2022 23:54)  POCT Blood Glucose.: 133 mg/dL (26 Dec 2022 21:40)  POCT Blood Glucose.: 136 mg/dL (26 Dec 2022 19:05)    I&O's Summary      PHYSICAL EXAM:  Vital Signs Last 24 Hrs  T(C): 37.1 (27 Dec 2022 04:36), Max: 37.1 (27 Dec 2022 04:36)  T(F): 98.8 (27 Dec 2022 04:36), Max: 98.8 (27 Dec 2022 04:36)  HR: 97 (27 Dec 2022 04:36) (70 - 97)  BP: 111/67 (27 Dec 2022 04:36) (111/67 - 184/76)  BP(mean): --  RR: 17 (27 Dec 2022 04:36) (17 - 18)  SpO2: 97% (27 Dec 2022 04:36) (97% - 99%)    Parameters below as of 27 Dec 2022 04:36  Patient On (Oxygen Delivery Method): room air    GEN: NAD; A and O x 3, thin, mucositis  LUNGS: CTA B/L  HEART: S1 S2  ABDOMEN: soft, non-tender, non-distended, + BS, +G-tube  EXTREMITIES: no edema  DERM: anterior portion of neck with denuded skin; c/w XRT dermititis  NERVOUS SYSTEM:  Awake and alert; no focal neuro deficits      LABS:                        10.6   4.87  )-----------( 376      ( 27 Dec 2022 06:33 )             34.8     12-27    143  |  106  |  20<H>  ----------------------------<  238<H>  3.6   |  30  |  0.99    Ca    7.8<L>      27 Dec 2022 06:33  Phos  2.7     12-27  Mg     1.7     12-27    TPro  6.2  /  Alb  1.9<L>  /  TBili  0.6  /  DBili  x   /  AST  69<H>  /  ALT  88<H>  /  AlkPhos  121<H>  12-27

## 2022-12-27 NOTE — PROGRESS NOTE ADULT - ASSESSMENT
77 year old male with a PHMx of hypertension, hypercholesteremia, BPH and oropharyngeal mass s/p resection, on radiation and chemo (last dose of chemo was last week) presenting to the ED with complaints of irritation and burning in his throat for three days, Patient was admitted for Sepsis  2/2 COVID + and neutropenic fever. Admitted for further management. ID and Heme/onc consulted. started Cefepime per ID. s/p 1 unit PRBC for low H/H on 12/23.  GI consulted for malfunction Peg tube, adjusted at bedside, now resumed feeding/med per GI.   urine /blood culture negative. D/C'd ABT per ID.   ddimer 3101, will d/c on xarelto x 30 days- attending recc  Pt with mild hypernatremia, started free water via PEG, pt refusing to repeat lab today.   Pt has HHA service, CM to follow to reinstate service. PT eval pending.

## 2022-12-27 NOTE — PROGRESS NOTE ADULT - SUBJECTIVE AND OBJECTIVE BOX
[   ] ICU                                          [   ] CCU                                      [ X  ] Medical Floor      Patient is a 77 year old male with dysphagia and Peg tube malfunction. GI consulted to evaluate.        77 year old male with past medical history significant for hypertension, hypercholesteremia, BPH and oropharyngeal mass s/p resection, on radiation and chemo (last dose of chemo was last week) presenting to the ED with complaints of irritation and burning in his throat for three days. Patient also c/o leaking peg tube and irritation at peg site. Patient denies abdominal pain, nausea, vomiting, hematemesis, hematochezia, melena, fever, chills, chest pain, SOB, palpitation, hematuria, dysuria or diarrhea.      Patient appears comfortable. No new complaints reported, No abdominal pain, N/V, hematemesis, hematochezia, melena, fever, chills, chest pain, SOB, cough or diarrhea reported.         PAIN MANAGEMENT:  Pain Scale:                0 /10  Pain Location:         PAST MEDICAL HISTORY  Hypertension  Hypercholesterolemia  Stage 3 chronic kidney disease        PAST SURGICAL HISTORY  Resection oropharyngeal mass        Allergies    No Known Allergies    Intolerances  None          SOCIAL HISTORY  Advanced Directives:       [ X ] Full Code       [  ] DNR  Marital Status:         [  ] M      [ X ] S      [  ] D       [  ] W  Children:       [ X ] Yes      [  ] No  Occupation:        [  ] Employed       [ X ] Unemployed       [  ] Retired  Diet:       [ X ] Regular       [  ] PEG feeding          [  ] NG tube feeding  Drug Use:           [ X ] Patient denied          [  ] Yes  Alcohol:           [ X ] No             [  ] Yes (socially)         [  ] Yes (chronic)  Tobacco:           [  ] Yes           [ X ] No      FAMILY HISTORY  [ X ] Heart Disease            [ X ] Diabetes             [ X ] HTN             [  ] Colon Cancer             [  ] Stomach Cancer              [  ] Pancreatic Cancer      VITALS  Vital Signs Last 24 Hrs  T(C): 36.9 (27 Dec 2022 13:57), Max: 37.1 (27 Dec 2022 04:36)  T(F): 98.4 (27 Dec 2022 13:57), Max: 98.8 (27 Dec 2022 04:36)  HR: 89 (27 Dec 2022 13:57) (86 - 97)  BP: 117/69 (27 Dec 2022 13:57) (111/67 - 135/76)   RR: 16 (27 Dec 2022 13:57) (16 - 18)  SpO2: 98% (27 Dec 2022 13:57) (97% - 98%)  Parameters below as of 27 Dec 2022 13:57  Patient On (Oxygen Delivery Method): room air       MEDICATIONS  (STANDING):  amLODIPine   Tablet 10 milliGRAM(s) Oral daily  dextrose 5%. 1000 milliLiter(s) (100 mL/Hr) IV Continuous <Continuous>  dextrose 5%. 1000 milliLiter(s) (50 mL/Hr) IV Continuous <Continuous>  dextrose 50% Injectable 25 Gram(s) IV Push once  dextrose 50% Injectable 12.5 Gram(s) IV Push once  dextrose 50% Injectable 25 Gram(s) IV Push once  enoxaparin Injectable 40 milliGRAM(s) SubCutaneous every 12 hours  folic acid 1 milliGRAM(s) Oral daily  glucagon  Injectable 1 milliGRAM(s) IntraMuscular once  insulin lispro (ADMELOG) corrective regimen sliding scale   SubCutaneous every 6 hours  mupirocin 2% Ointment 1 Application(s) Both Nostrils two times a day  pantoprazole  Injectable 40 milliGRAM(s) IV Push daily  silver sulfADIAZINE 1% Cream 1 Application(s) Topical daily  tamsulosin 0.4 milliGRAM(s) Oral at bedtime    MEDICATIONS  (PRN):  acetaminophen   IVPB .. 750 milliGRAM(s) IV Intermittent once PRN Temp greater or equal to 38C (100.4F), Mild Pain (1 - 3)  dextrose Oral Gel 15 Gram(s) Oral once PRN Blood Glucose LESS THAN 70 milliGRAM(s)/deciliter  FIRST- Mouthwash  BLM 10 milliLiter(s) Swish and Spit every 6 hours PRN Mouth Care                            10.6   4.87  )-----------( 376      ( 27 Dec 2022 06:33 )             34.8       12-27    143  |  106  |  20<H>  ----------------------------<  238<H>  3.6   |  30  |  0.99    Ca    7.8<L>      27 Dec 2022 06:33  Phos  2.7     12-27  Mg     1.7     12-27    TPro  6.2  /  Alb  1.9<L>  /  TBili  0.6  /  DBili  x   /  AST  69<H>  /  ALT  88<H>  /  AlkPhos  121<H>  12-27

## 2022-12-27 NOTE — PROGRESS NOTE ADULT - PROBLEM SELECTOR PLAN 11
PEG tube feeding due to Oropharyngeal mass  noted malfunction  GI dr. Waddell followed and adjusted tube at bedside.  Resumed Feeding per GI

## 2022-12-27 NOTE — ADVANCED PRACTICE NURSE CONSULT - RECOMMEDATIONS
-Clean all affected areas with normal saline and apply skin prep to the surrounding skin  -Apply Xeroform gauze to the Neck wound and cover with a non-adherent dry dressing Daily PRN  -Apply multiple layers of skin prep to the Peristomal PEG site and then apply a non-adherent dry dressing b.i.d. PRN  -Elevate/float the patients heels using heel protectors and reposition the patient Q 2hrs using wedges or pillows

## 2022-12-28 ENCOUNTER — APPOINTMENT (OUTPATIENT)
Dept: INFUSION THERAPY | Facility: HOSPITAL | Age: 77
End: 2022-12-28

## 2022-12-28 LAB
ANION GAP SERPL CALC-SCNC: 8 MMOL/L — SIGNIFICANT CHANGE UP (ref 5–17)
ANISOCYTOSIS BLD QL: SLIGHT — SIGNIFICANT CHANGE UP
BASOPHILS # BLD AUTO: 0 K/UL — SIGNIFICANT CHANGE UP (ref 0–0.2)
BASOPHILS NFR BLD AUTO: 0 % — SIGNIFICANT CHANGE UP (ref 0–2)
BUN SERPL-MCNC: 27 MG/DL — HIGH (ref 7–18)
CALCIUM SERPL-MCNC: 8.1 MG/DL — LOW (ref 8.4–10.5)
CHLORIDE SERPL-SCNC: 107 MMOL/L — SIGNIFICANT CHANGE UP (ref 96–108)
CO2 SERPL-SCNC: 33 MMOL/L — HIGH (ref 22–31)
CREAT SERPL-MCNC: 0.9 MG/DL — SIGNIFICANT CHANGE UP (ref 0.5–1.3)
CULTURE RESULTS: SIGNIFICANT CHANGE UP
CULTURE RESULTS: SIGNIFICANT CHANGE UP
EGFR: 89 ML/MIN/1.73M2 — SIGNIFICANT CHANGE UP
EOSINOPHIL # BLD AUTO: 0 K/UL — SIGNIFICANT CHANGE UP (ref 0–0.5)
EOSINOPHIL NFR BLD AUTO: 0 % — SIGNIFICANT CHANGE UP (ref 0–6)
GLUCOSE BLDC GLUCOMTR-MCNC: 127 MG/DL — HIGH (ref 70–99)
GLUCOSE BLDC GLUCOMTR-MCNC: 149 MG/DL — HIGH (ref 70–99)
GLUCOSE BLDC GLUCOMTR-MCNC: 154 MG/DL — HIGH (ref 70–99)
GLUCOSE BLDC GLUCOMTR-MCNC: 160 MG/DL — HIGH (ref 70–99)
GLUCOSE BLDC GLUCOMTR-MCNC: 171 MG/DL — HIGH (ref 70–99)
GLUCOSE BLDC GLUCOMTR-MCNC: 181 MG/DL — HIGH (ref 70–99)
GLUCOSE SERPL-MCNC: 197 MG/DL — HIGH (ref 70–99)
HCT VFR BLD CALC: 33.2 % — LOW (ref 39–50)
HGB BLD-MCNC: 10 G/DL — LOW (ref 13–17)
LG PLATELETS BLD QL AUTO: SLIGHT — SIGNIFICANT CHANGE UP
LYMPHOCYTES # BLD AUTO: 0.49 K/UL — LOW (ref 1–3.3)
LYMPHOCYTES # BLD AUTO: 9 % — LOW (ref 13–44)
MANUAL SMEAR VERIFICATION: SIGNIFICANT CHANGE UP
MCHC RBC-ENTMCNC: 27.5 PG — SIGNIFICANT CHANGE UP (ref 27–34)
MCHC RBC-ENTMCNC: 30.1 GM/DL — LOW (ref 32–36)
MCV RBC AUTO: 91.2 FL — SIGNIFICANT CHANGE UP (ref 80–100)
MONOCYTES # BLD AUTO: 0.49 K/UL — SIGNIFICANT CHANGE UP (ref 0–0.9)
MONOCYTES NFR BLD AUTO: 9 % — SIGNIFICANT CHANGE UP (ref 2–14)
MYELOCYTES NFR BLD: 1 % — HIGH (ref 0–0)
NEUTROPHILS # BLD AUTO: 4.31 K/UL — SIGNIFICANT CHANGE UP (ref 1.8–7.4)
NEUTROPHILS NFR BLD AUTO: 80 % — HIGH (ref 43–77)
NRBC # BLD: 0 /100 — SIGNIFICANT CHANGE UP (ref 0–0)
OVALOCYTES BLD QL SMEAR: SLIGHT — SIGNIFICANT CHANGE UP
PLAT MORPH BLD: NORMAL — SIGNIFICANT CHANGE UP
PLATELET # BLD AUTO: 336 K/UL — SIGNIFICANT CHANGE UP (ref 150–400)
PLATELET COUNT - ESTIMATE: NORMAL — SIGNIFICANT CHANGE UP
POIKILOCYTOSIS BLD QL AUTO: SLIGHT — SIGNIFICANT CHANGE UP
POLYCHROMASIA BLD QL SMEAR: SLIGHT — SIGNIFICANT CHANGE UP
POTASSIUM SERPL-MCNC: 3.9 MMOL/L — SIGNIFICANT CHANGE UP (ref 3.5–5.3)
POTASSIUM SERPL-SCNC: 3.9 MMOL/L — SIGNIFICANT CHANGE UP (ref 3.5–5.3)
RBC # BLD: 3.64 M/UL — LOW (ref 4.2–5.8)
RBC # FLD: 17.1 % — HIGH (ref 10.3–14.5)
RBC BLD AUTO: ABNORMAL
SCHISTOCYTES BLD QL AUTO: SLIGHT — SIGNIFICANT CHANGE UP
SODIUM SERPL-SCNC: 148 MMOL/L — HIGH (ref 135–145)
SPECIMEN SOURCE: SIGNIFICANT CHANGE UP
SPECIMEN SOURCE: SIGNIFICANT CHANGE UP
VARIANT LYMPHS # BLD: 1 % — SIGNIFICANT CHANGE UP (ref 0–6)
WBC # BLD: 5.39 K/UL — SIGNIFICANT CHANGE UP (ref 3.8–10.5)
WBC # FLD AUTO: 5.39 K/UL — SIGNIFICANT CHANGE UP (ref 3.8–10.5)

## 2022-12-28 PROCEDURE — 99232 SBSQ HOSP IP/OBS MODERATE 35: CPT

## 2022-12-28 RX ORDER — PANTOPRAZOLE SODIUM 20 MG/1
1 TABLET, DELAYED RELEASE ORAL
Qty: 0 | Refills: 0 | DISCHARGE

## 2022-12-28 RX ORDER — TAMSULOSIN HYDROCHLORIDE 0.4 MG/1
1 CAPSULE ORAL
Qty: 30 | Refills: 0
Start: 2022-12-28 | End: 2023-01-26

## 2022-12-28 RX ORDER — MUPIROCIN 20 MG/G
1 OINTMENT TOPICAL
Qty: 1 | Refills: 0
Start: 2022-12-28 | End: 2022-12-30

## 2022-12-28 RX ORDER — PANTOPRAZOLE SODIUM 20 MG/1
40 TABLET, DELAYED RELEASE ORAL
Qty: 0 | Refills: 0 | DISCHARGE
Start: 2022-12-28

## 2022-12-28 RX ORDER — RIVAROXABAN 15 MG-20MG
1 KIT ORAL
Qty: 30 | Refills: 0
Start: 2022-12-28 | End: 2023-01-26

## 2022-12-28 RX ORDER — FOLIC ACID 0.8 MG
1 TABLET ORAL
Qty: 30 | Refills: 0
Start: 2022-12-28 | End: 2023-01-26

## 2022-12-28 RX ORDER — DIPHENHYDRAMINE HYDROCHLORIDE AND LIDOCAINE HYDROCHLORIDE AND ALUMINUM HYDROXIDE AND MAGNESIUM HYDRO
30 KIT
Qty: 900 | Refills: 0
Start: 2022-12-28 | End: 2023-01-26

## 2022-12-28 RX ORDER — AMLODIPINE BESYLATE 2.5 MG/1
1 TABLET ORAL
Qty: 30 | Refills: 0
Start: 2022-12-28 | End: 2023-01-26

## 2022-12-28 RX ADMIN — Medication 1: at 07:02

## 2022-12-28 RX ADMIN — ENOXAPARIN SODIUM 40 MILLIGRAM(S): 100 INJECTION SUBCUTANEOUS at 06:21

## 2022-12-28 RX ADMIN — PANTOPRAZOLE SODIUM 40 MILLIGRAM(S): 20 TABLET, DELAYED RELEASE ORAL at 11:52

## 2022-12-28 RX ADMIN — Medication 1: at 02:44

## 2022-12-28 RX ADMIN — TAMSULOSIN HYDROCHLORIDE 0.4 MILLIGRAM(S): 0.4 CAPSULE ORAL at 23:00

## 2022-12-28 RX ADMIN — MUPIROCIN 1 APPLICATION(S): 20 OINTMENT TOPICAL at 06:21

## 2022-12-28 RX ADMIN — Medication 1 APPLICATION(S): at 12:17

## 2022-12-28 RX ADMIN — ENOXAPARIN SODIUM 40 MILLIGRAM(S): 100 INJECTION SUBCUTANEOUS at 18:02

## 2022-12-28 RX ADMIN — MUPIROCIN 1 APPLICATION(S): 20 OINTMENT TOPICAL at 18:02

## 2022-12-28 RX ADMIN — AMLODIPINE BESYLATE 10 MILLIGRAM(S): 2.5 TABLET ORAL at 06:21

## 2022-12-28 RX ADMIN — Medication 1: at 17:21

## 2022-12-28 RX ADMIN — Medication 1 MILLIGRAM(S): at 11:52

## 2022-12-28 RX ADMIN — Medication 1: at 11:51

## 2022-12-28 NOTE — PROGRESS NOTE ADULT - PROBLEM SELECTOR PLAN 12
Lovenox    DC planning pending:  PT eval  pt will need to f/u outpt heme/onc  need HHA reinstatement, CM to follow Lovenox    DC planning pending:  pt will need to f/u outpt heme/onc (Dr. Martinez)  home care services, CM following  Xarelto approved x 30 days on d/c

## 2022-12-28 NOTE — PROGRESS NOTE ADULT - ASSESSMENT
1. Dysphagia  2. Peg tube malfunction adjusted functioning well now  3. Anemia (etiology multifactorial)  4. No evidence of acute GI bleeding    Suggestions:    1. Peg tube can be used for feeding and medication  2. Aspiration precaution  3. Protonix daily  4. Avoid NSAID  5. Monitor H/H  6. Transfuse PRBC as needed  7. Hematology follow up  8. DVT prophylaxis

## 2022-12-28 NOTE — PROGRESS NOTE ADULT - SUBJECTIVE AND OBJECTIVE BOX
Patient is a 77y old  Male who presents with a chief complaint of fever (27 Dec 2022 13:01)      INTERVAL HPI/OVERNIGHT EVENTS: no overnight events    I&O's Summary    Vital Signs Last 24 Hrs  T(C): 36.5 (28 Dec 2022 13:21), Max: 37.3 (28 Dec 2022 05:20)  T(F): 97.7 (28 Dec 2022 13:21), Max: 99.1 (28 Dec 2022 05:20)  HR: 95 (28 Dec 2022 13:21) (95 - 99)  BP: 106/76 (28 Dec 2022 13:21) (100/68 - 122/75)  BP(mean): --  RR: 18 (28 Dec 2022 13:21) (17 - 18)  SpO2: 98% (28 Dec 2022 13:21) (95% - 98%)    Parameters below as of 28 Dec 2022 13:21  Patient On (Oxygen Delivery Method): room air      PAST MEDICAL & SURGICAL HISTORY:  Hypertension      Hypercholesterolemia      Stage 3 chronic kidney disease      No significant past surgical history          SOCIAL HISTORY  Alcohol:  Tobacco:  Illicit substance use:      FAMILY HISTORY:      LABS:                        10.0   5.39  )-----------( 336      ( 28 Dec 2022 07:22 )             33.2     12-28    148<H>  |  107  |  27<H>  ----------------------------<  197<H>  3.9   |  33<H>  |  0.90    Ca    8.1<L>      28 Dec 2022 07:22  Phos  2.7     12-27  Mg     1.7     12-27    TPro  6.2  /  Alb  1.9<L>  /  TBili  0.6  /  DBili  x   /  AST  69<H>  /  ALT  88<H>  /  AlkPhos  121<H>  12-27        CAPILLARY BLOOD GLUCOSE      POCT Blood Glucose.: 181 mg/dL (28 Dec 2022 11:41)  POCT Blood Glucose.: 171 mg/dL (28 Dec 2022 06:52)  POCT Blood Glucose.: 154 mg/dL (28 Dec 2022 02:38)  POCT Blood Glucose.: 164 mg/dL (27 Dec 2022 21:18)  POCT Blood Glucose.: 172 mg/dL (27 Dec 2022 16:39)            MEDICATIONS  (STANDING):  amLODIPine   Tablet 10 milliGRAM(s) Oral daily  dextrose 5%. 1000 milliLiter(s) (50 mL/Hr) IV Continuous <Continuous>  dextrose 5%. 1000 milliLiter(s) (100 mL/Hr) IV Continuous <Continuous>  dextrose 50% Injectable 25 Gram(s) IV Push once  dextrose 50% Injectable 12.5 Gram(s) IV Push once  dextrose 50% Injectable 25 Gram(s) IV Push once  enoxaparin Injectable 40 milliGRAM(s) SubCutaneous every 12 hours  folic acid 1 milliGRAM(s) Oral daily  glucagon  Injectable 1 milliGRAM(s) IntraMuscular once  insulin lispro (ADMELOG) corrective regimen sliding scale   SubCutaneous every 6 hours  mupirocin 2% Ointment 1 Application(s) Both Nostrils two times a day  pantoprazole  Injectable 40 milliGRAM(s) IV Push daily  silver sulfADIAZINE 1% Cream 1 Application(s) Topical daily  tamsulosin 0.4 milliGRAM(s) Oral at bedtime    MEDICATIONS  (PRN):  acetaminophen   IVPB .. 750 milliGRAM(s) IV Intermittent once PRN Temp greater or equal to 38C (100.4F), Mild Pain (1 - 3)  dextrose Oral Gel 15 Gram(s) Oral once PRN Blood Glucose LESS THAN 70 milliGRAM(s)/deciliter  FIRST- Mouthwash  BLM 10 milliLiter(s) Swish and Spit every 6 hours PRN Mouth Care      REVIEW OF SYSTEMS:  CONSTITUTIONAL: No fever, weight loss, or fatigue  EYES: No eye pain, visual disturbances, or discharge  ENMT:  No difficulty hearing, tinnitus, vertigo; No sinus or throat pain  NECK: No pain or stiffness  RESPIRATORY: No cough, wheezing, chills or hemoptysis; No shortness of breath  CARDIOVASCULAR: No chest pain, palpitations, dizziness, or leg swelling  GASTROINTESTINAL: No abdominal or epigastric pain. No nausea, vomiting, or hematemesis; No diarrhea or constipation. No melena or hematochezia.  GENITOURINARY: No dysuria, frequency, hematuria, or incontinence  NEUROLOGICAL: No headaches, memory loss, loss of strength, numbness, or tremors  SKIN: No itching, burning, rashes, or lesions   LYMPH NODES: No enlarged glands  ENDOCRINE: No heat or cold intolerance; No hair loss  MUSCULOSKELETAL: No joint pain or swelling; No muscle, back, or extremity pain  PSYCHIATRIC: No depression, anxiety, mood swings, or difficulty sleeping  HEME/LYMPH: No easy bruising, or bleeding gums  ALLERY AND IMMUNOLOGIC: No hives or eczema    RADIOLOGY & ADDITIONAL TESTS:    Imaging Personally Reviewed:  [ ] YES  [ ] NO    Consultant(s) Notes Reviewed:  [ ] YES  [ ] NO    PHYSICAL EXAM:  GENERAL: NAD, well-groomed, well-developed  HEAD:  Atraumatic, Normocephalic  EYES: EOMI, PERRLA, conjunctiva and sclera clear  ENMT: No tonsillar erythema, exudates, or enlargement; Moist mucous membranes, Good dentition, No lesions  NECK: Supple, No JVD, Normal thyroid  NERVOUS SYSTEM:  Alert & Oriented X3, Good concentration; Motor Strength 5/5 B/L upper and lower extremities; DTRs 2+ intact and symmetric  CHEST/LUNG: Clear to percussion bilaterally; No rales, rhonchi, wheezing, or rubs  HEART: Regular rate and rhythm; No murmurs, rubs, or gallops  ABDOMEN: Soft, Nontender, Nondistended; Bowel sounds present  EXTREMITIES:  2+ Peripheral Pulses, No clubbing, cyanosis, or edema  LYMPH: No lymphadenopathy noted  SKIN: No rashes or lesions    Care Collaborated Discussed with Consultants/Other Providers [ ] YES  [ ] NO Patient is a 77y old  Male who presents with a chief complaint of fever (27 Dec 2022 13:01)      INTERVAL HPI/OVERNIGHT EVENTS: no overnight events    I&O's Summary    Vital Signs Last 24 Hrs  T(C): 36.5 (28 Dec 2022 13:21), Max: 37.3 (28 Dec 2022 05:20)  T(F): 97.7 (28 Dec 2022 13:21), Max: 99.1 (28 Dec 2022 05:20)  HR: 95 (28 Dec 2022 13:21) (95 - 99)  BP: 106/76 (28 Dec 2022 13:21) (100/68 - 122/75)  BP(mean): --  RR: 18 (28 Dec 2022 13:21) (17 - 18)  SpO2: 98% (28 Dec 2022 13:21) (95% - 98%)    Parameters below as of 28 Dec 2022 13:21  Patient On (Oxygen Delivery Method): room air      PAST MEDICAL & SURGICAL HISTORY:  Hypertension      Hypercholesterolemia      Stage 3 chronic kidney disease      No significant past surgical history          SOCIAL HISTORY  Alcohol:  Tobacco:  Illicit substance use:      FAMILY HISTORY:      LABS:                        10.0   5.39  )-----------( 336      ( 28 Dec 2022 07:22 )             33.2     12-28    148<H>  |  107  |  27<H>  ----------------------------<  197<H>  3.9   |  33<H>  |  0.90    Ca    8.1<L>      28 Dec 2022 07:22  Phos  2.7     12-27  Mg     1.7     12-27    TPro  6.2  /  Alb  1.9<L>  /  TBili  0.6  /  DBili  x   /  AST  69<H>  /  ALT  88<H>  /  AlkPhos  121<H>  12-27        CAPILLARY BLOOD GLUCOSE      POCT Blood Glucose.: 181 mg/dL (28 Dec 2022 11:41)  POCT Blood Glucose.: 171 mg/dL (28 Dec 2022 06:52)  POCT Blood Glucose.: 154 mg/dL (28 Dec 2022 02:38)  POCT Blood Glucose.: 164 mg/dL (27 Dec 2022 21:18)  POCT Blood Glucose.: 172 mg/dL (27 Dec 2022 16:39)            MEDICATIONS  (STANDING):  amLODIPine   Tablet 10 milliGRAM(s) Oral daily  dextrose 5%. 1000 milliLiter(s) (50 mL/Hr) IV Continuous <Continuous>  dextrose 5%. 1000 milliLiter(s) (100 mL/Hr) IV Continuous <Continuous>  dextrose 50% Injectable 25 Gram(s) IV Push once  dextrose 50% Injectable 12.5 Gram(s) IV Push once  dextrose 50% Injectable 25 Gram(s) IV Push once  enoxaparin Injectable 40 milliGRAM(s) SubCutaneous every 12 hours  folic acid 1 milliGRAM(s) Oral daily  glucagon  Injectable 1 milliGRAM(s) IntraMuscular once  insulin lispro (ADMELOG) corrective regimen sliding scale   SubCutaneous every 6 hours  mupirocin 2% Ointment 1 Application(s) Both Nostrils two times a day  pantoprazole  Injectable 40 milliGRAM(s) IV Push daily  silver sulfADIAZINE 1% Cream 1 Application(s) Topical daily  tamsulosin 0.4 milliGRAM(s) Oral at bedtime    MEDICATIONS  (PRN):  acetaminophen   IVPB .. 750 milliGRAM(s) IV Intermittent once PRN Temp greater or equal to 38C (100.4F), Mild Pain (1 - 3)  dextrose Oral Gel 15 Gram(s) Oral once PRN Blood Glucose LESS THAN 70 milliGRAM(s)/deciliter  FIRST- Mouthwash  BLM 10 milliLiter(s) Swish and Spit every 6 hours PRN Mouth Care      REVIEW OF SYSTEMS:  CONSTITUTIONAL: No fever  EYES: No eye pain  ENMT:  No throat pain  NECK: No pain   RESPIRATORY: No cough, No shortness of breath  CARDIOVASCULAR: No chest pain  GASTROINTESTINAL: No abdominal pain.   GENITOURINARY: No dysuria  NEUROLOGICAL: No headaches  SKIN: + neck wound    RADIOLOGY & ADDITIONAL TESTS:  < from: Xray Chest 1 View- PORTABLE-Urgent (12.22.22 @ 20:58) >    ACC: [id number] EXAM:  XR CHEST PORTABLE URGENT 1V                          PROCEDURE DATE:  12/22/2022          INTERPRETATION:  INDICATION: Throat pain. Sepsis    Portable chest 8:42 PM    COMPARISON: 10/25/2022    Overlying tubing.    FINDINGS:  Heart/Vascular: The heart size, mediastinum, hilum and aorta are within   normal limits for projection.  Pulmonary: Midline trachea. There is no focal infiltrate, congestion or   effusion.    Bones: There is no fracture.  Lines and catheter: None    Impression:    No acute pulmonary disease.    --- End of Report ---             YARIEL BOYD DO; Attending Radiologist  This document has been electronically signed. Dec 23 2022 11:02AM    < end of copied text >    Imaging Personally Reviewed:  [ x] YES  [ ] NO    Consultant(s) Notes Reviewed:  [x ] YES  [ ] NO    PHYSICAL EXAM:  GENERAL: NAD  HEAD:  Atraumatic, Normocephalic  EYES: conjunctiva and sclera clear  ENMT: Moist mucous membranes  NECK: Supple  NERVOUS SYSTEM:  Alert & Oriented X3  CHEST/LUNG: CTA bilaterally;  HEART: Regular rate and rhythm  ABDOMEN: Soft, Nontender, Nondistended; Bowel sounds present  EXTREMITIES:  2+ Peripheral Pulses  SKIN: + neck wound    Care Collaborated Discussed with Consultants/Other Providers [ x] YES  [ ] NO

## 2022-12-28 NOTE — PROGRESS NOTE ADULT - PROBLEM SELECTOR PLAN 1
hx of oropharyngeal mass s/p resection, on radiation and chemo   complaints of irritation and burning in his throat for three days  ANC 1.80, wbc 4.8, afebrile  CXR no infiltrates  COVID + asymptomatic on room air.   s/p zosyn + cefepime, 1.5L bolus   Urine and blood culture NGTD  ID Dr. Mittal  -->resolved hx of oropharyngeal mass s/p resection, on radiation and chemo   complaints of irritation and burning in his throat for three days  ANC 4.31, wbc 5.3, afebrile  CXR no infiltrates  COVID + asymptomatic on room air.   s/p zosyn + cefepime, 1.5L bolus   Urine and blood culture NGTD  ID Dr. Mittal  -->resolved

## 2022-12-28 NOTE — PROGRESS NOTE ADULT - PROBLEM SELECTOR PLAN 7
wbc improving 3.05-->4.8, Hgb 9.8-->10.6  s/p 1 unit PRBC 12/23 for Hgb 7.4  UA shows + RBC  no gross hematuria  monitor CBC wbc improving 3.05-->5.3, Hgb 9.8-->10.0  s/p 1 unit PRBC 12/23 for Hgb 7.4  UA shows + RBC  no gross hematuria  monitor CBC

## 2022-12-28 NOTE — PROGRESS NOTE ADULT - ASSESSMENT
77 year old male with a PHMx of hypertension, hypercholesteremia, BPH and oropharyngeal mass s/p resection, on radiation and chemo (last dose of chemo was last week) presenting to the ED with complaints of irritation and burning in his throat for three days, Patient was admitted for Sepsis  2/2 COVID + and neutropenic fever. Admitted for further management. ID and Heme/onc consulted. started Cefepime per ID. s/p 1 unit PRBC for low H/H on 12/23.  GI consulted for malfunction Peg tube, adjusted at bedside, now resumed feeding/med per GI.   urine /blood culture negative. D/C'd ABT per ID.   ddimer 3101, will d/c on xarelto x 30 days- attending recc  Pt with mild hypernatremia, started free water via PEG, pt refusing to repeat lab today.   Pt has HHA service, CM to follow to reinstate service. PT eval pending.         77 year old male with a PHMx of hypertension, hypercholesteremia, BPH and oropharyngeal mass s/p resection, on radiation and chemo (last dose of chemo was last week) presenting to the ED with complaints of irritation and burning in his throat for three days, Patient was admitted for Sepsis  2/2 COVID + and neutropenic fever. Admitted for further management. ID and Heme/onc consulted. started Cefepime per ID. s/p 1 unit PRBC for low H/H on 12/23.  GI consulted for malfunction Peg tube, adjusted at bedside, now resumed feeding/med per GI.   urine /blood culture negative. D/C'd ABT per ID.   ddimer 3101, will d/c on xarelto x 30 days- attending recc  Pt with mild hypernatremia, started free water via PEG  CM to follow for home care services

## 2022-12-28 NOTE — PROGRESS NOTE ADULT - ATTENDING COMMENTS
77 year old male with a PHMx of hypertension, hypercholesteremia, BPH and oropharyngeal mass s/p resection, on radiation and chemo (last dose of chemo was last week) presenting to the ED with complaints of irritation and burning in his throat for three days, Patient was found to be septic 2/2 COVID +. Admitted for further management.     Pt seen and examined on 12/27 afternoon, no new complaints.     Labs reviewed     A/P:  #Sepsis   #covid 19 infection  #Malfunctioning Gastrostomy tube -resolved   #Oropharyngeal mass/malignancy- on chemo     Plan:  -Medically stable for discharge home. Spoke w/ patient at length, he wilma not have any HHA. However, needs services for wound care. CM working, however unable to find an accepting agency. Also spoke w/ patient's niece at length, all questions were answered.

## 2022-12-28 NOTE — PROGRESS NOTE ADULT - SUBJECTIVE AND OBJECTIVE BOX
[   ] ICU                                          [   ] CCU                                      [  X ] Medical Floor      Patient is a 77 year old male with dysphagia and Peg tube malfunction. GI consulted to evaluate.        77 year old male with past medical history significant for hypertension, hypercholesteremia, BPH and oropharyngeal mass s/p resection, on radiation and chemo (last dose of chemo was last week) presenting to the ED with complaints of irritation and burning in his throat for three days. Patient also c/o leaking peg tube and irritation at peg site. Patient denies abdominal pain, nausea, vomiting, hematemesis, hematochezia, melena, fever, chills, chest pain, SOB, palpitation, hematuria, dysuria or diarrhea.      Patient appears comfortable. No new complaints reported, No abdominal pain, N/V, hematemesis, hematochezia, melena, fever, chills, chest pain, SOB, cough or diarrhea reported.         PAIN MANAGEMENT:  Pain Scale:                0 /10  Pain Location:         PAST MEDICAL HISTORY  Hypertension  Hypercholesterolemia  Stage 3 chronic kidney disease        PAST SURGICAL HISTORY  Resection oropharyngeal mass        Allergies    No Known Allergies    Intolerances  None          SOCIAL HISTORY  Advanced Directives:       [ X ] Full Code       [  ] DNR  Marital Status:         [  ] M      [ X ] S      [  ] D       [  ] W  Children:       [ X ] Yes      [  ] No  Occupation:        [  ] Employed       [ X ] Unemployed       [  ] Retired  Diet:       [ X ] Regular       [  ] PEG feeding          [  ] NG tube feeding  Drug Use:           [ X ] Patient denied          [  ] Yes  Alcohol:           [ X ] No             [  ] Yes (socially)         [  ] Yes (chronic)  Tobacco:           [  ] Yes           [ X ] No      FAMILY HISTORY  [ X ] Heart Disease            [ X ] Diabetes             [ X ] HTN             [  ] Colon Cancer             [  ] Stomach Cancer              [  ] Pancreatic Cancer      VITALS  Vital Signs Last 24 Hrs  T(C): 37.3 (28 Dec 2022 05:20), Max: 37.3 (28 Dec 2022 05:20)  T(F): 99.1 (28 Dec 2022 05:20), Max: 99.1 (28 Dec 2022 05:20)  HR: 98 (28 Dec 2022 05:20) (89 - 99)  BP: 100/68 (28 Dec 2022 05:20) (100/68 - 122/75)   RR: 17 (28 Dec 2022 05:20) (16 - 17)  SpO2: 95% (28 Dec 2022 05:20) (95% - 98%)  Parameters below as of 28 Dec 2022 05:20  Patient On (Oxygen Delivery Method): room air       MEDICATIONS  (STANDING):  amLODIPine   Tablet 10 milliGRAM(s) Oral daily  dextrose 5%. 1000 milliLiter(s) (50 mL/Hr) IV Continuous <Continuous>  dextrose 5%. 1000 milliLiter(s) (100 mL/Hr) IV Continuous <Continuous>  dextrose 50% Injectable 25 Gram(s) IV Push once  dextrose 50% Injectable 12.5 Gram(s) IV Push once  dextrose 50% Injectable 25 Gram(s) IV Push once  enoxaparin Injectable 40 milliGRAM(s) SubCutaneous every 12 hours  folic acid 1 milliGRAM(s) Oral daily  glucagon  Injectable 1 milliGRAM(s) IntraMuscular once  insulin lispro (ADMELOG) corrective regimen sliding scale   SubCutaneous every 6 hours  mupirocin 2% Ointment 1 Application(s) Both Nostrils two times a day  pantoprazole  Injectable 40 milliGRAM(s) IV Push daily  silver sulfADIAZINE 1% Cream 1 Application(s) Topical daily  tamsulosin 0.4 milliGRAM(s) Oral at bedtime    MEDICATIONS  (PRN):  acetaminophen   IVPB .. 750 milliGRAM(s) IV Intermittent once PRN Temp greater or equal to 38C (100.4F), Mild Pain (1 - 3)  dextrose Oral Gel 15 Gram(s) Oral once PRN Blood Glucose LESS THAN 70 milliGRAM(s)/deciliter  FIRST- Mouthwash  BLM 10 milliLiter(s) Swish and Spit every 6 hours PRN Mouth Care                            10.0   5.39  )-----------( 336      ( 28 Dec 2022 07:22 )             33.2       12-28    148<H>  |  107  |  27<H>  ----------------------------<  197<H>  3.9   |  33<H>  |  0.90    Ca    8.1<L>      28 Dec 2022 07:22  Phos  2.7     12-27  Mg     1.7     12-27    TPro  6.2  /  Alb  1.9<L>  /  TBili  0.6  /  DBili  x   /  AST  69<H>  /  ALT  88<H>  /  AlkPhos  121<H>  12-27

## 2022-12-29 ENCOUNTER — TRANSCRIPTION ENCOUNTER (OUTPATIENT)
Age: 77
End: 2022-12-29

## 2022-12-29 VITALS
HEART RATE: 100 BPM | DIASTOLIC BLOOD PRESSURE: 70 MMHG | TEMPERATURE: 98 F | RESPIRATION RATE: 16 BRPM | OXYGEN SATURATION: 99 % | SYSTOLIC BLOOD PRESSURE: 105 MMHG

## 2022-12-29 LAB
ANION GAP SERPL CALC-SCNC: 5 MMOL/L — SIGNIFICANT CHANGE UP (ref 5–17)
ANION GAP SERPL CALC-SCNC: 8 MMOL/L — SIGNIFICANT CHANGE UP (ref 5–17)
BUN SERPL-MCNC: 27 MG/DL — HIGH (ref 7–18)
BUN SERPL-MCNC: 28 MG/DL — HIGH (ref 7–18)
CALCIUM SERPL-MCNC: 8.1 MG/DL — LOW (ref 8.4–10.5)
CALCIUM SERPL-MCNC: 8.3 MG/DL — LOW (ref 8.4–10.5)
CHLORIDE SERPL-SCNC: 105 MMOL/L — SIGNIFICANT CHANGE UP (ref 96–108)
CHLORIDE SERPL-SCNC: 105 MMOL/L — SIGNIFICANT CHANGE UP (ref 96–108)
CO2 SERPL-SCNC: 30 MMOL/L — SIGNIFICANT CHANGE UP (ref 22–31)
CO2 SERPL-SCNC: 35 MMOL/L — HIGH (ref 22–31)
CREAT SERPL-MCNC: 0.91 MG/DL — SIGNIFICANT CHANGE UP (ref 0.5–1.3)
CREAT SERPL-MCNC: 0.94 MG/DL — SIGNIFICANT CHANGE UP (ref 0.5–1.3)
EGFR: 85 ML/MIN/1.73M2 — SIGNIFICANT CHANGE UP
EGFR: 88 ML/MIN/1.73M2 — SIGNIFICANT CHANGE UP
GLUCOSE BLDC GLUCOMTR-MCNC: 123 MG/DL — HIGH (ref 70–99)
GLUCOSE BLDC GLUCOMTR-MCNC: 171 MG/DL — HIGH (ref 70–99)
GLUCOSE BLDC GLUCOMTR-MCNC: 174 MG/DL — HIGH (ref 70–99)
GLUCOSE SERPL-MCNC: 178 MG/DL — HIGH (ref 70–99)
GLUCOSE SERPL-MCNC: 200 MG/DL — HIGH (ref 70–99)
POTASSIUM SERPL-MCNC: 4.4 MMOL/L — SIGNIFICANT CHANGE UP (ref 3.5–5.3)
POTASSIUM SERPL-MCNC: 4.4 MMOL/L — SIGNIFICANT CHANGE UP (ref 3.5–5.3)
POTASSIUM SERPL-SCNC: 4.4 MMOL/L — SIGNIFICANT CHANGE UP (ref 3.5–5.3)
POTASSIUM SERPL-SCNC: 4.4 MMOL/L — SIGNIFICANT CHANGE UP (ref 3.5–5.3)
SODIUM SERPL-SCNC: 143 MMOL/L — SIGNIFICANT CHANGE UP (ref 135–145)
SODIUM SERPL-SCNC: 145 MMOL/L — SIGNIFICANT CHANGE UP (ref 135–145)

## 2022-12-29 PROCEDURE — 84100 ASSAY OF PHOSPHORUS: CPT

## 2022-12-29 PROCEDURE — 82962 GLUCOSE BLOOD TEST: CPT

## 2022-12-29 PROCEDURE — 87040 BLOOD CULTURE FOR BACTERIA: CPT

## 2022-12-29 PROCEDURE — 96375 TX/PRO/DX INJ NEW DRUG ADDON: CPT

## 2022-12-29 PROCEDURE — 82746 ASSAY OF FOLIC ACID SERUM: CPT

## 2022-12-29 PROCEDURE — 86901 BLOOD TYPING SEROLOGIC RH(D): CPT

## 2022-12-29 PROCEDURE — 87086 URINE CULTURE/COLONY COUNT: CPT

## 2022-12-29 PROCEDURE — 99232 SBSQ HOSP IP/OBS MODERATE 35: CPT

## 2022-12-29 PROCEDURE — 99285 EMERGENCY DEPT VISIT HI MDM: CPT | Mod: 25

## 2022-12-29 PROCEDURE — 83550 IRON BINDING TEST: CPT

## 2022-12-29 PROCEDURE — 96374 THER/PROPH/DIAG INJ IV PUSH: CPT

## 2022-12-29 PROCEDURE — 85730 THROMBOPLASTIN TIME PARTIAL: CPT

## 2022-12-29 PROCEDURE — 85379 FIBRIN DEGRADATION QUANT: CPT

## 2022-12-29 PROCEDURE — 80048 BASIC METABOLIC PNL TOTAL CA: CPT

## 2022-12-29 PROCEDURE — 86900 BLOOD TYPING SEROLOGIC ABO: CPT

## 2022-12-29 PROCEDURE — 81001 URINALYSIS AUTO W/SCOPE: CPT

## 2022-12-29 PROCEDURE — 85610 PROTHROMBIN TIME: CPT

## 2022-12-29 PROCEDURE — 86923 COMPATIBILITY TEST ELECTRIC: CPT

## 2022-12-29 PROCEDURE — 87637 SARSCOV2&INF A&B&RSV AMP PRB: CPT

## 2022-12-29 PROCEDURE — 71045 X-RAY EXAM CHEST 1 VIEW: CPT

## 2022-12-29 PROCEDURE — 83615 LACTATE (LD) (LDH) ENZYME: CPT

## 2022-12-29 PROCEDURE — 83036 HEMOGLOBIN GLYCOSYLATED A1C: CPT

## 2022-12-29 PROCEDURE — P9040: CPT

## 2022-12-29 PROCEDURE — 83735 ASSAY OF MAGNESIUM: CPT

## 2022-12-29 PROCEDURE — 83010 ASSAY OF HAPTOGLOBIN QUANT: CPT

## 2022-12-29 PROCEDURE — 85027 COMPLETE CBC AUTOMATED: CPT

## 2022-12-29 PROCEDURE — 86850 RBC ANTIBODY SCREEN: CPT

## 2022-12-29 PROCEDURE — 93005 ELECTROCARDIOGRAM TRACING: CPT

## 2022-12-29 PROCEDURE — 85025 COMPLETE CBC W/AUTO DIFF WBC: CPT

## 2022-12-29 PROCEDURE — 36415 COLL VENOUS BLD VENIPUNCTURE: CPT

## 2022-12-29 PROCEDURE — 87640 STAPH A DNA AMP PROBE: CPT

## 2022-12-29 PROCEDURE — 82607 VITAMIN B-12: CPT

## 2022-12-29 PROCEDURE — 82306 VITAMIN D 25 HYDROXY: CPT

## 2022-12-29 PROCEDURE — 82728 ASSAY OF FERRITIN: CPT

## 2022-12-29 PROCEDURE — 87641 MR-STAPH DNA AMP PROBE: CPT

## 2022-12-29 PROCEDURE — 85045 AUTOMATED RETICULOCYTE COUNT: CPT

## 2022-12-29 PROCEDURE — 83605 ASSAY OF LACTIC ACID: CPT

## 2022-12-29 PROCEDURE — 80053 COMPREHEN METABOLIC PANEL: CPT

## 2022-12-29 PROCEDURE — 36430 TRANSFUSION BLD/BLD COMPNT: CPT

## 2022-12-29 PROCEDURE — 83540 ASSAY OF IRON: CPT

## 2022-12-29 RX ORDER — CHLORHEXIDINE GLUCONATE 213 G/1000ML
1 SOLUTION TOPICAL
Refills: 0 | Status: DISCONTINUED | OUTPATIENT
Start: 2022-12-29 | End: 2022-12-29

## 2022-12-29 RX ORDER — MUPIROCIN 20 MG/G
1 OINTMENT TOPICAL
Qty: 1 | Refills: 0
Start: 2022-12-29 | End: 2022-12-30

## 2022-12-29 RX ORDER — OLMESARTAN MEDOXOMIL 5 MG/1
1 TABLET, FILM COATED ORAL
Qty: 0 | Refills: 0 | DISCHARGE

## 2022-12-29 RX ADMIN — ENOXAPARIN SODIUM 40 MILLIGRAM(S): 100 INJECTION SUBCUTANEOUS at 06:08

## 2022-12-29 RX ADMIN — AMLODIPINE BESYLATE 10 MILLIGRAM(S): 2.5 TABLET ORAL at 06:08

## 2022-12-29 RX ADMIN — Medication 1 MILLIGRAM(S): at 11:49

## 2022-12-29 RX ADMIN — Medication 1: at 06:31

## 2022-12-29 RX ADMIN — PANTOPRAZOLE SODIUM 40 MILLIGRAM(S): 20 TABLET, DELAYED RELEASE ORAL at 11:47

## 2022-12-29 RX ADMIN — MUPIROCIN 1 APPLICATION(S): 20 OINTMENT TOPICAL at 06:08

## 2022-12-29 RX ADMIN — Medication 1: at 11:46

## 2022-12-29 RX ADMIN — Medication 1 APPLICATION(S): at 11:47

## 2022-12-29 NOTE — PROGRESS NOTE ADULT - ASSESSMENT
77 year old male with a PHMx of hypertension, hypercholesteremia, BPH and oropharyngeal mass s/p resection, on radiation and chemo (last dose of chemo was last week) presenting to the ED with complaints of irritation and burning in his throat for three days, Patient was admitted for Sepsis  2/2 COVID + and neutropenic fever. Admitted for further management. ID and Heme/onc consulted. started Cefepime per ID. s/p 1 unit PRBC for low H/H on 12/23.  GI consulted for malfunction Peg tube, adjusted at bedside, now resumed feeding/med per GI.   urine /blood culture negative. D/C'd ABT per ID.   ddimer [id number], will d/c on xarelto x 30 days- attending recc  Pt with mild hypernatremia, started free water via PEG  CM to follow for home care services

## 2022-12-29 NOTE — PROGRESS NOTE ADULT - EYES
PERRL/EOMI/conjunctiva clear/non icteric sclera

## 2022-12-29 NOTE — PROGRESS NOTE ADULT - PROBLEM SELECTOR PLAN 6
+ dermatitis/skin excoriation anterior neck -->on Silvadene per Heme  small irritation around PEG insertion site--PEG tube care, dry dressing  Wound care recc noted:  Clean all affected areas with normal saline and apply skin prep to the surrounding skin  -Apply Xeroform gauze to the Neck wound and cover with a non-adherent dry dressing Daily PRN  -Apply multiple layers of skin prep to the Peristomal PEG site and then apply a non-adherent dry dressing b.i.d. PRN  -Elevate/float the patients heels using heel protectors and reposition the patient Q 2hrs using wedges or pillows
+ dermatitis/skin excoriation anterior neck --started on Silvadene per Heme  small irritation around PEG insertion site--PEG tube care, dry dressing  Wound care consulted for reccs.
+ dermatitis/skin excoriation anterior neck -->on Silvadene per Heme  small irritation around PEG insertion site--PEG tube care, dry dressing  Wound care recc noted:  Clean all affected areas with normal saline and apply skin prep to the surrounding skin  -Apply Xeroform gauze to the Neck wound and cover with a non-adherent dry dressing Daily PRN  -Apply multiple layers of skin prep to the Peristomal PEG site and then apply a non-adherent dry dressing b.i.d. PRN  -Elevate/float the patients heels using heel protectors and reposition the patient Q 2hrs using wedges or pillows
+ dermatitis/skin excoriation anterior neck -->on Silvadene per Heme  small irritation around PEG insertion site--PEG tube care, dry dressing  Wound care recc noted:  Clean all affected areas with normal saline and apply skin prep to the surrounding skin  -Apply Xeroform gauze to the Neck wound and cover with a non-adherent dry dressing Daily PRN  -Apply multiple layers of skin prep to the Peristomal PEG site and then apply a non-adherent dry dressing b.i.d. PRN  -Elevate/float the patients heels using heel protectors and reposition the patient Q 2hrs using wedges or pillows

## 2022-12-29 NOTE — PROGRESS NOTE ADULT - SUBJECTIVE AND OBJECTIVE BOX
Patient is a 77y old  Male who presents with a chief complaint of sepsis, neutropenic fever (28 Dec 2022 16:04)      INTERVAL HPI/OVERNIGHT EVENTS: no overnight events    I&O's Summary    Vital Signs Last 24 Hrs  T(C): 36.5 (29 Dec 2022 04:50), Max: 36.6 (28 Dec 2022 20:45)  T(F): 97.7 (29 Dec 2022 04:50), Max: 97.8 (28 Dec 2022 20:45)  HR: 93 (29 Dec 2022 04:50) (93 - 109)  BP: 113/71 (29 Dec 2022 04:50) (106/76 - 143/86)  BP(mean): --  RR: 19 (29 Dec 2022 04:50) (18 - 19)  SpO2: 98% (29 Dec 2022 04:50) (98% - 100%)    Parameters below as of 29 Dec 2022 04:50  Patient On (Oxygen Delivery Method): room air      PAST MEDICAL & SURGICAL HISTORY:  Hypertension      Hypercholesterolemia      Stage 3 chronic kidney disease      No significant past surgical history          SOCIAL HISTORY  Alcohol:  Tobacco:  Illicit substance use:      FAMILY HISTORY:      LABS:                        10.0   5.39  )-----------( 336      ( 28 Dec 2022 07:22 )             33.2     12-29    145  |  105  |  28<H>  ----------------------------<  178<H>  4.4   |  35<H>  |  0.91    Ca    8.3<L>      29 Dec 2022 10:15          CAPILLARY BLOOD GLUCOSE      POCT Blood Glucose.: 171 mg/dL (29 Dec 2022 11:28)  POCT Blood Glucose.: 174 mg/dL (29 Dec 2022 06:28)  POCT Blood Glucose.: 149 mg/dL (28 Dec 2022 23:48)  POCT Blood Glucose.: 127 mg/dL (28 Dec 2022 21:44)  POCT Blood Glucose.: 160 mg/dL (28 Dec 2022 16:46)            MEDICATIONS  (STANDING):  amLODIPine   Tablet 10 milliGRAM(s) Oral daily  chlorhexidine 2% Cloths 1 Application(s) Topical <User Schedule>  dextrose 5%. 1000 milliLiter(s) (50 mL/Hr) IV Continuous <Continuous>  dextrose 5%. 1000 milliLiter(s) (100 mL/Hr) IV Continuous <Continuous>  dextrose 50% Injectable 25 Gram(s) IV Push once  dextrose 50% Injectable 12.5 Gram(s) IV Push once  dextrose 50% Injectable 25 Gram(s) IV Push once  enoxaparin Injectable 40 milliGRAM(s) SubCutaneous every 12 hours  folic acid 1 milliGRAM(s) Oral daily  glucagon  Injectable 1 milliGRAM(s) IntraMuscular once  insulin lispro (ADMELOG) corrective regimen sliding scale   SubCutaneous every 6 hours  mupirocin 2% Ointment 1 Application(s) Both Nostrils two times a day  pantoprazole  Injectable 40 milliGRAM(s) IV Push daily  silver sulfADIAZINE 1% Cream 1 Application(s) Topical daily  tamsulosin 0.4 milliGRAM(s) Oral at bedtime    MEDICATIONS  (PRN):  acetaminophen   IVPB .. 750 milliGRAM(s) IV Intermittent once PRN Temp greater or equal to 38C (100.4F), Mild Pain (1 - 3)  dextrose Oral Gel 15 Gram(s) Oral once PRN Blood Glucose LESS THAN 70 milliGRAM(s)/deciliter  FIRST- Mouthwash  BLM 10 milliLiter(s) Swish and Spit every 6 hours PRN Mouth Care      REVIEW OF SYSTEMS:  CONSTITUTIONAL: No fever  EYES: No eye pain  ENMT:  No sinus or throat pain  NECK: No pain   RESPIRATORY: No cough,  No shortness of breath  CARDIOVASCULAR: No chest pain  GASTROINTESTINAL: No abdominal pain. No nausea, vomiting.  GENITOURINARY: No dysuria  NEUROLOGICAL: No headaches,  SKIN: + neck wound  MUSCULOSKELETAL: No joint pain     RADIOLOGY & ADDITIONAL TESTS:  < from: Xray Chest 1 View- PORTABLE-Urgent (12.22.22 @ 20:58) >    ACC: [id number] EXAM:  XR CHEST PORTABLE URGENT 1V                          PROCEDURE DATE:  12/22/2022          INTERPRETATION:  INDICATION: Throat pain. Sepsis    Portable chest 8:42 PM    COMPARISON: 10/25/2022    Overlying tubing.    FINDINGS:  Heart/Vascular: The heart size, mediastinum, hilum and aorta are within   normal limits for projection.  Pulmonary: Midline trachea. There is no focal infiltrate, congestion or   effusion.    Bones: There is no fracture.  Lines and catheter: None    Impression:    No acute pulmonary disease.    --- End of Report ---     YARIEL BOYD DO; Attending Radiologist  This document has been electronically signed. Dec 23 2022 11:02AM    < end of copied text >    Imaging Personally Reviewed:  [x ] YES  [ ] NO    Consultant(s) Notes Reviewed:  [ x] YES  [ ] NO    PHYSICAL EXAM:  GENERAL: NAD  HEAD:  Atraumatic, Normocephalic  EYES: conjunctiva and sclera clear  ENMT:  Moist mucous membranes  NECK: Supple,  NERVOUS SYSTEM:  Alert & Oriented X3  CHEST/LUNG: CTA bilaterally  HEART: Regular rate and rhythm  ABDOMEN: Soft, Nontender, Nondistended; Bowel sounds present  EXTREMITIES:  2+ Peripheral Pulses  SKIN:+ neck wound    Care Collaborated Discussed with Consultants/Other Providers [x ] YES  [ ] NO

## 2022-12-29 NOTE — PROGRESS NOTE ADULT - PROVIDER SPECIALTY LIST ADULT
Infectious Disease
Gastroenterology
Heme/Onc
Infectious Disease
Internal Medicine
Hospitalist
Hospitalist
Gastroenterology
Hospitalist
Gastroenterology
Internal Medicine
Gastroenterology
Internal Medicine
Internal Medicine
Gastroenterology

## 2022-12-29 NOTE — PROGRESS NOTE ADULT - RESPIRATORY
clear to auscultation bilaterally/no wheezes/no rales/no rhonchi/no respiratory distress/airway patent/breath sounds equal/good air movement

## 2022-12-29 NOTE — PROGRESS NOTE ADULT - NEGATIVE MUSCULOSKELETAL SYMPTOMS
no stiffness/no arm pain L/no arm pain R/no back pain/no leg pain L/no leg pain R

## 2022-12-29 NOTE — PROGRESS NOTE ADULT - NEGATIVE NEUROLOGICAL SYMPTOMS
no syncope/no tremors/no vertigo/no loss of sensation/no difficulty walking/no headache/no confusion

## 2022-12-29 NOTE — PROGRESS NOTE ADULT - PROBLEM SELECTOR PLAN 1
hx of oropharyngeal mass s/p resection, on radiation and chemo   complaints of irritation and burning in his throat for three days  ANC 4.31, wbc 5.3, afebrile  CXR no infiltrates  COVID + asymptomatic on room air.   s/p zosyn + cefepime, 1.5L bolus   Urine and blood culture NGTD  ID Dr. Mittal  -->resolved

## 2022-12-29 NOTE — PROGRESS NOTE ADULT - PHARYNX
no redness/no discharge
no redness
no redness/no discharge

## 2022-12-29 NOTE — CHART NOTE - NSCHARTNOTEFT_GEN_A_CORE
Assessment:   77yMalePatient is a 77y old  Male who presents with a chief complaint of sepsis, neutropenic fever (29 Dec 2022 11:56) Pt Visited. Pis on airborne isolation. Pt with COVID +. Pt OOb to chair. Pt reports as whole set up @ home. Pt Reports he is on 5 cans of Glucerna 1.5 @ Home.  Pt being D/C Home today. Labs noted. BUN 28, . Pt  being followed by Home care @ Home.      Factors impacting intake: [ ] none [ ] nausea  [ ] vomiting [ ] diarrhea [ ] constipation  [ ]chewing problems [ x] swallowing issues  [ ] other:     Diet Prescription: Diet, NPO with Tube Feed:   Tube Feeding Modality: Gastrostomy  Glucerna 1.5 Earl  Total Volume for 24 Hours (mL): 1360  Continuous  Starting Tube Feed Rate {mL per Hour}: 30  Increase Tube Feed Rate by (mL): 15     Every 6 hours  Until Goal Tube Feed Rate (mL per Hour): 85  Tube Feed Duration (in Hours): 16  Tube Feed Start Time: 13:00 (12-25-22 @ 12:44)    Intake: NPO w TF     Current Weight: Weight (kg): 49.9 (12-22 @ 18:13)  % Weight Change    Pertinent Medications: MEDICATIONS  (STANDING):  amLODIPine   Tablet 10 milliGRAM(s) Oral daily  chlorhexidine 2% Cloths 1 Application(s) Topical <User Schedule>  dextrose 5%. 1000 milliLiter(s) (50 mL/Hr) IV Continuous <Continuous>  dextrose 5%. 1000 milliLiter(s) (100 mL/Hr) IV Continuous <Continuous>  dextrose 50% Injectable 25 Gram(s) IV Push once  dextrose 50% Injectable 12.5 Gram(s) IV Push once  dextrose 50% Injectable 25 Gram(s) IV Push once  enoxaparin Injectable 40 milliGRAM(s) SubCutaneous every 12 hours  folic acid 1 milliGRAM(s) Oral daily  glucagon  Injectable 1 milliGRAM(s) IntraMuscular once  insulin lispro (ADMELOG) corrective regimen sliding scale   SubCutaneous every 6 hours  mupirocin 2% Ointment 1 Application(s) Both Nostrils two times a day  pantoprazole  Injectable 40 milliGRAM(s) IV Push daily  silver sulfADIAZINE 1% Cream 1 Application(s) Topical daily  tamsulosin 0.4 milliGRAM(s) Oral at bedtime    MEDICATIONS  (PRN):  acetaminophen   IVPB .. 750 milliGRAM(s) IV Intermittent once PRN Temp greater or equal to 38C (100.4F), Mild Pain (1 - 3)  dextrose Oral Gel 15 Gram(s) Oral once PRN Blood Glucose LESS THAN 70 milliGRAM(s)/deciliter  FIRST- Mouthwash  BLM 10 milliLiter(s) Swish and Spit every 6 hours PRN Mouth Care    Pertinent Labs: 12-29 Na145 mmol/L Glu 178 mg/dL<H> K+ 4.4 mmol/L Cr  0.91 mg/dL BUN 28 mg/dL<H> 12-27 Phos 2.7 mg/dL 12-27 Alb 1.9 g/dL<L>     CAPILLARY BLOOD GLUCOSE      POCT Blood Glucose.: 171 mg/dL (29 Dec 2022 11:28)  POCT Blood Glucose.: 174 mg/dL (29 Dec 2022 06:28)  POCT Blood Glucose.: 149 mg/dL (28 Dec 2022 23:48)  POCT Blood Glucose.: 127 mg/dL (28 Dec 2022 21:44)  POCT Blood Glucose.: 160 mg/dL (28 Dec 2022 16:46)    Skin:     Estimated Needs:   [ ] no change since previous assessment  [ ] recalculated:     Previous Nutrition Diagnosis:   [ ] Inadequate Energy Intake [ ]Inadequate Oral Intake [ ] Excessive Energy Intake   [ ] Underweight [ ] Increased Nutrient Needs [ ] Overweight/Obesity   [ ] Altered GI Function [ ] Unintended Weight Loss [ ] Food & Nutrition Related Knowledge Deficit [ ] Malnutrition     Nutrition Diagnosis is [ ] ongoing  [ ] resolved [ ] not applicable     New Nutrition Diagnosis: [ ] not applicable       Interventions:   Recommend  [ ] Change Diet To:  [ ] Nutrition Supplement  [x ] Nutrition Support continue with Glucerna 1.5 @ 85 ml /hr x 16 hr to provide 1360 ml, 2040 kcal, protein 112 gms, free water 1032 ml/day  [ ] Other:     Monitoring and Evaluation:   [ ] PO intake [ x ] Tolerance to diet prescription [ x ] weights [ x ] labs[ x ] follow up per protocol  [ ] other:

## 2022-12-29 NOTE — PROGRESS NOTE ADULT - NECK
supple/symmetric
supple/symmetric/no tracheal deviation
supple/symmetric

## 2022-12-29 NOTE — PROGRESS NOTE ADULT - PROBLEM SELECTOR PLAN 7
wbc improving 3.05-->5.3, Hgb 9.8-->10.0  s/p 1 unit PRBC 12/23 for Hgb 7.4  UA shows + RBC  no gross hematuria  monitor CBC

## 2022-12-29 NOTE — PROGRESS NOTE ADULT - PROBLEM SELECTOR PLAN 4
oropharyngeal mass s/p resection  on radiation and chemo (last dose of chemo was last week 12/16)  follows up with Dr. Segundo Velasquez  Protein calorie malnutrition/cachectic  PEG tube feeding, pt able to feed self with bolus  HEME/onc QMA

## 2022-12-29 NOTE — PROGRESS NOTE ADULT - SKIN
warm and dry/no rashes/no ulcers
warm and dry/no rashes
warm and dry/no rashes/no ulcers

## 2022-12-29 NOTE — PROGRESS NOTE ADULT - NOSE
no discharge/no deviation
no discharge/no deviation/clear discharge

## 2022-12-29 NOTE — PROGRESS NOTE ADULT - NEGATIVE PSYCHIATRIC SYMPTOMS
no suicidal ideation/no depression/no anxiety/no memory loss/no paranoia/no mood swings/no agitation

## 2022-12-29 NOTE — PROGRESS NOTE ADULT - SUBJECTIVE AND OBJECTIVE BOX
[   ] ICU                                          [   ] CCU                                      [  X ] Medical Floor      Patient is a 77 year old male with dysphagia and Peg tube malfunction. GI consulted to evaluate.        77 year old male with past medical history significant for hypertension, hypercholesteremia, BPH and oropharyngeal mass s/p resection, on radiation and chemo (last dose of chemo was last week) presenting to the ED with complaints of irritation and burning in his throat for three days. Patient also c/o leaking peg tube and irritation at peg site. Patient denies abdominal pain, nausea, vomiting, hematemesis, hematochezia, melena, fever, chills, chest pain, SOB, palpitation, hematuria, dysuria or diarrhea.      Patient appears comfortable. No new complaints reported, No abdominal pain, N/V, hematemesis, hematochezia, melena, fever, chills, chest pain, SOB, cough or diarrhea reported.         PAIN MANAGEMENT:  Pain Scale:                0 /10  Pain Location:         PAST MEDICAL HISTORY  Hypertension  Hypercholesterolemia  Stage 3 chronic kidney disease        PAST SURGICAL HISTORY  Resection oropharyngeal mass        Allergies    No Known Allergies    Intolerances  None          SOCIAL HISTORY  Advanced Directives:       [ X ] Full Code       [  ] DNR  Marital Status:         [  ] M      [ X ] S      [  ] D       [  ] W  Children:       [ X ] Yes      [  ] No  Occupation:        [  ] Employed       [ X ] Unemployed       [  ] Retired  Diet:       [ X ] Regular       [  ] PEG feeding          [  ] NG tube feeding  Drug Use:           [ X ] Patient denied          [  ] Yes  Alcohol:           [ X ] No             [  ] Yes (socially)         [  ] Yes (chronic)  Tobacco:           [  ] Yes           [ X ] No      FAMILY HISTORY  [ X ] Heart Disease            [ X ] Diabetes             [ X ] HTN             [  ] Colon Cancer             [  ] Stomach Cancer              [  ] Pancreatic Cancer      VITALS  Vital Signs Last 24 Hrs  T(C): 36.9 (29 Dec 2022 13:32), Max: 36.9 (29 Dec 2022 13:32)  T(F): 98.4 (29 Dec 2022 13:32), Max: 98.4 (29 Dec 2022 13:32)  HR: 100 (29 Dec 2022 13:32) (93 - 109)  BP: 105/70 (29 Dec 2022 13:32) (105/70 - 143/86)     RR: 16 (29 Dec 2022 13:32) (16 - 19)  SpO2: 99% (29 Dec 2022 13:32) (98% - 100%)    Parameters below as of 29 Dec 2022 13:32  Patient On (Oxygen Delivery Method): room air       MEDICATIONS  (STANDING):  amLODIPine   Tablet 10 milliGRAM(s) Oral daily  chlorhexidine 2% Cloths 1 Application(s) Topical <User Schedule>  dextrose 5%. 1000 milliLiter(s) (50 mL/Hr) IV Continuous <Continuous>  dextrose 5%. 1000 milliLiter(s) (100 mL/Hr) IV Continuous <Continuous>  dextrose 50% Injectable 25 Gram(s) IV Push once  dextrose 50% Injectable 12.5 Gram(s) IV Push once  dextrose 50% Injectable 25 Gram(s) IV Push once  enoxaparin Injectable 40 milliGRAM(s) SubCutaneous every 12 hours  folic acid 1 milliGRAM(s) Oral daily  glucagon  Injectable 1 milliGRAM(s) IntraMuscular once  insulin lispro (ADMELOG) corrective regimen sliding scale   SubCutaneous every 6 hours  mupirocin 2% Ointment 1 Application(s) Both Nostrils two times a day  pantoprazole  Injectable 40 milliGRAM(s) IV Push daily  silver sulfADIAZINE 1% Cream 1 Application(s) Topical daily  tamsulosin 0.4 milliGRAM(s) Oral at bedtime    MEDICATIONS  (PRN):  acetaminophen   IVPB .. 750 milliGRAM(s) IV Intermittent once PRN Temp greater or equal to 38C (100.4F), Mild Pain (1 - 3)  dextrose Oral Gel 15 Gram(s) Oral once PRN Blood Glucose LESS THAN 70 milliGRAM(s)/deciliter  FIRST- Mouthwash  BLM 10 milliLiter(s) Swish and Spit every 6 hours PRN Mouth Care                            10.0   5.39  )-----------( 336      ( 28 Dec 2022 07:22 )             33.2       12-29    145  |  105  |  28<H>  ----------------------------<  178<H>  4.4   |  35<H>  |  0.91    Ca    8.3<L>      29 Dec 2022 10:15

## 2022-12-29 NOTE — HISTORY OF PRESENT ILLNESS
[de-identified] : Patient c/o mouth pain and superficial neck pain. States he took oxycodone yesterday and applied Silver Sulfadiazine yesterday evening with some relief in neck pain. Patient not using prescribed mouth was as prescribes. Ordered Viscous Lidocaine 1% for mouth pain, and Silver Sulfadiazine for neck pain relief. On exam VSS: Neck area erythematous and +skin sloughing. Explained to patient that he needs to use viscous lidocaine , gabapentin and topical cream as prescribed for continuous pain relief. Patient understands and agrees with plan.

## 2022-12-29 NOTE — PROGRESS NOTE ADULT - PROBLEM SELECTOR PROBLEM 5
DVT prophylaxis
DVT prophylaxis
Oral mucositis
Oral mucositis
DVT prophylaxis
Oral mucositis
Oral mucositis

## 2022-12-29 NOTE — PROGRESS NOTE ADULT - REASON FOR ADMISSION
sepsis
sepsis, neutropenic fever
sepsis
fever
sepsis, neutropenic fever

## 2022-12-29 NOTE — PROGRESS NOTE ADULT - PROBLEM SELECTOR PLAN 5
IMPROVE VTE Individual Risk Assessment          RISK                                                          Points  [  ] Previous VTE                                                3  [  ] Thrombophilia                                             2  [  ] Lower limb paralysis                                   2        (unable to hold up >15 seconds)    [  ] Current Cancer                                             2         (within 6 months)  [  x] Immobilization > 24 hrs                              1  [  ] ICU/CCU stay > 24 hours                             1  [x  ] Age > 60                                                         1    IMPROVE VTE Score:         [   2      ]      Heparin SubQ
IMPROVE VTE Individual Risk Assessment          RISK                                                          Points  [  ] Previous VTE                                                3  [  ] Thrombophilia                                             2  [  ] Lower limb paralysis                                   2        (unable to hold up >15 seconds)    [  ] Current Cancer                                             2         (within 6 months)  [  x] Immobilization > 24 hrs                              1  [  ] ICU/CCU stay > 24 hours                             1  [x  ] Age > 60                                                         1    IMPROVE VTE Score:         [   2      ]      Heparin SubQ
c/w magic mouth wash  maintain skin integrity   monitor symptoms
c/w magic mouth wash  maintain skin integrity   monitor symptoms
IMPROVE VTE Individual Risk Assessment          RISK                                                          Points  [  ] Previous VTE                                                3  [  ] Thrombophilia                                             2  [  ] Lower limb paralysis                                   2        (unable to hold up >15 seconds)    [  ] Current Cancer                                             2         (within 6 months)  [  x] Immobilization > 24 hrs                              1  [  ] ICU/CCU stay > 24 hours                             1  [x  ] Age > 60                                                         1    IMPROVE VTE Score:         [   2      ]      Heparin SubQ
c/w magic mouth wash  maintain skin integrity   monitor symptoms
c/w magic mouth wash  maintain skin integrity   monitor symptoms

## 2022-12-29 NOTE — CHART NOTE - NSCHARTNOTEFT_GEN_A_CORE
Surg Onc notified that patient was in house. Patient is a long term patient being followed for pancreatic ca. Met with patient at bedside.  Discussed with patient that office would reach out and set up appointment for February 2022.  Patient in agreement, updated contact info obtained.

## 2022-12-29 NOTE — PROGRESS NOTE ADULT - PROBLEM SELECTOR PLAN 8
med recc done  /78-->111/67 hr 97  c/w Amlodipine  Monitor BP/HR
Pt used to take Amlodipine 10mg in previous record.   unable to reach pharmacy as listed, multiple calls attempted. [facility] [phone]  Need MED Rec  /78  start Amlodipine  Monitor BP/HR
med recc done  /78-->111/67 hr 97  c/w Amlodipine  Monitor BP/HR
med recc done  /78-->111/67 hr 97  c/w Amlodipine  Monitor BP/HR

## 2022-12-29 NOTE — PROGRESS NOTE ADULT - NUTRITIONAL ASSESSMENT
This patient has been assessed with a concern for Malnutrition and has been determined to have a diagnosis/diagnoses of Severe protein-calorie malnutrition.    This patient is being managed with:   Diet NPO with Tube Feed-  Tube Feeding Modality: Gastrostomy  Glucerna 1.5 Earl  Total Volume for 24 Hours (mL): 1360  Continuous  Starting Tube Feed Rate {mL per Hour}: 30  Increase Tube Feed Rate by (mL): 15     Every 6 hours  Until Goal Tube Feed Rate (mL per Hour): 85  Tube Feed Duration (in Hours): 16  Tube Feed Start Time: 13:00  Entered: Dec 25 2022 12:44PM    

## 2022-12-29 NOTE — DISCHARGE NOTE NURSING/CASE MANAGEMENT/SOCIAL WORK - PATIENT PORTAL LINK FT
You can access the FollowMyHealth Patient Portal offered by [facility] by registering at the following website: [email or web address]. By joining [employer]’s FollowMyHealth portal, you will also be able to view your health information using other applications (apps) compatible with our system.

## 2022-12-30 ENCOUNTER — NON-APPOINTMENT (OUTPATIENT)
Age: 77
End: 2022-12-30

## 2022-12-30 ENCOUNTER — INPATIENT (INPATIENT)
Facility: HOSPITAL | Age: 77
LOS: 27 days | Discharge: HOME | DRG: 641 | End: 2023-01-27
Attending: INTERNAL MEDICINE | Admitting: INTERNAL MEDICINE
Payer: MEDICARE

## 2022-12-30 VITALS
SYSTOLIC BLOOD PRESSURE: 116 MMHG | WEIGHT: 110.01 LBS | OXYGEN SATURATION: 92 % | HEART RATE: 94 BPM | RESPIRATION RATE: 17 BRPM | HEIGHT: 69 IN | DIASTOLIC BLOOD PRESSURE: 72 MMHG | TEMPERATURE: 96 F

## 2022-12-30 DIAGNOSIS — F43.20 ADJUSTMENT DISORDER, UNSPECIFIED: ICD-10-CM

## 2022-12-30 LAB
ALBUMIN SERPL ELPH-MCNC: 1.8 G/DL — LOW (ref 3.5–5)
ALP SERPL-CCNC: 168 U/L — HIGH (ref 40–120)
ALT FLD-CCNC: 101 U/L DA — HIGH (ref 10–60)
ANION GAP SERPL CALC-SCNC: 6 MMOL/L — SIGNIFICANT CHANGE UP (ref 5–17)
APAP SERPL-MCNC: 3 UG/ML — LOW (ref 10–30)
APTT BLD: 30.3 SEC — SIGNIFICANT CHANGE UP (ref 27.5–35.5)
AST SERPL-CCNC: 35 U/L — SIGNIFICANT CHANGE UP (ref 10–40)
BASOPHILS # BLD AUTO: SIGNIFICANT CHANGE UP K/UL (ref 0–0.2)
BASOPHILS NFR BLD AUTO: SIGNIFICANT CHANGE UP % (ref 0–2)
BILIRUB SERPL-MCNC: 0.5 MG/DL — SIGNIFICANT CHANGE UP (ref 0.2–1.2)
BUN SERPL-MCNC: 31 MG/DL — HIGH (ref 7–18)
CALCIUM SERPL-MCNC: 8.6 MG/DL — SIGNIFICANT CHANGE UP (ref 8.4–10.5)
CHLORIDE SERPL-SCNC: 100 MMOL/L — SIGNIFICANT CHANGE UP (ref 96–108)
CO2 SERPL-SCNC: 35 MMOL/L — HIGH (ref 22–31)
CREAT SERPL-MCNC: 1.18 MG/DL — SIGNIFICANT CHANGE UP (ref 0.5–1.3)
EGFR: 64 ML/MIN/1.73M2 — SIGNIFICANT CHANGE UP
EOSINOPHIL # BLD AUTO: SIGNIFICANT CHANGE UP K/UL (ref 0–0.5)
EOSINOPHIL NFR BLD AUTO: SIGNIFICANT CHANGE UP % (ref 0–6)
ETHANOL SERPL-MCNC: <3 MG/DL — SIGNIFICANT CHANGE UP (ref 0–10)
GLUCOSE SERPL-MCNC: 240 MG/DL — HIGH (ref 70–99)
HCT VFR BLD CALC: 37.2 % — LOW (ref 39–50)
HGB BLD-MCNC: 11 G/DL — LOW (ref 13–17)
IMM GRANULOCYTES NFR BLD AUTO: SIGNIFICANT CHANGE UP % (ref 0–0.9)
INR BLD: 1.09 RATIO — SIGNIFICANT CHANGE UP (ref 0.88–1.16)
LYMPHOCYTES # BLD AUTO: SIGNIFICANT CHANGE UP % (ref 13–44)
LYMPHOCYTES # BLD AUTO: SIGNIFICANT CHANGE UP K/UL (ref 1–3.3)
MCHC RBC-ENTMCNC: 28 PG — SIGNIFICANT CHANGE UP (ref 27–34)
MCHC RBC-ENTMCNC: 29.6 GM/DL — LOW (ref 32–36)
MCV RBC AUTO: 94.7 FL — SIGNIFICANT CHANGE UP (ref 80–100)
MONOCYTES # BLD AUTO: SIGNIFICANT CHANGE UP K/UL (ref 0–0.9)
MONOCYTES NFR BLD AUTO: SIGNIFICANT CHANGE UP % (ref 2–14)
NEUTROPHILS # BLD AUTO: SIGNIFICANT CHANGE UP K/UL (ref 1.8–7.4)
NEUTROPHILS NFR BLD AUTO: SIGNIFICANT CHANGE UP % (ref 43–77)
NRBC # BLD: 0 /100 WBCS — SIGNIFICANT CHANGE UP (ref 0–0)
PLATELET # BLD AUTO: 378 K/UL — SIGNIFICANT CHANGE UP (ref 150–400)
POTASSIUM SERPL-MCNC: 5 MMOL/L — SIGNIFICANT CHANGE UP (ref 3.5–5.3)
POTASSIUM SERPL-SCNC: 5 MMOL/L — SIGNIFICANT CHANGE UP (ref 3.5–5.3)
PROT SERPL-MCNC: 7 G/DL — SIGNIFICANT CHANGE UP (ref 6–8.3)
PROTHROM AB SERPL-ACNC: 13 SEC — SIGNIFICANT CHANGE UP (ref 10.5–13.4)
RBC # BLD: 3.93 M/UL — LOW (ref 4.2–5.8)
RBC # FLD: 17.5 % — HIGH (ref 10.3–14.5)
SALICYLATES SERPL-MCNC: <1.7 MG/DL — LOW (ref 2.8–20)
SARS-COV-2 RNA SPEC QL NAA+PROBE: SIGNIFICANT CHANGE UP
SODIUM SERPL-SCNC: 141 MMOL/L — SIGNIFICANT CHANGE UP (ref 135–145)
WBC # BLD: 9.61 K/UL — SIGNIFICANT CHANGE UP (ref 3.8–10.5)
WBC # FLD AUTO: 9.61 K/UL — SIGNIFICANT CHANGE UP (ref 3.8–10.5)

## 2022-12-30 PROCEDURE — 90792 PSYCH DIAG EVAL W/MED SRVCS: CPT | Mod: 95,GC

## 2022-12-30 PROCEDURE — 99285 EMERGENCY DEPT VISIT HI MDM: CPT

## 2022-12-30 RX ORDER — SODIUM CHLORIDE 9 MG/ML
1000 INJECTION INTRAMUSCULAR; INTRAVENOUS; SUBCUTANEOUS ONCE
Refills: 0 | Status: COMPLETED | OUTPATIENT
Start: 2022-12-30 | End: 2022-12-30

## 2022-12-30 RX ADMIN — SODIUM CHLORIDE 1000 MILLILITER(S): 9 INJECTION INTRAMUSCULAR; INTRAVENOUS; SUBCUTANEOUS at 16:43

## 2022-12-30 NOTE — ED PROVIDER NOTE - OBJECTIVE STATEMENT
77-year-old male with a past medical history of esophageal cancer with PEG tube in place discharged yesterday from medicine service for sepsis and neutropenic fever.  Visiting nurse followed up with patient today and per family he reported to her that he drank a whole bottle of oxycodone as well as a whole bottle of Tylenol and a whole bottle of the liquid muscle relaxer and an apparent suicide attempt for which the patient was sent to the emergency department.  Here the patient is awake and alert, not speaking much but denying any complaint.

## 2022-12-30 NOTE — ED BEHAVIORAL HEALTH NOTE - BEHAVIORAL HEALTH NOTE
==================  PRE-HOSPITAL COURSE  ===================  SOURCE:  RN and secondhand ED documentation  DETAILS: 77-year-old male with a past medical history of esophageal cancer with PEG tube in place discharged yesterday from medicine service for sepsis and neutropenic fever.  =========  ED COURSE  =========  SOURCE: Secondhand ED documentation.  ARRIVAL:  Per chart, patient arrived via EMS. Per chart, patient was calm upon arrival, and cooperative with triage process.  BELONGINGS:  Per RN, patient arrived with no belongings. Patient currently in a gown with a 1:1 staff member.  BEHAVIOR: Chart described patient to be calm and cooperative, presenting with linear thought process, AAOx3, presenting with normal mood and appropriate affect, remains in behavioral and impulse control, is not violent/aggressive. Chart stated that the patient is denying SI/HI/A/VH. Chart stated that there is a circumferential wound noted to neck, no other visible marks, bruises, or lacerations on the body. Chart stated that the patient appears to be well-groomed, maintains fair hygiene, and reports fair ADLs, ambulates without assistance.  TREATMENT:  Per chart and RN, patient did not receive PRN medications.   VISITORS:  Per chart, no visitors at bedside.        COVID Exposure Screen- collateral (i.e. third-party, chart review, belongings, etc; include EMS and ED staff)   ---------------------------------------------------  1. Has the patient had a COVID-19 test in the last 90 days? Unknown.   2. Has the patient tested positive for COVID-19 antibodies? Unknown.   3. Has the patient received 2 doses of the COVID-19 vaccine? Unknown  4. In the past 10 days, has the patient been around anyone with a positive COVID-19 test?* Unknown.   5. Has the patient been out of [place] within the past 10 days? Unknown

## 2022-12-30 NOTE — ED ADULT TRIAGE NOTE - CHIEF COMPLAINT QUOTE
discharged yesterday EMS by roommate for lethargy , weakness , discharge from peg tube, was covid positive on 12/22, unable to obtain temp due to throat ca

## 2022-12-30 NOTE — ED PROVIDER NOTE - NSFOLLOWUPINSTRUCTIONS_ED_ALL_ED_FT
You were seen in the emergency department for: weakness  Your results report is attached - you were cleared for discharge. Your home care services are reinstated.   We recommend you follow up with: your primary care doctor    Please return to the Emergency Department if you experience any of the following symptoms:   - Shortness of breath or trouble breathing  - Pressure, pain or tightness in the chest  - Face drooping, arm weakness or speech difficulty  - Persistence of severe vomiting  - Head injury or loss of consciousness  - Nonstop bleeding or an open wound    (1) Follow up with your primary care physician within the next 24-48 hours as discussed. In addition, we did not find evidence of a life threatening illness on your testing here today, but listed below are the specialists that will be necessary to see as an outpatient to continue the workup.  Please call the numbers listed below or [phone] to set up the necessary appointments.  (2) Take Tylenol (up to 1000mg or 1 g)  and/or Motrin (up to 600mg) up to every 6 hours as needed for pain.   (3) If you had an IV (intravenous) line placed, it was removed. Sometimes, after IV removal, that area can be tender for a few days; if it develops redness and swelling, those could be signs of infection; in which case, return to the Emergency Department for assessment.  (4) Please continue taking all of your home medications as directed.

## 2022-12-30 NOTE — ED PROVIDER NOTE - PHYSICAL EXAMINATION
Exam:  General: Patient chronically ill appearing, vital signs within normal limits  HEENT: airway patent with moist mucous membranes  Cardiac: RRR S1/S2 with strong peripheral pulses  Respiratory: lungs clear without respiratory distress  GI: abdomen soft, PEG in place, non distended  Neuro: no gross neurologic deficits, moving all extremities  Skin: warm, well perfused  Psych: withdrawn

## 2022-12-30 NOTE — ED PROVIDER NOTE - PROGRESS NOTE DETAILS
psych cleared patient, and tox fellow cleared after she spoke with niece who all feel a suicide attempt was very unlikely given psychical exam, labs, vitals, mental status. niece states shes not sure its a safe discharge so KB consulted, who did tele visit and believes he does have safe services at home and d/w niece who agrees, KB requests we keep patient in room 26 overnight and he will come to ER to do discharge/transport in AM Maddy: pt pending SW to set up transport home. SW set up transport home, home care services reinstated, family made aware, will discharge. (Carie) - Lori the  asked me to evaluated pt has reportedly ambulette was ordered earlier and pt could not walk, so then ambulance ordered. During my initial eval, pt cannot walk despite 2 person assistance. Unsafe discharge. Endorsed to hospitalist for readmission to Dr Morton and MAR. Called and left message with pt's joce Marcum [phone] (Carie) - Lori the  asked me to evaluated pt has reportedly ambulette was ordered earlier and pt could not walk, so then ambulance ordered. During my initial eval, pt cannot walk despite 2 person assistance. Unsafe discharge. Endorsed to hospitalist for readmission to Dr Morton and MAR. Called and spoke with pt's joce Marcum [phone] who feels that pt should go to a rehab upon discharge

## 2022-12-30 NOTE — CHART NOTE - NSCHARTNOTEFT_GEN_A_CORE
spoke with patient niece Snehal Sahu, tel. [phone] she shared that both her and his other niece, Trixie had concerns about the patient's care post discharge from the hospital.  She noted that the patient was discharge recently but the family and care givers were unaware of what medications were prescribed and how they were to be administered. She noted that the patient was sent to the hospital because the nurse at home who was caring for him felt he was very weak, not eating when fed and appeared in need of emergent care. Snehal added that patient was previously prescribed medication but that was discontinued by his primary care physician. She added that the family is in favor of his safe discharge but need to know the plan for after care.   Patient will be discharged in the AM with home care reinstated and transportation home by ambulette.

## 2022-12-30 NOTE — ED BEHAVIORAL HEALTH ASSESSMENT NOTE - NSBHATTESTCOMMENTATTENDFT_PSY_A_CORE
Patient asked why he was being held to be seen by psychiatry. He stated that he was feeling dizzy earlier and that he is no longer feeling dizzy. There is no evidence of suicidality leading to presentation or history of suicidality per patient's niece whom we spoke to nor from patient. There is no evidence of any psychiatric disorder and there is no psychiatric contraindication to discharge.

## 2022-12-30 NOTE — ED PROVIDER NOTE - PATIENT PORTAL LINK FT
You can access the FollowMyHealth Patient Portal offered by [facility] by registering at the following website: [email or web address]. By joining [employer]’s FollowMyHealth portal, you will also be able to view your health information using other applications (apps) compatible with our system.

## 2022-12-30 NOTE — ED ADULT NURSE NOTE - OBJECTIVE STATEMENT
78 y/o male presents for weakness. As per family, pt took bottle of Tylenol, oxycodone and muscle relaxant. Pt denies suicidal attempt, states that he was trying to get some sleep. A&OX3 and denies pain. Circumferential wound noted to neck. Pt using urinal at bedside. All safety initiated and maintained. Pending further orders. VICKIE Simmons RN

## 2022-12-30 NOTE — ED BEHAVIORAL HEALTH ASSESSMENT NOTE - DESCRIPTION
Patient arrived by EMS w/ report that he took a bottle of liquid muscle relaxer, tylenol and oxycodone in an attempt to kill self.     Vital Signs Last 24 Hrs  T(C): 36.4 (30 Dec 2022 15:40), Max: 36.4 (30 Dec 2022 15:40)  T(F): 97.5 (30 Dec 2022 15:40), Max: 97.5 (30 Dec 2022 15:40)  HR: 93 (30 Dec 2022 15:40) (93 - 94)  BP: 122/75 (30 Dec 2022 15:40) (116/72 - 122/75)  BP(mean): --  RR: 17 (30 Dec 2022 15:40) (17 - 17)  SpO2: 100% (30 Dec 2022 15:40) (92% - 100%)    Parameters below as of 30 Dec 2022 15:40  Patient On (Oxygen Delivery Method): nasal cannula  O2 Flow (L/min): 2    Labs reviewed Esophageal cancer Patient is retired from position in education. Retired.

## 2022-12-30 NOTE — ED ADULT NURSE NOTE - NSIMPLEMENTINTERV_GEN_ALL_ED
Implemented All Fall Risk Interventions:  [place] to call system. Call bell, personal items and telephone within reach. Instruct patient to call for assistance. Room bathroom lighting operational. Non-slip footwear when patient is off stretcher. Physically safe environment: no spills, clutter or unnecessary equipment. Stretcher in lowest position, wheels locked, appropriate side rails in place. Provide visual cue, wrist band, yellow gown, etc. Monitor gait and stability. Monitor for mental status changes and reorient to person, place, and time. Review medications for side effects contributing to fall risk. Reinforce activity limits and safety measures with patient and family.

## 2022-12-30 NOTE — ED ADULT NURSE NOTE - ED STAT RN HANDOFF DETAILS 2
Patient resting on stretcher, not in apparent distress, awaiting for transportation. endorsed to RN Saline, safety maintained.

## 2022-12-30 NOTE — ED BEHAVIORAL HEALTH ASSESSMENT NOTE - SUMMARY
Patient is a 77 year-old M, , retired, pmh esophageal cancer w/ PEG, d/c from Medicine yesterday for tx of sepsis and neutropenic fever, no formal pph, who presents s/p possible overdose of medications, psychiatry consulted for mental health evaluation.    Patient adamantly denies mood and psychotic sxs, and denies suicidal ideation/ homicidal ideation. Patient was discharged from the hospital yesterday with multiple medications and likely adjusting to self-care at home, including use of medications and environment. Patient is at low imminent risk for harm to self or others at this time as he is adherent to medical treatment, future-oriented, help-seeking, calm, cooperative and in no acute distress at this time and identifies various reasons for wanting to live. Patient is currently at low acute risk and does not meet criteria for inpatient psychiatric hospitalization.    Plan:  Treat and release Patient is a 77 year-old M, , retired, pmh esophageal cancer w/ PEG, d/c from Medicine yesterday for tx of sepsis and neutropenic fever, no formal pph, who presents s/p possible overdose of medications, psychiatry consulted for mental health evaluation.    Patient adamantly denies mood and psychotic sxs, and denies suicidal ideation/ homicidal ideation. Patient was discharged from the hospital yesterday with multiple medications and likely adjusting to self-care at home, including use of medications and environment. Patient is at low imminent risk for harm to self or others at this time as he is adherent to medical treatment, future-oriented, help-seeking, calm, cooperative and in no acute distress at this time and identifies various reasons for wanting to live. Patient is currently at low acute risk and does not meet criteria for inpatient psychiatric hospitalization.    Plan:  - No psychiatric contraindication to discharge

## 2022-12-30 NOTE — CONSULT NOTE ADULT - ASSESSMENT
78 yo M hx of esophageal CA, presents with possible overdose.     Recommendations  - Assuming 9am as time of ingestion, patient's 6 hour APAP level is 3, AST 35, , which is inconsistent with reported ingestion of "a whole bottle of APAP".   - Patient AOx3, respiratory rate 17, heart rate 93, not consistent with ingestion of "a whole bottle of oxycodone".  - Patient unlikely to be experiencing clinically significant toxicity and can be cleared from a toxicological standpoint.   - discussed with attending.     Thank you for this consult  Toxicology consults: [phone]

## 2022-12-30 NOTE — ED BEHAVIORAL HEALTH ASSESSMENT NOTE - RISK ASSESSMENT
Modifiable risk factors: none  Unmodifiable risk factors: male gender; advanced age; family hx of psychotic illness  Protective factors: no past NSSIB; no co-morbid substance use; domiciled status; future-orientation; lack of current suicidality or homicidally; lack of urges to self-harm or engage in NSSIB; identifies various reasons for living; willingness and ability to readily engage in safety planning with this MD

## 2022-12-30 NOTE — ED PROVIDER NOTE - CLINICAL SUMMARY MEDICAL DECISION MAKING FREE TEXT BOX
Patient presenting for initial complaint of generalized weakness and leaking from PEG tube per EMS report but after further history from patient there is a concern for possible overdose attempt.  Labs including alcohol salicylates and Tylenol levels were sent.  Patient is currently not demonstrating a significant opiate toxidrome requiring Narcan but will monitor.  Will place patient on one-to-one observation.  We will have patient evaluated by psychiatry.

## 2022-12-30 NOTE — CONSULT NOTE ADULT - SUBJECTIVE AND OBJECTIVE BOX
MEDICAL TOXICOLOGY CONSULT    HPI:   76 yo M hx of esophageal CA with PEG tube, discharged yesterday from medicine for sepsis and neutropenic fever, arrived home at 6:30pm last night, sent in for concern for overdose. Per patient's niece, home health aid said patient was very lethargic at 9am and he was unable to sit up in bed without assistance, which is not his norm. Per niece, patient then stated he took one bottle of oxycodone, acetaminophen, and "muscle relaxor".     In the ED, patient was initially sleepy but arousable. At time of consult at 8pm, patient is Aox3, denying he took medication to harm himself. Vitals cosme, not bradypneic or bradycardic, No significant finding on exam.     EC rate, NSR, MN, QRS, QTc    PAST MEDICAL & SURGICAL HISTORY:  Hypertension      Hypercholesterolemia      Stage 3 chronic kidney disease      No significant past surgical history          MEDICATION HISTORY:      FAMILY HISTORY:      PHYSICAL EXAM  Vital Signs Last 24 Hrs  T(C): 36.4 (30 Dec 2022 15:40), Max: 36.4 (30 Dec 2022 15:40)  T(F): 97.5 (30 Dec 2022 15:40), Max: 97.5 (30 Dec 2022 15:40)  HR: 93 (30 Dec 2022 15:40) (93 - 94)  BP: 122/75 (30 Dec 2022 15:40) (116/72 - 122/75)  BP(mean): --  RR: 17 (30 Dec 2022 15:40) (17 - 17)  SpO2: 100% (30 Dec 2022 15:40) (92% - 100%)    Parameters below as of 30 Dec 2022 15:40  Patient On (Oxygen Delivery Method): nasal cannula  O2 Flow (L/min): 2      SIGNIFICANT LABORATORY STUDIES:                        11.0   9.61  )-----------( 378      ( 30 Dec 2022 13:05 )             37.2       12-30    141  |  100  |  31<H>  ----------------------------<  240<H>  5.0   |  35<H>  |  1.18    Ca    8.6      30 Dec 2022 13:05    TPro  7.0  /  Alb  1.8<L>  /  TBili  0.5  /  DBili  x   /  AST  35  /  ALT  101<H>  /  AlkPhos  168<H>        PT/INR - ( 30 Dec 2022 13:05 )   PT: 13.0 sec;   INR: 1.09 ratio         PTT - ( 30 Dec 2022 13:05 )  PTT:30.3 sec        Anion Gap: --  @ 15:50  CK: --  @ 15:50  Troponin:  --   @ 15:50  Pro-BNP:  --   @ 15:50  VBG:  --   @ 15:50  Carboxyhemoglobin %:  --   @ 15:50  Methemoglobin %:  --   @ 15:50  Osmolality Serum:  --   @ 15:50  Aspirin Level: <1.7<L>   @ 15:50  Acetaminophen Level:  3<L>   @ 15:50  Ethanol Level:  --   @ 15:50  Digoxin Level:  --   @ 15:50  Phenytoin Level:  --   @ 15:50  Carbamazepine level:  --   @ 15:50  Lamotrigine level:  --   @ 15:50  Anion Gap: 6  @ 13:05  CK: --  @ 13:05  Troponin:  --   @ 13:05  Pro-BNP:  --   @ 13:05  VBG:  --   @ 13:05  Carboxyhemoglobin %:  --   @ 13:05  Methemoglobin %:  --   @ 13:05  Osmolality Serum:  --   @ 13:05  Aspirin Level: --   @ 13:05  Acetaminophen Level:  --   @ 13:05  Ethanol Level:  --   @ 13:05  Digoxin Level:  --   @ 13:05  Phenytoin Level:  --   @ 13:05  Carbamazepine level:  --   @ 13:05  Lamotrigine level:  --   @ 13:05  Anion Gap: 5  @ 10:15  CK: --  @ 10:15  Troponin:  --   @ 10:15  Pro-BNP:  --   @ 10:15  VBG:  --   @ 10:15  Carboxyhemoglobin %:  --   @ 10:15  Methemoglobin %:  --   @ 10:15  Osmolality Serum:  --   @ 10:15  Aspirin Level: --   @ 10:15  Acetaminophen Level:  --   @ 10:15  Ethanol Level:  --   @ 10:15  Digoxin Level:  --   @ 10:15  Phenytoin Level:  --   @ 10:15  Carbamazepine level:  --   @ 10:15  Lamotrigine level:  --   @ 10:15  Anion Gap: 8  @ 06:28  CK: --  @ 06:28  Troponin:  --   @ 06:28  Pro-BNP:  --   @ 06:28  VBG:  --   @ 06:28  Carboxyhemoglobin %:  --   06:28  Methemoglobin %:  --   @ 06:28  Osmolality Serum:  --   @ 06:28  Aspirin Level: --   @ 06:28  Acetaminophen Level:  --   @ 06:28  Ethanol Level:  --   @ 06:28  Digoxin Level:  --   @ 06:28  Phenytoin Level:  --   @ 06:28  Carbamazepine level:  --   @ 06:28  Lamotrigine level:  --   @ 06:28      RADIOLOGIC STUDIES

## 2022-12-30 NOTE — ED BEHAVIORAL HEALTH ASSESSMENT NOTE - HPI (INCLUDE ILLNESS QUALITY, SEVERITY, DURATION, TIMING, CONTEXT, MODIFYING FACTORS, ASSOCIATED SIGNS AND SYMPTOMS)
Patient is a 77 year-old M, , retired, pmh esophageal cancer w/ PEG, d/c from Medicine yesterday for tx of sepsis and neutropenic fever, no formal pph, who presents s/p possible overdose of medications, psychiatry consulted for mental health evaluation.    ___ Patient is a 77 year-old M, , retired, pmh esophageal cancer w/ PEG, d/c from Medicine yesterday for tx of sepsis and neutropenic fever, no formal pph, who presents s/p possible overdose of medications, psychiatry consulted for mental health evaluation.        Glen - Nelfany Rigo, niece, [phone], health care proxy  Patient was visited by VNS for the first time today (d/c yesterday). Nurse found patient to be lethargic, unable to sit up and not making sense. Family was contacted and requested patient go to ED. Patient lives with a roommate who told family that the patient wanted to go to sleep and had a headache, possibly taking more tylenol or medication than necessary. Ms. Sierra denies concerns that patient is suicidal or acting in a way to harm himself. Patient normally takes care of his own ADLs with the help of his roommate, attends chemo and radiation appointments. Patient has never voiced suicidal thoughts, not anxious nor depressed; has no past psychiatric history. Family hx of schizophrenia (brother). States it is more likely that patient mixed medications/confused them for each other as he is on so many. No access to guns. Patient is a 77 year-old M, , retired, pmh esophageal cancer w/ PEG, d/c from Medicine yesterday for tx of sepsis and neutropenic fever, no formal pph, who presents s/p possible overdose of medications, psychiatry consulted for mental health evaluation.    Patient calm and cooperative, alert and oriented to person and place, does not know date but able to state he returned home from the hospital yesterday. Patient adamantly denies anxiety, depression, and suicidal ideation. Asked writer "why would I want to kill myself?" Strongly denies issues with energy and concentration, and feelings of worthlessness. States that he is currently receiving treatment for cancer and future oriented, discussing what he will do once he completes treatment and regains weight. Denies AVH, paranoia, substance use.     Patient was pleasant throughout interview and discussed nature, sports, and hobbies he likes to do. He is capable of taking care of himself, including finances, and going for appointments/treatments.     Glen - Snehal Rigo, niece, [phone], health care proxy  Patient was visited by VNS for the first time today (d/c yesterday). Nurse found patient to be lethargic, unable to sit up and not making sense. Family was contacted and requested patient go to ED. Patient lives with a roommate who told family that the patient wanted to go to sleep and had a headache, possibly taking more tylenol or medication than necessary. Ms. Sierra denies concerns that patient is suicidal or acting in a way to harm himself. Patient normally takes care of his own ADLs with the help of his roommate, attends chemo and radiation appointments. Patient has never voiced suicidal thoughts, not anxious nor depressed; has no past psychiatric history. Family hx of schizophrenia (brother). States it is more likely that patient mixed medications/confused them for each other as he is on so many. No access to guns.

## 2022-12-31 ENCOUNTER — APPOINTMENT (OUTPATIENT)
Dept: INFUSION THERAPY | Facility: HOSPITAL | Age: 77
End: 2022-12-31

## 2022-12-31 ENCOUNTER — TRANSCRIPTION ENCOUNTER (OUTPATIENT)
Age: 77
End: 2022-12-31

## 2022-12-31 DIAGNOSIS — R53.1 WEAKNESS: ICD-10-CM

## 2022-12-31 NOTE — DISCHARGE NOTE NURSING/CASE MANAGEMENT/SOCIAL WORK - NSDCPEFALRISK_GEN_ALL_CORE
For information on Fall & Injury Prevention, visit: [email or web address] OR  [email or web address] OR  https://www.cdc.gov/steadi/patient.html

## 2022-12-31 NOTE — CHART NOTE - NSCHARTNOTEFT_GEN_A_CORE
met with patient at the bedside.   Patient is being prepared for discharge.  Care management confirmed that his post discharge care at home will be reinstated.    Family expressed that they will be supportive of the home care services and need a copy of the discharge summary.  spoke with his niece Snehal [phone] to explain the discharge plan. Transportation will be arranged for his discharge home.  Patient is socially cleared for discharge.

## 2022-12-31 NOTE — DISCHARGE NOTE NURSING/CASE MANAGEMENT/SOCIAL WORK - PATIENT PORTAL LINK FT
You can access the FollowMyHealth Patient Portal offered by [facility] by registering at the following website: [email or web address]. By joining [employer]’s FollowMyHealth portal, you will also be able to view your health information using other applications (apps) compatible with our system.

## 2023-01-01 DIAGNOSIS — E78.00 PURE HYPERCHOLESTEROLEMIA, UNSPECIFIED: ICD-10-CM

## 2023-01-01 DIAGNOSIS — Z29.9 ENCOUNTER FOR PROPHYLACTIC MEASURES, UNSPECIFIED: ICD-10-CM

## 2023-01-01 DIAGNOSIS — I10 ESSENTIAL (PRIMARY) HYPERTENSION: ICD-10-CM

## 2023-01-01 DIAGNOSIS — J39.2 OTHER DISEASES OF PHARYNX: ICD-10-CM

## 2023-01-01 DIAGNOSIS — R53.1 WEAKNESS: ICD-10-CM

## 2023-01-01 PROCEDURE — 12345: CPT | Mod: NC

## 2023-01-01 PROCEDURE — 99222 1ST HOSP IP/OBS MODERATE 55: CPT

## 2023-01-01 RX ORDER — TAMSULOSIN HYDROCHLORIDE 0.4 MG/1
0.4 CAPSULE ORAL AT BEDTIME
Refills: 0 | Status: DISCONTINUED | OUTPATIENT
Start: 2023-01-01 | End: 2023-01-01

## 2023-01-01 RX ORDER — PANTOPRAZOLE SODIUM 20 MG/1
40 TABLET, DELAYED RELEASE ORAL DAILY
Refills: 0 | Status: DISCONTINUED | OUTPATIENT
Start: 2023-01-01 | End: 2023-01-02

## 2023-01-01 RX ORDER — THIAMINE MONONITRATE (VIT B1) 100 MG
500 TABLET ORAL ONCE
Refills: 0 | Status: COMPLETED | OUTPATIENT
Start: 2023-01-01 | End: 2023-01-03

## 2023-01-01 RX ORDER — ATORVASTATIN CALCIUM 80 MG/1
20 TABLET, FILM COATED ORAL AT BEDTIME
Refills: 0 | Status: DISCONTINUED | OUTPATIENT
Start: 2023-01-01 | End: 2023-01-27

## 2023-01-01 RX ORDER — FOLIC ACID 0.8 MG
1 TABLET ORAL DAILY
Refills: 0 | Status: DISCONTINUED | OUTPATIENT
Start: 2023-01-01 | End: 2023-01-27

## 2023-01-01 RX ORDER — RIVAROXABAN 15 MG-20MG
10 KIT ORAL DAILY
Refills: 0 | Status: DISCONTINUED | OUTPATIENT
Start: 2023-01-01 | End: 2023-01-01

## 2023-01-01 RX ORDER — ACETAMINOPHEN 500 MG
650 TABLET ORAL EVERY 6 HOURS
Refills: 0 | Status: DISCONTINUED | OUTPATIENT
Start: 2023-01-01 | End: 2023-01-01

## 2023-01-01 RX ORDER — RIVAROXABAN 15 MG-20MG
10 KIT ORAL DAILY
Refills: 0 | Status: DISCONTINUED | OUTPATIENT
Start: 2023-01-01 | End: 2023-01-03

## 2023-01-01 RX ORDER — ACETAMINOPHEN 500 MG
650 TABLET ORAL EVERY 6 HOURS
Refills: 0 | Status: DISCONTINUED | OUTPATIENT
Start: 2023-01-01 | End: 2023-01-10

## 2023-01-01 RX ORDER — FINASTERIDE 5 MG/1
5 TABLET, FILM COATED ORAL DAILY
Refills: 0 | Status: DISCONTINUED | OUTPATIENT
Start: 2023-01-01 | End: 2023-01-01

## 2023-01-01 RX ORDER — TAMSULOSIN HYDROCHLORIDE 0.4 MG/1
0.4 CAPSULE ORAL AT BEDTIME
Refills: 0 | Status: DISCONTINUED | OUTPATIENT
Start: 2023-01-01 | End: 2023-01-27

## 2023-01-01 RX ORDER — AMLODIPINE BESYLATE 2.5 MG/1
10 TABLET ORAL DAILY
Refills: 0 | Status: DISCONTINUED | OUTPATIENT
Start: 2023-01-01 | End: 2023-01-01

## 2023-01-01 RX ORDER — AMLODIPINE BESYLATE 2.5 MG/1
10 TABLET ORAL DAILY
Refills: 0 | Status: DISCONTINUED | OUTPATIENT
Start: 2023-01-01 | End: 2023-01-17

## 2023-01-01 RX ORDER — FOLIC ACID 0.8 MG
1 TABLET ORAL DAILY
Refills: 0 | Status: DISCONTINUED | OUTPATIENT
Start: 2023-01-01 | End: 2023-01-01

## 2023-01-01 RX ORDER — ATORVASTATIN CALCIUM 80 MG/1
20 TABLET, FILM COATED ORAL AT BEDTIME
Refills: 0 | Status: DISCONTINUED | OUTPATIENT
Start: 2023-01-01 | End: 2023-01-01

## 2023-01-01 RX ORDER — MUPIROCIN 20 MG/G
1 OINTMENT TOPICAL
Refills: 0 | Status: DISCONTINUED | OUTPATIENT
Start: 2023-01-01 | End: 2023-01-27

## 2023-01-01 RX ORDER — FINASTERIDE 5 MG/1
5 TABLET, FILM COATED ORAL DAILY
Refills: 0 | Status: DISCONTINUED | OUTPATIENT
Start: 2023-01-01 | End: 2023-01-27

## 2023-01-01 RX ADMIN — MUPIROCIN 1 APPLICATION(S): 20 OINTMENT TOPICAL at 21:00

## 2023-01-01 RX ADMIN — PANTOPRAZOLE SODIUM 40 MILLIGRAM(S): 20 TABLET, DELAYED RELEASE ORAL at 11:38

## 2023-01-01 RX ADMIN — FINASTERIDE 5 MILLIGRAM(S): 5 TABLET, FILM COATED ORAL at 11:38

## 2023-01-01 RX ADMIN — RIVAROXABAN 10 MILLIGRAM(S): KIT at 11:38

## 2023-01-01 RX ADMIN — TAMSULOSIN HYDROCHLORIDE 0.4 MILLIGRAM(S): 0.4 CAPSULE ORAL at 22:48

## 2023-01-01 RX ADMIN — Medication 1 MILLIGRAM(S): at 11:38

## 2023-01-01 RX ADMIN — ATORVASTATIN CALCIUM 20 MILLIGRAM(S): 80 TABLET, FILM COATED ORAL at 22:48

## 2023-01-01 RX ADMIN — MUPIROCIN 1 APPLICATION(S): 20 OINTMENT TOPICAL at 06:26

## 2023-01-01 RX ADMIN — Medication 1 APPLICATION(S): at 15:30

## 2023-01-01 RX ADMIN — AMLODIPINE BESYLATE 10 MILLIGRAM(S): 2.5 TABLET ORAL at 05:02

## 2023-01-01 NOTE — H&P ADULT - ATTENDING COMMENTS
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     Vital Signs Last 24 Hrs  T(C): 36.6 (31 Dec 2022 21:00), Max: 37.3 (31 Dec 2022 10:52)  T(F): 97.9 (31 Dec 2022 21:00), Max: 99.1 (31 Dec 2022 10:52)  HR: 111 (31 Dec 2022 21:00) (88 - 112)  BP: 112/76 (31 Dec 2022 21:00) (102/65 - 122/93)  RR: 18 (31 Dec 2022 16:34) (16 - 18)  SpO2: 92% (31 Dec 2022 16:34) (92% - 96%)    Parameters below as of 31 Dec 2022 14:45  Patient On (Oxygen Delivery Method): nasal cannula  O2 Flow (L/min): 98    - Impression   Worsening physical deconditioning   likely a combination of  underlying chronic medical problems and recent infections     Plan   Admit   Monitor vital signs   gentle hydration   re-evaluate hypoxia status and O2 requirement

## 2023-01-01 NOTE — H&P ADULT - NSHPREVIEWOFSYSTEMS_GEN_ALL_CORE
REVIEW OF SYSTEMS:    CONSTITUTIONAL:  No fevers or chills; + weakness  EYES/ENT:  No visual changes;  No vertigo or throat pain   NECK:  No pain or stiffness  RESPIRATORY:  No cough, wheezing, hemoptysis; No shortness of breath  CARDIOVASCULAR:  No chest pain or palpitations  GASTROINTESTINAL:  No abdominal or epigastric pain. No nausea, vomiting, or hematemesis; No diarrhea or constipation. No melena or hematochezia.  GENITOURINARY:  No dysuria, frequency or hematuria  MUSCULOSKELETAL:  FROM all extremities, normal strength, No calf tenderness  NEUROLOGICAL:  No numbness or weakness  SKIN:  No itching, rashes

## 2023-01-01 NOTE — H&P ADULT - NSHPSOCIALHISTORY_GEN_ALL_CORE
denies history of alcohol, tobacco or other drug use  lives with roommate  ambulates with rollator at baseline

## 2023-01-01 NOTE — PATIENT PROFILE ADULT - NSPROHMSYMPCOND_GEN_A_NUR
cancer/cardiovascular/genitourinary Says he is going to Rehab./cancer/cardiovascular/gastrointestinal/genitourinary/musculoskeletal

## 2023-01-01 NOTE — H&P ADULT - HISTORY OF PRESENT ILLNESS
77 year old male from home with PMH of HTN, HLD, BPH, oropharyngeal mass s/p PEG tube/resection, on radiation and chemo (last dose of chemo was last week) presenting to the ED with weakness. Patient reports he was recently admitted for COVID and fever, and upon discharge home he had significant weakness in his legs and was unable to walk. Otherwise denies further complaints. Reports 60 lb weight loss over 6 months, Denies chest pain, sob, fever, chills, n/v/d, changes in urinary or bowel habits, focal weakness    Recent admission 12/23-29: sepsis  2/2 COVID and neutropenic fever. treated with cefepime., 1 u pRBC, PEG tube was replaced by GI.     ED Course  Vitals; /72 P 94 R 17 SpO2 98% on room air  meds: 1 L NS

## 2023-01-01 NOTE — H&P ADULT - ASSESSMENT
77 year old male from home with PMH of HTN, HLD, BPH, oropharyngeal mass s/p PEG tube/resection, on radiation and chemo (last dose of chemo was last week)  admitted for weakness

## 2023-01-01 NOTE — PATIENT PROFILE ADULT - FALL HARM RISK - HARM RISK INTERVENTIONS

## 2023-01-01 NOTE — PATIENT PROFILE ADULT - FALL HARM RISK - UNIVERSAL INTERVENTIONS
Bed in lowest position, wheels locked, appropriate side rails in place/Call bell, personal items and telephone in reach/Instruct patient to call for assistance before getting out of bed or chair/Non-slip footwear when patient is out of bed/[place] to call system/Physically safe environment - no spills, clutter or unnecessary equipment/Purposeful Proactive Rounding/Room/bathroom lighting operational, light cord in reach

## 2023-01-01 NOTE — H&P ADULT - PROBLEM SELECTOR PLAN 1
patient with recent admission for sepsis/covid, now with weakness, unable to ambulate  s/p 1 L NS in ED    - PT eval

## 2023-01-01 NOTE — H&P ADULT - NSHPPHYSICALEXAM_GEN_ALL_CORE
GENERAL: NAD,   HEAD:  Atraumatic, Normocephalic  EYES:  conjunctiva and sclera clear  NECK: Supple, No JVD, Normal thyroid, discoloration, peeling   CHEST/LUNG: Clear to auscultation. Clear to percussion bilaterally; No rales, rhonchi, wheezing, or rubs  HEART: Regular rate and rhythm; No murmurs, rubs, or gallops  ABDOMEN: Soft, Nontender, Nondistended; Bowel sounds present, PEG tube  NERVOUS SYSTEM:  Alert & Oriented X3,    EXTREMITIES:  2+ Peripheral Pulses, No clubbing, cyanosis, or edema  SKIN: warm dry

## 2023-01-01 NOTE — ED ADULT NURSE REASSESSMENT NOTE - NS ED NURSE REASSESS COMMENT FT1
10 pm . pt was very weak . so discharge cancelled and admitted  him to medicine . pt is Covid negative . as per charge NURSE  pt was transferred to HOLDING
Patient alert and verbally responsive, calm not in apparent distress, yellow gown in place. SaO2 fluctuating 88-92% declining Oxygen supplement stated" leave me alone, I don't need oxygen" education provided, MD Johnson notified. awaiting for SW consult.

## 2023-01-01 NOTE — CHART NOTE - NSCHARTNOTEFT_GEN_A_CORE
77 year old male from home with PMH of HTN, HLD, BPH, oropharyngeal mass s/p PEG tube/resection, on radiation and chemo (last dose of chemo was last week) presenting to the ED with weakness. Patient was recently discharged from [facility], says that he feels too weak.   -Neurological exam was non focal. PT recommended [facility]. Patient medically stable for DC to [facility]. Will also get nutritionist jacquelin

## 2023-01-02 LAB
ALBUMIN SERPL ELPH-MCNC: 1.5 G/DL — LOW (ref 3.5–5)
ALP SERPL-CCNC: 143 U/L — HIGH (ref 40–120)
ALT FLD-CCNC: 54 U/L DA — SIGNIFICANT CHANGE UP (ref 10–60)
ANION GAP SERPL CALC-SCNC: 7 MMOL/L — SIGNIFICANT CHANGE UP (ref 5–17)
AST SERPL-CCNC: 35 U/L — SIGNIFICANT CHANGE UP (ref 10–40)
BILIRUB SERPL-MCNC: 0.6 MG/DL — SIGNIFICANT CHANGE UP (ref 0.2–1.2)
BUN SERPL-MCNC: 18 MG/DL — SIGNIFICANT CHANGE UP (ref 7–18)
CALCIUM SERPL-MCNC: 8.2 MG/DL — LOW (ref 8.4–10.5)
CHLORIDE SERPL-SCNC: 103 MMOL/L — SIGNIFICANT CHANGE UP (ref 96–108)
CO2 SERPL-SCNC: 31 MMOL/L — SIGNIFICANT CHANGE UP (ref 22–31)
CREAT SERPL-MCNC: 0.84 MG/DL — SIGNIFICANT CHANGE UP (ref 0.5–1.3)
EGFR: 91 ML/MIN/1.73M2 — SIGNIFICANT CHANGE UP
GLUCOSE SERPL-MCNC: 152 MG/DL — HIGH (ref 70–99)
HCT VFR BLD CALC: 31.2 % — LOW (ref 39–50)
HGB BLD-MCNC: 9.6 G/DL — LOW (ref 13–17)
MCHC RBC-ENTMCNC: 28.6 PG — SIGNIFICANT CHANGE UP (ref 27–34)
MCHC RBC-ENTMCNC: 30.8 GM/DL — LOW (ref 32–36)
MCV RBC AUTO: 92.9 FL — SIGNIFICANT CHANGE UP (ref 80–100)
NRBC # BLD: 0 /100 WBCS — SIGNIFICANT CHANGE UP (ref 0–0)
PLATELET # BLD AUTO: 336 K/UL — SIGNIFICANT CHANGE UP (ref 150–400)
POTASSIUM SERPL-MCNC: 4 MMOL/L — SIGNIFICANT CHANGE UP (ref 3.5–5.3)
POTASSIUM SERPL-SCNC: 4 MMOL/L — SIGNIFICANT CHANGE UP (ref 3.5–5.3)
PROT SERPL-MCNC: 6.2 G/DL — SIGNIFICANT CHANGE UP (ref 6–8.3)
RBC # BLD: 3.36 M/UL — LOW (ref 4.2–5.8)
RBC # FLD: 17 % — HIGH (ref 10.3–14.5)
SODIUM SERPL-SCNC: 141 MMOL/L — SIGNIFICANT CHANGE UP (ref 135–145)
WBC # BLD: 8 K/UL — SIGNIFICANT CHANGE UP (ref 3.8–10.5)
WBC # FLD AUTO: 8 K/UL — SIGNIFICANT CHANGE UP (ref 3.8–10.5)

## 2023-01-02 RX ORDER — PANTOPRAZOLE SODIUM 20 MG/1
40 TABLET, DELAYED RELEASE ORAL DAILY
Refills: 0 | Status: DISCONTINUED | OUTPATIENT
Start: 2023-01-02 | End: 2023-01-27

## 2023-01-02 RX ADMIN — MUPIROCIN 1 APPLICATION(S): 20 OINTMENT TOPICAL at 06:49

## 2023-01-02 RX ADMIN — Medication 650 MILLIGRAM(S): at 21:26

## 2023-01-02 RX ADMIN — RIVAROXABAN 10 MILLIGRAM(S): KIT at 13:11

## 2023-01-02 RX ADMIN — Medication 650 MILLIGRAM(S): at 22:00

## 2023-01-02 RX ADMIN — PANTOPRAZOLE SODIUM 40 MILLIGRAM(S): 20 TABLET, DELAYED RELEASE ORAL at 21:27

## 2023-01-02 RX ADMIN — AMLODIPINE BESYLATE 10 MILLIGRAM(S): 2.5 TABLET ORAL at 06:49

## 2023-01-02 RX ADMIN — ATORVASTATIN CALCIUM 20 MILLIGRAM(S): 80 TABLET, FILM COATED ORAL at 21:26

## 2023-01-02 RX ADMIN — PANTOPRAZOLE SODIUM 40 MILLIGRAM(S): 20 TABLET, DELAYED RELEASE ORAL at 13:11

## 2023-01-02 RX ADMIN — TAMSULOSIN HYDROCHLORIDE 0.4 MILLIGRAM(S): 0.4 CAPSULE ORAL at 21:26

## 2023-01-02 RX ADMIN — Medication 1 MILLIGRAM(S): at 13:11

## 2023-01-02 RX ADMIN — FINASTERIDE 5 MILLIGRAM(S): 5 TABLET, FILM COATED ORAL at 13:11

## 2023-01-02 NOTE — DIETITIAN INITIAL EVALUATION ADULT - ENTERAL
Rec. Tube feeding; Glucerna 1.5 kcal, goal at 60ml/hr x 16hrs (960ml, 1440kcal, 79g protein, 729ml water at goal) NP/team to adjust free water as needed, as medically feasible

## 2023-01-02 NOTE — PROGRESS NOTE ADULT - SUBJECTIVE AND OBJECTIVE BOX
Patient is a 77y old  Male who presents with a chief complaint of Weakness     (02 Jan 2023 10:20)      INTERVAL HPI/OVERNIGHT EVENTS: no events noted overnight.    MEDICATIONS  (STANDING):  amLODIPine   Tablet 10 milliGRAM(s) Oral daily  atorvastatin 20 milliGRAM(s) Oral at bedtime  finasteride 5 milliGRAM(s) Oral daily  folic acid 1 milliGRAM(s) Oral daily  mupirocin 2% Ointment 1 Application(s) Topical two times a day  pantoprazole   Suspension 40 milliGRAM(s) Oral daily  rivaroxaban 10 milliGRAM(s) Enteral Tube daily  silver sulfADIAZINE 1% Cream 1 Application(s) Topical daily  tamsulosin 0.4 milliGRAM(s) Oral at bedtime  thiamine IVPB 500 milliGRAM(s) IV Intermittent once    MEDICATIONS  (PRN):  acetaminophen     Tablet .. 650 milliGRAM(s) Oral every 6 hours PRN Temp greater or equal to 38C (100.4F)  guaiFENesin Oral Liquid (Sugar-Free) 200 milliGRAM(s) Oral every 6 hours PRN Cough      __________________________________________________  REVIEW OF SYSTEMS:    CONSTITUTIONAL: No fever,   EYES: no acute visual disturbances  NECK: No pain or stiffness  RESPIRATORY: No cough; No shortness of breath  CARDIOVASCULAR: No chest pain, no palpitations  GASTROINTESTINAL: No pain. No nausea or vomiting; No diarrhea   NEUROLOGICAL: No headache or numbness, no tremors  MUSCULOSKELETAL: No joint pain, no muscle pain  GENITOURINARY: no dysuria, no frequency, no hesitancy  PSYCHIATRY: no depression , no anxiety  ALL OTHER  ROS negative        Vital Signs Last 24 Hrs  T(C): 37.7 (02 Jan 2023 13:43), Max: 37.7 (02 Jan 2023 13:43)  T(F): 99.9 (02 Jan 2023 13:43), Max: 99.9 (02 Jan 2023 13:43)  HR: 106 (02 Jan 2023 13:43) (100 - 106)  BP: 123/68 (02 Jan 2023 13:43) (114/70 - 139/71)  BP(mean): --  RR: 17 (02 Jan 2023 13:43) (17 - 18)  SpO2: 97% (02 Jan 2023 13:43) (95% - 98%)    Parameters below as of 02 Jan 2023 13:43  Patient On (Oxygen Delivery Method): room air        ________________________________________________  PHYSICAL EXAM:  GENERAL: NAD  HEENT: Normocephalic;  conjunctivae and sclerae clear; moist mucous membranes;   NECK : supple  CHEST/LUNG: Clear to auscultation bilaterally with good air entry   HEART: S1 S2  regular; no murmurs, gallops or rubs  ABDOMEN: Soft, Nontender, Nondistended; Bowel sounds present  EXTREMITIES: no cyanosis; no edema; no calf tenderness  SKIN: warm and dry; no rash  NERVOUS SYSTEM:  Awake and alert; Oriented  to place, person and time ; no new deficits    _________________________________________________  LABS:                        9.6    8.00  )-----------( 336      ( 02 Jan 2023 08:37 )             31.2     01-02    141  |  103  |  18  ----------------------------<  152<H>  4.0   |  31  |  0.84    Ca    8.2<L>      02 Jan 2023 08:37    TPro  6.2  /  Alb  1.5<L>  /  TBili  0.6  /  DBili  x   /  AST  35  /  ALT  54  /  AlkPhos  143<H>  01-02        CAPILLARY BLOOD GLUCOSE            RADIOLOGY & ADDITIONAL TESTS:    Imaging Personally Reviewed:  YES    Consultant(s) Notes Reviewed:   YES    Care Discussed with Consultants : YES     Plan of care was discussed with patient and /or primary care giver; all questions and concerns were addressed and care was aligned with patient's wishes.     This a late entry, patient was seen and examined on 1/2/23    Patient is a 77y old  Male who presents with a chief complaint of Weakness     (02 Jan 2023 10:20)      INTERVAL HPI/OVERNIGHT EVENTS: no events noted overnight. Denies any complaints     MEDICATIONS  (STANDING):  amLODIPine   Tablet 10 milliGRAM(s) Oral daily  atorvastatin 20 milliGRAM(s) Oral at bedtime  finasteride 5 milliGRAM(s) Oral daily  folic acid 1 milliGRAM(s) Oral daily  mupirocin 2% Ointment 1 Application(s) Topical two times a day  pantoprazole   Suspension 40 milliGRAM(s) Oral daily  rivaroxaban 10 milliGRAM(s) Enteral Tube daily  silver sulfADIAZINE 1% Cream 1 Application(s) Topical daily  tamsulosin 0.4 milliGRAM(s) Oral at bedtime  thiamine IVPB 500 milliGRAM(s) IV Intermittent once    MEDICATIONS  (PRN):  acetaminophen     Tablet .. 650 milliGRAM(s) Oral every 6 hours PRN Temp greater or equal to 38C (100.4F)  guaiFENesin Oral Liquid (Sugar-Free) 200 milliGRAM(s) Oral every 6 hours PRN Cough      __________________________________________________  REVIEW OF SYSTEMS:    CONSTITUTIONAL: No fever,   EYES: no acute visual disturbances  NECK: No pain or stiffness  RESPIRATORY: No cough; No shortness of breath  CARDIOVASCULAR: No chest pain, no palpitations  GASTROINTESTINAL: No pain. No nausea or vomiting; No diarrhea   NEUROLOGICAL: No headache or numbness, no tremors  MUSCULOSKELETAL: weakness+   GENITOURINARY: no dysuria, no frequency, no hesitancy  PSYCHIATRY: no depression , no anxiety  ALL OTHER  ROS negative        Vital Signs Last 24 Hrs  T(C): 37.7 (02 Jan 2023 13:43), Max: 37.7 (02 Jan 2023 13:43)  T(F): 99.9 (02 Jan 2023 13:43), Max: 99.9 (02 Jan 2023 13:43)  HR: 106 (02 Jan 2023 13:43) (100 - 106)  BP: 123/68 (02 Jan 2023 13:43) (114/70 - 139/71)  BP(mean): --  RR: 17 (02 Jan 2023 13:43) (17 - 18)  SpO2: 97% (02 Jan 2023 13:43) (95% - 98%)    Parameters below as of 02 Jan 2023 13:43  Patient On (Oxygen Delivery Method): room air        ________________________________________________  PHYSICAL EXAM:  GENERAL: chronically ill appearing male   HEENT: Normocephalic;  conjunctivae and sclerae clear; moist mucous membranes;   NECK : supple  CHEST/LUNG: Clear to auscultation bilaterally with good air entry   HEART: S1 S2  regular; no murmurs, gallops or rubs  ABDOMEN: Soft, Nontender, Nondistended; Bowel sounds present, PEG in place   EXTREMITIES: no cyanosis; no edema; no calf tenderness  SKIN: radiation dermatitis on the left lateral aspect of neck   NERVOUS SYSTEM:  Awake and alert; Oriented  to place, person and time ; no new deficits    _________________________________________________  LABS:                        9.6    8.00  )-----------( 336      ( 02 Jan 2023 08:37 )             31.2     01-02    141  |  103  |  18  ----------------------------<  152<H>  4.0   |  31  |  0.84    Ca    8.2<L>      02 Jan 2023 08:37    TPro  6.2  /  Alb  1.5<L>  /  TBili  0.6  /  DBili  x   /  AST  35  /  ALT  54  /  AlkPhos  143<H>  01-02        CAPILLARY BLOOD GLUCOSE            RADIOLOGY & ADDITIONAL TESTS:    Imaging Personally Reviewed:  YES    Consultant(s) Notes Reviewed:   YES    Care Discussed with Consultants : YES     Plan of care was discussed with patient and /or primary care giver; all questions and concerns were addressed and care was aligned with patient's wishes.

## 2023-01-02 NOTE — DIETITIAN INITIAL EVALUATION ADULT - PERTINENT MEDS FT
MEDICATIONS  (STANDING):  amLODIPine   Tablet 10 milliGRAM(s) Oral daily  atorvastatin 20 milliGRAM(s) Oral at bedtime  finasteride 5 milliGRAM(s) Oral daily  folic acid 1 milliGRAM(s) Oral daily  mupirocin 2% Ointment 1 Application(s) Topical two times a day  pantoprazole  Injectable 40 milliGRAM(s) IV Push daily  rivaroxaban 10 milliGRAM(s) Enteral Tube daily  silver sulfADIAZINE 1% Cream 1 Application(s) Topical daily  tamsulosin 0.4 milliGRAM(s) Oral at bedtime  thiamine IVPB 500 milliGRAM(s) IV Intermittent once    MEDICATIONS  (PRN):  acetaminophen     Tablet .. 650 milliGRAM(s) Oral every 6 hours PRN Temp greater or equal to 38C (100.4F)  guaiFENesin Oral Liquid (Sugar-Free) 200 milliGRAM(s) Oral every 6 hours PRN Cough

## 2023-01-02 NOTE — DIETITIAN INITIAL EVALUATION ADULT - OTHER INFO
Patient from home admitted for home recently d/gianni from [facility] in Dec. 2022. Visited pt. alert, very weak with "cough & congestion", tube feeding Glucerna 1.5 infusing at 40ml/hr & tolerating, reports of "60 lb weight loss over 6 months", dosing wt. 139 Lbs on 01/02/23, recently dx. with severe malnutrition by RD on 12/25/22 noted, seen by Psych/team, pending Neuro consult & [facility] placement?

## 2023-01-02 NOTE — PROGRESS NOTE ADULT - ASSESSMENT
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   -PT [facility], awaiting acceptance

## 2023-01-02 NOTE — DIETITIAN INITIAL EVALUATION ADULT - PHYSCIAL ASSESSMENT
emaciated/debilitated Partial Purse String (Simple) Text: Given the location of the defect and the characteristics of the surrounding skin a simple purse string closure was deemed most appropriate.  Undermining was performed circumfirentially around the surgical defect.  A purse string suture was then placed and tightened. Wound tension only allowed a partial closure of the circular defect.

## 2023-01-02 NOTE — DIETITIAN INITIAL EVALUATION ADULT - PERTINENT LABORATORY DATA
01-02    141  |  103  |  18  ----------------------------<  152<H>  4.0   |  31  |  0.84    Ca    8.2<L>      02 Jan 2023 08:37    TPro  6.2  /  Alb  1.5<L>  /  TBili  0.6  /  DBili  x   /  AST  35  /  ALT  54  /  AlkPhos  143<H>  01-02  A1C with Estimated Average Glucose Result: 7.1 % (12-27-22 @ 06:33)  A1C with Estimated Average Glucose Result: 14.1 % (02-17-22 @ 10:17)

## 2023-01-02 NOTE — DIETITIAN NUTRITION RISK NOTIFICATION - TREATMENT: THE FOLLOWING DIET HAS BEEN RECOMMENDED
Diet, NPO:   Tube Feeding Modality: Gastrostomy  Glucerna 1.5 Earl  Total Volume for 24 Hours (mL): 960  Continuous  Starting Tube Feed Rate {mL per Hour}: 10  Increase Tube Feed Rate by (mL): 10     Every 4 hours  Until Goal Tube Feed Rate (mL per Hour): 40  Tube Feed Duration (in Hours): 24  Tube Feed Start Time: 03:00 (01-01-23 @ 01:52) [Active]

## 2023-01-03 DIAGNOSIS — R65.10 SYSTEMIC INFLAMMATORY RESPONSE SYNDROME (SIRS) OF NON-INFECTIOUS ORIGIN WITHOUT ACUTE ORGAN DYSFUNCTION: ICD-10-CM

## 2023-01-03 DIAGNOSIS — Z02.9 ENCOUNTER FOR ADMINISTRATIVE EXAMINATIONS, UNSPECIFIED: ICD-10-CM

## 2023-01-03 DIAGNOSIS — K94.23 GASTROSTOMY MALFUNCTION: ICD-10-CM

## 2023-01-03 DIAGNOSIS — E43 UNSPECIFIED SEVERE PROTEIN-CALORIE MALNUTRITION: ICD-10-CM

## 2023-01-03 LAB
ANION GAP SERPL CALC-SCNC: 8 MMOL/L — SIGNIFICANT CHANGE UP (ref 5–17)
APPEARANCE UR: CLEAR — SIGNIFICANT CHANGE UP
BACTERIA # UR AUTO: ABNORMAL /HPF
BILIRUB UR-MCNC: NEGATIVE — SIGNIFICANT CHANGE UP
BUN SERPL-MCNC: 18 MG/DL — SIGNIFICANT CHANGE UP (ref 7–18)
CALCIUM SERPL-MCNC: 8.1 MG/DL — LOW (ref 8.4–10.5)
CHLORIDE SERPL-SCNC: 101 MMOL/L — SIGNIFICANT CHANGE UP (ref 96–108)
CO2 SERPL-SCNC: 30 MMOL/L — SIGNIFICANT CHANGE UP (ref 22–31)
COLOR SPEC: YELLOW — SIGNIFICANT CHANGE UP
CREAT SERPL-MCNC: 0.94 MG/DL — SIGNIFICANT CHANGE UP (ref 0.5–1.3)
DIFF PNL FLD: ABNORMAL
EGFR: 85 ML/MIN/1.73M2 — SIGNIFICANT CHANGE UP
EPI CELLS # UR: SIGNIFICANT CHANGE UP /HPF
GLUCOSE SERPL-MCNC: 157 MG/DL — HIGH (ref 70–99)
GLUCOSE UR QL: NEGATIVE — SIGNIFICANT CHANGE UP
HCT VFR BLD CALC: 29.9 % — LOW (ref 39–50)
HGB BLD-MCNC: 9.1 G/DL — LOW (ref 13–17)
KETONES UR-MCNC: NEGATIVE — SIGNIFICANT CHANGE UP
LEUKOCYTE ESTERASE UR-ACNC: ABNORMAL
MCHC RBC-ENTMCNC: 27.9 PG — SIGNIFICANT CHANGE UP (ref 27–34)
MCHC RBC-ENTMCNC: 30.4 GM/DL — LOW (ref 32–36)
MCV RBC AUTO: 91.7 FL — SIGNIFICANT CHANGE UP (ref 80–100)
NITRITE UR-MCNC: NEGATIVE — SIGNIFICANT CHANGE UP
NRBC # BLD: 0 /100 WBCS — SIGNIFICANT CHANGE UP (ref 0–0)
PH UR: 7 — SIGNIFICANT CHANGE UP (ref 5–8)
PLATELET # BLD AUTO: 349 K/UL — SIGNIFICANT CHANGE UP (ref 150–400)
POTASSIUM SERPL-MCNC: 4.3 MMOL/L — SIGNIFICANT CHANGE UP (ref 3.5–5.3)
POTASSIUM SERPL-SCNC: 4.3 MMOL/L — SIGNIFICANT CHANGE UP (ref 3.5–5.3)
PROCALCITONIN SERPL-MCNC: 0.74 NG/ML — HIGH (ref 0.02–0.1)
PROT UR-MCNC: 100
RAPID RVP RESULT: SIGNIFICANT CHANGE UP
RBC # BLD: 3.26 M/UL — LOW (ref 4.2–5.8)
RBC # FLD: 17.1 % — HIGH (ref 10.3–14.5)
RBC CASTS # UR COMP ASSIST: SIGNIFICANT CHANGE UP /HPF (ref 0–2)
SARS-COV-2 RNA SPEC QL NAA+PROBE: SIGNIFICANT CHANGE UP
SODIUM SERPL-SCNC: 139 MMOL/L — SIGNIFICANT CHANGE UP (ref 135–145)
SP GR SPEC: 1.01 — SIGNIFICANT CHANGE UP (ref 1.01–1.02)
UROBILINOGEN FLD QL: 1
WBC # BLD: 10.5 K/UL — SIGNIFICANT CHANGE UP (ref 3.8–10.5)
WBC # FLD AUTO: 10.5 K/UL — SIGNIFICANT CHANGE UP (ref 3.8–10.5)
WBC UR QL: SIGNIFICANT CHANGE UP /HPF (ref 0–5)

## 2023-01-03 PROCEDURE — 71045 X-RAY EXAM CHEST 1 VIEW: CPT | Mod: 26

## 2023-01-03 RX ORDER — SODIUM CHLORIDE 9 MG/ML
1000 INJECTION INTRAMUSCULAR; INTRAVENOUS; SUBCUTANEOUS
Refills: 0 | Status: DISCONTINUED | OUTPATIENT
Start: 2023-01-03 | End: 2023-01-04

## 2023-01-03 RX ADMIN — MUPIROCIN 1 APPLICATION(S): 20 OINTMENT TOPICAL at 05:06

## 2023-01-03 RX ADMIN — TAMSULOSIN HYDROCHLORIDE 0.4 MILLIGRAM(S): 0.4 CAPSULE ORAL at 23:16

## 2023-01-03 RX ADMIN — RIVAROXABAN 10 MILLIGRAM(S): KIT at 12:12

## 2023-01-03 RX ADMIN — FINASTERIDE 5 MILLIGRAM(S): 5 TABLET, FILM COATED ORAL at 12:12

## 2023-01-03 RX ADMIN — Medication 1 APPLICATION(S): at 12:13

## 2023-01-03 RX ADMIN — MUPIROCIN 1 APPLICATION(S): 20 OINTMENT TOPICAL at 17:28

## 2023-01-03 RX ADMIN — ATORVASTATIN CALCIUM 20 MILLIGRAM(S): 80 TABLET, FILM COATED ORAL at 23:16

## 2023-01-03 RX ADMIN — Medication 1 MILLIGRAM(S): at 12:13

## 2023-01-03 RX ADMIN — Medication 105 MILLIGRAM(S): at 05:04

## 2023-01-03 RX ADMIN — PANTOPRAZOLE SODIUM 40 MILLIGRAM(S): 20 TABLET, DELAYED RELEASE ORAL at 12:27

## 2023-01-03 RX ADMIN — SODIUM CHLORIDE 80 MILLILITER(S): 9 INJECTION INTRAMUSCULAR; INTRAVENOUS; SUBCUTANEOUS at 23:16

## 2023-01-03 NOTE — PROGRESS NOTE ADULT - ASSESSMENT
77 year old male from home with PMH of HTN, HLD, BPH, oropharyngeal mass s/p PEG tube/resection, on radiation and chemo (last dose of chemo was last week)  admitted for weakness. Pt was evaluated by PT and recommended [facility]   Hospital course complicated with fevers and tachycardia and infectious work up was started. Pt was also noted with Malfunctioning PEG tube abd GI Dr. Waddell consulted  77 year old male from home with PMH of HTN, HLD, BPH, oropharyngeal mass s/p PEG tube/resection, on radiation and chemo (last dose of chemo was last week)  admitted for weakness. Pt was evaluated by PT and recommended [facility]   Hospital course complicated with fevers and tachycardia and infectious work up was started. Pt was also noted with Malfunctioning PEG tube and GI Dr. Waddell consulted

## 2023-01-03 NOTE — PROGRESS NOTE ADULT - SUBJECTIVE AND OBJECTIVE BOX
Patient is a 77y old  Male who presents with a chief complaint of Weakness (02 Jan 2023 19:37)    OVERNIGHT EVENTS: pt with fever of 101.1 last evening, PEG tube malfunctioning     REVIEW OF SYSTEMS:  NECK: No pain or stiffness  RESPIRATORY: No cough, SOB  CARDIOVASCULAR: No chest pain, palpitations  GASTROINTESTINAL: No abdominal pain. +ve feeding tube   GENITOURINARY: No dysuria  NEUROLOGICAL: No HA  MUSCULOSKELETAL: No joint pain or swelling; No muscle, back, or extremity pain      T(C): 37.4 (01-03-23 @ 05:05), Max: 38.4 (01-02-23 @ 20:29)  HR: 102 (01-03-23 @ 05:05) (102 - 105)  BP: 106/63 (01-03-23 @ 05:05) (106/63 - 129/74)  RR: 17 (01-03-23 @ 05:05) (17 - 18)  SpO2: 99% (01-03-23 @ 05:05) (95% - 99%)  Wt(kg): --Vital Signs Last 24 Hrs  T(C): 37.4 (03 Jan 2023 05:05), Max: 38.4 (02 Jan 2023 20:29)  T(F): 99.3 (03 Jan 2023 05:05), Max: 101.1 (02 Jan 2023 20:29)  HR: 102 (03 Jan 2023 05:05) (102 - 105)  BP: 106/63 (03 Jan 2023 05:05) (106/63 - 129/74)  BP(mean): --  RR: 17 (03 Jan 2023 05:05) (17 - 18)  SpO2: 99% (03 Jan 2023 05:05) (95% - 99%)    Parameters below as of 03 Jan 2023 05:05  Patient On (Oxygen Delivery Method): room air    MEDICATIONS  (STANDING):  amLODIPine   Tablet 10 milliGRAM(s) Oral daily  atorvastatin 20 milliGRAM(s) Oral at bedtime  finasteride 5 milliGRAM(s) Oral daily  folic acid 1 milliGRAM(s) Oral daily  mupirocin 2% Ointment 1 Application(s) Topical two times a day  pantoprazole   Suspension 40 milliGRAM(s) Oral daily  silver sulfADIAZINE 1% Cream 1 Application(s) Topical daily  sodium chloride 0.9%. 1000 milliLiter(s) (80 mL/Hr) IV Continuous <Continuous>  tamsulosin 0.4 milliGRAM(s) Oral at bedtime    MEDICATIONS  (PRN):  acetaminophen     Tablet .. 650 milliGRAM(s) Oral every 6 hours PRN Temp greater or equal to 38C (100.4F)  guaiFENesin Oral Liquid (Sugar-Free) 200 milliGRAM(s) Oral every 6 hours PRN Cough          PHYSICAL EXAM:  GENERAL: NAD, frail   EYES: clear conjunctiva  ENMT: Moist mucous membranes  NECK: Supple, No JVD  CHEST/LUNG: Clear to auscultation bilaterally; No rales, rhonchi, wheezing, or rubs  HEART: S1, S2, Regular rate and rhythm  ABDOMEN: Soft, Nontender, Nondistended; Bowel sounds present  NEURO: Alert & Oriented X3  EXTREMITIES: No LE edema, no calf tenderness  LYMPH: No lymphadenopathy noted  SKIN: No rashes or lesions    Consultant(s) Notes Reviewed:  [x ] YES  [ ] NO  Care Discussed with Consultants/Other Providers [ x] YES  [ ] NO    LABS:                        9.1    10.50 )-----------( 349      ( 03 Jan 2023 08:10 )             29.9     01-03    139  |  101  |  18  ----------------------------<  157<H>  4.3   |  30  |  0.94    Ca    8.1<L>      03 Jan 2023 08:10    TPro  6.2  /  Alb  1.5<L>  /  TBili  0.6  /  DBili  x   /  AST  35  /  ALT  54  /  AlkPhos  143<H>  01-02      CAPILLARY BLOOD GLUCOSE                RADIOLOGY & ADDITIONAL TESTS:    Imaging Personally Reviewed:  [ ] YES  [ ] NO   Patient is a 77y old  Male who presents with a chief complaint of Weakness (02 Jan 2023 19:37)    OVERNIGHT EVENTS: pt with fever of 101.1 last evening, PEG tube malfunctioning     REVIEW OF SYSTEMS:  NECK: No pain or stiffness  RESPIRATORY: No cough, SOB  CARDIOVASCULAR: No chest pain, palpitations  GASTROINTESTINAL: No abdominal pain. +ve feeding tube   GENITOURINARY: No dysuria  NEUROLOGICAL: No HA  MUSCULOSKELETAL: No joint pain or swelling; No muscle, back, or extremity pain      T(C): 37.4 (01-03-23 @ 05:05), Max: 38.4 (01-02-23 @ 20:29)  HR: 102 (01-03-23 @ 05:05) (102 - 105)  BP: 106/63 (01-03-23 @ 05:05) (106/63 - 129/74)  RR: 17 (01-03-23 @ 05:05) (17 - 18)  SpO2: 99% (01-03-23 @ 05:05) (95% - 99%)  Wt(kg): --Vital Signs Last 24 Hrs  T(C): 37.4 (03 Jan 2023 05:05), Max: 38.4 (02 Jan 2023 20:29)  T(F): 99.3 (03 Jan 2023 05:05), Max: 101.1 (02 Jan 2023 20:29)  HR: 102 (03 Jan 2023 05:05) (102 - 105)  BP: 106/63 (03 Jan 2023 05:05) (106/63 - 129/74)  BP(mean): --  RR: 17 (03 Jan 2023 05:05) (17 - 18)  SpO2: 99% (03 Jan 2023 05:05) (95% - 99%)    Parameters below as of 03 Jan 2023 05:05  Patient On (Oxygen Delivery Method): room air    MEDICATIONS  (STANDING):  amLODIPine   Tablet 10 milliGRAM(s) Oral daily  atorvastatin 20 milliGRAM(s) Oral at bedtime  finasteride 5 milliGRAM(s) Oral daily  folic acid 1 milliGRAM(s) Oral daily  mupirocin 2% Ointment 1 Application(s) Topical two times a day  pantoprazole   Suspension 40 milliGRAM(s) Oral daily  silver sulfADIAZINE 1% Cream 1 Application(s) Topical daily  sodium chloride 0.9%. 1000 milliLiter(s) (80 mL/Hr) IV Continuous <Continuous>  tamsulosin 0.4 milliGRAM(s) Oral at bedtime    MEDICATIONS  (PRN):  acetaminophen     Tablet .. 650 milliGRAM(s) Oral every 6 hours PRN Temp greater or equal to 38C (100.4F)  guaiFENesin Oral Liquid (Sugar-Free) 200 milliGRAM(s) Oral every 6 hours PRN Cough          PHYSICAL EXAM:  GENERAL: NAD, frail   EYES: clear conjunctiva  ENMT: Moist mucous membranes  NECK: Supple, No JVD  CHEST/LUNG: Clear to auscultation bilaterally; No rales, rhonchi, wheezing, or rubs  HEART: S1, S2, Regular rate and rhythm  ABDOMEN: Soft, Nontender, Nondistended; Bowel sounds present, +ve PEG tube   NEURO: Alert & Oriented X3  EXTREMITIES: No LE edema, no calf tenderness    Consultant(s) Notes Reviewed:  [x ] YES  [ ] NO  Care Discussed with Consultants/Other Providers [ x] YES  [ ] NO    LABS:                        9.1    10.50 )-----------( 349      ( 03 Jan 2023 08:10 )             29.9     01-03    139  |  101  |  18  ----------------------------<  157<H>  4.3   |  30  |  0.94    Ca    8.1<L>      03 Jan 2023 08:10    TPro  6.2  /  Alb  1.5<L>  /  TBili  0.6  /  DBili  x   /  AST  35  /  ALT  54  /  AlkPhos  143<H>  01-02    CAPILLARY BLOOD GLUCOSE  RADIOLOGY & ADDITIONAL TESTS:

## 2023-01-03 NOTE — PROGRESS NOTE ADULT - PROBLEM SELECTOR PLAN 1
-Fever of 101.1 with tachycardia, unclear source   -f/u blood and urine culture   -f/u CXR   -f/u UA   -f/u RVP   -empiric abx after cultures obtained   -supportive measures

## 2023-01-03 NOTE — PROGRESS NOTE ADULT - PROBLEM SELECTOR PLAN 2
-s/p PEG tube reposition in last admission, currently malfunctioning again  -GI Dr. Waddell consulted   -hold Xarelto- PEG tube tentatively scheduled for Friday   -NPO after midnight Thurs

## 2023-01-03 NOTE — PROGRESS NOTE ADULT - NS ATTEND AMEND GEN_ALL_CORE FT
This is a late entry, patient was seen and examined on 1/3.     89 year old male from home with PMH of HTN, HLD, BPH, oropharyngeal mass s/p PEG tube/resection, on radiation and chemo (last dose of chemo was last week)  admitted for weakness and planned for LINDY.      Overnight, patient spiked fever.     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 No obvious infectious etiology. Would consider empiric abx.   -PEG tube appears to be malfunctioning per nursing staff. will get GI eval   -Rest of the management as above

## 2023-01-03 NOTE — PROGRESS NOTE ADULT - PROBLEM SELECTOR PLAN 8
-dvt ppx: hold Xarelto prior to PEG tube placement -dvt ppx: hold Xarelto prior to PEG tube placement, SCDs

## 2023-01-03 NOTE — PROGRESS NOTE ADULT - PROBLEM SELECTOR PLAN 7
-on radiation and chemo (last dose of chemo was last week), follows up with Dr. Segundo Velasquez.  -Heme/Onc follow up outpt

## 2023-01-04 ENCOUNTER — TRANSCRIPTION ENCOUNTER (OUTPATIENT)
Age: 78
End: 2023-01-04

## 2023-01-04 LAB
ALBUMIN SERPL ELPH-MCNC: 1.4 G/DL — LOW (ref 3.5–5)
ALP SERPL-CCNC: 139 U/L — HIGH (ref 40–120)
ALT FLD-CCNC: 47 U/L DA — SIGNIFICANT CHANGE UP (ref 10–60)
ANION GAP SERPL CALC-SCNC: 8 MMOL/L — SIGNIFICANT CHANGE UP (ref 5–17)
AST SERPL-CCNC: 33 U/L — SIGNIFICANT CHANGE UP (ref 10–40)
BILIRUB SERPL-MCNC: 0.5 MG/DL — SIGNIFICANT CHANGE UP (ref 0.2–1.2)
BUN SERPL-MCNC: 17 MG/DL — SIGNIFICANT CHANGE UP (ref 7–18)
CALCIUM SERPL-MCNC: 8.3 MG/DL — LOW (ref 8.4–10.5)
CHLORIDE SERPL-SCNC: 103 MMOL/L — SIGNIFICANT CHANGE UP (ref 96–108)
CO2 SERPL-SCNC: 28 MMOL/L — SIGNIFICANT CHANGE UP (ref 22–31)
CREAT SERPL-MCNC: 0.88 MG/DL — SIGNIFICANT CHANGE UP (ref 0.5–1.3)
EGFR: 90 ML/MIN/1.73M2 — SIGNIFICANT CHANGE UP
GLUCOSE SERPL-MCNC: 115 MG/DL — HIGH (ref 70–99)
HCT VFR BLD CALC: 27.4 % — LOW (ref 39–50)
HGB BLD-MCNC: 8.4 G/DL — LOW (ref 13–17)
MCHC RBC-ENTMCNC: 28.6 PG — SIGNIFICANT CHANGE UP (ref 27–34)
MCHC RBC-ENTMCNC: 30.7 GM/DL — LOW (ref 32–36)
MCV RBC AUTO: 93.2 FL — SIGNIFICANT CHANGE UP (ref 80–100)
NRBC # BLD: 0 /100 WBCS — SIGNIFICANT CHANGE UP (ref 0–0)
PLATELET # BLD AUTO: 345 K/UL — SIGNIFICANT CHANGE UP (ref 150–400)
POTASSIUM SERPL-MCNC: 4.2 MMOL/L — SIGNIFICANT CHANGE UP (ref 3.5–5.3)
POTASSIUM SERPL-SCNC: 4.2 MMOL/L — SIGNIFICANT CHANGE UP (ref 3.5–5.3)
PROT SERPL-MCNC: 6.4 G/DL — SIGNIFICANT CHANGE UP (ref 6–8.3)
RBC # BLD: 2.94 M/UL — LOW (ref 4.2–5.8)
RBC # FLD: 17 % — HIGH (ref 10.3–14.5)
SODIUM SERPL-SCNC: 139 MMOL/L — SIGNIFICANT CHANGE UP (ref 135–145)
WBC # BLD: 9.74 K/UL — SIGNIFICANT CHANGE UP (ref 3.8–10.5)
WBC # FLD AUTO: 9.74 K/UL — SIGNIFICANT CHANGE UP (ref 3.8–10.5)

## 2023-01-04 RX ORDER — SODIUM CHLORIDE 9 MG/ML
1000 INJECTION INTRAMUSCULAR; INTRAVENOUS; SUBCUTANEOUS
Refills: 0 | Status: DISCONTINUED | OUTPATIENT
Start: 2023-01-04 | End: 2023-01-18

## 2023-01-04 RX ORDER — CEFEPIME 1 G/1
1000 INJECTION, POWDER, FOR SOLUTION INTRAMUSCULAR; INTRAVENOUS EVERY 12 HOURS
Refills: 0 | Status: DISCONTINUED | OUTPATIENT
Start: 2023-01-04 | End: 2023-01-05

## 2023-01-04 RX ORDER — ENOXAPARIN SODIUM 100 MG/ML
40 INJECTION SUBCUTANEOUS EVERY 24 HOURS
Refills: 0 | Status: COMPLETED | OUTPATIENT
Start: 2023-01-04 | End: 2023-01-05

## 2023-01-04 RX ORDER — CEFEPIME 1 G/1
1000 INJECTION, POWDER, FOR SOLUTION INTRAMUSCULAR; INTRAVENOUS ONCE
Refills: 0 | Status: COMPLETED | OUTPATIENT
Start: 2023-01-04 | End: 2023-01-04

## 2023-01-04 RX ORDER — CEFEPIME 1 G/1
INJECTION, POWDER, FOR SOLUTION INTRAMUSCULAR; INTRAVENOUS
Refills: 0 | Status: DISCONTINUED | OUTPATIENT
Start: 2023-01-04 | End: 2023-01-05

## 2023-01-04 RX ADMIN — SODIUM CHLORIDE 100 MILLILITER(S): 9 INJECTION INTRAMUSCULAR; INTRAVENOUS; SUBCUTANEOUS at 11:57

## 2023-01-04 RX ADMIN — ENOXAPARIN SODIUM 40 MILLIGRAM(S): 100 INJECTION SUBCUTANEOUS at 11:39

## 2023-01-04 RX ADMIN — TAMSULOSIN HYDROCHLORIDE 0.4 MILLIGRAM(S): 0.4 CAPSULE ORAL at 22:38

## 2023-01-04 RX ADMIN — MUPIROCIN 1 APPLICATION(S): 20 OINTMENT TOPICAL at 06:53

## 2023-01-04 RX ADMIN — PANTOPRAZOLE SODIUM 40 MILLIGRAM(S): 20 TABLET, DELAYED RELEASE ORAL at 11:39

## 2023-01-04 RX ADMIN — SODIUM CHLORIDE 100 MILLILITER(S): 9 INJECTION INTRAMUSCULAR; INTRAVENOUS; SUBCUTANEOUS at 22:37

## 2023-01-04 RX ADMIN — MUPIROCIN 1 APPLICATION(S): 20 OINTMENT TOPICAL at 18:48

## 2023-01-04 RX ADMIN — ATORVASTATIN CALCIUM 20 MILLIGRAM(S): 80 TABLET, FILM COATED ORAL at 22:38

## 2023-01-04 RX ADMIN — Medication 200 MILLIGRAM(S): at 11:39

## 2023-01-04 RX ADMIN — Medication 1 MILLIGRAM(S): at 11:39

## 2023-01-04 RX ADMIN — AMLODIPINE BESYLATE 10 MILLIGRAM(S): 2.5 TABLET ORAL at 06:53

## 2023-01-04 RX ADMIN — CEFEPIME 100 MILLIGRAM(S): 1 INJECTION, POWDER, FOR SOLUTION INTRAMUSCULAR; INTRAVENOUS at 22:38

## 2023-01-04 RX ADMIN — CEFEPIME 100 MILLIGRAM(S): 1 INJECTION, POWDER, FOR SOLUTION INTRAMUSCULAR; INTRAVENOUS at 11:57

## 2023-01-04 RX ADMIN — FINASTERIDE 5 MILLIGRAM(S): 5 TABLET, FILM COATED ORAL at 11:37

## 2023-01-04 RX ADMIN — Medication 1 APPLICATION(S): at 11:57

## 2023-01-04 NOTE — CONSULT NOTE ADULT - NEGATIVE OPHTHALMOLOGIC SYMPTOMS
no diplopia/no photophobia/no lacrimation L/no lacrimation R/no blurred vision L/no blurred vision R/no discharge L/no discharge R/no loss of vision R/no scleral injection L/no scleral injection R

## 2023-01-04 NOTE — DISCHARGE NOTE PROVIDER - NSDCCPCAREPLAN_GEN_ALL_CORE_FT
PRINCIPAL DISCHARGE DIAGNOSIS  Diagnosis: SIRS (systemic inflammatory response syndrome)  Assessment and Plan of Treatment: you presented to hospital with weakness, there was suspicion of infection, you were treated with IV antibiotics and infectious work up was done and showed ?????????????      SECONDARY DISCHARGE DIAGNOSES  Diagnosis: PEG tube malfunction  Assessment and Plan of Treatment: your PEG tube was not functioning and you were evaluated by GI physician and the tube was changed on ??????????????  Tube feeds was resarted and you tolerated   Continue tube feeds as recommended    Diagnosis: Severe protein-calorie malnutrition  Assessment and Plan of Treatment: continue tube feeding with supplements as tolearted    Diagnosis: Oropharyngeal mass  Assessment and Plan of Treatment: you have history of Oropharyngeal mass, follow up with Hematology/Oncology out patient     PRINCIPAL DISCHARGE DIAGNOSIS  Diagnosis: PEG tube malfunction  Assessment and Plan of Treatment: There was a concern of your PEG tube was not functioning but was later found to be fully functioning. You were followed by the gastroenterology team, and Tube feeds were reumed and you tolerated   Continue tube feeds as recommended  -Apply multiple layers of skin prep to the Peristomal PEG site and then apply a non-adherent dry dressing b.i.d. PRN      SECONDARY DISCHARGE DIAGNOSES  Diagnosis: Oropharyngeal mass  Assessment and Plan of Treatment: you have history of Oropharyngeal mass, follow up with Hematology/Oncology out patient. Wound care as follows to the neck: Clean all affected areas with normal saline and apply skin prep to the surrounding skin  -Apply Xeroform gauze to the Neck wound and cover with a non-adherent dry dressing Daily PRN    Diagnosis: Severe protein-calorie malnutrition  Assessment and Plan of Treatment: continue tube feeding with supplements as tolearted     PRINCIPAL DISCHARGE DIAGNOSIS  Diagnosis: PEG tube malfunction  Assessment and Plan of Treatment: There was a concern of your PEG tube was not functioning but was later found to be fully functioning. You were followed by the gastroenterology team, and Tube feeds were reumed and you tolerated   Continue tube feeds as recommended  -Apply multiple layers of skin prep to the Peristomal PEG site and then apply a non-adherent dry dressing two times a day and as needed      SECONDARY DISCHARGE DIAGNOSES  Diagnosis: Severe protein-calorie malnutrition  Assessment and Plan of Treatment: continue tube feeding with supplements as tolearted    Diagnosis: Oropharyngeal mass  Assessment and Plan of Treatment: you have history of Oropharyngeal mass, follow up with Hematology/Oncology out patient. Wound care as follows to the neck: Clean all affected areas with normal saline and apply skin prep to the surrounding skin  -Apply Xeroform gauze to the Neck wound and cover with a non-adherent dry dressing daily and as needed

## 2023-01-04 NOTE — PROGRESS NOTE ADULT - PROBLEM SELECTOR PLAN 8
-dvt ppx: hold Xarelto prior to PEG tube placement, dvt ppx Lovenox ordered until tomorrow prior to PEG placement, primary team to reinstate AC after PEG placement.

## 2023-01-04 NOTE — CONSULT NOTE ADULT - SUBJECTIVE AND OBJECTIVE BOX
[  ] STAT REQUEST              [ X ] ROUTINE REQUEST    Patient is a 77 year old male with dysphagia and Peg tube malfunction. GI consulted to evaluate.        HPI:  77 year old male from home with past medical history significant for HTN, HLD, BPH, oropharyngeal mass s/p PEG tube/placement, on radiation and chemo (last dose of chemo was last week) presenting to the ED with weakness. Patient reports he was recently admitted for COVID and fever, and upon discharge home he had significant weakness in his legs and was unable to walk. Now patient with Peg tube malfunction. No abdominal pain, nausea, vomiting, hematemesis, hematochezia, melena, fever. chills, chest pain, SOB, cough, hematuria, dysuria or diarrhea reported.            PAIN MANAGEMENT:  Pain Scale:                 /10  Pain Location:         PAST MEDICAL HISTORY  Hypertension    Hypercholesterolemia    Stage 3 chronic kidney disease    BPH    Oropharyngeal cancer        PAST SURGICAL HISTORY  No significant past surgical history        Allergies    No Known Allergies    Intolerances  None           MEDICATIONS  (STANDING):  amLODIPine   Tablet 10 milliGRAM(s) Oral daily  atorvastatin 20 milliGRAM(s) Oral at bedtime  cefepime   IVPB      cefepime   IVPB 1000 milliGRAM(s) IV Intermittent every 12 hours  enoxaparin Injectable 40 milliGRAM(s) SubCutaneous every 24 hours  finasteride 5 milliGRAM(s) Oral daily  folic acid 1 milliGRAM(s) Oral daily  mupirocin 2% Ointment 1 Application(s) Topical two times a day  pantoprazole   Suspension 40 milliGRAM(s) Oral daily  silver sulfADIAZINE 1% Cream 1 Application(s) Topical daily  sodium chloride 0.9%. 1000 milliLiter(s) (100 mL/Hr) IV Continuous <Continuous>  tamsulosin 0.4 milliGRAM(s) Oral at bedtime    MEDICATIONS  (PRN):  acetaminophen     Tablet .. 650 milliGRAM(s) Oral every 6 hours PRN Temp greater or equal to 38C (100.4F)  guaiFENesin Oral Liquid (Sugar-Free) 200 milliGRAM(s) Oral every 6 hours PRN Cough      SOCIAL HISTORY  Advanced Directives:       [ X ] Full Code       [  ] DNR  Marital Status:         [  ] M      [ X ] S      [  ] D       [  ] W  Children:       [ X ] Yes      [  ] No  Occupation:        [  ] Employed       [ X ] Unemployed       [  ] Retired  Diet:       [X  ] Regular       [  ] PEG feeding          [  ] NG tube feeding  Drug Use:           [ X ] Patient denied          [  ] Yes  Alcohol:           [ X ] No             [  ] Yes (socially)         [  ] Yes (chronic)  Tobacco:           [  ] Yes           [ X ] No      FAMILY HISTORY  [ X ] Heart Disease            [ X ] Diabetes             [ X ] HTN             [  ] Colon Cancer             [  ] Stomach Cancer              [  ] Pancreatic Cancer      VITAL SIGNS   Vital Signs Last 24 Hrs  T(C): 37.1 (04 Jan 2023 13:59), Max: 37.7 (03 Jan 2023 20:29)  T(F): 98.7 (04 Jan 2023 13:59), Max: 99.8 (03 Jan 2023 20:29)  HR: 106 (04 Jan 2023 13:59) (105 - 118)  BP: 119/69 (04 Jan 2023 13:59) (108/71 - 119/69)   RR: 17 (04 Jan 2023 13:59) (17 - 18)  SpO2: 96% (04 Jan 2023 13:59) (95% - 100%)  Parameters below as of 04 Jan 2023 13:59  Patient On (Oxygen Delivery Method): room air        CBC Full  -  ( 04 Jan 2023 06:48 )  WBC Count : 9.74 K/uL  RBC Count : 2.94 M/uL  Hemoglobin : 8.4 g/dL  Hematocrit : 27.4 %  Platelet Count - Automated : 345 K/uL  Mean Cell Volume : 93.2 fl  Mean Cell Hemoglobin : 28.6 pg  Mean Cell Hemoglobin Concentration : 30.7 gm/dL  Auto Neutrophil # : x  Auto Lymphocyte # : x  Auto Monocyte # : x  Auto Eosinophil # : x  Auto Basophil # : x  Auto Neutrophil % : x  Auto Lymphocyte % : x  Auto Monocyte % : x  Auto Eosinophil % : x  Auto Basophil % : x      01-04    139  |  103  |  17  ----------------------------<  115<H>  4.2   |  28  |  0.88    Ca    8.3<L>      04 Jan 2023 06:48    TPro  6.4  /  Alb  1.4<L>  /  TBili  0.5  /  DBili  x   /  AST  33  /  ALT  47  /  AlkPhos  139<H>  01-04     Iron with Total Binding Capacity in AM (12.24.22 @ 06:21)   Iron - Total Binding Capacity.: 119 ug/dL   % Saturation, Iron: 34 %   Iron Total, Serum: 40 ug/dL   Unsaturated Iron Binding Capacity: 79 ug/dL < from: 12 Lead ECG (12.30.22 @ 11:33) >    Ventricular Rate 87 BPM    Atrial Rate 87 BPM    P-R Interval 142 ms    QRS Duration 86 ms    Q-T Interval 370 ms    QTC Calculation(Bazett) 445 ms    P Axis 58 degrees    R Axis 67 degrees    T Axis 69 degrees    Diagnosis Line Normal sinus rhythm  Nonspecific T wave abnormality  Abnormal ECG         RADIOLOGY/IMAGING                < from: Xray Chest 1 View- PORTABLE-Urgent (12.22.22 @ 20:58) >    ACC: [id number] EXAM:  XR CHEST PORTABLE URGENT 1V                          PROCEDURE DATE:  12/22/2022          INTERPRETATION:  INDICATION: Throat pain. Sepsis    Portable chest 8:42 PM    COMPARISON: 10/25/2022    Overlying tubing.    FINDINGS:  Heart/Vascular: The heart size, mediastinum, hilum and aorta are within   normal limits for projection.  Pulmonary: Midline trachea. There is no focal infiltrate, congestion or   effusion.    Bones: There is no fracture.  Lines and catheter: None    Impression:    No acute pulmonary disease.    < end of copied text >             [  ] STAT REQUEST              [ X ] ROUTINE REQUEST    Patient is a 77 year old male with dysphagia and Peg tube malfunction. GI consulted to evaluate.        HPI:  77 year old male from home with past medical history significant for HTN, HLD, BPH, oropharyngeal mass s/p PEG tube/placement, on radiation and chemo (last dose of chemo was last week) presenting to the ED with weakness. Patient reports he was recently admitted for COVID and fever, and upon discharge home he had significant weakness in his legs and was unable to walk. Now patient with Peg tube malfunction. No abdominal pain, nausea, vomiting, hematemesis, hematochezia, melena, fever. chills, chest pain, SOB, cough, hematuria, dysuria or diarrhea reported.            PAIN MANAGEMENT:  Pain Scale:                 /10  Pain Location:         PAST MEDICAL HISTORY  Hypertension    Hypercholesterolemia    Stage 3 chronic kidney disease    BPH    Oropharyngeal cancer        PAST SURGICAL HISTORY  No significant past surgical history        Allergies    No Known Allergies    Intolerances  None           MEDICATIONS  (STANDING):  amLODIPine   Tablet 10 milliGRAM(s) Oral daily  atorvastatin 20 milliGRAM(s) Oral at bedtime  cefepime   IVPB      cefepime   IVPB 1000 milliGRAM(s) IV Intermittent every 12 hours  enoxaparin Injectable 40 milliGRAM(s) SubCutaneous every 24 hours  finasteride 5 milliGRAM(s) Oral daily  folic acid 1 milliGRAM(s) Oral daily  mupirocin 2% Ointment 1 Application(s) Topical two times a day  pantoprazole   Suspension 40 milliGRAM(s) Oral daily  silver sulfADIAZINE 1% Cream 1 Application(s) Topical daily  sodium chloride 0.9%. 1000 milliLiter(s) (100 mL/Hr) IV Continuous <Continuous>  tamsulosin 0.4 milliGRAM(s) Oral at bedtime    MEDICATIONS  (PRN):  acetaminophen     Tablet .. 650 milliGRAM(s) Oral every 6 hours PRN Temp greater or equal to 38C (100.4F)  guaiFENesin Oral Liquid (Sugar-Free) 200 milliGRAM(s) Oral every 6 hours PRN Cough      SOCIAL HISTORY  Advanced Directives:       [ X ] Full Code       [  ] DNR  Marital Status:         [  ] M      [ X ] S      [  ] D       [  ] W  Children:       [ X ] Yes      [  ] No  Occupation:        [  ] Employed       [ X ] Unemployed       [  ] Retired  Diet:       [  ] Regular       [ X ] PEG feeding          [  ] NG tube feeding  Drug Use:           [ X ] Patient denied          [  ] Yes  Alcohol:           [ X ] No             [  ] Yes (socially)         [  ] Yes (chronic)  Tobacco:           [  ] Yes           [ X ] No      FAMILY HISTORY  [ X ] Heart Disease            [ X ] Diabetes             [ X ] HTN             [  ] Colon Cancer             [  ] Stomach Cancer              [  ] Pancreatic Cancer      VITAL SIGNS   Vital Signs Last 24 Hrs  T(C): 37.1 (04 Jan 2023 13:59), Max: 37.7 (03 Jan 2023 20:29)  T(F): 98.7 (04 Jan 2023 13:59), Max: 99.8 (03 Jan 2023 20:29)  HR: 106 (04 Jan 2023 13:59) (105 - 118)  BP: 119/69 (04 Jan 2023 13:59) (108/71 - 119/69)   RR: 17 (04 Jan 2023 13:59) (17 - 18)  SpO2: 96% (04 Jan 2023 13:59) (95% - 100%)  Parameters below as of 04 Jan 2023 13:59  Patient On (Oxygen Delivery Method): room air        CBC Full  -  ( 04 Jan 2023 06:48 )  WBC Count : 9.74 K/uL  RBC Count : 2.94 M/uL  Hemoglobin : 8.4 g/dL  Hematocrit : 27.4 %  Platelet Count - Automated : 345 K/uL  Mean Cell Volume : 93.2 fl  Mean Cell Hemoglobin : 28.6 pg  Mean Cell Hemoglobin Concentration : 30.7 gm/dL  Auto Neutrophil # : x  Auto Lymphocyte # : x  Auto Monocyte # : x  Auto Eosinophil # : x  Auto Basophil # : x  Auto Neutrophil % : x  Auto Lymphocyte % : x  Auto Monocyte % : x  Auto Eosinophil % : x  Auto Basophil % : x      01-04    139  |  103  |  17  ----------------------------<  115<H>  4.2   |  28  |  0.88    Ca    8.3<L>      04 Jan 2023 06:48    TPro  6.4  /  Alb  1.4<L>  /  TBili  0.5  /  DBili  x   /  AST  33  /  ALT  47  /  AlkPhos  139<H>  01-04     Iron with Total Binding Capacity in AM (12.24.22 @ 06:21)   Iron - Total Binding Capacity.: 119 ug/dL   % Saturation, Iron: 34 %   Iron Total, Serum: 40 ug/dL   Unsaturated Iron Binding Capacity: 79 ug/dL < from: 12 Lead ECG (12.30.22 @ 11:33) >    Ventricular Rate 87 BPM    Atrial Rate 87 BPM    P-R Interval 142 ms    QRS Duration 86 ms    Q-T Interval 370 ms    QTC Calculation(Bazett) 445 ms    P Axis 58 degrees    R Axis 67 degrees    T Axis 69 degrees    Diagnosis Line Normal sinus rhythm  Nonspecific T wave abnormality  Abnormal ECG         RADIOLOGY/IMAGING                < from: Xray Chest 1 View- PORTABLE-Urgent (12.22.22 @ 20:58) >    ACC: [id number] EXAM:  XR CHEST PORTABLE URGENT 1V                          PROCEDURE DATE:  12/22/2022          INTERPRETATION:  INDICATION: Throat pain. Sepsis    Portable chest 8:42 PM    COMPARISON: 10/25/2022    Overlying tubing.    FINDINGS:  Heart/Vascular: The heart size, mediastinum, hilum and aorta are within   normal limits for projection.  Pulmonary: Midline trachea. There is no focal infiltrate, congestion or   effusion.    Bones: There is no fracture.  Lines and catheter: None    Impression:    No acute pulmonary disease.    < end of copied text >

## 2023-01-04 NOTE — CONSULT NOTE ADULT - GASTROINTESTINAL
soft/nontender/nondistended/normal active bowel sounds/no guarding/no rigidity/no organomegaly/no palpable lydia/no masses palpable details…

## 2023-01-04 NOTE — PROGRESS NOTE ADULT - PROBLEM SELECTOR PLAN 1
-Fever of 101.1 with tachycardia, unclear source   -start empiric Cefepime   -f/u blood and urine culture   -f/u CXR   -supportive measures -low grade temps with tachycardia, unclear source   -start empiric Cefepime   -f/u blood and urine culture   -f/u CXR   -supportive measures

## 2023-01-04 NOTE — CONSULT NOTE ADULT - ASSESSMENT
1. Dysphagia  2. Peg tube malfunction  3. Anemia  4. No evidence of acute GI bleeding   1. Dysphagia  2. Peg tube malfunction  3. Anemia  4. No evidence of acute GI bleeding    Suggestions:    1. Peg replacement by endoscopy  2. Aspiration precaution  3. Monitor H/H  4. Transfuse PRBC as bneeded  5. DVT prophylaxis

## 2023-01-04 NOTE — DISCHARGE NOTE PROVIDER - CARE PROVIDERS DIRECT ADDRESSES
[email or web address]

## 2023-01-04 NOTE — DISCHARGE NOTE PROVIDER - PROVIDER TOKENS
PROVIDER:[TOKEN:[[id number]],FOLLOWUP:[1 week]],PROVIDER:[TOKEN:[[id number]],FOLLOWUP:[2 weeks]]

## 2023-01-04 NOTE — PROGRESS NOTE ADULT - ASSESSMENT
77 year old male from home with PMH of HTN, HLD, BPH, oropharyngeal mass s/p PEG tube/resection, on radiation and chemo (last dose of chemo was last week)  admitted for weakness. Pt was evaluated by PT and recommended LINDY   Hospital course complicated with fevers and tachycardia and infectious work up was started. Pt was also noted with Malfunctioning PEG tube and GI Dr. Waddell consulted, pt for PEG tube change on Friday 1/6. Xarelto placed on hold prior to procedure and pt was started on dvt ppx Lovenox   Cultures in progress, started on empiric Cefepime

## 2023-01-04 NOTE — DISCHARGE NOTE PROVIDER - NSDCFUSCHEDAPPT_GEN_ALL_CORE_FT
Valeria Moran  [facility]  INTMED [address]  Scheduled Appointment: 01/06/2023    Khadra Rousseau  [facility]  NEUROLOGY [address]  Scheduled Appointment: 01/19/2023    Yossi Vee  [facility]  CARDIOLOGY [address]  Scheduled Appointment: 02/14/2023    Segundo Velasquez  [facility]  RADMED [address] [facility]  Scheduled Appointment: 02/15/2023     Yossi Vee  [facility] 234 beds at the [facility]  CARDIOLOGY [address]  Scheduled Appointment: 02/14/2023    Segundo Velasquez  [facility] include 234 beds at the [facility]  RADMED [address] [facility]  Scheduled Appointment: 02/15/2023     Segundo Velasquez  [facility] Physician [facility]  RADMED [address] [facility]  Scheduled Appointment: 02/03/2023    Yossi Vee  [place] [facility]  CARDIOLOGY [address]  Scheduled Appointment: 02/14/2023     Segundo Velasquez  [facility] Physician [facility]  RADMED [address] [facility]  Scheduled Appointment: 02/03/2023    Luis Haynes  [facility]  OTOLARYNG [address] [facility]  Scheduled Appointment: 02/14/2023    Yossi Vee  [facility]  CARDIOLOGY [address]  Scheduled Appointment: 02/14/2023

## 2023-01-04 NOTE — DISCHARGE NOTE PROVIDER - CARE PROVIDER_API CALL
Valeria Moran (MD; MPH)  Internal Medicine  [address] [place] [address]  Phone: [phone]  Fax: [phone]  Follow Up Time: 1 week    Segundo Velasquez; SHIKHA)  Radiation Oncology  [address] [place] [address]  Phone: [phone]  Fax: [phone]  Follow Up Time: 2 weeks

## 2023-01-04 NOTE — DISCHARGE NOTE PROVIDER - HOSPITAL COURSE
77 year old male from home with PMH of HTN, HLD, BPH, oropharyngeal mass s/p PEG tube/resection, on radiation and chemo (last dose of chemo was last week)  admitted for weakness. Pt was evaluated by PT and recommended LINDY   Hospital course complicated with fevers and tachycardia and infectious work up was started. Pt was also noted with Malfunctioning PEG tube and GI Dr. Waddell consulted, pt for PEG tube change on Friday 1/6. Xarelto placed on hold prior to procedure and pt was started on dvt ppx Lovenox   Cultures in progress, started on empiric Cefepime     NOT COMPLETE 1/4/23  Please note that this a brief summary of hospital course please refer to daily progress notes and consult notes for full course and events   77 year old male from home with PMH of HTN, HLD, BPH, oropharyngeal mass s/p PEG tube/resection, on radiation and chemo (last dose of chemo was last week)  admitted for weakness, hospital course complicated with fevers and tachycardia and infectious work up was negative. Also noted with Malfunctioning PEG tube and GI Dr. Waddell consulted, later PEG tube was working and not changed.   Plan to continue wound care at home as follows: Clean all affected areas with normal saline and apply skin prep to the surrounding skin  -Apply Xeroform gauze to the Neck wound and cover with a non-adherent dry dressing Daily PRN  -Apply multiple layers of skin prep to the Peristomal PEG site and then apply a non-adherent dry dressing b.i.d. PRN    Discharge now complicated by updated PT which patient has no need for LINDY and reccomended for HPT. NCM and family concern about safe discharge. SW consulted for D/C planning     NOT COMPLETE 1/4/23  Please note that this a brief summary of hospital course please refer to daily progress notes and consult notes for full course and events   77 year old male from home with PMH of HTN, HLD, BPH, oropharyngeal mass s/p PEG tube/resection, on radiation and chemo (last dose of chemo was last week)  admitted for weakness, hospital course complicated with fevers and tachycardia and infectious work up was negative. Also noted with Malfunctioning PEG tube and GI Dr. Waddell consulted, later PEG tube was working and not changed.   Patient to have wound care managed by home care RN from [facility] to both neck and PEG sites. Patient now medically optimized to go home with home care.      Please note that this a brief summary of hospital course please refer to daily progress notes and consult notes for full course and events.

## 2023-01-04 NOTE — PROGRESS NOTE ADULT - SUBJECTIVE AND OBJECTIVE BOX
Patient is a 77y old  Male who presents with a chief complaint of Weakness (2023 13:54)    OVERNIGHT EVENTS: no acute events overnight, offering no new complaints     REVIEW OF SYSTEMS:  NECK: No pain or stiffness  RESPIRATORY: No cough, SOB  CARDIOVASCULAR: No chest pain, palpitations  GASTROINTESTINAL: No abdominal pain. +ve feeding tube   GENITOURINARY: No dysuria  NEUROLOGICAL: No HA  MUSCULOSKELETAL: No joint pain or swelling; No muscle, back, or extremity pain      T(C): 37.4 (23 @ 06:45), Max: 37.7 (23 @ 20:29)  HR: 105 (23 @ 06:45) (105 - 118)  BP: 108/71 (23 @ 06:45) (108/71 - 118/72)  RR: 18 (23 @ 06:45) (17 - 18)  SpO2: 95% (23 @ 06:45) (95% - 100%)  Wt(kg): --Vital Signs Last 24 Hrs  T(C): 37.4 (2023 06:45), Max: 37.7 (2023 20:29)  T(F): 99.3 (2023 06:45), Max: 99.8 (2023 20:29)  HR: 105 (2023 06:45) (105 - 118)  BP: 108/71 (2023 06:45) (108/71 - 118/72)  BP(mean): 93 (2023 13:55) (93 - 93)  RR: 18 (2023 06:45) (17 - 18)  SpO2: 95% (2023 06:45) (95% - 100%)    Parameters below as of 2023 06:45  Patient On (Oxygen Delivery Method): room air    MEDICATIONS  (STANDING):  amLODIPine   Tablet 10 milliGRAM(s) Oral daily  atorvastatin 20 milliGRAM(s) Oral at bedtime  cefepime   IVPB      enoxaparin Injectable 40 milliGRAM(s) SubCutaneous every 24 hours  finasteride 5 milliGRAM(s) Oral daily  folic acid 1 milliGRAM(s) Oral daily  mupirocin 2% Ointment 1 Application(s) Topical two times a day  pantoprazole   Suspension 40 milliGRAM(s) Oral daily  silver sulfADIAZINE 1% Cream 1 Application(s) Topical daily  sodium chloride 0.9%. 1000 milliLiter(s) (80 mL/Hr) IV Continuous <Continuous>  tamsulosin 0.4 milliGRAM(s) Oral at bedtime    MEDICATIONS  (PRN):  acetaminophen     Tablet .. 650 milliGRAM(s) Oral every 6 hours PRN Temp greater or equal to 38C (100.4F)  guaiFENesin Oral Liquid (Sugar-Free) 200 milliGRAM(s) Oral every 6 hours PRN Cough      PHYSICAL EXAM:  GENERAL: NAD, frail   EYES: clear conjunctiva  ENMT: Moist mucous membranes  NECK: Supple, No JVD  CHEST/LUNG: Clear to auscultation bilaterally; No rales, rhonchi, wheezing, or rubs  HEART: S1, S2, Regular rate and rhythm  ABDOMEN: Soft, Nontender, Nondistended; Bowel sounds present, +ve PEG tube   NEURO: Alert & Oriented X3  EXTREMITIES: No LE edema, no calf tenderness  SKIN: neck with peeling skin,     Consultant(s) Notes Reviewed:  [x ] YES  [ ] NO  Care Discussed with Consultants/Other Providers [ x] YES  [ ] NO    LABS:                        8.4    9.74  )-----------( 345      ( 2023 06:48 )             27.4     -04    139  |  103  |  17  ----------------------------<  115<H>  4.2   |  28  |  0.88    Ca    8.3<L>      2023 06:48    TPro  6.4  /  Alb  1.4<L>  /  TBili  0.5  /  DBili  x   /  AST  33  /  ALT  47  /  AlkPhos  139<H>  01-04    CAPILLARY BLOOD GLUCOSE    Urinalysis Basic - ( 2023 22:30 )    Color: Yellow / Appearance: Clear / S.010 / pH: x  Gluc: x / Ketone: Negative  / Bili: Negative / Urobili: 1   Blood: x / Protein: 100 / Nitrite: Negative   Leuk Esterase: Trace / RBC: 0-2 /HPF / WBC 0-2 /HPF   Sq Epi: x / Non Sq Epi: Few /HPF / Bacteria: Moderate /HPF    RADIOLOGY & ADDITIONAL TESTS:

## 2023-01-04 NOTE — DISCHARGE NOTE PROVIDER - NSDCMRMEDTOKEN_GEN_ALL_CORE_FT
amLODIPine 10 mg oral tablet: 1 tab(s) orally once a day  atorvastatin 20 mg oral tablet: 1 tab(s) orally once a day (at bedtime)  diphenhydramine/lidocaine/aluminum hydroxide/magnesium hydroxide/simethicone mucous membrane suspension: 30 milliliter(s) mucous membrane once a day   finasteride 5 mg oral tablet: 1 tab(s) orally once a day  folic acid 1 mg oral tablet: 1 tab(s) orally once a day  mupirocin 2% topical ointment: Apply topically to affected area 2 times a day   pantoprazole 40 mg intravenous injection: 40 milligram(s) intravenous once a day  silver sulfADIAZINE 1% topical cream: 1 application topically once a day  tamsulosin 0.4 mg oral capsule: 1 cap(s) orally once a day (at bedtime)  Xarelto 10 mg oral tablet: 1 tab(s) orally once a day    amLODIPine 10 mg oral tablet: 1 tab(s) orally once a day  ascorbic acid 500 mg oral tablet: 1 tab(s) orally once a day  atorvastatin 20 mg oral tablet: 1 tab(s) orally once a day (at bedtime)  diphenhydramine/lidocaine/aluminum hydroxide/magnesium hydroxide/simethicone mucous membrane suspension: 30 milliliter(s) mucous membrane once a day   finasteride 5 mg oral tablet: 1 tab(s) orally once a day  folic acid 1 mg oral tablet: 1 tab(s) orally once a day  Multiple Vitamins with Minerals oral tablet: 1 tab(s) orally once a day  mupirocin 2% topical ointment: Apply topically to affected area 2 times a day   pantoprazole 40 mg intravenous injection: 40 milligram(s) intravenous once a day  silver sulfADIAZINE 1% topical cream: 1 application topically once a day  tamsulosin 0.4 mg oral capsule: 1 cap(s) orally once a day (at bedtime)  Xarelto 10 mg oral tablet: 1 tab(s) orally once a day   zinc sulfate 220 mg oral capsule: 1 cap(s) orally once a day

## 2023-01-05 LAB
ALBUMIN SERPL ELPH-MCNC: 1.3 G/DL — LOW (ref 3.5–5)
ALP SERPL-CCNC: 184 U/L — HIGH (ref 40–120)
ALT FLD-CCNC: 77 U/L DA — HIGH (ref 10–60)
ANION GAP SERPL CALC-SCNC: 8 MMOL/L — SIGNIFICANT CHANGE UP (ref 5–17)
AST SERPL-CCNC: 70 U/L — HIGH (ref 10–40)
BILIRUB SERPL-MCNC: 0.5 MG/DL — SIGNIFICANT CHANGE UP (ref 0.2–1.2)
BUN SERPL-MCNC: 14 MG/DL — SIGNIFICANT CHANGE UP (ref 7–18)
CALCIUM SERPL-MCNC: 7.9 MG/DL — LOW (ref 8.4–10.5)
CHLORIDE SERPL-SCNC: 105 MMOL/L — SIGNIFICANT CHANGE UP (ref 96–108)
CO2 SERPL-SCNC: 27 MMOL/L — SIGNIFICANT CHANGE UP (ref 22–31)
CREAT SERPL-MCNC: 0.7 MG/DL — SIGNIFICANT CHANGE UP (ref 0.5–1.3)
CULTURE RESULTS: SIGNIFICANT CHANGE UP
EGFR: 96 ML/MIN/1.73M2 — SIGNIFICANT CHANGE UP
GLUCOSE SERPL-MCNC: 99 MG/DL — SIGNIFICANT CHANGE UP (ref 70–99)
HCT VFR BLD CALC: 25.8 % — LOW (ref 39–50)
HGB BLD-MCNC: 8 G/DL — LOW (ref 13–17)
MCHC RBC-ENTMCNC: 28.9 PG — SIGNIFICANT CHANGE UP (ref 27–34)
MCHC RBC-ENTMCNC: 31 GM/DL — LOW (ref 32–36)
MCV RBC AUTO: 93.1 FL — SIGNIFICANT CHANGE UP (ref 80–100)
NRBC # BLD: 0 /100 WBCS — SIGNIFICANT CHANGE UP (ref 0–0)
PLATELET # BLD AUTO: 315 K/UL — SIGNIFICANT CHANGE UP (ref 150–400)
POTASSIUM SERPL-MCNC: 4 MMOL/L — SIGNIFICANT CHANGE UP (ref 3.5–5.3)
POTASSIUM SERPL-SCNC: 4 MMOL/L — SIGNIFICANT CHANGE UP (ref 3.5–5.3)
PROT SERPL-MCNC: 6.2 G/DL — SIGNIFICANT CHANGE UP (ref 6–8.3)
RAPID RVP RESULT: DETECTED
RBC # BLD: 2.77 M/UL — LOW (ref 4.2–5.8)
RBC # FLD: 16.6 % — HIGH (ref 10.3–14.5)
SARS-COV-2 RNA SPEC QL NAA+PROBE: DETECTED
SODIUM SERPL-SCNC: 140 MMOL/L — SIGNIFICANT CHANGE UP (ref 135–145)
SPECIMEN SOURCE: SIGNIFICANT CHANGE UP
WBC # BLD: 6.38 K/UL — SIGNIFICANT CHANGE UP (ref 3.8–10.5)
WBC # FLD AUTO: 6.38 K/UL — SIGNIFICANT CHANGE UP (ref 3.8–10.5)

## 2023-01-05 RX ADMIN — ENOXAPARIN SODIUM 40 MILLIGRAM(S): 100 INJECTION SUBCUTANEOUS at 11:36

## 2023-01-05 RX ADMIN — FINASTERIDE 5 MILLIGRAM(S): 5 TABLET, FILM COATED ORAL at 11:45

## 2023-01-05 RX ADMIN — Medication 1 MILLIGRAM(S): at 11:45

## 2023-01-05 RX ADMIN — CEFEPIME 100 MILLIGRAM(S): 1 INJECTION, POWDER, FOR SOLUTION INTRAMUSCULAR; INTRAVENOUS at 11:36

## 2023-01-05 RX ADMIN — Medication 200 MILLIGRAM(S): at 11:44

## 2023-01-05 RX ADMIN — PANTOPRAZOLE SODIUM 40 MILLIGRAM(S): 20 TABLET, DELAYED RELEASE ORAL at 11:45

## 2023-01-05 RX ADMIN — MUPIROCIN 1 APPLICATION(S): 20 OINTMENT TOPICAL at 06:42

## 2023-01-05 RX ADMIN — TAMSULOSIN HYDROCHLORIDE 0.4 MILLIGRAM(S): 0.4 CAPSULE ORAL at 22:00

## 2023-01-05 RX ADMIN — ATORVASTATIN CALCIUM 20 MILLIGRAM(S): 80 TABLET, FILM COATED ORAL at 22:00

## 2023-01-05 RX ADMIN — Medication 1 APPLICATION(S): at 11:37

## 2023-01-05 NOTE — PROGRESS NOTE ADULT - PROBLEM SELECTOR PLAN 6
-cont home dose Statin -on radiation and chemo (last dose of chemo was last week), follows up with Dr. Segundo Velasquez.  -Heme/Onc follow up outpt

## 2023-01-05 NOTE — CHART NOTE - NSCHARTNOTEFT_GEN_A_CORE
Assessment:   77yMalePatient is a 77y old  Male who presents with a chief complaint of Weakness (05 Jan 2023 15:08)      Factors impacting intake: [ ] none [ ] nausea  [ ] vomiting [ ] diarrhea [ ] constipation  [ ]chewing problems [ ] swallowing issues  [ ] other:     Diet Presciption: Diet, NPO with Tube Feed:   Tube Feeding Modality: Gastrostomy  Glucerna 1.5 Earl  Total Volume for 24 Hours (mL): 960  Continuous  Starting Tube Feed Rate {mL per Hour}: 20  Increase Tube Feed Rate by (mL): 10     Every 6 hours  Until Goal Tube Feed Rate (mL per Hour): 60  Tube Feed Duration (in Hours): 16  Tube Feed Start Time: 06:00  Tube Feed Stop Time: 22:00  Free Water Flush  Bolus   Total Volume per Flush (mL): 250   Frequency: Every 6 Hours    Start Time: 16:00 (01-02-23 @ 10:52)    Intake:     Daily   % Weight Change    Pertinent Medications: MEDICATIONS  (STANDING):  amLODIPine   Tablet 10 milliGRAM(s) Oral daily  atorvastatin 20 milliGRAM(s) Oral at bedtime  finasteride 5 milliGRAM(s) Oral daily  folic acid 1 milliGRAM(s) Oral daily  mupirocin 2% Ointment 1 Application(s) Topical two times a day  pantoprazole   Suspension 40 milliGRAM(s) Oral daily  silver sulfADIAZINE 1% Cream 1 Application(s) Topical daily  sodium chloride 0.9%. 1000 milliLiter(s) (100 mL/Hr) IV Continuous <Continuous>  tamsulosin 0.4 milliGRAM(s) Oral at bedtime    MEDICATIONS  (PRN):  acetaminophen     Tablet .. 650 milliGRAM(s) Oral every 6 hours PRN Temp greater or equal to 38C (100.4F)  guaiFENesin Oral Liquid (Sugar-Free) 200 milliGRAM(s) Oral every 6 hours PRN Cough    Pertinent Labs: 01-05 Na140 mmol/L Glu 99 mg/dL K+ 4.0 mmol/L Cr  0.70 mg/dL BUN 14 mg/dL 01-05 Alb 1.3 g/dL<L>     CAPILLARY BLOOD GLUCOSE        Skin:     Estimated Needs:   [ ] no change since previous assessment  [ ] recalculated:     Previous Nutrition Diagnosis:   [ ] Inadequate Energy Intake [ ]Inadequate Oral Intake [ ] Excessive Energy Intake   [ ] Underweight [ ] Increased Nutrient Needs [ ] Overweight/Obesity   [ ] Altered GI Function [ ] Unintended Weight Loss [ ] Food & Nutrition Related Knowledge Deficit [ ] Malnutrition     Nutrition Diagnosis is [ ] ongoing  [ ] resolved [ ] not applicable     New Nutrition Diagnosis: [ ] not applicable       Interventions:   Recommend  [ ] Change Diet To:  [ ] Nutrition Supplement  [ ] Nutrition Support  [ ] Other:     Monitoring and Evaluation:   [ ] PO intake [ x ] Tolerance to diet prescription [ x ] weights [ x ] labs[ x ] follow up per protocol  [ ] other: Reassessment:     Patient is a 77y old  Male who presents with a chief complaint of Weakness (05 Jan 2023 15:08)      Factors impacting intake: [ ] none [ ] nausea  [ ] vomiting [ ] diarrhea [ ] constipation  [ ]chewing problems [ ] swallowing issues  [X ] other: oropharyngeal mass s/p PEG tube    Diet Prescription: Diet, NPO with Tube Feed:   Tube Feeding Modality: Gastrostomy  Glucerna 1.5 Earl  Total Volume for 24 Hours (mL): 960  Continuous  Starting Tube Feed Rate {mL per Hour}: 20  Increase Tube Feed Rate by (mL): 10     Every 6 hours  Until Goal Tube Feed Rate (mL per Hour): 60  Tube Feed Duration (in Hours): 16  Tube Feed Start Time: 06:00  Tube Feed Stop Time: 22:00  Free Water Flush  Bolus   Total Volume per Flush (mL): 250   Frequency: Every 6 Hours    Start Time: 16:00 (01-02-23 @ 10:52)    Intake: Patient visited, alert & awake, "agitated", d/w RN, pt. at times "verbal abusive" & aware. While in pt. room observed tube feeding Jevity 1.5 kcal, infusing at goal; 60ml/hr & tolerating, pending replacement of PEG on 01/06/23, GI/team following. RD available.     Daily weight: 110 Lbs on 12/30/22  % Weight Change: nursing to monitor daily weights    Pertinent Medications: MEDICATIONS  (STANDING):  amLODIPine   Tablet 10 milliGRAM(s) Oral daily  atorvastatin 20 milliGRAM(s) Oral at bedtime  finasteride 5 milliGRAM(s) Oral daily  folic acid 1 milliGRAM(s) Oral daily  mupirocin 2% Ointment 1 Application(s) Topical two times a day  pantoprazole   Suspension 40 milliGRAM(s) Oral daily  silver sulfADIAZINE 1% Cream 1 Application(s) Topical daily  sodium chloride 0.9%. 1000 milliLiter(s) (100 mL/Hr) IV Continuous <Continuous>  tamsulosin 0.4 milliGRAM(s) Oral at bedtime    MEDICATIONS  (PRN):  acetaminophen     Tablet .. 650 milliGRAM(s) Oral every 6 hours PRN Temp greater or equal to 38C (100.4F)  guaiFENesin Oral Liquid (Sugar-Free) 200 milliGRAM(s) Oral every 6 hours PRN Cough    Pertinent Labs: 01-05 Na140 mmol/L Glu 99 mg/dL K+ 4.0 mmol/L Cr  0.70 mg/dL BUN 14 mg/dL 01-05 Alb 1.3 g/dL<L>     CAPILLARY BLOOD GLUCOSE    Skin: neck wound/care    Estimated Needs:   [X ] no change since previous assessment  [ ] recalculated:     Previous Nutrition Diagnosis:   [ ] Inadequate Energy Intake [ ]Inadequate Oral Intake [ ] Excessive Energy Intake   [ ] Underweight [ ] Increased Nutrient Needs [ ] Overweight/Obesity   [ ] Altered GI Function [ ] Unintended Weight Loss [ ] Food & Nutrition Related Knowledge Deficit [Severe] Malnutrition     Nutrition Diagnosis is [ X] ongoing  [ ] resolved [ ] not applicable     New Nutrition Diagnosis: [ ] not applicable       Interventions: To meet nutrition needs via tube feeding    Recommend  [ ] Change Diet To:  [ ] Nutrition Supplement  [X ] Nutrition Support: Goal: Glucerna 1.5 @ 60ml/hr x 16hrs (960ml, 1440kcal, 79g protein, 729ml water at goal) MD/NP team to adjust free water as needed as medically feasible  [X ] Other: Add MVI/minerals/Vit. C 500mg BID daily for wound care    Monitoring and Evaluation:   [ ] PO intake [ x ] Tolerance to diet prescription [ x ] weights [ x ] labs[ x ] follow up per protocol  [ ] other:

## 2023-01-05 NOTE — PROGRESS NOTE ADULT - PROBLEM SELECTOR PLAN 7
-on radiation and chemo (last dose of chemo was last week), follows up with Dr. Segundo Velasquez.  -Heme/Onc follow up outpt -dvt ppx: Xarelto reinstated -dvt ppx: xarelto on hold dvt ppx: hold Xarelto prior to PEG tube placement, dvt ppx Lovenox ordered until tomorrow prior to PEG placement, primary team to reinstate AC after PEG placement.

## 2023-01-05 NOTE — PROGRESS NOTE ADULT - PROBLEM SELECTOR PLAN 8
-dvt ppx: hold Xarelto prior to PEG tube placement, dvt ppx Lovenox ordered until tomorrow prior to PEG placement, primary team to reinstate AC after PEG placement. -PT recommended LINDY  -Pending auth and placement  -CM following -PEG Tube exchange pending  -PT recommended LINDY  -Pending auth and placement  -CM following

## 2023-01-05 NOTE — PROGRESS NOTE ADULT - SUBJECTIVE AND OBJECTIVE BOX
Patient is a 77y old  Male who presents with a chief complaint of Weakness (2023 16:09)      OVERNIGHT EVENTS: none noted     REVIEW OF SYSTEMS:  CONSTITUTIONAL: No fever, chills  RESPIRATORY: No cough, SOB  CARDIOVASCULAR: No chest pain, palpitations  GASTROINTESTINAL: No abdominal pain. No nausea, vomiting, or diarrhea  GENITOURINARY: No dysuria  NEUROLOGICAL: No HA    T(C): 37.2 (23 @ 05:28), Max: 37.2 (23 @ 21:26)  HR: 76 (23 @ 09:02) (76 - 106)  BP: 144/66 (23 @ 09:02) (115/74 - 144/66)  RR: 17 (23 @ 05:28) (17 - 17)  SpO2: 96% (23 @ 09:02) (96% - 100%)  Wt(kg): --Vital Signs Last 24 Hrs  T(C): 37.2 (2023 05:28), Max: 37.2 (2023 21:26)  T(F): 98.9 (2023 05:28), Max: 98.9 (2023 21:26)  HR: 76 (2023 09:02) (76 - 106)  BP: 144/66 (2023 09:02) (115/74 - 144/66)  BP(mean): 84 (2023 05:28) (84 - 84)  RR: 17 (2023 05:28) (17 - 17)  SpO2: 96% (2023 09:02) (96% - 100%)    Parameters below as of 2023 09:02  Patient On (Oxygen Delivery Method): room air      PHYSICAL EXAM:  GENERAL: NAD, thin  CHEST/LUNG: Clear to auscultation bilaterally; No rales, rhonchi, wheezing, or rubs  HEART: S1, S2, Regular rate and rhythm  ABDOMEN: Soft, Nontender, Nondistended; Bowel sounds present, +ve PEG tube C/D/I  NEURO: Alert & Oriented X3    Consultant(s) Notes Reviewed:  [x ] YES  [ ] NO  Care Discussed with Consultants/Other Providers [ x] YES  [ ] NO  LABS:                        8.0    6.38  )-----------( 315      ( 2023 05:21 )             25.8     -    140  |  105  |  14  ----------------------------<  99  4.0   |  27  |  0.70    Ca    7.9<L>      2023 05:21    TPro  6.2  /  Alb  1.3<L>  /  TBili  0.5  /  DBili  x   /  AST  70<H>  /  ALT  77<H>  /  AlkPhos  184<H>        CAPILLARY BLOOD GLUCOSE    MEDICATIONS  (STANDING):  amLODIPine   Tablet 10 milliGRAM(s) Oral daily  atorvastatin 20 milliGRAM(s) Oral at bedtime  cefepime   IVPB      cefepime   IVPB 1000 milliGRAM(s) IV Intermittent every 12 hours  enoxaparin Injectable 40 milliGRAM(s) SubCutaneous every 24 hours  finasteride 5 milliGRAM(s) Oral daily  folic acid 1 milliGRAM(s) Oral daily  mupirocin 2% Ointment 1 Application(s) Topical two times a day  pantoprazole   Suspension 40 milliGRAM(s) Oral daily  silver sulfADIAZINE 1% Cream 1 Application(s) Topical daily  sodium chloride 0.9%. 1000 milliLiter(s) (100 mL/Hr) IV Continuous <Continuous>  tamsulosin 0.4 milliGRAM(s) Oral at bedtime    MEDICATIONS  (PRN):  acetaminophen     Tablet .. 650 milliGRAM(s) Oral every 6 hours PRN Temp greater or equal to 38C (100.4F)  guaiFENesin Oral Liquid (Sugar-Free) 200 milliGRAM(s) Oral every 6 hours PRN Cough        Urinalysis Basic - ( 2023 22:30 )    Color: Yellow / Appearance: Clear / S.010 / pH: x  Gluc: x / Ketone: Negative  / Bili: Negative / Urobili: 1   Blood: x / Protein: 100 / Nitrite: Negative   Leuk Esterase: Trace / RBC: 0-2 /HPF / WBC 0-2 /HPF   Sq Epi: x / Non Sq Epi: Few /HPF / Bacteria: Moderate /HPF        RADIOLOGY & ADDITIONAL TESTS:  Imaging Personally Reviewed:  [ ] YES  [ ] NO  < from: Xray Chest 1 View-PORTABLE IMMEDIATE (Xray Chest 1 View-PORTABLE IMMEDIATE .) (23 @ 20:17) >  ACC: [id number] EXAM:  XR CHEST PORTABLE IMMED 1V                          PROCEDURE DATE:  2023          INTERPRETATION:  INDICATION: Fever    COMPARISON: 2022    FINDINGS:  An AP portable semiupright view of the chest shows clear lungs   bilaterally. No infiltrates are seen. There is no pneumothorax. There are   no pleural effusions. There is no hilar or mediastinal widening. Heart   size is normal, without CHF. There are degenerative changes of the spine   and shoulders. No significant changes are seen compared to the prior   study.    IMPRESSION: Clear lungs with no significant cardiopulmonary abnormalities.         Patient is a 77y old  Male who presents with a chief complaint of Weakness (2023 16:09)      OVERNIGHT EVENTS: pt has refused to have vitals signs and blood work done despite ongoing education from multiple team members    REVIEW OF SYSTEMS:  CONSTITUTIONAL: No fever, chills  RESPIRATORY: No cough, SOB  CARDIOVASCULAR: No chest pain, palpitations  GASTROINTESTINAL: No abdominal pain. No nausea, vomiting, or diarrhea  GENITOURINARY: No dysuria  NEUROLOGICAL: No HA    T(C): 37.2 (23 @ 05:28), Max: 37.2 (23 @ 21:26)  HR: 76 (23 @ 09:02) (76 - 106)  BP: 144/66 (23 @ 09:02) (115/74 - 144/66)  RR: 17 (23 @ 05:28) (17 - 17)  SpO2: 96% (23 @ 09:02) (96% - 100%)  Wt(kg): --Vital Signs Last 24 Hrs  T(C): 37.2 (2023 05:28), Max: 37.2 (2023 21:26)  T(F): 98.9 (2023 05:28), Max: 98.9 (2023 21:26)  HR: 76 (2023 09:02) (76 - 106)  BP: 144/66 (2023 09:02) (115/74 - 144/66)  BP(mean): 84 (2023 05:28) (84 - 84)  RR: 17 (2023 05:28) (17 - 17)  SpO2: 96% (2023 09:02) (96% - 100%)    Parameters below as of 2023 09:02  Patient On (Oxygen Delivery Method): room air      PHYSICAL EXAM:  GENERAL: NAD, thin  CHEST/LUNG: Clear to auscultation bilaterally; No rales, rhonchi, wheezing, or rubs  HEART: S1, S2, Regular rate and rhythm  ABDOMEN: Soft, Nontender, Nondistended; Bowel sounds present, +ve PEG tube C/D/I  NEURO: Alert & Oriented X3    Consultant(s) Notes Reviewed:  [x ] YES  [ ] NO  Care Discussed with Consultants/Other Providers [ x] YES  [ ] NO  LABS:                        8.0    6.38  )-----------( 315      ( 2023 05:21 )             25.8         140  |  105  |  14  ----------------------------<  99  4.0   |  27  |  0.70    Ca    7.9<L>      2023 05:21    TPro  6.2  /  Alb  1.3<L>  /  TBili  0.5  /  DBili  x   /  AST  70<H>  /  ALT  77<H>  /  AlkPhos  184<H>        CAPILLARY BLOOD GLUCOSE    MEDICATIONS  (STANDING):  amLODIPine   Tablet 10 milliGRAM(s) Oral daily  atorvastatin 20 milliGRAM(s) Oral at bedtime  cefepime   IVPB      cefepime   IVPB 1000 milliGRAM(s) IV Intermittent every 12 hours  enoxaparin Injectable 40 milliGRAM(s) SubCutaneous every 24 hours  finasteride 5 milliGRAM(s) Oral daily  folic acid 1 milliGRAM(s) Oral daily  mupirocin 2% Ointment 1 Application(s) Topical two times a day  pantoprazole   Suspension 40 milliGRAM(s) Oral daily  silver sulfADIAZINE 1% Cream 1 Application(s) Topical daily  sodium chloride 0.9%. 1000 milliLiter(s) (100 mL/Hr) IV Continuous <Continuous>  tamsulosin 0.4 milliGRAM(s) Oral at bedtime    MEDICATIONS  (PRN):  acetaminophen     Tablet .. 650 milliGRAM(s) Oral every 6 hours PRN Temp greater or equal to 38C (100.4F)  guaiFENesin Oral Liquid (Sugar-Free) 200 milliGRAM(s) Oral every 6 hours PRN Cough        Urinalysis Basic - ( 2023 22:30 )    Color: Yellow / Appearance: Clear / S.010 / pH: x  Gluc: x / Ketone: Negative  / Bili: Negative / Urobili: 1   Blood: x / Protein: 100 / Nitrite: Negative   Leuk Esterase: Trace / RBC: 0-2 /HPF / WBC 0-2 /HPF   Sq Epi: x / Non Sq Epi: Few /HPF / Bacteria: Moderate /HPF        RADIOLOGY & ADDITIONAL TESTS:  Imaging Personally Reviewed:  [ ] YES  [ ] NO  < from: Xray Chest 1 View-PORTABLE IMMEDIATE (Xray Chest 1 View-PORTABLE IMMEDIATE .) (23 @ 20:17) >  ACC: [id number] EXAM:  XR CHEST PORTABLE IMMED 1V                          PROCEDURE DATE:  2023          INTERPRETATION:  INDICATION: Fever    COMPARISON: 2022    FINDINGS:  An AP portable semiupright view of the chest shows clear lungs   bilaterally. No infiltrates are seen. There is no pneumothorax. There are   no pleural effusions. There is no hilar or mediastinal widening. Heart   size is normal, without CHF. There are degenerative changes of the spine   and shoulders. No significant changes are seen compared to the prior   study.    IMPRESSION: Clear lungs with no significant cardiopulmonary abnormalities.         Patient is a 77y old  Male who presents with a chief complaint of Weakness (2023 16:09)      OVERNIGHT EVENTS: none noted     REVIEW OF SYSTEMS:  CONSTITUTIONAL: No fever, chills  RESPIRATORY: No cough, SOB  CARDIOVASCULAR: No chest pain, palpitations  GASTROINTESTINAL: No abdominal pain. No nausea, vomiting, or diarrhea  GENITOURINARY: No dysuria  NEUROLOGICAL: No HA    T(C): 37.2 (23 @ 05:28), Max: 37.2 (23 @ 21:26)  HR: 76 (23 @ 09:02) (76 - 106)  BP: 144/66 (23 @ 09:02) (115/74 - 144/66)  RR: 17 (23 @ 05:28) (17 - 17)  SpO2: 96% (23 @ 09:02) (96% - 100%)  Wt(kg): --Vital Signs Last 24 Hrs  T(C): 37.2 (2023 05:28), Max: 37.2 (2023 21:26)  T(F): 98.9 (2023 05:28), Max: 98.9 (2023 21:26)  HR: 76 (2023 09:02) (76 - 106)  BP: 144/66 (2023 09:02) (115/74 - 144/66)  BP(mean): 84 (2023 05:28) (84 - 84)  RR: 17 (2023 05:28) (17 - 17)  SpO2: 96% (2023 09:02) (96% - 100%)    Parameters below as of 2023 09:02  Patient On (Oxygen Delivery Method): room air      PHYSICAL EXAM:  GENERAL: NAD, thin  CHEST/LUNG: Clear to auscultation bilaterally; No rales, rhonchi, wheezing, or rubs  HEART: S1, S2, Regular rate and rhythm  ABDOMEN: Soft, Nontender, Nondistended; Bowel sounds present, +ve PEG tube C/D/I  NEURO: Alert & Oriented X3    Consultant(s) Notes Reviewed:  [x ] YES  [ ] NO  Care Discussed with Consultants/Other Providers [ x] YES  [ ] NO  LABS:                        8.0    6.38  )-----------( 315      ( 2023 05:21 )             25.8     -    140  |  105  |  14  ----------------------------<  99  4.0   |  27  |  0.70    Ca    7.9<L>      2023 05:21    TPro  6.2  /  Alb  1.3<L>  /  TBili  0.5  /  DBili  x   /  AST  70<H>  /  ALT  77<H>  /  AlkPhos  184<H>        CAPILLARY BLOOD GLUCOSE    MEDICATIONS  (STANDING):  amLODIPine   Tablet 10 milliGRAM(s) Oral daily  atorvastatin 20 milliGRAM(s) Oral at bedtime  cefepime   IVPB      cefepime   IVPB 1000 milliGRAM(s) IV Intermittent every 12 hours  enoxaparin Injectable 40 milliGRAM(s) SubCutaneous every 24 hours  finasteride 5 milliGRAM(s) Oral daily  folic acid 1 milliGRAM(s) Oral daily  mupirocin 2% Ointment 1 Application(s) Topical two times a day  pantoprazole   Suspension 40 milliGRAM(s) Oral daily  silver sulfADIAZINE 1% Cream 1 Application(s) Topical daily  sodium chloride 0.9%. 1000 milliLiter(s) (100 mL/Hr) IV Continuous <Continuous>  tamsulosin 0.4 milliGRAM(s) Oral at bedtime    MEDICATIONS  (PRN):  acetaminophen     Tablet .. 650 milliGRAM(s) Oral every 6 hours PRN Temp greater or equal to 38C (100.4F)  guaiFENesin Oral Liquid (Sugar-Free) 200 milliGRAM(s) Oral every 6 hours PRN Cough        Urinalysis Basic - ( 2023 22:30 )    Color: Yellow / Appearance: Clear / S.010 / pH: x  Gluc: x / Ketone: Negative  / Bili: Negative / Urobili: 1   Blood: x / Protein: 100 / Nitrite: Negative   Leuk Esterase: Trace / RBC: 0-2 /HPF / WBC 0-2 /HPF   Sq Epi: x / Non Sq Epi: Few /HPF / Bacteria: Moderate /HPF        RADIOLOGY & ADDITIONAL TESTS:  Imaging Personally Reviewed:  [ ] YES  [ ] NO  < from: Xray Chest 1 View-PORTABLE IMMEDIATE (Xray Chest 1 View-PORTABLE IMMEDIATE .) (23 @ 20:17) >  ACC: [id number] EXAM:  XR CHEST PORTABLE IMMED 1V                          PROCEDURE DATE:  2023          INTERPRETATION:  INDICATION: Fever    COMPARISON: 2022    FINDINGS:  An AP portable semiupright view of the chest shows clear lungs   bilaterally. No infiltrates are seen. There is no pneumothorax. There are   no pleural effusions. There is no hilar or mediastinal widening. Heart   size is normal, without CHF. There are degenerative changes of the spine   and shoulders. No significant changes are seen compared to the prior   study.    IMPRESSION: Clear lungs with no significant cardiopulmonary abnormalities.

## 2023-01-05 NOTE — PROGRESS NOTE ADULT - PROBLEM SELECTOR PLAN 1
-low grade temps with tachycardia, unclear source   -start empiric Cefepime   -f/u blood and urine culture   -f/u CXR   -supportive measures -s/p PEG tube reposition in last admission, currently malfunctioning again  -PEG tube was not changed as it was properly working as per GI Dr. Waddell   -GI Dr. Waddell following -s/p PEG tube reposition in last admission, currently malfunctioning again  -PEG tube change pending   -GI Dr. Waddell following

## 2023-01-05 NOTE — HISTORY OF PRESENT ILLNESS
[de-identified] : 77yoM, retired [employer] head and neck cancer,  pancreatic mass, DM2, HTN, intracranial aneurysm presents for routine follow up during chemoXRT\par \par endorses pain with swallowing - barely able to drink water\par after speaking with oncology/nutriotion - will try to increase frequency of G tube feedings from 3 to 4-6 cans per day as well as free water via Gtube\par visiting nurse has completed home education for G tube\par will f/u radonc re possible treatment for odynophagia as mouthwash not effective\par \par has discontinued antihypertensives and oral hypoglycemics\par \par did not complete MR brain

## 2023-01-05 NOTE — CHART NOTE - NSCHARTNOTEFT_GEN_A_CORE
COVID and RVP positive, pt asymptomatic, isolation precautions initiated, pt has been isolated and roommate tested. ADDENDUM: As per [facility] infection preventionist specialist, [facility],  pt does not need isolation d/t recent covid positive december 22,2022    COVID and RVP positive, pt asymptomatic, isolation precautions initiated, pt has been isolated and roommate tested.

## 2023-01-05 NOTE — PROGRESS NOTE ADULT - PROBLEM SELECTOR PLAN 3
-likely in setting of malignancy   -cont supportive measures   -Nutrition recs appreciated -ambulatory dysfunction   -PT recommended LINDY

## 2023-01-05 NOTE — PROGRESS NOTE ADULT - SUBJECTIVE AND OBJECTIVE BOX
[   ] ICU                                          [   ] CCU                                      [  X ] Medical Floor      Patient is a 77 year old male with dysphagia and Peg tube malfunction. GI consulted to evaluate.         77 year old male from home with past medical history significant for HTN, HLD, BPH, oropharyngeal mass s/p PEG tube/placement, on radiation and chemo (last dose of chemo was last week) presenting to the ED with weakness. Patient reports he was recently admitted for COVID and fever, and upon discharge home he had significant weakness in his legs and was unable to walk. Now patient with Peg tube malfunction. No abdominal pain, nausea, vomiting, hematemesis, hematochezia, melena, fever. chills, chest pain, SOB, cough, hematuria, dysuria or diarrhea reported.          Patient is comfortable. No new complaints reported, No abdominal pain, N/V, hematemesis, hematochezia, melena, fever, chills, chest pain, SOB, cough or diarrhea reported.      PAIN MANAGEMENT:  Pain Scale:                 /10  Pain Location:         PAST MEDICAL HISTORY  Hypertension    Hypercholesterolemia    Stage 3 chronic kidney disease    BPH    Oropharyngeal cancer        PAST SURGICAL HISTORY  No significant past surgical history        Allergies    No Known Allergies    Intolerances  None           MEDICATIONS  (STANDING):  amLODIPine   Tablet 10 milliGRAM(s) Oral daily  atorvastatin 20 milliGRAM(s) Oral at bedtime  cefepime   IVPB      cefepime   IVPB 1000 milliGRAM(s) IV Intermittent every 12 hours  enoxaparin Injectable 40 milliGRAM(s) SubCutaneous every 24 hours  finasteride 5 milliGRAM(s) Oral daily  folic acid 1 milliGRAM(s) Oral daily  mupirocin 2% Ointment 1 Application(s) Topical two times a day  pantoprazole   Suspension 40 milliGRAM(s) Oral daily  silver sulfADIAZINE 1% Cream 1 Application(s) Topical daily  sodium chloride 0.9%. 1000 milliLiter(s) (100 mL/Hr) IV Continuous <Continuous>  tamsulosin 0.4 milliGRAM(s) Oral at bedtime    MEDICATIONS  (PRN):  acetaminophen     Tablet .. 650 milliGRAM(s) Oral every 6 hours PRN Temp greater or equal to 38C (100.4F)  guaiFENesin Oral Liquid (Sugar-Free) 200 milliGRAM(s) Oral every 6 hours PRN Cough      SOCIAL HISTORY  Advanced Directives:       [ X ] Full Code       [  ] DNR  Marital Status:         [  ] M      [ X ] S      [  ] D       [  ] W  Children:       [ X ] Yes      [  ] No  Occupation:        [  ] Employed       [ X ] Unemployed       [  ] Retired  Diet:       [  ] Regular       [ X ] PEG feeding          [  ] NG tube feeding  Drug Use:           [ X ] Patient denied          [  ] Yes  Alcohol:           [ X ] No             [  ] Yes (socially)         [  ] Yes (chronic)  Tobacco:           [  ] Yes           [ X ] No      FAMILY HISTORY  [ X ] Heart Disease            [ X ] Diabetes             [ X ] HTN             [  ] Colon Cancer             [  ] Stomach Cancer              [  ] Pancreatic Cancer      VITALS  Vital Signs Last 24 Hrs  T(C): 36.6 (05 Jan 2023 14:42), Max: 37.2 (04 Jan 2023 21:26)  T(F): 97.9 (05 Jan 2023 14:42), Max: 98.9 (04 Jan 2023 21:26)  HR: 102 (05 Jan 2023 14:42) (76 - 115)  BP: 128/70 (05 Jan 2023 14:42) (97/74 - 144/66)  BP(mean): 79 (05 Jan 2023 13:40) (79 - 84)  RR: 17 (05 Jan 2023 14:42) (17 - 17)  SpO2: 100% (05 Jan 2023 13:40) (96% - 100%)  Parameters below as of 05 Jan 2023 14:42  Patient On (Oxygen Delivery Method): room air     MEDICATIONS  (STANDING):  amLODIPine   Tablet 10 milliGRAM(s) Oral daily  atorvastatin 20 milliGRAM(s) Oral at bedtime  finasteride 5 milliGRAM(s) Oral daily  folic acid 1 milliGRAM(s) Oral daily  mupirocin 2% Ointment 1 Application(s) Topical two times a day  pantoprazole   Suspension 40 milliGRAM(s) Oral daily  silver sulfADIAZINE 1% Cream 1 Application(s) Topical daily  sodium chloride 0.9%. 1000 milliLiter(s) (100 mL/Hr) IV Continuous <Continuous>  tamsulosin 0.4 milliGRAM(s) Oral at bedtime    MEDICATIONS  (PRN):  acetaminophen     Tablet .. 650 milliGRAM(s) Oral every 6 hours PRN Temp greater or equal to 38C (100.4F)  guaiFENesin Oral Liquid (Sugar-Free) 200 milliGRAM(s) Oral every 6 hours PRN Cough                            8.0    6.38  )-----------( 315      ( 05 Jan 2023 05:21 )             25.8       01-05    140  |  105  |  14  ----------------------------<  99  4.0   |  27  |  0.70    Ca    7.9<L>      05 Jan 2023 05:21    TPro  6.2  /  Alb  1.3<L>  /  TBili  0.5  /  DBili  x   /  AST  70<H>  /  ALT  77<H>  /  AlkPhos  184<H>  01-05

## 2023-01-05 NOTE — PROGRESS NOTE ADULT - ASSESSMENT
1. Dysphagia  2. Peg tube malfunction  3. Anemia  4. No evidence of acute GI bleeding    Suggestions:    1. Patient refused Peg replacement claiming functioning well  2. Aspiration precaution  3. Monitor H/H  4. Transfuse PRBC as bneeded  5. DVT prophylaxis

## 2023-01-05 NOTE — PROGRESS NOTE ADULT - PROBLEM SELECTOR PLAN 2
-s/p PEG tube reposition in last admission, currently malfunctioning again  -GI Dr. Waddell consulted   -hold Xarelto- PEG tube scheduled for Friday 1/6  -NPO after midnight Thurs -likely in setting of malignancy   -cont supportive measures   -Nutrition recs appreciated

## 2023-01-05 NOTE — PROGRESS NOTE ADULT - ASSESSMENT
77 year old male from home with PMH of HTN, HLD, BPH, oropharyngeal mass s/p PEG tube/resection, on radiation and chemo (last dose of chemo was last week)  admitted for weakness. Pt was evaluated by PT and recommended LINDY   Hospital course complicated with fevers and tachycardia and infectious work up was started. Pt was also noted with Malfunctioning PEG tube and GI Dr. Waddell consulted, pt for PEG tube change on Friday 1/6. Xarelto placed on hold prior to procedure and pt was started on dvt ppx Lovenox.  pending cultures, started on empiric Cefepime  77 year old male from home with PMH of HTN, HLD, BPH, oropharyngeal mass s/p PEG tube/resection, on radiation and chemo (last dose of chemo was last week)  admitted for weakness. Pt was evaluated by PT and recommended LINDY   Hospital course complicated with fevers and tachycardia and infectious work up was started, empiric Cefepime started.  CXR negative for infiltrates, cultures NGTD, Cefepime discontinued. Pt was also noted with Malfunctioning PEG tube, as per GI Dr. Waddell no need for a PEG tube change. Xarelto reinstated. Pt has refused to have vitals signs and blood work done despite ongoing education from multiple team members. PT recommended LINDY. Pending auth and placement. CM following.  77 year old male from home with PMH of HTN, HLD, BPH, oropharyngeal mass s/p PEG tube/resection, on radiation and chemo (last dose of chemo was last week)  admitted for weakness. Pt was evaluated by PT and recommended LINDY   Hospital course complicated with fevers and tachycardia and infectious work up was started, empiric Cefepime started.  CXR negative for infiltrates, cultures NGTD, Cefepime discontinued. Pt was also noted with Malfunctioning PEG tube, pending PEG tube change. Xarelto on hold for procedure.  PT recommended LINDY. Pending auth and placement. CM following.

## 2023-01-06 ENCOUNTER — APPOINTMENT (OUTPATIENT)
Dept: INTERNAL MEDICINE | Facility: CLINIC | Age: 78
End: 2023-01-06

## 2023-01-06 PROCEDURE — 99232 SBSQ HOSP IP/OBS MODERATE 35: CPT

## 2023-01-06 RX ORDER — RIVAROXABAN 15 MG-20MG
10 KIT ORAL ONCE
Refills: 0 | Status: COMPLETED | OUTPATIENT
Start: 2023-01-06 | End: 2023-01-06

## 2023-01-06 RX ADMIN — Medication 1 MILLIGRAM(S): at 14:10

## 2023-01-06 RX ADMIN — RIVAROXABAN 10 MILLIGRAM(S): KIT at 18:01

## 2023-01-06 RX ADMIN — FINASTERIDE 5 MILLIGRAM(S): 5 TABLET, FILM COATED ORAL at 14:10

## 2023-01-06 RX ADMIN — PANTOPRAZOLE SODIUM 40 MILLIGRAM(S): 20 TABLET, DELAYED RELEASE ORAL at 14:10

## 2023-01-06 RX ADMIN — Medication 200 MILLIGRAM(S): at 21:56

## 2023-01-06 RX ADMIN — TAMSULOSIN HYDROCHLORIDE 0.4 MILLIGRAM(S): 0.4 CAPSULE ORAL at 21:56

## 2023-01-06 RX ADMIN — ATORVASTATIN CALCIUM 20 MILLIGRAM(S): 80 TABLET, FILM COATED ORAL at 21:56

## 2023-01-06 NOTE — PROGRESS NOTE ADULT - PROBLEM SELECTOR PLAN 7
-on radiation and chemo (last dose of chemo was last week), follows up with Dr. Segundo Velasquez.  -Heme/Onc follow up outpt -dvt ppx: Xarelto reinstated

## 2023-01-06 NOTE — PROGRESS NOTE ADULT - PROBLEM SELECTOR PLAN 1
-low grade temps with tachycardia, unclear source   -start empiric Cefepime   -f/u blood and urine culture   -f/u CXR   -supportive measures -s/p PEG tube reposition in last admission, currently malfunctioning again  -PEG tube was not changed, as per Dr. Waddell PEG tube works  -nurses are using PEG tube with no issues   -GI Dr. Waddell following

## 2023-01-06 NOTE — PROGRESS NOTE ADULT - PROBLEM SELECTOR PLAN 9
-PT recommended LINDY, pending PEG tube change and infectious work up -PT recommended LINDY, pending Auth and Placement  -CM following

## 2023-01-06 NOTE — PROGRESS NOTE ADULT - GASTROINTESTINAL
details… soft/nontender/nondistended/normal active bowel sounds/no guarding/no rigidity/no organomegaly/no palpable lydia/no masses palpable

## 2023-01-06 NOTE — PROGRESS NOTE ADULT - SUBJECTIVE AND OBJECTIVE BOX
Patient is a 77y old  Male who presents with a chief complaint of Weakness (05 Jan 2023 15:08)      OVERNIGHT EVENTS: none noted    REVIEW OF SYSTEMS:  CONSTITUTIONAL: No fever, chills  ENMT:  No difficulty hearing, no change in vision  NECK: No pain or stiffness  RESPIRATORY: No cough, SOB  CARDIOVASCULAR: No chest pain, palpitations  GASTROINTESTINAL: No abdominal pain. No nausea, vomiting, or diarrhea  GENITOURINARY: No dysuria  NEUROLOGICAL: No HA      T(C): 36.6 (01-05-23 @ 20:38), Max: 36.8 (01-05-23 @ 13:40)  HR: 100 (01-05-23 @ 20:38) (100 - 115)  BP: 129/75 (01-05-23 @ 20:38) (97/74 - 129/75)  RR: 17 (01-05-23 @ 20:38) (17 - 17)  SpO2: 100% (01-05-23 @ 20:38) (100% - 100%)  Wt(kg): --Vital Signs Last 24 Hrs  T(C): 36.6 (05 Jan 2023 20:38), Max: 36.8 (05 Jan 2023 13:40)  T(F): 97.9 (05 Jan 2023 20:38), Max: 98.2 (05 Jan 2023 13:40)  HR: 100 (05 Jan 2023 20:38) (100 - 115)  BP: 129/75 (05 Jan 2023 20:38) (97/74 - 129/75)  BP(mean): 79 (05 Jan 2023 13:40) (79 - 79)  RR: 17 (05 Jan 2023 20:38) (17 - 17)  SpO2: 100% (05 Jan 2023 20:38) (100% - 100%)    Parameters below as of 05 Jan 2023 20:38  Patient On (Oxygen Delivery Method): room air          PHYSICAL EXAM:  GENERAL: NAD  EYES: clear conjunctiva; EOMI  ENMT: Moist mucous membranes  NECK: Supple, No JVD, Normal thyroid  CHEST/LUNG: Clear to auscultation bilaterally; No rales, rhonchi, wheezing, or rubs  HEART: S1, S2, Regular rate and rhythm  ABDOMEN: Soft, Nontender, Nondistended; Bowel sounds present  NEURO: Alert & Oriented X3  EXTREMITIES: No LE edema, no calf tenderness  LYMPH: No lymphadenopathy noted  SKIN: No rashes or lesions    Consultant(s) Notes Reviewed:  [x ] YES  [ ] NO  Care Discussed with Consultants/Other Providers [ x] YES  [ ] NO  LABS:                        8.0    6.38  )-----------( 315      ( 05 Jan 2023 05:21 )             25.8     01-05    140  |  105  |  14  ----------------------------<  99  4.0   |  27  |  0.70    Ca    7.9<L>      05 Jan 2023 05:21    TPro  6.2  /  Alb  1.3<L>  /  TBili  0.5  /  DBili  x   /  AST  70<H>  /  ALT  77<H>  /  AlkPhos  184<H>  01-05      CAPILLARY BLOOD GLUCOSE              RADIOLOGY & ADDITIONAL TESTS:  Imaging Personally Reviewed:  [ ] YES  [ ] NO   Patient is a 77y old  Male who presents with a chief complaint of Weakness (05 Jan 2023 15:08)      OVERNIGHT EVENTS: none noted    REVIEW OF SYSTEMS:  CONSTITUTIONAL: No fever, chills  ENMT:  No difficulty hearing, no change in vision  NECK: No pain or stiffness  RESPIRATORY: No cough, SOB  CARDIOVASCULAR: No chest pain, palpitations  GASTROINTESTINAL: No abdominal pain. No nausea, vomiting, or diarrhea  GENITOURINARY: No dysuria  NEUROLOGICAL: No HA      T(C): 36.6 (01-05-23 @ 20:38), Max: 36.8 (01-05-23 @ 13:40)  HR: 100 (01-05-23 @ 20:38) (100 - 115)  BP: 129/75 (01-05-23 @ 20:38) (97/74 - 129/75)  RR: 17 (01-05-23 @ 20:38) (17 - 17)  SpO2: 100% (01-05-23 @ 20:38) (100% - 100%)  Wt(kg): --Vital Signs Last 24 Hrs  T(C): 36.6 (05 Jan 2023 20:38), Max: 36.8 (05 Jan 2023 13:40)  T(F): 97.9 (05 Jan 2023 20:38), Max: 98.2 (05 Jan 2023 13:40)  HR: 100 (05 Jan 2023 20:38) (100 - 115)  BP: 129/75 (05 Jan 2023 20:38) (97/74 - 129/75)  BP(mean): 79 (05 Jan 2023 13:40) (79 - 79)  RR: 17 (05 Jan 2023 20:38) (17 - 17)  SpO2: 100% (05 Jan 2023 20:38) (100% - 100%)    Parameters below as of 05 Jan 2023 20:38  Patient On (Oxygen Delivery Method): room air          PHYSICAL EXAM:  GENERAL: NAD  EYES: clear conjunctiva; EOMI  ENMT: Moist mucous membranes  NECK: Supple, No JVD, Normal thyroid  CHEST/LUNG: Clear to auscultation bilaterally; No rales, rhonchi, wheezing, or rubs  HEART: S1, S2, Regular rate and rhythm  ABDOMEN: Soft, Nontender, Nondistended; +peg tube, C/D/I  NEURO: Alert & Oriented X3  EXTREMITIES: No LE edema, no calf tenderness    Consultant(s) Notes Reviewed:  [x ] YES  [ ] NO  Care Discussed with Consultants/Other Providers [ x] YES  [ ] NO  LABS:                        8.0    6.38  )-----------( 315      ( 05 Jan 2023 05:21 )             25.8     01-05    140  |  105  |  14  ----------------------------<  99  4.0   |  27  |  0.70    Ca    7.9<L>      05 Jan 2023 05:21    TPro  6.2  /  Alb  1.3<L>  /  TBili  0.5  /  DBili  x   /  AST  70<H>  /  ALT  77<H>  /  AlkPhos  184<H>  01-05      CAPILLARY BLOOD GLUCOSE    MEDICATIONS  (STANDING):  amLODIPine   Tablet 10 milliGRAM(s) Oral daily  atorvastatin 20 milliGRAM(s) Oral at bedtime  finasteride 5 milliGRAM(s) Oral daily  folic acid 1 milliGRAM(s) Oral daily  mupirocin 2% Ointment 1 Application(s) Topical two times a day  pantoprazole   Suspension 40 milliGRAM(s) Oral daily  silver sulfADIAZINE 1% Cream 1 Application(s) Topical daily  sodium chloride 0.9%. 1000 milliLiter(s) (100 mL/Hr) IV Continuous <Continuous>  tamsulosin 0.4 milliGRAM(s) Oral at bedtime    MEDICATIONS  (PRN):  acetaminophen     Tablet .. 650 milliGRAM(s) Oral every 6 hours PRN Temp greater or equal to 38C (100.4F)  guaiFENesin Oral Liquid (Sugar-Free) 200 milliGRAM(s) Oral every 6 hours PRN Cough      RADIOLOGY & ADDITIONAL TESTS:  Imaging Personally Reviewed:  [ ] YES  [ ] NO  < from: Xray Chest 1 View-PORTABLE IMMEDIATE (Xray Chest 1 View-PORTABLE IMMEDIATE .) (01.03.23 @ 20:17) >  ACC: [id number] EXAM:  XR CHEST PORTABLE IMMED 1V                          PROCEDURE DATE:  01/03/2023          INTERPRETATION:  INDICATION: Fever    COMPARISON: 12/22/2022    FINDINGS:  An AP portable semiupright view of the chest shows clear lungs   bilaterally. No infiltrates are seen. There is no pneumothorax. There are   no pleural effusions. There is no hilar or mediastinal widening. Heart   size is normal, without CHF. There are degenerative changes of the spine   and shoulders. No significant changes are seen compared to the prior   study.    IMPRESSION: Clear lungs with no significant cardiopulmonary abnormalities.      < end of copied text >   Patient is a 77y old  Male who presents with a chief complaint of Weakness (05 Jan 2023 15:08)      OVERNIGHT EVENTS: none noted    REVIEW OF SYSTEMS:  CONSTITUTIONAL: No fever, chills  ENMT:  No difficulty hearing, no change in vision  NECK: No pain or stiffness  RESPIRATORY: No cough, SOB  CARDIOVASCULAR: No chest pain, palpitations  GASTROINTESTINAL: No abdominal pain. No nausea, vomiting, or diarrhea  GENITOURINARY: No dysuria  NEUROLOGICAL: No HA      T(C): 36.6 (01-05-23 @ 20:38), Max: 36.8 (01-05-23 @ 13:40)  HR: 100 (01-05-23 @ 20:38) (100 - 115)  BP: 129/75 (01-05-23 @ 20:38) (97/74 - 129/75)  RR: 17 (01-05-23 @ 20:38) (17 - 17)  SpO2: 100% (01-05-23 @ 20:38) (100% - 100%)  Wt(kg): --Vital Signs Last 24 Hrs  T(C): 36.6 (05 Jan 2023 20:38), Max: 36.8 (05 Jan 2023 13:40)  T(F): 97.9 (05 Jan 2023 20:38), Max: 98.2 (05 Jan 2023 13:40)  HR: 100 (05 Jan 2023 20:38) (100 - 115)  BP: 129/75 (05 Jan 2023 20:38) (97/74 - 129/75)  BP(mean): 79 (05 Jan 2023 13:40) (79 - 79)  RR: 17 (05 Jan 2023 20:38) (17 - 17)  SpO2: 100% (05 Jan 2023 20:38) (100% - 100%)    Parameters below as of 05 Jan 2023 20:38  Patient On (Oxygen Delivery Method): room air    PHYSICAL EXAM:  GENERAL: NAD  EYES: clear conjunctiva; EOMI  ENMT: Moist mucous membranes  NECK: Supple, No JVD, Normal thyroid  CHEST/LUNG: Clear to auscultation bilaterally; No rales, rhonchi, wheezing, or rubs  HEART: S1, S2, Regular rate and rhythm  ABDOMEN: Soft, Nontender, Nondistended; +peg tube, C/D/I  NEURO: Alert & Oriented X3  EXTREMITIES: No LE edema, no calf tenderness    Consultant(s) Notes Reviewed:  [x ] YES  [ ] NO  Care Discussed with Consultants/Other Providers [ x] YES  [ ] NO  LABS:                        8.0    6.38  )-----------( 315      ( 05 Jan 2023 05:21 )             25.8     01-05    140  |  105  |  14  ----------------------------<  99  4.0   |  27  |  0.70    Ca    7.9<L>      05 Jan 2023 05:21    TPro  6.2  /  Alb  1.3<L>  /  TBili  0.5  /  DBili  x   /  AST  70<H>  /  ALT  77<H>  /  AlkPhos  184<H>  01-05      CAPILLARY BLOOD GLUCOSE    MEDICATIONS  (STANDING):  amLODIPine   Tablet 10 milliGRAM(s) Oral daily  atorvastatin 20 milliGRAM(s) Oral at bedtime  finasteride 5 milliGRAM(s) Oral daily  folic acid 1 milliGRAM(s) Oral daily  mupirocin 2% Ointment 1 Application(s) Topical two times a day  pantoprazole   Suspension 40 milliGRAM(s) Oral daily  silver sulfADIAZINE 1% Cream 1 Application(s) Topical daily  sodium chloride 0.9%. 1000 milliLiter(s) (100 mL/Hr) IV Continuous <Continuous>  tamsulosin 0.4 milliGRAM(s) Oral at bedtime    MEDICATIONS  (PRN):  acetaminophen     Tablet .. 650 milliGRAM(s) Oral every 6 hours PRN Temp greater or equal to 38C (100.4F)  guaiFENesin Oral Liquid (Sugar-Free) 200 milliGRAM(s) Oral every 6 hours PRN Cough      RADIOLOGY & ADDITIONAL TESTS:  Imaging Personally Reviewed:  [ ] YES  [ ] NO  < from: Xray Chest 1 View-PORTABLE IMMEDIATE (Xray Chest 1 View-PORTABLE IMMEDIATE .) (01.03.23 @ 20:17) >  ACC: [id number] EXAM:  XR CHEST PORTABLE IMMED 1V                          PROCEDURE DATE:  01/03/2023          INTERPRETATION:  INDICATION: Fever    COMPARISON: 12/22/2022    FINDINGS:  An AP portable semiupright view of the chest shows clear lungs   bilaterally. No infiltrates are seen. There is no pneumothorax. There are   no pleural effusions. There is no hilar or mediastinal widening. Heart   size is normal, without CHF. There are degenerative changes of the spine   and shoulders. No significant changes are seen compared to the prior   study.    IMPRESSION: Clear lungs with no significant cardiopulmonary abnormalities.      < end of copied text >   Patient is a 77y old  Male who presents with a chief complaint of Weakness (05 Jan 2023 15:08)      OVERNIGHT EVENTS: Pt has refused to have vitals signs and blood work done despite ongoing education from multiple team members    REVIEW OF SYSTEMS:  CONSTITUTIONAL: No fever, chills  ENMT:  No difficulty hearing, no change in vision  NECK: No pain or stiffness  RESPIRATORY: No cough, SOB  CARDIOVASCULAR: No chest pain, palpitations  GASTROINTESTINAL: No abdominal pain. No nausea, vomiting, or diarrhea  GENITOURINARY: No dysuria  NEUROLOGICAL: No HA      T(C): 36.6 (01-05-23 @ 20:38), Max: 36.8 (01-05-23 @ 13:40)  HR: 100 (01-05-23 @ 20:38) (100 - 115)  BP: 129/75 (01-05-23 @ 20:38) (97/74 - 129/75)  RR: 17 (01-05-23 @ 20:38) (17 - 17)  SpO2: 100% (01-05-23 @ 20:38) (100% - 100%)  Wt(kg): --Vital Signs Last 24 Hrs  T(C): 36.6 (05 Jan 2023 20:38), Max: 36.8 (05 Jan 2023 13:40)  T(F): 97.9 (05 Jan 2023 20:38), Max: 98.2 (05 Jan 2023 13:40)  HR: 100 (05 Jan 2023 20:38) (100 - 115)  BP: 129/75 (05 Jan 2023 20:38) (97/74 - 129/75)  BP(mean): 79 (05 Jan 2023 13:40) (79 - 79)  RR: 17 (05 Jan 2023 20:38) (17 - 17)  SpO2: 100% (05 Jan 2023 20:38) (100% - 100%)    Parameters below as of 05 Jan 2023 20:38  Patient On (Oxygen Delivery Method): room air    PHYSICAL EXAM:  GENERAL: NAD  EYES: clear conjunctiva; EOMI  ENMT: Moist mucous membranes  NECK: Supple, No JVD, Normal thyroid  CHEST/LUNG: Clear to auscultation bilaterally; No rales, rhonchi, wheezing, or rubs  HEART: S1, S2, Regular rate and rhythm  ABDOMEN: Soft, Nontender, Nondistended; +peg tube, C/D/I  NEURO: Alert & Oriented X3  EXTREMITIES: No LE edema, no calf tenderness    Consultant(s) Notes Reviewed:  [x ] YES  [ ] NO  Care Discussed with Consultants/Other Providers [ x] YES  [ ] NO  LABS:                        8.0    6.38  )-----------( 315      ( 05 Jan 2023 05:21 )             25.8     01-05    140  |  105  |  14  ----------------------------<  99  4.0   |  27  |  0.70    Ca    7.9<L>      05 Jan 2023 05:21    TPro  6.2  /  Alb  1.3<L>  /  TBili  0.5  /  DBili  x   /  AST  70<H>  /  ALT  77<H>  /  AlkPhos  184<H>  01-05      CAPILLARY BLOOD GLUCOSE    MEDICATIONS  (STANDING):  amLODIPine   Tablet 10 milliGRAM(s) Oral daily  atorvastatin 20 milliGRAM(s) Oral at bedtime  finasteride 5 milliGRAM(s) Oral daily  folic acid 1 milliGRAM(s) Oral daily  mupirocin 2% Ointment 1 Application(s) Topical two times a day  pantoprazole   Suspension 40 milliGRAM(s) Oral daily  silver sulfADIAZINE 1% Cream 1 Application(s) Topical daily  sodium chloride 0.9%. 1000 milliLiter(s) (100 mL/Hr) IV Continuous <Continuous>  tamsulosin 0.4 milliGRAM(s) Oral at bedtime    MEDICATIONS  (PRN):  acetaminophen     Tablet .. 650 milliGRAM(s) Oral every 6 hours PRN Temp greater or equal to 38C (100.4F)  guaiFENesin Oral Liquid (Sugar-Free) 200 milliGRAM(s) Oral every 6 hours PRN Cough      RADIOLOGY & ADDITIONAL TESTS:  Imaging Personally Reviewed:  [ ] YES  [ ] NO  < from: Xray Chest 1 View-PORTABLE IMMEDIATE (Xray Chest 1 View-PORTABLE IMMEDIATE .) (01.03.23 @ 20:17) >  ACC: [id number] EXAM:  XR CHEST PORTABLE IMMED 1V                          PROCEDURE DATE:  01/03/2023          INTERPRETATION:  INDICATION: Fever    COMPARISON: 12/22/2022    FINDINGS:  An AP portable semiupright view of the chest shows clear lungs   bilaterally. No infiltrates are seen. There is no pneumothorax. There are   no pleural effusions. There is no hilar or mediastinal widening. Heart   size is normal, without CHF. There are degenerative changes of the spine   and shoulders. No significant changes are seen compared to the prior   study.    IMPRESSION: Clear lungs with no significant cardiopulmonary abnormalities.      < end of copied text >

## 2023-01-06 NOTE — PROGRESS NOTE ADULT - PROBLEM SELECTOR PLAN 2
-s/p PEG tube reposition in last admission, currently malfunctioning again  -GI Dr. Waddell consulted   -hold Xarelto- PEG tube scheduled for Friday 1/6  -NPO after midnight Thurs -s/p PEG tube reposition in last admission, currently malfunctioning again  -PEG tube was not changed, as per Dr. Waddell PEG tube works  -nurses are using PEG tube with no issues   -GI Dr. Waddell following -likely in setting of malignancy   -cont supportive measures   -Nutrition recs appreciated

## 2023-01-06 NOTE — PROGRESS NOTE ADULT - SUBJECTIVE AND OBJECTIVE BOX
[   ] ICU                                          [   ] CCU                                      [ X  ] Medical Floor      Patient is a 77 year old male with dysphagia and Peg tube malfunction. GI consulted to evaluate.         77 year old male from home with past medical history significant for HTN, HLD, BPH, oropharyngeal mass s/p PEG tube/placement, on radiation and chemo (last dose of chemo was last week) presenting to the ED with weakness. Patient reports he was recently admitted for COVID and fever, and upon discharge home he had significant weakness in his legs and was unable to walk. Now patient with Peg tube malfunction. No abdominal pain, nausea, vomiting, hematemesis, hematochezia, melena, fever. chills, chest pain, SOB, cough, hematuria, dysuria or diarrhea reported.          Patient is comfortable. No new complaints reported, No abdominal pain, N/V, hematemesis, hematochezia, melena, fever, chills, chest pain, SOB, cough or diarrhea reported.      PAIN MANAGEMENT:  Pain Scale:                 /10  Pain Location:         PAST MEDICAL HISTORY  Hypertension    Hypercholesterolemia    Stage 3 chronic kidney disease    BPH    Oropharyngeal cancer        PAST SURGICAL HISTORY  No significant past surgical history        Allergies    No Known Allergies    Intolerances  None           MEDICATIONS  (STANDING):  amLODIPine   Tablet 10 milliGRAM(s) Oral daily  atorvastatin 20 milliGRAM(s) Oral at bedtime  cefepime   IVPB      cefepime   IVPB 1000 milliGRAM(s) IV Intermittent every 12 hours  enoxaparin Injectable 40 milliGRAM(s) SubCutaneous every 24 hours  finasteride 5 milliGRAM(s) Oral daily  folic acid 1 milliGRAM(s) Oral daily  mupirocin 2% Ointment 1 Application(s) Topical two times a day  pantoprazole   Suspension 40 milliGRAM(s) Oral daily  silver sulfADIAZINE 1% Cream 1 Application(s) Topical daily  sodium chloride 0.9%. 1000 milliLiter(s) (100 mL/Hr) IV Continuous <Continuous>  tamsulosin 0.4 milliGRAM(s) Oral at bedtime    MEDICATIONS  (PRN):  acetaminophen     Tablet .. 650 milliGRAM(s) Oral every 6 hours PRN Temp greater or equal to 38C (100.4F)  guaiFENesin Oral Liquid (Sugar-Free) 200 milliGRAM(s) Oral every 6 hours PRN Cough      SOCIAL HISTORY  Advanced Directives:       [ X ] Full Code       [  ] DNR  Marital Status:         [  ] M      [ X ] S      [  ] D       [  ] W  Children:       [ X ] Yes      [  ] No  Occupation:        [  ] Employed       [ X ] Unemployed       [  ] Retired  Diet:       [  ] Regular       [ X ] PEG feeding          [  ] NG tube feeding  Drug Use:           [ X ] Patient denied          [  ] Yes  Alcohol:           [ X ] No             [  ] Yes (socially)         [  ] Yes (chronic)  Tobacco:           [  ] Yes           [ X ] No      FAMILY HISTORY  [ X ] Heart Disease            [ X ] Diabetes             [ X ] HTN             [  ] Colon Cancer             [  ] Stomach Cancer              [  ] Pancreatic Cancer    VITALS  Vital Signs Last 24 Hrs  T(C): 36.7 (06 Jan 2023 13:40), Max: 36.7 (06 Jan 2023 13:40)  T(F): 98 (06 Jan 2023 13:40), Max: 98 (06 Jan 2023 13:40)  HR: 92 (06 Jan 2023 13:40) (92 - 100)  BP: 129/84 (06 Jan 2023 13:40) (129/75 - 129/84)  BP(mean): 95 (06 Jan 2023 13:40) (95 - 95)  RR: 17 (06 Jan 2023 13:40) (17 - 17)  SpO2: 100% (06 Jan 2023 13:40) (100% - 100%)  Parameters below as of 06 Jan 2023 13:40  Patient On (Oxygen Delivery Method): room air       MEDICATIONS  (STANDING):  amLODIPine   Tablet 10 milliGRAM(s) Oral daily  atorvastatin 20 milliGRAM(s) Oral at bedtime  finasteride 5 milliGRAM(s) Oral daily  folic acid 1 milliGRAM(s) Oral daily  mupirocin 2% Ointment 1 Application(s) Topical two times a day  pantoprazole   Suspension 40 milliGRAM(s) Oral daily  rivaroxaban 10 milliGRAM(s) Enteral Tube once  silver sulfADIAZINE 1% Cream 1 Application(s) Topical daily  sodium chloride 0.9%. 1000 milliLiter(s) (100 mL/Hr) IV Continuous <Continuous>  tamsulosin 0.4 milliGRAM(s) Oral at bedtime    MEDICATIONS  (PRN):  acetaminophen     Tablet .. 650 milliGRAM(s) Oral every 6 hours PRN Temp greater or equal to 38C (100.4F)  guaiFENesin Oral Liquid (Sugar-Free) 200 milliGRAM(s) Oral every 6 hours PRN Cough                            8.0    6.38  )-----------( 315      ( 05 Jan 2023 05:21 )             25.8       01-05    140  |  105  |  14  ----------------------------<  99  4.0   |  27  |  0.70    Ca    7.9<L>      05 Jan 2023 05:21    TPro  6.2  /  Alb  1.3<L>  /  TBili  0.5  /  DBili  x   /  AST  70<H>  /  ALT  77<H>  /  AlkPhos  184<H>  01-05

## 2023-01-06 NOTE — PROGRESS NOTE ADULT - ATTENDING COMMENTS
77 year old male from home with PMH of HTN, HLD, BPH, oropharyngeal mass s/p PEG tube/resection, on radiation and chemo (last dose of chemo was last week)  admitted for weakness. He was seen by PT who recommended [facility]. Hospital course complicated by malfunctioning PEG now resolved.    #PEG tube malfunction  #SIRS  #Chronic Normocytic Anemia   #Oropharyngeal cancer s/p chemo/radiation   #Radiation dermatitis   #Oral mucositis     Plan:  -Patient w/ malfunctioning PEG tube, (unable to maintain TF connection and it comes off)  - now apparently working again  -No obvious infectious etiology, can discontinue cefepime  -C/w magic mouth wash  -C/w local wound care   -Out-patient f/u w/ his oncologist Dr. Pittman   -Pending [facility] placement
77 year old male from home with PMH of HTN, HLD, BPH, oropharyngeal mass s/p PEG tube/resection, on radiation and chemo (last dose of chemo was last week)  admitted for weakness. He was seen by PT who recommended [facility]. Hospital course complicated by malfunctioning PEG.    #PEG tube malfunction   #SIRS  #Chronic Normocytic Anemia   #Oropharyngeal cancer s/p chemo/radiation   #Radiation dermatitis   #Oral mucositis     Plan:  -Patient w/ malfunctioning PEG tube, (unable to maintain TF connection and it comes off)  - Tentative plan for Friday, will keep NPO after midnight on Thursday   -No obvious infectious etiology, can discontinue cefepime  -C/w magic mouth wash  -C/w local wound care   -Out-patient f/u w/ his oncologist Dr. Pittman   -PT recommended [facility], eventual DC plan to [facility].
77 year old male from home with PMH of HTN, HLD, BPH, oropharyngeal mass s/p PEG tube/resection, on radiation and chemo (last dose of chemo was last week)  admitted for weakness. He was seen by PT who recommended [facility]. Hospital course complicated by malfunctioning PEG     Patient was seen and examined, he is concerned that he may not be able to gain weight if not able to take TF. He denies any chills, sweats, urinary symptoms, abd pain. He does have some cough which he says is for last 3 weeks w/ some occasional sputum production   Continued to spike low grade temp 99.8F     Labs reviewed     A/P:  #PEG tube malfunction   #SIRS  #Chronic Normocytic Anemia   #Oropharyngeal cancer s/p chemo/radiation   #Radiation dermatitis   #Oral mucositis     Plan:  -Patient w/ malfunctioning PEG tube, (unable to maintain TF connection and it comes off)  requested GI eval who recommended to get Peg tube changed. Tentative plan for Friday, will keep NPO after midnight on Thursday   -No obvious infectious etiology, started empiric abx for now. Follow blood cx. No clinical evidence of PE/DVT   -C/w magic mouth wash  -C/w local wound care   -Out-patient f/u w/ his oncologist Dr. Pittman   -PT recommended [facility], eventual DC plan to [facility].

## 2023-01-06 NOTE — PROGRESS NOTE ADULT - ASSESSMENT
77 year old male from home with PMH of HTN, HLD, BPH, oropharyngeal mass s/p PEG tube/resection, on radiation and chemo (last dose of chemo was last week)  admitted for weakness. Pt was evaluated by PT and recommended [facility]   Hospital course complicated with fevers and tachycardia and infectious work up was started. Pt was also noted with Malfunctioning PEG tube and GI Dr. Waddell consulted, pt for PEG tube change on Friday 1/6. Xarelto placed on hold prior to procedure and pt was started on dvt ppx Lovenox.  pending cultures, started on empiric Cefepime  77 year old male from home with PMH of HTN, HLD, BPH, oropharyngeal mass s/p PEG tube/resection, on radiation and chemo (last dose of chemo was last week)  admitted for weakness. Pt was evaluated by PT and recommended [facility]   Hospital course complicated with fevers and tachycardia and infectious work up was started. Pt was also noted with Malfunctioning PEG tube and GI Dr. Waddell consulted, PEG tube was working and not changed. Xarelto placed on hold prior to procedure and pt was started on dvt ppx Lovenox.   77 year old male from home with PMH of HTN, HLD, BPH, oropharyngeal mass s/p PEG tube/resection, on radiation and chemo (last dose of chemo was last week)  admitted for weakness. Pt was evaluated by PT and recommended [facility]   Hospital course complicated with fevers and tachycardia and infectious work up was started. Pt was also noted with Malfunctioning PEG tube and GI Dr. Waddell consulted, PEG tube was working and not changed. Xarelto reinstated. PT recommended [facility], pending Auth and Placement. CM following   77 year old male from home with PMH of HTN, HLD, BPH, oropharyngeal mass s/p PEG tube/resection, on radiation and chemo (last dose of chemo was last week)  admitted for weakness. Pt was evaluated by PT and recommended [facility]   Hospital course complicated with fevers and tachycardia and infectious work up was started. Pt was also noted with Malfunctioning PEG tube and GI Dr. Waddell consulted, PEG tube was working and not changed. Xarelto reinstated. Pt has refused to have vitals signs and blood work done despite ongoing education from multiple team members. PT recommended [facility], pending Auth and Placement. CM following

## 2023-01-06 NOTE — PROGRESS NOTE ADULT - PROBLEM SELECTOR PLAN 8
-dvt ppx: hold Xarelto prior to PEG tube placement, dvt ppx Lovenox ordered until tomorrow prior to PEG placement, primary team to reinstate AC after PEG placement. -dvt ppx: Xarelto reinstated -PT recommended [facility], pending Auth and Placement  -CM following

## 2023-01-07 LAB
ANION GAP SERPL CALC-SCNC: 8 MMOL/L — SIGNIFICANT CHANGE UP (ref 5–17)
BUN SERPL-MCNC: 13 MG/DL — SIGNIFICANT CHANGE UP (ref 7–18)
CALCIUM SERPL-MCNC: 8 MG/DL — LOW (ref 8.4–10.5)
CHLORIDE SERPL-SCNC: 101 MMOL/L — SIGNIFICANT CHANGE UP (ref 96–108)
CO2 SERPL-SCNC: 28 MMOL/L — SIGNIFICANT CHANGE UP (ref 22–31)
CREAT SERPL-MCNC: 0.67 MG/DL — SIGNIFICANT CHANGE UP (ref 0.5–1.3)
EGFR: 97 ML/MIN/1.73M2 — SIGNIFICANT CHANGE UP
GLUCOSE SERPL-MCNC: 116 MG/DL — HIGH (ref 70–99)
HCT VFR BLD CALC: 27.4 % — LOW (ref 39–50)
HGB BLD-MCNC: 8.4 G/DL — LOW (ref 13–17)
MCHC RBC-ENTMCNC: 27.7 PG — SIGNIFICANT CHANGE UP (ref 27–34)
MCHC RBC-ENTMCNC: 30.7 GM/DL — LOW (ref 32–36)
MCV RBC AUTO: 90.4 FL — SIGNIFICANT CHANGE UP (ref 80–100)
NRBC # BLD: 0 /100 WBCS — SIGNIFICANT CHANGE UP (ref 0–0)
PLATELET # BLD AUTO: 346 K/UL — SIGNIFICANT CHANGE UP (ref 150–400)
POTASSIUM SERPL-MCNC: 3.9 MMOL/L — SIGNIFICANT CHANGE UP (ref 3.5–5.3)
POTASSIUM SERPL-SCNC: 3.9 MMOL/L — SIGNIFICANT CHANGE UP (ref 3.5–5.3)
RBC # BLD: 3.03 M/UL — LOW (ref 4.2–5.8)
RBC # FLD: 15.7 % — HIGH (ref 10.3–14.5)
SODIUM SERPL-SCNC: 137 MMOL/L — SIGNIFICANT CHANGE UP (ref 135–145)
WBC # BLD: 5.52 K/UL — SIGNIFICANT CHANGE UP (ref 3.8–10.5)
WBC # FLD AUTO: 5.52 K/UL — SIGNIFICANT CHANGE UP (ref 3.8–10.5)

## 2023-01-07 PROCEDURE — 99232 SBSQ HOSP IP/OBS MODERATE 35: CPT

## 2023-01-07 RX ADMIN — FINASTERIDE 5 MILLIGRAM(S): 5 TABLET, FILM COATED ORAL at 11:41

## 2023-01-07 RX ADMIN — Medication 200 MILLIGRAM(S): at 22:59

## 2023-01-07 RX ADMIN — Medication 1 MILLIGRAM(S): at 11:41

## 2023-01-07 RX ADMIN — TAMSULOSIN HYDROCHLORIDE 0.4 MILLIGRAM(S): 0.4 CAPSULE ORAL at 22:59

## 2023-01-07 RX ADMIN — PANTOPRAZOLE SODIUM 40 MILLIGRAM(S): 20 TABLET, DELAYED RELEASE ORAL at 11:40

## 2023-01-07 RX ADMIN — MUPIROCIN 1 APPLICATION(S): 20 OINTMENT TOPICAL at 05:15

## 2023-01-07 RX ADMIN — ATORVASTATIN CALCIUM 20 MILLIGRAM(S): 80 TABLET, FILM COATED ORAL at 22:59

## 2023-01-07 NOTE — PROGRESS NOTE ADULT - SUBJECTIVE AND OBJECTIVE BOX
[   ] ICU                                          [   ] CCU                                      [ X  ] Medical Floor      Patient is a 77 year old male with dysphagia and Peg tube malfunction. GI consulted to evaluate.         77 year old male from home with past medical history significant for HTN, HLD, BPH, oropharyngeal mass s/p PEG tube/placement, on radiation and chemo (last dose of chemo was last week) presenting to the ED with weakness. Patient reports he was recently admitted for COVID and fever, and upon discharge home he had significant weakness in his legs and was unable to walk. Now patient with Peg tube malfunction. No abdominal pain, nausea, vomiting, hematemesis, hematochezia, melena, fever. chills, chest pain, SOB, cough, hematuria, dysuria or diarrhea reported.          Patient is comfortable. No new complaints reported, No abdominal pain, N/V, hematemesis, hematochezia, melena, fever, chills, chest pain, SOB, cough or diarrhea reported.      PAIN MANAGEMENT:  Pain Scale:                 /10  Pain Location:         PAST MEDICAL HISTORY  Hypertension    Hypercholesterolemia    Stage 3 chronic kidney disease    BPH    Oropharyngeal cancer        PAST SURGICAL HISTORY  No significant past surgical history        Allergies    No Known Allergies    Intolerances  None           MEDICATIONS  (STANDING):  amLODIPine   Tablet 10 milliGRAM(s) Oral daily  atorvastatin 20 milliGRAM(s) Oral at bedtime  cefepime   IVPB      cefepime   IVPB 1000 milliGRAM(s) IV Intermittent every 12 hours  enoxaparin Injectable 40 milliGRAM(s) SubCutaneous every 24 hours  finasteride 5 milliGRAM(s) Oral daily  folic acid 1 milliGRAM(s) Oral daily  mupirocin 2% Ointment 1 Application(s) Topical two times a day  pantoprazole   Suspension 40 milliGRAM(s) Oral daily  silver sulfADIAZINE 1% Cream 1 Application(s) Topical daily  sodium chloride 0.9%. 1000 milliLiter(s) (100 mL/Hr) IV Continuous <Continuous>  tamsulosin 0.4 milliGRAM(s) Oral at bedtime    MEDICATIONS  (PRN):  acetaminophen     Tablet .. 650 milliGRAM(s) Oral every 6 hours PRN Temp greater or equal to 38C (100.4F)  guaiFENesin Oral Liquid (Sugar-Free) 200 milliGRAM(s) Oral every 6 hours PRN Cough      SOCIAL HISTORY  Advanced Directives:       [ X ] Full Code       [  ] DNR  Marital Status:         [  ] M      [ X ] S      [  ] D       [  ] W  Children:       [ X ] Yes      [  ] No  Occupation:        [  ] Employed       [ X ] Unemployed       [  ] Retired  Diet:       [  ] Regular       [ X ] PEG feeding          [  ] NG tube feeding  Drug Use:           [ X ] Patient denied          [  ] Yes  Alcohol:           [ X ] No             [  ] Yes (socially)         [  ] Yes (chronic)  Tobacco:           [  ] Yes           [ X ] No      FAMILY HISTORY  [ X ] Heart Disease            [ X ] Diabetes             [ X ] HTN             [  ] Colon Cancer             [  ] Stomach Cancer              [  ] Pancreatic Cancer      VITALS  Vital Signs Last 24 Hrs  T(C): 37.1 (07 Jan 2023 14:37), Max: 37.1 (07 Jan 2023 05:00)  T(F): 98.7 (07 Jan 2023 14:37), Max: 98.8 (07 Jan 2023 05:00)  HR: 90 (07 Jan 2023 14:37) (86 - 90)  BP: 120/74 (07 Jan 2023 14:37) (91/56 - 128/70)  BP(mean): 85 (07 Jan 2023 14:37) (85 - 85)  RR: 17 (07 Jan 2023 14:37) (17 - 17)  SpO2: 100% (07 Jan 2023 14:37) (99% - 100%)    Parameters below as of 07 Jan 2023 14:37  Patient On (Oxygen Delivery Method): room air       MEDICATIONS  (STANDING):  amLODIPine   Tablet 10 milliGRAM(s) Oral daily  atorvastatin 20 milliGRAM(s) Oral at bedtime  finasteride 5 milliGRAM(s) Oral daily  folic acid 1 milliGRAM(s) Oral daily  mupirocin 2% Ointment 1 Application(s) Topical two times a day  pantoprazole   Suspension 40 milliGRAM(s) Oral daily  silver sulfADIAZINE 1% Cream 1 Application(s) Topical daily  sodium chloride 0.9%. 1000 milliLiter(s) (100 mL/Hr) IV Continuous <Continuous>  tamsulosin 0.4 milliGRAM(s) Oral at bedtime    MEDICATIONS  (PRN):  acetaminophen     Tablet .. 650 milliGRAM(s) Oral every 6 hours PRN Temp greater or equal to 38C (100.4F)  guaiFENesin Oral Liquid (Sugar-Free) 200 milliGRAM(s) Oral every 6 hours PRN Cough                            8.4    5.52  )-----------( 346      ( 07 Jan 2023 06:35 )             27.4       01-07    137  |  101  |  13  ----------------------------<  116<H>  3.9   |  28  |  0.67    Ca    8.0<L>      07 Jan 2023 06:35

## 2023-01-07 NOTE — PROGRESS NOTE ADULT - ASSESSMENT
77 year old male from home with PMH of HTN, HLD, BPH, oropharyngeal mass s/p PEG tube/resection, on radiation and chemo (last dose of chemo was last week)  admitted for weakness. He was seen by PT who recommended LINDY. Hospital course complicated by malfunctioning PEG. Dr Waddell consulted for replacement but pt refused - claiming it was functioning well. Unfortunately it still apperas to be leaking.    #PEG tube malfunction  #SIRS  #Chronic Normocytic Anemia   #Oropharyngeal cancer s/p chemo/radiation   #Radiation dermatitis   #Oral mucositis     Plan:  -Patient w/ malfunctioning PEG tube, (unable to maintain TF connection and it comes off/leaks)  - pt refused replacement, claiming it was functioning well - will see if patient willing to have tube replaced  -No obvious infectious etiology, can discontinue cefepime  -C/w magic mouth wash  -C/w local wound care   -Out-patient f/u w/ his oncologist Dr. Pittman   -Pending LINDY placement.

## 2023-01-07 NOTE — PROGRESS NOTE ADULT - ASSESSMENT
1. Dysphagia  2. Peg tube malfunction  3. Anemia  4. No evidence of acute GI bleeding    Suggestions:    1. Peg tube readjusted functioning now   2. Patient needs new Peg tube replacement but patient refusing  3. Aspiration precaution  3. Monitor H/H  4. Transfuse PRBC as needed  5. DVT prophylaxis

## 2023-01-07 NOTE — PROGRESS NOTE ADULT - SUBJECTIVE AND OBJECTIVE BOX
Interval of present illness: No acute events overnight. Pt seen at bedside. PEG tubing still leaking    REVIEW OF SYSTEMS:    CONSTITUTIONAL: No fever  EYES: No acute visual disturbances  NECK: No pain or stiffness  RESPIRATORY: No cough; No shortness of breath  CARDIOVASCULAR: No chest pain, no palpitations  GASTROINTESTINAL: No pain. No nausea or vomiting.  No diarrhea   NEUROLOGICAL: No headache or numbness, no tremors  MUSCULOSKELETAL: no muscle pain  GENITOURINARY: No dysuria, no frequency, no hesitancy  PSYCHIATRY: No depression , no anxiety  ALL OTHER  ROS negative     O:  Vital Signs Last 24 Hrs  T(C): 37.1 (07 Jan 2023 05:00), Max: 37.1 (07 Jan 2023 05:00)  T(F): 98.8 (07 Jan 2023 05:00), Max: 98.8 (07 Jan 2023 05:00)  HR: 86 (07 Jan 2023 05:00) (86 - 92)  BP: 91/56 (07 Jan 2023 05:00) (91/56 - 129/84)  BP(mean): 95 (06 Jan 2023 13:40) (95 - 95)  RR: 17 (07 Jan 2023 05:00) (17 - 17)  SpO2: 99% (07 Jan 2023 05:00) (99% - 100%)    Parameters below as of 07 Jan 2023 05:00  Patient On (Oxygen Delivery Method): room air        Gen: NAD  Neuro: alert, answering qs appropriately, moves all extremities  HEENT: anicteric, moist oral mucosa  Neck: supple, no JVD elevation  Cards: RRR  Pulm: good inspiratory effort, CTAB  Abd: PEG tube is leaking+  Ext: no edema  Skin: warm, dry      acetaminophen     Tablet .. 650 milliGRAM(s) Oral every 6 hours PRN  amLODIPine   Tablet 10 milliGRAM(s) Oral daily  atorvastatin 20 milliGRAM(s) Oral at bedtime  finasteride 5 milliGRAM(s) Oral daily  folic acid 1 milliGRAM(s) Oral daily  guaiFENesin Oral Liquid (Sugar-Free) 200 milliGRAM(s) Oral every 6 hours PRN  mupirocin 2% Ointment 1 Application(s) Topical two times a day  pantoprazole   Suspension 40 milliGRAM(s) Oral daily  silver sulfADIAZINE 1% Cream 1 Application(s) Topical daily  sodium chloride 0.9%. 1000 milliLiter(s) IV Continuous <Continuous>  tamsulosin 0.4 milliGRAM(s) Oral at bedtime                            8.4    5.52  )-----------( 346      ( 07 Jan 2023 06:35 )             27.4       01-07    137  |  101  |  13  ----------------------------<  116<H>  3.9   |  28  |  0.67    Ca    8.0<L>      07 Jan 2023 06:35

## 2023-01-08 LAB
CULTURE RESULTS: SIGNIFICANT CHANGE UP
CULTURE RESULTS: SIGNIFICANT CHANGE UP
SPECIMEN SOURCE: SIGNIFICANT CHANGE UP
SPECIMEN SOURCE: SIGNIFICANT CHANGE UP

## 2023-01-08 PROCEDURE — 99232 SBSQ HOSP IP/OBS MODERATE 35: CPT

## 2023-01-08 RX ADMIN — Medication 200 MILLIGRAM(S): at 21:53

## 2023-01-08 RX ADMIN — Medication 200 MILLIGRAM(S): at 11:57

## 2023-01-08 RX ADMIN — AMLODIPINE BESYLATE 10 MILLIGRAM(S): 2.5 TABLET ORAL at 05:38

## 2023-01-08 RX ADMIN — Medication 1 APPLICATION(S): at 11:54

## 2023-01-08 RX ADMIN — TAMSULOSIN HYDROCHLORIDE 0.4 MILLIGRAM(S): 0.4 CAPSULE ORAL at 21:53

## 2023-01-08 RX ADMIN — Medication 1 MILLIGRAM(S): at 11:54

## 2023-01-08 RX ADMIN — PANTOPRAZOLE SODIUM 40 MILLIGRAM(S): 20 TABLET, DELAYED RELEASE ORAL at 11:54

## 2023-01-08 RX ADMIN — FINASTERIDE 5 MILLIGRAM(S): 5 TABLET, FILM COATED ORAL at 11:53

## 2023-01-08 RX ADMIN — ATORVASTATIN CALCIUM 20 MILLIGRAM(S): 80 TABLET, FILM COATED ORAL at 21:53

## 2023-01-08 RX ADMIN — MUPIROCIN 1 APPLICATION(S): 20 OINTMENT TOPICAL at 05:47

## 2023-01-08 NOTE — PROGRESS NOTE ADULT - SUBJECTIVE AND OBJECTIVE BOX
Interval of present illness: No acute events overnight. Pt seen at bedside. PEG tube no longer leaking.    REVIEW OF SYSTEMS:    CONSTITUTIONAL: No fever  EYES: No acute visual disturbances  NECK: No pain or stiffness  RESPIRATORY: No cough; No shortness of breath  CARDIOVASCULAR: No chest pain, no palpitations  GASTROINTESTINAL: No pain. No nausea or vomiting.  No diarrhea   NEUROLOGICAL: No headache or numbness, no tremors  MUSCULOSKELETAL: no muscle pain  GENITOURINARY: No dysuria, no frequency, no hesitancy  PSYCHIATRY: No depression , no anxiety  ALL OTHER  ROS negative     O:  Vital Signs Last 24 Hrs  T(C): 37.1 (07 Jan 2023 05:00), Max: 37.1 (07 Jan 2023 05:00)  T(F): 98.8 (07 Jan 2023 05:00), Max: 98.8 (07 Jan 2023 05:00)  HR: 86 (07 Jan 2023 05:00) (86 - 92)  BP: 91/56 (07 Jan 2023 05:00) (91/56 - 129/84)  BP(mean): 95 (06 Jan 2023 13:40) (95 - 95)  RR: 17 (07 Jan 2023 05:00) (17 - 17)  SpO2: 99% (07 Jan 2023 05:00) (99% - 100%)    Parameters below as of 07 Jan 2023 05:00  Patient On (Oxygen Delivery Method): room air        Gen: NAD  Neuro: alert, answering qs appropriately, moves all extremities  HEENT: anicteric, moist oral mucosa  Neck: supple, no JVD elevation  Cards: RRR  Pulm: good inspiratory effort, CTAB  Abd: PEG tube in place - not leaking  Ext: no edema  Skin: warm, dry      acetaminophen     Tablet .. 650 milliGRAM(s) Oral every 6 hours PRN  amLODIPine   Tablet 10 milliGRAM(s) Oral daily  atorvastatin 20 milliGRAM(s) Oral at bedtime  finasteride 5 milliGRAM(s) Oral daily  folic acid 1 milliGRAM(s) Oral daily  guaiFENesin Oral Liquid (Sugar-Free) 200 milliGRAM(s) Oral every 6 hours PRN  mupirocin 2% Ointment 1 Application(s) Topical two times a day  pantoprazole   Suspension 40 milliGRAM(s) Oral daily  silver sulfADIAZINE 1% Cream 1 Application(s) Topical daily  sodium chloride 0.9%. 1000 milliLiter(s) IV Continuous <Continuous>  tamsulosin 0.4 milliGRAM(s) Oral at bedtime                            8.4    5.52  )-----------( 346      ( 07 Jan 2023 06:35 )             27.4       01-07    137  |  101  |  13  ----------------------------<  116<H>  3.9   |  28  |  0.67    Ca    8.0<L>      07 Jan 2023 06:35

## 2023-01-08 NOTE — PROGRESS NOTE ADULT - SUBJECTIVE AND OBJECTIVE BOX
[   ] ICU                                          [   ] CCU                                      [  X ] Medical Floor        Patient is a 77 year old male with dysphagia and Peg tube malfunction. GI consulted to evaluate.         77 year old male from home with past medical history significant for HTN, HLD, BPH, oropharyngeal mass s/p PEG tube/placement, on radiation and chemo (last dose of chemo was last week) presenting to the ED with weakness. Patient reports he was recently admitted for COVID and fever, and upon discharge home he had significant weakness in his legs and was unable to walk. Now patient with Peg tube malfunction. No abdominal pain, nausea, vomiting, hematemesis, hematochezia, melena, fever. chills, chest pain, SOB, cough, hematuria, dysuria or diarrhea reported.          Patient is comfortable. No new complaints reported, No abdominal pain, N/V, hematemesis, hematochezia, melena, fever, chills, chest pain, SOB, cough or diarrhea reported.      PAIN MANAGEMENT:  Pain Scale:                 /10  Pain Location:         PAST MEDICAL HISTORY  Hypertension    Hypercholesterolemia    Stage 3 chronic kidney disease    BPH    Oropharyngeal cancer        PAST SURGICAL HISTORY  No significant past surgical history        Allergies    No Known Allergies    Intolerances  None           MEDICATIONS  (STANDING):  amLODIPine   Tablet 10 milliGRAM(s) Oral daily  atorvastatin 20 milliGRAM(s) Oral at bedtime  cefepime   IVPB      cefepime   IVPB 1000 milliGRAM(s) IV Intermittent every 12 hours  enoxaparin Injectable 40 milliGRAM(s) SubCutaneous every 24 hours  finasteride 5 milliGRAM(s) Oral daily  folic acid 1 milliGRAM(s) Oral daily  mupirocin 2% Ointment 1 Application(s) Topical two times a day  pantoprazole   Suspension 40 milliGRAM(s) Oral daily  silver sulfADIAZINE 1% Cream 1 Application(s) Topical daily  sodium chloride 0.9%. 1000 milliLiter(s) (100 mL/Hr) IV Continuous <Continuous>  tamsulosin 0.4 milliGRAM(s) Oral at bedtime    MEDICATIONS  (PRN):  acetaminophen     Tablet .. 650 milliGRAM(s) Oral every 6 hours PRN Temp greater or equal to 38C (100.4F)  guaiFENesin Oral Liquid (Sugar-Free) 200 milliGRAM(s) Oral every 6 hours PRN Cough      SOCIAL HISTORY  Advanced Directives:       [ X ] Full Code       [  ] DNR  Marital Status:         [  ] M      [ X ] S      [  ] D       [  ] W  Children:       [ X ] Yes      [  ] No  Occupation:        [  ] Employed       [ X ] Unemployed       [  ] Retired  Diet:       [  ] Regular       [ X ] PEG feeding          [  ] NG tube feeding  Drug Use:           [ X ] Patient denied          [  ] Yes  Alcohol:           [ X ] No             [  ] Yes (socially)         [  ] Yes (chronic)  Tobacco:           [  ] Yes           [ X ] No      FAMILY HISTORY  [ X ] Heart Disease            [ X ] Diabetes             [ X ] HTN             [  ] Colon Cancer             [  ] Stomach Cancer              [  ] Pancreatic Cancer      VITALS  Vital Signs Last 24 Hrs  T(C): 36.6 (08 Jan 2023 05:43), Max: 37.2 (07 Jan 2023 20:50)  T(F): 97.8 (08 Jan 2023 05:43), Max: 98.9 (07 Jan 2023 20:50)  HR: 87 (08 Jan 2023 05:43) (87 - 98)  BP: 115/70 (08 Jan 2023 05:43) (114/79 - 120/74)  BP(mean): 81 (08 Jan 2023 05:43) (81 - 85)  RR: 17 (08 Jan 2023 05:43) (17 - 17)  SpO2: 100% (08 Jan 2023 05:43) (100% - 100%)    Parameters below as of 08 Jan 2023 05:43  Patient On (Oxygen Delivery Method): room air         MEDICATIONS  (STANDING):  amLODIPine   Tablet 10 milliGRAM(s) Oral daily  atorvastatin 20 milliGRAM(s) Oral at bedtime  finasteride 5 milliGRAM(s) Oral daily  folic acid 1 milliGRAM(s) Oral daily  mupirocin 2% Ointment 1 Application(s) Topical two times a day  pantoprazole   Suspension 40 milliGRAM(s) Oral daily  silver sulfADIAZINE 1% Cream 1 Application(s) Topical daily  sodium chloride 0.9%. 1000 milliLiter(s) (100 mL/Hr) IV Continuous <Continuous>  tamsulosin 0.4 milliGRAM(s) Oral at bedtime    MEDICATIONS  (PRN):  acetaminophen     Tablet .. 650 milliGRAM(s) Oral every 6 hours PRN Temp greater or equal to 38C (100.4F)  guaiFENesin Oral Liquid (Sugar-Free) 200 milliGRAM(s) Oral every 6 hours PRN Cough                            8.4    5.52  )-----------( 346      ( 07 Jan 2023 06:35 )             27.4       01-07    137  |  101  |  13  ----------------------------<  116<H>  3.9   |  28  |  0.67    Ca    8.0<L>      07 Jan 2023 06:35

## 2023-01-08 NOTE — PROGRESS NOTE ADULT - ASSESSMENT
77 year old male from home with PMH of HTN, HLD, BPH, oropharyngeal mass s/p PEG tube/resection, on radiation and chemo (last dose of chemo was last week)  admitted for weakness. He was seen by PT who recommended LINDY. Hospital course complicated by malfunctioning PEG. Dr Waddell consulted for replacement but pt refused - claiming it was functioning well. Unfortunately it still apperas to be leaking.    #PEG tube malfunction  #SIRS  #Chronic Normocytic Anemia   #Oropharyngeal cancer s/p chemo/radiation   #Radiation dermatitis   #Oral mucositis     Plan:  -Patient w/ malfunctioning PEG tube, (unable to maintain TF connection and it comes off/leaks) - pt refused replacement but it appears to be working now without leaking  -No obvious infectious etiology, can discontinue cefepime  -C/w magic mouth wash  -C/w local wound care   -Out-patient f/u w/ his oncologist Dr. Pittman   -Pending LINDY placement.   77 year old male from home with PMH of HTN, HLD, BPH, oropharyngeal mass s/p PEG tube/resection, on radiation and chemo (last dose of chemo was last week)  admitted for weakness. He was seen by PT who recommended LINDY. Hospital course complicated by leaky PEG tubing. Dr Waddell consulted for replacement but pt refused - claiming it was functioning well. It appears to be working now.    #PEG tube malfunction  #SIRS  #Chronic Normocytic Anemia   #Oropharyngeal cancer s/p chemo/radiation   #Radiation dermatitis   #Oral mucositis     Plan:  -Patient w/ malfunctioning PEG tube, (unable to maintain TF connection and it comes off/leaks) - pt refused replacement but it appears to be working now without leaking  -No obvious infectious etiology, can discontinue cefepime  -C/w magic mouth wash  -C/w local wound care   -Out-patient f/u w/ his oncologist Dr. Pittman   -Pending LINDY placement.

## 2023-01-09 LAB — SARS-COV-2 RNA SPEC QL NAA+PROBE: SIGNIFICANT CHANGE UP

## 2023-01-09 PROCEDURE — 99232 SBSQ HOSP IP/OBS MODERATE 35: CPT

## 2023-01-09 RX ADMIN — AMLODIPINE BESYLATE 10 MILLIGRAM(S): 2.5 TABLET ORAL at 06:11

## 2023-01-09 RX ADMIN — Medication 200 MILLIGRAM(S): at 12:36

## 2023-01-09 RX ADMIN — Medication 1 APPLICATION(S): at 12:34

## 2023-01-09 RX ADMIN — ATORVASTATIN CALCIUM 20 MILLIGRAM(S): 80 TABLET, FILM COATED ORAL at 21:37

## 2023-01-09 RX ADMIN — PANTOPRAZOLE SODIUM 40 MILLIGRAM(S): 20 TABLET, DELAYED RELEASE ORAL at 12:34

## 2023-01-09 RX ADMIN — TAMSULOSIN HYDROCHLORIDE 0.4 MILLIGRAM(S): 0.4 CAPSULE ORAL at 21:37

## 2023-01-09 RX ADMIN — FINASTERIDE 5 MILLIGRAM(S): 5 TABLET, FILM COATED ORAL at 12:34

## 2023-01-09 RX ADMIN — Medication 1 MILLIGRAM(S): at 12:34

## 2023-01-09 NOTE — PROGRESS NOTE ADULT - SUBJECTIVE AND OBJECTIVE BOX
NP Note discussed with  primary attending    Patient is a 77y old  Male who presents with a chief complaint of Weakness (09 Jan 2023 13:37)      INTERVAL HPI/OVERNIGHT EVENTS: no acute medical events overnight     MEDICATIONS  (STANDING):  amLODIPine   Tablet 10 milliGRAM(s) Oral daily  atorvastatin 20 milliGRAM(s) Oral at bedtime  finasteride 5 milliGRAM(s) Oral daily  folic acid 1 milliGRAM(s) Oral daily  mupirocin 2% Ointment 1 Application(s) Topical two times a day  pantoprazole   Suspension 40 milliGRAM(s) Oral daily  silver sulfADIAZINE 1% Cream 1 Application(s) Topical daily  sodium chloride 0.9%. 1000 milliLiter(s) (100 mL/Hr) IV Continuous <Continuous>  tamsulosin 0.4 milliGRAM(s) Oral at bedtime    MEDICATIONS  (PRN):  acetaminophen     Tablet .. 650 milliGRAM(s) Oral every 6 hours PRN Temp greater or equal to 38C (100.4F)  guaiFENesin Oral Liquid (Sugar-Free) 200 milliGRAM(s) Oral every 6 hours PRN Cough      __________________________________________________  REVIEW OF SYSTEMS:  limited due to patient's participation      Vital Signs Last 24 Hrs  T(C): 36.8 (09 Jan 2023 14:01), Max: 37.1 (08 Jan 2023 20:25)  T(F): 98.3 (09 Jan 2023 14:01), Max: 98.8 (09 Jan 2023 05:46)  HR: 88 (09 Jan 2023 14:02) (80 - 88)  BP: 103/62 (09 Jan 2023 14:02) (103/62 - 113/60)  BP(mean): --  RR: 16 (09 Jan 2023 14:01) (16 - 18)  SpO2: 99% (09 Jan 2023 14:02) (99% - 100%)    Parameters below as of 09 Jan 2023 14:02  Patient On (Oxygen Delivery Method): room air    ________________________________________________  PHYSICAL EXAM:  GENERAL: withdrawn  HEENT: Normocephalic;  conjunctivae and sclerae clear;   NECK" clean dry dressing to the anterior neck  CHEST/LUNG: Clear to auscultation bilaterally with good air entry   HEART: S1 S2  regular; no murmurs, gallops or rubs  ABDOMEN: + PEG site clean dry and dressing intact Soft, Nontender, Nondistended; Bowel sounds present  EXTREMITIES: no cyanosis; no edema; no calf tenderness  SKIN: warm and dry; no rash  NERVOUS SYSTEM:  Awake and alert; Oriented  to place, person and time     _________________________________________________  LABS:              CAPILLARY BLOOD GLUCOSE            RADIOLOGY & ADDITIONAL TESTS: < from: Xray Chest 1 View-PORTABLE IMMEDIATE (Xray Chest 1 View-PORTABLE IMMEDIATE .) (01.03.23 @ 20:17) >  IMPRESSION: Clear lungs with no significant cardiopulmonary abnormalities.    --- End of Report ---      < end of copied text >      Imaging Personally Reviewed:  YES    Consultant(s) Notes Reviewed:   YES    Plan of care was discussed with patient and /or primary care giver; all questions and concerns were addressed and care was aligned with patient's wishes.

## 2023-01-09 NOTE — PROGRESS NOTE ADULT - ASSESSMENT
77 year old male from home with PMH of HTN, HLD, BPH, oropharyngeal mass s/p PEG tube/resection, on radiation and chemo (last dose of chemo was last week)  admitted for weakness. He was seen by PT who recommended LINDY. Hospital course complicated by leaky PEG tubing. Dr Waddlel consulted for replacement but pt refused - claiming it was functioning well. It appears to be working now.    #PEG tube malfunction  #SIRS  #Chronic Normocytic Anemia   #Oropharyngeal cancer s/p chemo/radiation   #Radiation dermatitis   #Oral mucositis     Plan:  -Patient w/ malfunctioning PEG tube, (unable to maintain TF connection and it comes off/leaks) - pt refused PEG replacement but it appears to be working now without leaking  -No obvious infectious etiology, can discontinue cefepime  -C/w magic mouth wash  -C/w local wound care   -Out-patient f/u w/ his oncologist Dr. Pittman   -Pending LINDY placement.

## 2023-01-09 NOTE — PROGRESS NOTE ADULT - ASSESSMENT
77 year old male from home with PMH of HTN, HLD, BPH, oropharyngeal mass s/p PEG tube/resection, on radiation and chemo (last dose of chemo was last week)  admitted for weakness, hospital course complicated with fevers and tachycardia and infectious work up was negative. Also noted with Malfunctioning PEG tube and GI Dr. Waddell consulted, later PEG tube was working and not changed.   Discharge now complicated by updated PT which patient has no need for LINDY and reccomended for HPT. NCM and family concern about safe discharge. SW consulted for D/C planning

## 2023-01-09 NOTE — PROGRESS NOTE ADULT - PROBLEM SELECTOR PLAN 8
patient from home   unable to care for self at home according to family   PT reccs [facility]   social to follow for safe D/C planning   attempt to contact miguel Marcum unsuccessful LVM 1/9/23  COVID + since 12/22

## 2023-01-09 NOTE — PROGRESS NOTE ADULT - PROBLEM SELECTOR PLAN 6
-on radiation and chemo (last dose of chemo was last week), follows up with Dr. Segundo Velasquez.  wound care reccs   -Heme/Onc follow up outpt

## 2023-01-09 NOTE — PROGRESS NOTE ADULT - SUBJECTIVE AND OBJECTIVE BOX
[   ] ICU                                          [   ] CCU                                      [  X ] Medical Floor      Patient is a 77 year old male with dysphagia and Peg tube malfunction. GI consulted to evaluate.         77 year old male from home with past medical history significant for HTN, HLD, BPH, oropharyngeal mass s/p PEG tube/placement, on radiation and chemo (last dose of chemo was last week) presenting to the ED with weakness. Patient reports he was recently admitted for COVID and fever, and upon discharge home he had significant weakness in his legs and was unable to walk. Now patient with Peg tube malfunction. No abdominal pain, nausea, vomiting, hematemesis, hematochezia, melena, fever. chills, chest pain, SOB, cough, hematuria, dysuria or diarrhea reported.          Patient is comfortable. No new complaints reported, No abdominal pain, N/V, hematemesis, hematochezia, melena, fever, chills, chest pain, SOB, cough or diarrhea reported.      PAIN MANAGEMENT:  Pain Scale:                 /10  Pain Location:         PAST MEDICAL HISTORY  Hypertension    Hypercholesterolemia    Stage 3 chronic kidney disease    BPH    Oropharyngeal cancer        PAST SURGICAL HISTORY  No significant past surgical history        Allergies    No Known Allergies    Intolerances  None           MEDICATIONS  (STANDING):  amLODIPine   Tablet 10 milliGRAM(s) Oral daily  atorvastatin 20 milliGRAM(s) Oral at bedtime  cefepime   IVPB      cefepime   IVPB 1000 milliGRAM(s) IV Intermittent every 12 hours  enoxaparin Injectable 40 milliGRAM(s) SubCutaneous every 24 hours  finasteride 5 milliGRAM(s) Oral daily  folic acid 1 milliGRAM(s) Oral daily  mupirocin 2% Ointment 1 Application(s) Topical two times a day  pantoprazole   Suspension 40 milliGRAM(s) Oral daily  silver sulfADIAZINE 1% Cream 1 Application(s) Topical daily  sodium chloride 0.9%. 1000 milliLiter(s) (100 mL/Hr) IV Continuous <Continuous>  tamsulosin 0.4 milliGRAM(s) Oral at bedtime    MEDICATIONS  (PRN):  acetaminophen     Tablet .. 650 milliGRAM(s) Oral every 6 hours PRN Temp greater or equal to 38C (100.4F)  guaiFENesin Oral Liquid (Sugar-Free) 200 milliGRAM(s) Oral every 6 hours PRN Cough      SOCIAL HISTORY  Advanced Directives:       [ X ] Full Code       [  ] DNR  Marital Status:         [  ] M      [ X ] S      [  ] D       [  ] W  Children:       [ X ] Yes      [  ] No  Occupation:        [  ] Employed       [ X ] Unemployed       [  ] Retired  Diet:       [  ] Regular       [ X ] PEG feeding          [  ] NG tube feeding  Drug Use:           [ X ] Patient denied          [  ] Yes  Alcohol:           [ X ] No             [  ] Yes (socially)         [  ] Yes (chronic)  Tobacco:           [  ] Yes           [ X ] No      FAMILY HISTORY  [ X ] Heart Disease            [ X ] Diabetes             [ X ] HTN             [  ] Colon Cancer             [  ] Stomach Cancer              [  ] Pancreatic Cancer      VITALS  Vital Signs Last 24 Hrs  T(C): 37.1 (09 Jan 2023 05:46), Max: 37.1 (08 Jan 2023 20:25)  T(F): 98.8 (09 Jan 2023 05:46), Max: 98.8 (09 Jan 2023 05:46)  HR: 82 (09 Jan 2023 05:46) (80 - 82)  BP: 113/60 (09 Jan 2023 05:46) (110/60 - 113/60)     RR: 16 (09 Jan 2023 05:46) (16 - 18)  SpO2: 100% (09 Jan 2023 05:46) (100% - 100%)    Parameters below as of 09 Jan 2023 05:46  Patient On (Oxygen Delivery Method): room air       MEDICATIONS  (STANDING):  amLODIPine   Tablet 10 milliGRAM(s) Oral daily  atorvastatin 20 milliGRAM(s) Oral at bedtime  finasteride 5 milliGRAM(s) Oral daily  folic acid 1 milliGRAM(s) Oral daily  mupirocin 2% Ointment 1 Application(s) Topical two times a day  pantoprazole   Suspension 40 milliGRAM(s) Oral daily  silver sulfADIAZINE 1% Cream 1 Application(s) Topical daily  sodium chloride 0.9%. 1000 milliLiter(s) (100 mL/Hr) IV Continuous <Continuous>  tamsulosin 0.4 milliGRAM(s) Oral at bedtime    MEDICATIONS  (PRN):  acetaminophen     Tablet .. 650 milliGRAM(s) Oral every 6 hours PRN Temp greater or equal to 38C (100.4F)  guaiFENesin Oral Liquid (Sugar-Free) 200 milliGRAM(s) Oral every 6 hours PRN Cough

## 2023-01-10 LAB
ANION GAP SERPL CALC-SCNC: 8 MMOL/L — SIGNIFICANT CHANGE UP (ref 5–17)
BUN SERPL-MCNC: 18 MG/DL — SIGNIFICANT CHANGE UP (ref 7–18)
CALCIUM SERPL-MCNC: 8.7 MG/DL — SIGNIFICANT CHANGE UP (ref 8.4–10.5)
CHLORIDE SERPL-SCNC: 99 MMOL/L — SIGNIFICANT CHANGE UP (ref 96–108)
CO2 SERPL-SCNC: 30 MMOL/L — SIGNIFICANT CHANGE UP (ref 22–31)
CREAT SERPL-MCNC: 0.75 MG/DL — SIGNIFICANT CHANGE UP (ref 0.5–1.3)
EGFR: 94 ML/MIN/1.73M2 — SIGNIFICANT CHANGE UP
GLUCOSE SERPL-MCNC: 126 MG/DL — HIGH (ref 70–99)
HCT VFR BLD CALC: 29.4 % — LOW (ref 39–50)
HGB BLD-MCNC: 9 G/DL — LOW (ref 13–17)
MCHC RBC-ENTMCNC: 28 PG — SIGNIFICANT CHANGE UP (ref 27–34)
MCHC RBC-ENTMCNC: 30.6 GM/DL — LOW (ref 32–36)
MCV RBC AUTO: 91.6 FL — SIGNIFICANT CHANGE UP (ref 80–100)
NRBC # BLD: 0 /100 WBCS — SIGNIFICANT CHANGE UP (ref 0–0)
PLATELET # BLD AUTO: 350 K/UL — SIGNIFICANT CHANGE UP (ref 150–400)
POTASSIUM SERPL-MCNC: 4.2 MMOL/L — SIGNIFICANT CHANGE UP (ref 3.5–5.3)
POTASSIUM SERPL-SCNC: 4.2 MMOL/L — SIGNIFICANT CHANGE UP (ref 3.5–5.3)
RBC # BLD: 3.21 M/UL — LOW (ref 4.2–5.8)
RBC # FLD: 16.3 % — HIGH (ref 10.3–14.5)
SODIUM SERPL-SCNC: 137 MMOL/L — SIGNIFICANT CHANGE UP (ref 135–145)
WBC # BLD: 5.81 K/UL — SIGNIFICANT CHANGE UP (ref 3.8–10.5)
WBC # FLD AUTO: 5.81 K/UL — SIGNIFICANT CHANGE UP (ref 3.8–10.5)

## 2023-01-10 PROCEDURE — 99232 SBSQ HOSP IP/OBS MODERATE 35: CPT

## 2023-01-10 RX ORDER — RIVAROXABAN 15 MG-20MG
10 KIT ORAL DAILY
Refills: 0 | Status: DISCONTINUED | OUTPATIENT
Start: 2023-01-10 | End: 2023-01-27

## 2023-01-10 RX ORDER — ACETAMINOPHEN 500 MG
650 TABLET ORAL EVERY 6 HOURS
Refills: 0 | Status: DISCONTINUED | OUTPATIENT
Start: 2023-01-10 | End: 2023-01-10

## 2023-01-10 RX ORDER — ACETAMINOPHEN 500 MG
500 TABLET ORAL EVERY 6 HOURS
Refills: 0 | Status: DISCONTINUED | OUTPATIENT
Start: 2023-01-10 | End: 2023-01-27

## 2023-01-10 RX ADMIN — Medication 650 MILLIGRAM(S): at 13:00

## 2023-01-10 RX ADMIN — RIVAROXABAN 10 MILLIGRAM(S): KIT at 22:05

## 2023-01-10 RX ADMIN — ATORVASTATIN CALCIUM 20 MILLIGRAM(S): 80 TABLET, FILM COATED ORAL at 22:02

## 2023-01-10 RX ADMIN — Medication 1 MILLIGRAM(S): at 12:18

## 2023-01-10 RX ADMIN — MUPIROCIN 1 APPLICATION(S): 20 OINTMENT TOPICAL at 05:13

## 2023-01-10 RX ADMIN — Medication 200 MILLIGRAM(S): at 12:18

## 2023-01-10 RX ADMIN — TAMSULOSIN HYDROCHLORIDE 0.4 MILLIGRAM(S): 0.4 CAPSULE ORAL at 22:05

## 2023-01-10 RX ADMIN — Medication 650 MILLIGRAM(S): at 12:18

## 2023-01-10 RX ADMIN — FINASTERIDE 5 MILLIGRAM(S): 5 TABLET, FILM COATED ORAL at 12:18

## 2023-01-10 RX ADMIN — PANTOPRAZOLE SODIUM 40 MILLIGRAM(S): 20 TABLET, DELAYED RELEASE ORAL at 12:17

## 2023-01-10 RX ADMIN — Medication 1 APPLICATION(S): at 12:19

## 2023-01-10 NOTE — PROGRESS NOTE ADULT - NEGATIVE NEUROLOGICAL SYMPTOMS
no syncope/no tremors/no vertigo/no loss of sensation

## 2023-01-10 NOTE — PROGRESS NOTE ADULT - NEGATIVE GENERAL GENITOURINARY SYMPTOMS
no hematuria/no renal colic/no flank pain L/no flank pain R

## 2023-01-10 NOTE — PROGRESS NOTE ADULT - SKIN
warm and dry/no rashes/no ulcers
warm and dry/no rashes
warm and dry/no rashes/no ulcers
warm and dry/no rashes/no ulcers

## 2023-01-10 NOTE — PROGRESS NOTE ADULT - RESPIRATORY
clear to auscultation bilaterally/no wheezes/no rales/no rhonchi/no respiratory distress/no use of accessory muscles/no subcutaneous emphysema

## 2023-01-10 NOTE — PROGRESS NOTE ADULT - MOUTH
normal mouth and gums
normal mouth and gums/moist

## 2023-01-10 NOTE — PROGRESS NOTE ADULT - SUBJECTIVE AND OBJECTIVE BOX
NP Note discussed with  primary attending    Patient is a 77y old  Male who presents with a chief complaint of Weakness (09 Jan 2023 13:37)      INTERVAL HPI/OVERNIGHT EVENTS: refused exam      MEDICATIONS  (STANDING):  amLODIPine   Tablet 10 milliGRAM(s) Oral daily  atorvastatin 20 milliGRAM(s) Oral at bedtime  finasteride 5 milliGRAM(s) Oral daily  folic acid 1 milliGRAM(s) Oral daily  mupirocin 2% Ointment 1 Application(s) Topical two times a day  pantoprazole   Suspension 40 milliGRAM(s) Oral daily  silver sulfADIAZINE 1% Cream 1 Application(s) Topical daily  sodium chloride 0.9%. 1000 milliLiter(s) (100 mL/Hr) IV Continuous <Continuous>  tamsulosin 0.4 milliGRAM(s) Oral at bedtime    MEDICATIONS  (PRN):  acetaminophen     Tablet .. 650 milliGRAM(s) Oral every 6 hours PRN Temp greater or equal to 38C (100.4F)  guaiFENesin Oral Liquid (Sugar-Free) 200 milliGRAM(s) Oral every 6 hours PRN Cough      __________________________________________________  REVIEW OF SYSTEMS:  limited due to patient participation       Vital Signs Last 24 Hrs  T(C): 37 (10 [place] 2023 05:10), Max: 37 (10 Nilton 2023 05:10)  T(F): 98.6 (10 Nilton 2023 05:10), Max: 98.6 (10 Nilton 2023 05:10)  HR: 86 (10 Nilton 2023 05:10) (86 - 95)  BP: 109/70 (10 Nilton 2023 05:10) (103/58 - 109/70)  BP(mean): --  RR: 18 (10 Nilton 2023 05:10) (16 - 18)  SpO2: 99% (10 Nilton 2023 05:10) (99% - 100%)    Parameters below as of 10 Nilton 2023 05:10  Patient On (Oxygen Delivery Method): room air        ________________________________________________  PHYSICAL EXAM:  GENERAL: NAD  HEENT: Normocephalic;  conjunctivae and sclerae clear;  NECK : clean dry dressing to anterior neck  NERVOUS SYSTEM: angry and withdrawn     _________________________________________________  LABS:                        9.0    5.81  )-----------( 350      ( 10 Nilton 2023 07:00 )             29.4     01-10    137  |  99  |  18  ----------------------------<  126<H>  4.2   |  30  |  0.75    Ca    8.7      10 Nilton 2023 07:00          CAPILLARY BLOOD GLUCOSE      RADIOLOGY & ADDITIONAL TESTS:< from: Xray Chest 1 View-PORTABLE IMMEDIATE (Xray Chest 1 View-PORTABLE IMMEDIATE .) (01.03.23 @ 20:17) >  IMPRESSION: Clear lungs with no significant cardiopulmonary abnormalities.    --- End of Report ---      < end of copied text >    Imaging Personally Reviewed:  YES    Plan of care was discussed with patient and /or primary care giver; all questions and concerns were addressed and care was aligned with patient's wishes.

## 2023-01-10 NOTE — PROGRESS NOTE ADULT - NS ATTEND AMEND GEN_ALL_CORE FT
#Generalized weakness 2/2 severe protein-calorie malnutrition  #Oropharyngeal SCC s/p resection/PEG tube, on chemo/radiation  #r/o infection  #HTN  #HLD  #Intracranial aneurysm  #BPH     77y M with PMH of HTN, HLD, BPH, intracranial aneurysm, oropharyngeal SCC s/p resection/PEG tube placement, on radiation/chemo, admitted with generalized weakness. Hospital course complicated by fevers and tachycardia, however infectious work up negative. Hospital course further complicated by malfunctioning PEG tube. GI consulted, but after adjustment tube started functioning and did not need to be changed. PT recommendations updated, patient previously recommended for [facility] and now home therapy. Social work following as patient's family has concerns about a safe discharge.     -PEG currently functioning  -Patient reports that PEG occasionally "trickles", refused replacement on this admission  -Infectious workup negative, abx discontinued   -Continue home meds for chronic conditions  -GI following  -Per SW, patient prefers to go home rather than LTC; lives at home with girlfriend who assists with his care  -DVT ppx: [place]

## 2023-01-10 NOTE — PROGRESS NOTE ADULT - SUBJECTIVE AND OBJECTIVE BOX
[   ] ICU                                          [   ] CCU                                      [ X  ] Medical Floor      Patient is a 77 year old male with dysphagia and Peg tube malfunction. GI consulted to evaluate.         77 year old male from home with past medical history significant for HTN, HLD, BPH, oropharyngeal mass s/p PEG tube/placement, on radiation and chemo (last dose of chemo was last week) presenting to the ED with weakness. Patient reports he was recently admitted for COVID and fever, and upon discharge home he had significant weakness in his legs and was unable to walk. Now patient with Peg tube malfunction. No abdominal pain, nausea, vomiting, hematemesis, hematochezia, melena, fever. chills, chest pain, SOB, cough, hematuria, dysuria or diarrhea reported.          Patient is comfortable. No new complaints reported, No abdominal pain, N/V, hematemesis, hematochezia, melena, fever, chills, chest pain, SOB, cough or diarrhea reported.      PAIN MANAGEMENT:  Pain Scale:                 /10  Pain Location:         PAST MEDICAL HISTORY  Hypertension    Hypercholesterolemia    Stage 3 chronic kidney disease    BPH    Oropharyngeal cancer        PAST SURGICAL HISTORY  No significant past surgical history        Allergies    No Known Allergies    Intolerances  None           MEDICATIONS  (STANDING):  amLODIPine   Tablet 10 milliGRAM(s) Oral daily  atorvastatin 20 milliGRAM(s) Oral at bedtime  cefepime   IVPB      cefepime   IVPB 1000 milliGRAM(s) IV Intermittent every 12 hours  enoxaparin Injectable 40 milliGRAM(s) SubCutaneous every 24 hours  finasteride 5 milliGRAM(s) Oral daily  folic acid 1 milliGRAM(s) Oral daily  mupirocin 2% Ointment 1 Application(s) Topical two times a day  pantoprazole   Suspension 40 milliGRAM(s) Oral daily  silver sulfADIAZINE 1% Cream 1 Application(s) Topical daily  sodium chloride 0.9%. 1000 milliLiter(s) (100 mL/Hr) IV Continuous <Continuous>  tamsulosin 0.4 milliGRAM(s) Oral at bedtime    MEDICATIONS  (PRN):  acetaminophen     Tablet .. 650 milliGRAM(s) Oral every 6 hours PRN Temp greater or equal to 38C (100.4F)  guaiFENesin Oral Liquid (Sugar-Free) 200 milliGRAM(s) Oral every 6 hours PRN Cough      SOCIAL HISTORY  Advanced Directives:       [ X ] Full Code       [  ] DNR  Marital Status:         [  ] M      [ X ] S      [  ] D       [  ] W  Children:       [ X ] Yes      [  ] No  Occupation:        [  ] Employed       [ X ] Unemployed       [  ] Retired  Diet:       [  ] Regular       [ X ] PEG feeding          [  ] NG tube feeding  Drug Use:           [ X ] Patient denied          [  ] Yes  Alcohol:           [ X ] No             [  ] Yes (socially)         [  ] Yes (chronic)  Tobacco:           [  ] Yes           [ X ] No      FAMILY HISTORY  [ X ] Heart Disease            [ X ] Diabetes             [ X ] HTN             [  ] Colon Cancer             [  ] Stomach Cancer              [  ] Pancreatic Cancer        VITALS  Vital Signs Last 24 Hrs  T(C): 37 (10 [place] 2023 05:10), Max: 37 (10 Nilton 2023 05:10)  T(F): 98.6 (10 Nilton 2023 05:10), Max: 98.6 (10 Nilton 2023 05:10)  HR: 96 (10 Nilton 2023 15:51) (86 - 96)  BP: 91/73 (10 Nilton 2023 15:51) (91/73 - 109/70)  BP(mean): --  RR: 18 (10 Nilton 2023 15:32) (16 - 18)  SpO2: 100% (10 Nilton 2023 15:51) (99% - 100%)    Parameters below as of 10 Nilton 2023 15:51  Patient On (Oxygen Delivery Method): room air       MEDICATIONS  (STANDING):  amLODIPine   Tablet 10 milliGRAM(s) Oral daily  atorvastatin 20 milliGRAM(s) Oral at bedtime  finasteride 5 milliGRAM(s) Oral daily  folic acid 1 milliGRAM(s) Oral daily  mupirocin 2% Ointment 1 Application(s) Topical two times a day  pantoprazole   Suspension 40 milliGRAM(s) Oral daily  silver sulfADIAZINE 1% Cream 1 Application(s) Topical daily  sodium chloride 0.9%. 1000 milliLiter(s) (100 mL/Hr) IV Continuous <Continuous>  tamsulosin 0.4 milliGRAM(s) Oral at bedtime    MEDICATIONS  (PRN):  acetaminophen    Suspension .. 500 milliGRAM(s) Oral every 6 hours PRN Temp greater or equal to 38C (100.4F), Mild Pain (1 - 3), Moderate Pain (4 - 6)  guaiFENesin Oral Liquid (Sugar-Free) 200 milliGRAM(s) Oral every 6 hours PRN Cough                            9.0    5.81  )-----------( 350      ( 10 Nilton 2023 07:00 )             29.4       01-10    137  |  99  |  18  ----------------------------<  126<H>  4.2   |  30  |  0.75    Ca    8.7      10 [place]ilton 2023 07:00

## 2023-01-10 NOTE — PROGRESS NOTE ADULT - NECK
supple/symmetric/no tracheal deviation
supple/symmetric/no tracheal deviation
supple/symmetric
supple/symmetric
supple/symmetric/no tracheal deviation

## 2023-01-10 NOTE — PROGRESS NOTE ADULT - NEGATIVE GASTROINTESTINAL SYMPTOMS
no nausea/no vomiting/no diarrhea/no melena/no hematochezia/no steatorrhea/no jaundice/no hiccoughs

## 2023-01-10 NOTE — PROGRESS NOTE ADULT - NEGATIVE GENERAL SYMPTOMS
no chills/no sweating/no polyphagia/no polyuria/no polydipsia

## 2023-01-10 NOTE — PROGRESS NOTE ADULT - NEGATIVE RESPIRATORY AND THORAX SYMPTOMS
no wheezing/no dyspnea/no cough/no hemoptysis

## 2023-01-10 NOTE — PROGRESS NOTE ADULT - NEGATIVE SKIN SYMPTOMS
no itching/no dryness/no brittle nails/no pitted nails/no hair loss

## 2023-01-10 NOTE — PROGRESS NOTE ADULT - NEGATIVE MUSCULOSKELETAL SYMPTOMS
no muscle cramps/no stiffness/no neck pain/no arm pain L/no arm pain R/no back pain/no leg pain L/no leg pain R

## 2023-01-10 NOTE — PROGRESS NOTE ADULT - EYES
PERRL/EOMI
PERRL/EOMI/conjunctiva clear
PERRL/EOMI/conjunctiva clear/non icteric sclera

## 2023-01-10 NOTE — PROGRESS NOTE ADULT - CONSTITUTIONAL
no distress/cachectic

## 2023-01-10 NOTE — PROGRESS NOTE ADULT - PROBLEM SELECTOR PLAN 8
patient from home   unable to care for self at home according to family   PT reccs [facility]   social to follow for safe D/C planning   attempt to contact [facility] Trixie unsuccessful LVM   COVID + since 12/22  Lab holiday while D/C planning

## 2023-01-11 DIAGNOSIS — Z29.9 ENCOUNTER FOR PROPHYLACTIC MEASURES, UNSPECIFIED: ICD-10-CM

## 2023-01-11 PROCEDURE — 99232 SBSQ HOSP IP/OBS MODERATE 35: CPT

## 2023-01-11 RX ADMIN — MUPIROCIN 1 APPLICATION(S): 20 OINTMENT TOPICAL at 17:39

## 2023-01-11 RX ADMIN — RIVAROXABAN 10 MILLIGRAM(S): KIT at 15:44

## 2023-01-11 RX ADMIN — MUPIROCIN 1 APPLICATION(S): 20 OINTMENT TOPICAL at 06:08

## 2023-01-11 RX ADMIN — Medication 1 MILLIGRAM(S): at 12:55

## 2023-01-11 RX ADMIN — PANTOPRAZOLE SODIUM 40 MILLIGRAM(S): 20 TABLET, DELAYED RELEASE ORAL at 12:55

## 2023-01-11 RX ADMIN — FINASTERIDE 5 MILLIGRAM(S): 5 TABLET, FILM COATED ORAL at 12:56

## 2023-01-11 RX ADMIN — ATORVASTATIN CALCIUM 20 MILLIGRAM(S): 80 TABLET, FILM COATED ORAL at 22:07

## 2023-01-11 RX ADMIN — TAMSULOSIN HYDROCHLORIDE 0.4 MILLIGRAM(S): 0.4 CAPSULE ORAL at 22:07

## 2023-01-11 RX ADMIN — Medication 1 APPLICATION(S): at 12:57

## 2023-01-11 RX ADMIN — AMLODIPINE BESYLATE 10 MILLIGRAM(S): 2.5 TABLET ORAL at 06:08

## 2023-01-11 NOTE — PROGRESS NOTE ADULT - PROBLEM SELECTOR PLAN 6
- H/o Oral mass on radiation and chemo (last dose of chemo was last week), follows up with Dr. Segundo Velasquez.  - C/w Wound care    - Pt to f/u w/ Heme/Onc outpt

## 2023-01-11 NOTE — PROGRESS NOTE ADULT - PROBLEM SELECTOR PLAN 1
- Resolved, functioning without intervention.  Pt refused replacement.    - C/w PEG tube feeds   - GI-Dr. Waddell consulted, appreciated

## 2023-01-11 NOTE — PROGRESS NOTE ADULT - ASSESSMENT
77 year old male from home with PMH of HTN, HLD, BPH, oropharyngeal mass s/p PEG tube/resection, on radiation and chemo (last dose of chemo was last week).  Admitted for Weakness.      Hospital course complicated with fevers and tachycardia and infectious work up was negative.     Hospital course further complicated by malfunctioning PEG tube.  GI Dr. Waddell consulted.   PEG tube functioning without intervention.    Discharge complicated by updated PT which patient has no need for [facility] and recommended for Home PT.  Family concern about safe discharge. [facility]  SW & CM following.  D/C planning

## 2023-01-11 NOTE — PROGRESS NOTE ADULT - SUBJECTIVE AND OBJECTIVE BOX
NP Note discussed with  primary attending    Patient is a 77y old  Male who presents with a chief complaint of Weakness (11 Jan 2023 13:58)      INTERVAL HPI/OVERNIGHT EVENTS: Pt preferred to continue his phone call.  NAD.      MEDICATIONS  (STANDING):  amLODIPine   Tablet 10 milliGRAM(s) Oral daily  atorvastatin 20 milliGRAM(s) Oral at bedtime  finasteride 5 milliGRAM(s) Oral daily  folic acid 1 milliGRAM(s) Oral daily  mupirocin 2% Ointment 1 Application(s) Topical two times a day  pantoprazole   Suspension 40 milliGRAM(s) Oral daily  rivaroxaban 10 milliGRAM(s) Enteral Tube daily  silver sulfADIAZINE 1% Cream 1 Application(s) Topical daily  sodium chloride 0.9%. 1000 milliLiter(s) (100 mL/Hr) IV Continuous <Continuous>  tamsulosin 0.4 milliGRAM(s) Oral at bedtime    MEDICATIONS  (PRN):  acetaminophen    Suspension .. 500 milliGRAM(s) Oral every 6 hours PRN Temp greater or equal to 38C (100.4F), Mild Pain (1 - 3), Moderate Pain (4 - 6)  guaiFENesin Oral Liquid (Sugar-Free) 200 milliGRAM(s) Oral every 6 hours PRN Cough      __________________________________________________  REVIEW OF SYSTEMS: Limited exam d/t pt's participation     CONSTITUTIONAL: No fever  EYES: No acute visual disturbances  NECK: No pain or stiffness  RESPIRATORY: No cough; No shortness of breath  CARDIOVASCULAR: No chest pain, no palpitations  GASTROINTESTINAL: No pain. No nausea or vomiting.  No diarrhea   NEUROLOGICAL: No headache or numbness, no tremors  MUSCULOSKELETAL: No joint pain, no muscle pain  GENITOURINARY: No dysuria, no frequency, no hesitancy  PSYCHIATRY: No depression , no anxiety  ALL OTHER  ROS negative        Vital Signs Last 24 Hrs  T(C): 36.3 (11 Jan 2023 14:13), Max: 37 (11 Jan 2023 05:31)  T(F): 97.4 (11 Jan 2023 14:13), Max: 98.6 (11 Jan 2023 05:31)  HR: 88 (11 Jan 2023 14:13) (88 - 96)  BP: 109/73 (11 Jan 2023 14:13) (91/73 - 117/79)  RR: 16 (11 Jan 2023 14:13) (16 - 18)  SpO2: 99% (11 Jan 2023 14:13) (97% - 100%)    Parameters below as of 11 Jan 2023 14:13  Patient On (Oxygen Delivery Method): room air        ________________________________________________  PHYSICAL EXAM:  Limited exam d/t pt's participation   GENERAL: NAD  HEENT: Normocephalic;  conjunctivae and sclerae clear; moist mucous membranes   NECK : Supple  CHEST/LUNG: Clear to auscultation bilaterally with good air entry   HEART: S1 S2  regular; no murmurs, gallops or rubs  ABDOMEN: Soft, Nontender, Nondistended; Bowel sounds present x 4 quad  EXTREMITIES: No cyanosis; no edema; no calf tenderness  SKIN: Warm and dry; no rash  NERVOUS SYSTEM:  Awake and alert; Oriented to place, person and time; no new deficits    _________________________________________________  LABS:                        9.0    5.81  )-----------( 350      ( 10 [place] 2023 07:00 )             29.4     01-10    137  |  99  |  18  ----------------------------<  126<H>  4.2   |  30  |  0.75    Ca    8.7      10 [place] 2023 07:00          CAPILLARY BLOOD GLUCOSE            RADIOLOGY & ADDITIONAL TESTS:    Imaging Personally Reviewed:  YES    Consultant(s) Notes Reviewed:   YES    Care Discussed with Consultants :     Plan of care was discussed with patient and /or primary care giver; all questions and concerns were addressed and care was aligned with patient's wishes.     NP Note discussed with  primary attending    Patient is a 77y old  Male who presents with a chief complaint of Weakness (11 Jan 2023 13:58)      INTERVAL HPI/OVERNIGHT EVENTS: Pt preferred to continue his phone call.  NAD.      MEDICATIONS  (STANDING):  amLODIPine   Tablet 10 milliGRAM(s) Oral daily  atorvastatin 20 milliGRAM(s) Oral at bedtime  finasteride 5 milliGRAM(s) Oral daily  folic acid 1 milliGRAM(s) Oral daily  mupirocin 2% Ointment 1 Application(s) Topical two times a day  pantoprazole   Suspension 40 milliGRAM(s) Oral daily  rivaroxaban 10 milliGRAM(s) Enteral Tube daily  silver sulfADIAZINE 1% Cream 1 Application(s) Topical daily  sodium chloride 0.9%. 1000 milliLiter(s) (100 mL/Hr) IV Continuous <Continuous>  tamsulosin 0.4 milliGRAM(s) Oral at bedtime    MEDICATIONS  (PRN):  acetaminophen    Suspension .. 500 milliGRAM(s) Oral every 6 hours PRN Temp greater or equal to 38C (100.4F), Mild Pain (1 - 3), Moderate Pain (4 - 6)  guaiFENesin Oral Liquid (Sugar-Free) 200 milliGRAM(s) Oral every 6 hours PRN Cough      __________________________________________________  REVIEW OF SYSTEMS: Limited exam d/t pt's participation     CONSTITUTIONAL: No fever  EYES: No acute visual disturbances  NECK: No pain or stiffness  RESPIRATORY: No cough; No shortness of breath  CARDIOVASCULAR: No chest pain, no palpitations  GASTROINTESTINAL: No pain. No nausea or vomiting.  No diarrhea   NEUROLOGICAL: No headache or numbness, no tremors  MUSCULOSKELETAL: No joint pain, no muscle pain  GENITOURINARY: No dysuria, no frequency, no hesitancy  PSYCHIATRY: No depression , no anxiety  ALL OTHER  ROS negative        Vital Signs Last 24 Hrs  T(C): 36.3 (11 Jan 2023 14:13), Max: 37 (11 Jan 2023 05:31)  T(F): 97.4 (11 Jan 2023 14:13), Max: 98.6 (11 Jan 2023 05:31)  HR: 88 (11 Jan 2023 14:13) (88 - 96)  BP: 109/73 (11 Jan 2023 14:13) (91/73 - 117/79)  RR: 16 (11 Jan 2023 14:13) (16 - 18)  SpO2: 99% (11 Jan 2023 14:13) (97% - 100%)    Parameters below as of 11 Jan 2023 14:13  Patient On (Oxygen Delivery Method): room air        ________________________________________________  PHYSICAL EXAM:  Limited exam d/t pt's participation   GENERAL: NAD  HEENT: Normocephalic;  conjunctivae and sclerae clear; moist mucous membranes.  (+) Neck wound, dsg intact  NECK : Supple  CHEST/LUNG: Clear to auscultation bilaterally with good air entry   HEART: S1 S2  regular; no murmurs, gallops or rubs  ABDOMEN: Soft, Nontender, Nondistended; Bowel sounds present x 4 quad  EXTREMITIES: No cyanosis; no edema; no calf tenderness  SKIN: Warm and dry; no rash  NERVOUS SYSTEM:  Awake and alert; Oriented to place, person and time; no new deficits    _________________________________________________  LABS:                        9.0    5.81  )-----------( 350      ( 10 [place] 2023 07:00 )             29.4     01-10    137  |  99  |  18  ----------------------------<  126<H>  4.2   |  30  |  0.75    Ca    8.7      10 [place] 2023 07:00          CAPILLARY BLOOD GLUCOSE            RADIOLOGY & ADDITIONAL TESTS:    Imaging Personally Reviewed:  YES    Consultant(s) Notes Reviewed:   YES    Care Discussed with Consultants :     Plan of care was discussed with patient and /or primary care giver; all questions and concerns were addressed and care was aligned with patient's wishes.     NP Note discussed with  primary attending    Patient is a 77y old  Male who presents with a chief complaint of Weakness (11 Jan 2023 13:58)      INTERVAL HPI/OVERNIGHT EVENTS: Pt preferred to continue his phone call.  NAD.      MEDICATIONS  (STANDING):  amLODIPine   Tablet 10 milliGRAM(s) Oral daily  atorvastatin 20 milliGRAM(s) Oral at bedtime  finasteride 5 milliGRAM(s) Oral daily  folic acid 1 milliGRAM(s) Oral daily  mupirocin 2% Ointment 1 Application(s) Topical two times a day  pantoprazole   Suspension 40 milliGRAM(s) Oral daily  rivaroxaban 10 milliGRAM(s) Enteral Tube daily  silver sulfADIAZINE 1% Cream 1 Application(s) Topical daily  sodium chloride 0.9%. 1000 milliLiter(s) (100 mL/Hr) IV Continuous <Continuous>  tamsulosin 0.4 milliGRAM(s) Oral at bedtime    MEDICATIONS  (PRN):  acetaminophen    Suspension .. 500 milliGRAM(s) Oral every 6 hours PRN Temp greater or equal to 38C (100.4F), Mild Pain (1 - 3), Moderate Pain (4 - 6)  guaiFENesin Oral Liquid (Sugar-Free) 200 milliGRAM(s) Oral every 6 hours PRN Cough      __________________________________________________  REVIEW OF SYSTEMS: Limited exam d/t pt's participation     CONSTITUTIONAL: No fever  EYES: No acute visual disturbances  NECK: No pain or stiffness  RESPIRATORY: No cough; No shortness of breath  CARDIOVASCULAR: No chest pain, no palpitations  GASTROINTESTINAL: No pain. No nausea or vomiting.  No diarrhea   NEUROLOGICAL: No headache or numbness, no tremors  MUSCULOSKELETAL: No joint pain, no muscle pain  GENITOURINARY: No dysuria, no frequency, no hesitancy  PSYCHIATRY: No depression , no anxiety  ALL OTHER  ROS negative        Vital Signs Last 24 Hrs  T(C): 36.3 (11 Jan 2023 14:13), Max: 37 (11 Jan 2023 05:31)  T(F): 97.4 (11 Jan 2023 14:13), Max: 98.6 (11 Jan 2023 05:31)  HR: 88 (11 Jan 2023 14:13) (88 - 96)  BP: 109/73 (11 Jan 2023 14:13) (91/73 - 117/79)  RR: 16 (11 Jan 2023 14:13) (16 - 18)  SpO2: 99% (11 Jan 2023 14:13) (97% - 100%)    Parameters below as of 11 Jan 2023 14:13  Patient On (Oxygen Delivery Method): room air        ________________________________________________  PHYSICAL EXAM:  Limited exam d/t pt's participation   GENERAL: NAD  HEENT: Normocephalic;  conjunctivae and sclerae clear; moist mucous membranes.  (+) Neck wound, dsg intact  NECK : Supple  CHEST/LUNG: Clear to auscultation bilaterally with good air entry   HEART: S1 S2  regular; no murmurs, gallops or rubs  ABDOMEN: Soft, Nontender, Nondistended; Bowel sounds present x 4 quad.  (+) PEG   EXTREMITIES: No cyanosis; no edema; no calf tenderness  SKIN: Warm and dry; no rash  NERVOUS SYSTEM:  Awake and alert; Oriented to place, person and time; no new deficits    _________________________________________________  LABS:                        9.0    5.81  )-----------( 350      ( 10 [place] 2023 07:00 )             29.4     01-10    137  |  99  |  18  ----------------------------<  126<H>  4.2   |  30  |  0.75    Ca    8.7      10 [place] 2023 07:00          CAPILLARY BLOOD GLUCOSE            RADIOLOGY & ADDITIONAL TESTS:    Imaging Personally Reviewed:  YES    Consultant(s) Notes Reviewed:   YES    Care Discussed with Consultants :     Plan of care was discussed with patient and /or primary care giver; all questions and concerns were addressed and care was aligned with patient's wishes.

## 2023-01-11 NOTE — PROGRESS NOTE ADULT - ASSESSMENT
#Generalized weakness 2/2 severe protein-calorie malnutrition  #Oropharyngeal SCC s/p resection/PEG tube, on chemo/radiation  #r/o infection  #HTN  #HLD  #Intracranial aneurysm  #BPH     77y M with PMH of HTN, HLD, BPH, intracranial aneurysm, oropharyngeal SCC s/p resection/PEG tube placement, on radiation/chemo, admitted with generalized weakness. Hospital course complicated by fevers and tachycardia, however infectious work up negative. Hospital course further complicated by malfunctioning PEG tube. GI consulted, but after adjustment tube started functioning and did not need to be changed. PT recommendations updated, patient previously recommended for [facility] and now home therapy. Social work following as patient's family has concerns about a safe discharge.     -PEG currently functioning  -Patient reports that PEG occasionally "trickles", refused replacement on this admission  -Infectious workup negative, abx discontinued   -Continue home meds for chronic conditions  -GI following  -DVT ppx: Xarleto  -Patient medically optimized for discharge, CM/SW following for dispo

## 2023-01-11 NOTE — CHART NOTE - NSCHARTNOTEFT_GEN_A_CORE
Reassessment:     Patient is a 77y old  Male who presents with a chief complaint of Weakness (2023 14:35)      Factors impacting intake: [ ] none [ ] nausea  [ ] vomiting [ ] diarrhea [ ] constipation  [ ]chewing problems [ ] swallowing issues  [ x] other: oropharyngeal mass s/p PEG tube/resection, on radiation and chemo, HTN,    Diet Presciption: Diet, NPO with Tube Feed:   Tube Feeding Modality: Gastrostomy  Glucerna 1.5 Earl  Total Volume for 24 Hours (mL): 960  Continuous  Starting Tube Feed Rate {mL per Hour}: 20  Increase Tube Feed Rate by (mL): 10     Every 6 hours  Until Goal Tube Feed Rate (mL per Hour): 60  Tube Feed Duration (in Hours): 16  Tube Feed Start Time: 06:00  Tube Feed Stop Time: 22:00  Free Water Flush  Bolus   Total Volume per Flush (mL): 250   Frequency: Every 6 Hours    Start Time: 16:00 (23 @ 10:52)    Intake: Patient visited, alert & awake, tube feeding Glucerna 1.5 Kcal infusing at recommended goal 60ml/hr & tolerating, no reports of n/vomiting or diarrhea, seen by GI/team, ongoing wound care, rec. continue with enteral feeding Glucerna 1.5 Kcal at goal: 60ml/h x 16h (960ml, 1440kcal, 79g protein, 729ml water at goal) to meet daily nutrient needs, NP/MD team to adjust free water as needed. Awaiting LINDY placement. RD available.     Daily Weight in k.5 (2023 05:31)    % Weight Change: wt. fluctuation noted possible due to fluid shifts?    Pertinent Medications: MEDICATIONS  (STANDING):  amLODIPine   Tablet 10 milliGRAM(s) Oral daily  atorvastatin 20 milliGRAM(s) Oral at bedtime  finasteride 5 milliGRAM(s) Oral daily  folic acid 1 milliGRAM(s) Oral daily  mupirocin 2% Ointment 1 Application(s) Topical two times a day  pantoprazole   Suspension 40 milliGRAM(s) Oral daily  rivaroxaban 10 milliGRAM(s) Enteral Tube daily  silver sulfADIAZINE 1% Cream 1 Application(s) Topical daily  sodium chloride 0.9%. 1000 milliLiter(s) (100 mL/Hr) IV Continuous <Continuous>  tamsulosin 0.4 milliGRAM(s) Oral at bedtime    MEDICATIONS  (PRN):  acetaminophen    Suspension .. 500 milliGRAM(s) Oral every 6 hours PRN Temp greater or equal to 38C (100.4F), Mild Pain (1 - 3), Moderate Pain (4 - 6)  guaiFENesin Oral Liquid (Sugar-Free) 200 milliGRAM(s) Oral every 6 hours PRN Cough    Pertinent Labs: 01-10 Na137 mmol/L Glu 126 mg/dL<H> K+ 4.2 mmol/L Cr  0.75 mg/dL BUN 18 mg/dL  Alb 1.3 g/dL<L>     CAPILLARY BLOOD GLUCOSE      Skin: pressure injuries with wound care    Estimated Needs:   [X] no change since previous assessment  [ ] recalculated:     Previous Nutrition Diagnosis:   [ ] Inadequate Energy Intake [ ]Inadequate Oral Intake [ ] Excessive Energy Intake   [ ] Underweight [ ] Increased Nutrient Needs [ ] Overweight/Obesity   [ ] Altered GI Function [ ] Unintended Weight Loss [ ] Food & Nutrition Related Knowledge Deficit [Severe ] Malnutrition     Nutrition Diagnosis is [ X] ongoing  [ ] resolved [ ] not applicable     New Nutrition Diagnosis: [ ] not applicable     Interventions: Optimal nutrition meeting >75% of energy nutrient needs via tube feeding     Recommend  [ ] Change Diet To:  [ ] Nutrition Supplement  [ ] Nutrition Support  [X ] Other: rec. adding MVI/minerals/Vit. C 500mg BID for wound healing & Zinc Sulfate 220mg once daily     Monitoring and Evaluation:   [ ] PO intake [ x ] Tolerance to diet prescription [ x ] weights [ x ] labs[ x ] follow up per protocol  [ ] other:

## 2023-01-12 PROCEDURE — 99232 SBSQ HOSP IP/OBS MODERATE 35: CPT

## 2023-01-12 RX ORDER — ZINC SULFATE TAB 220 MG (50 MG ZINC EQUIVALENT) 220 (50 ZN) MG
220 TAB ORAL DAILY
Refills: 0 | Status: DISCONTINUED | OUTPATIENT
Start: 2023-01-12 | End: 2023-01-27

## 2023-01-12 RX ORDER — ASCORBIC ACID 60 MG
500 TABLET,CHEWABLE ORAL DAILY
Refills: 0 | Status: DISCONTINUED | OUTPATIENT
Start: 2023-01-12 | End: 2023-01-27

## 2023-01-12 RX ORDER — MULTIVIT-MIN/FERROUS GLUCONATE 9 MG/15 ML
1 LIQUID (ML) ORAL DAILY
Refills: 0 | Status: DISCONTINUED | OUTPATIENT
Start: 2023-01-12 | End: 2023-01-27

## 2023-01-12 RX ADMIN — Medication 500 MILLIGRAM(S): at 21:24

## 2023-01-12 RX ADMIN — Medication 500 MILLIGRAM(S): at 21:54

## 2023-01-12 RX ADMIN — ATORVASTATIN CALCIUM 20 MILLIGRAM(S): 80 TABLET, FILM COATED ORAL at 21:23

## 2023-01-12 RX ADMIN — FINASTERIDE 5 MILLIGRAM(S): 5 TABLET, FILM COATED ORAL at 12:33

## 2023-01-12 RX ADMIN — MUPIROCIN 1 APPLICATION(S): 20 OINTMENT TOPICAL at 06:15

## 2023-01-12 RX ADMIN — RIVAROXABAN 10 MILLIGRAM(S): KIT at 12:34

## 2023-01-12 RX ADMIN — MUPIROCIN 1 APPLICATION(S): 20 OINTMENT TOPICAL at 18:01

## 2023-01-12 RX ADMIN — Medication 1 MILLIGRAM(S): at 12:33

## 2023-01-12 RX ADMIN — TAMSULOSIN HYDROCHLORIDE 0.4 MILLIGRAM(S): 0.4 CAPSULE ORAL at 21:23

## 2023-01-12 RX ADMIN — Medication 1 APPLICATION(S): at 12:35

## 2023-01-12 RX ADMIN — PANTOPRAZOLE SODIUM 40 MILLIGRAM(S): 20 TABLET, DELAYED RELEASE ORAL at 12:34

## 2023-01-12 NOTE — PROGRESS NOTE ADULT - ASSESSMENT
77 year old male from home with PMH of HTN, HLD, BPH, oropharyngeal mass s/p PEG tube/resection, on radiation and chemo (last dose of chemo was last week).  Admitted for Weakness.      Hospital course complicated with fevers and tachycardia and infectious work up was negative.     Hospital course further complicated by malfunctioning PEG tube.  GI Dr. Waddell consulted.   PEG tube functioning without intervention.    Discharge complicated by updated PT which patient has no need for [facility] and recommended for Home PT.  Family concern about safe discharge. [facility]  SW & CM following.  D/C planning

## 2023-01-12 NOTE — PROGRESS NOTE ADULT - NS ATTEND AMEND GEN_ALL_CORE FT
#Generalized weakness   #Oropharyngeal SCC s/p resection/PEG tube, on chemo/radiation  #Severe protein-calorie malnutrition  #HTN  #HLD  #Intracranial aneurysm  #BPH     77y M with PMH of HTN, HLD, BPH, intracranial aneurysm, oropharyngeal SCC s/p resection/PEG tube placement, on radiation/chemo, admitted with generalized weakness. Hospital course complicated by fevers and tachycardia, however infectious work up negative. Hospital course further complicated by malfunctioning PEG tube. GI consulted, but after adjustment tube started functioning and did not need to be changed. PT recommendations updated, patient previously recommended for [facility] and now home therapy. Social work following as patient's family has concerns about a safe discharge.     -PEG currently functioning  -Last dose of chemotherapy in December, will follow up with patient's OP oncologist (Dr. Segundo Velasquez)  -Patient reports that PEG occasionally "trickles", refused replacement on this admission  -Infectious workup negative, abx discontinued   -Continue home meds for chronic conditions  -GI following  -DVT ppx: Xarleto  -Patient medically optimized for discharge, CM/SW following for dispo #Generalized weakness   #Oropharyngeal SCC s/p resection/PEG tube, on chemo/radiation  #Severe protein-calorie malnutrition  #HTN  #HLD  #Intracranial aneurysm  #BPH     77y M with PMH of HTN, HLD, BPH, intracranial aneurysm, oropharyngeal SCC s/p resection/PEG tube placement, on radiation/chemo, admitted with generalized weakness. Hospital course complicated by fevers and tachycardia, however infectious work up negative. Hospital course further complicated by malfunctioning PEG tube. GI consulted, but after adjustment tube started functioning and did not need to be changed. PT recommendations updated, patient previously recommended for [facility] and now home therapy. Social work following as patient's family has concerns about a safe discharge.     -PEG currently functioning  -Last dose of chemotherapy in December, will follow up with patient's OP oncologist  -Patient reports that PEG occasionally "trickles", refused replacement on this admission  -Infectious workup negative, abx discontinued   -Continue home meds for chronic conditions  -GI following  -DVT ppx: Xarleto  -Patient medically optimized for discharge, CM/SW following for dispo

## 2023-01-12 NOTE — PROGRESS NOTE ADULT - SUBJECTIVE AND OBJECTIVE BOX
NP Note discussed with  primary attending    Patient is a 77y old  Male who presents with a chief complaint of Weakness (11 Jan 2023 14:35)      INTERVAL HPI/OVERNIGHT EVENTS: No new complaints.  NP requested the name and/or contact info for his Oncologist.  Pt awaiting info to provide to NP.      MEDICATIONS  (STANDING):  amLODIPine   Tablet 10 milliGRAM(s) Oral daily  atorvastatin 20 milliGRAM(s) Oral at bedtime  finasteride 5 milliGRAM(s) Oral daily  folic acid 1 milliGRAM(s) Oral daily  mupirocin 2% Ointment 1 Application(s) Topical two times a day  pantoprazole   Suspension 40 milliGRAM(s) Oral daily  rivaroxaban 10 milliGRAM(s) Enteral Tube daily  silver sulfADIAZINE 1% Cream 1 Application(s) Topical daily  sodium chloride 0.9%. 1000 milliLiter(s) (100 mL/Hr) IV Continuous <Continuous>  tamsulosin 0.4 milliGRAM(s) Oral at bedtime    MEDICATIONS  (PRN):  acetaminophen    Suspension .. 500 milliGRAM(s) Oral every 6 hours PRN Temp greater or equal to 38C (100.4F), Mild Pain (1 - 3), Moderate Pain (4 - 6)  guaiFENesin Oral Liquid (Sugar-Free) 200 milliGRAM(s) Oral every 6 hours PRN Cough      __________________________________________________  REVIEW OF SYSTEMS: Limited exam d/t pt's participation     CONSTITUTIONAL: No fever  EYES: No acute visual disturbances  NECK: No pain or stiffness  RESPIRATORY: No cough; No shortness of breath  CARDIOVASCULAR: No chest pain, no palpitations  GASTROINTESTINAL: No pain. No nausea or vomiting.  No diarrhea   NEUROLOGICAL: No headache or numbness, no tremors  MUSCULOSKELETAL: No joint pain, no muscle pain  GENITOURINARY: No dysuria, no frequency, no hesitancy  PSYCHIATRY: No depression , no anxiety  ALL OTHER  ROS negative      Vital Signs Last 24 Hrs  T(C): 37 (12 Jan 2023 12:28), Max: 37 (12 Jan 2023 12:28)  T(F): 98.6 (12 Jan 2023 12:28), Max: 98.6 (12 Jan 2023 12:28)  HR: 100 (12 Jan 2023 12:28) (85 - 100)  BP: 105/71 (12 Jan 2023 12:28) (105/71 - 116/74)  RR: 17 (12 Jan 2023 12:28) (16 - 17)  SpO2: 99% (12 Jan 2023 12:28) (99% - 99%)    Parameters below as of 12 Jan 2023 12:28  Patient On (Oxygen Delivery Method): room air        ________________________________________________  PHYSICAL EXAM:  Limited exam d/t pt's participation   GENERAL: NAD  HEENT: Normocephalic;  conjunctivae and sclerae clear; moist mucous membranes.  (+) Neck wound, dsg intact  NECK : Supple  CHEST/LUNG: Clear to auscultation bilaterally with good air entry   HEART: S1 S2  regular; no murmurs, gallops or rubs  ABDOMEN: Soft, Nontender, Nondistended; Bowel sounds present x 4 quad  (+) PEG   EXTREMITIES: No cyanosis; no edema; no calf tenderness  SKIN: Warm and dry; no rash  NERVOUS SYSTEM:  Awake and alert; Oriented to place, person and time; no new deficits  _________________________________________________  LABS:              CAPILLARY BLOOD GLUCOSE            RADIOLOGY & ADDITIONAL TESTS:    Imaging Personally Reviewed:  YES    Consultant(s) Notes Reviewed:   YES    Care Discussed with Consultants :     Plan of care was discussed with patient and /or primary care giver; all questions and concerns were addressed and care was aligned with patient's wishes.     NP Note discussed with  primary attending    Patient is a 77y old  Male who presents with a chief complaint of Weakness (11 Jan 2023 14:35)      INTERVAL HPI/OVERNIGHT EVENTS: No new complaints.  NP requested the name and/or contact info for his Oncologist.  Pt awaiting info to provide to NP.  Pt provided info for his Oncologist-Dr. Abdoulaye Pittman # [phone].      MEDICATIONS  (STANDING):  amLODIPine   Tablet 10 milliGRAM(s) Oral daily  atorvastatin 20 milliGRAM(s) Oral at bedtime  finasteride 5 milliGRAM(s) Oral daily  folic acid 1 milliGRAM(s) Oral daily  mupirocin 2% Ointment 1 Application(s) Topical two times a day  pantoprazole   Suspension 40 milliGRAM(s) Oral daily  rivaroxaban 10 milliGRAM(s) Enteral Tube daily  silver sulfADIAZINE 1% Cream 1 Application(s) Topical daily  sodium chloride 0.9%. 1000 milliLiter(s) (100 mL/Hr) IV Continuous <Continuous>  tamsulosin 0.4 milliGRAM(s) Oral at bedtime    MEDICATIONS  (PRN):  acetaminophen    Suspension .. 500 milliGRAM(s) Oral every 6 hours PRN Temp greater or equal to 38C (100.4F), Mild Pain (1 - 3), Moderate Pain (4 - 6)  guaiFENesin Oral Liquid (Sugar-Free) 200 milliGRAM(s) Oral every 6 hours PRN Cough      __________________________________________________  REVIEW OF SYSTEMS: Limited exam d/t pt's participation     CONSTITUTIONAL: No fever  EYES: No acute visual disturbances  NECK: No pain or stiffness  RESPIRATORY: No cough; No shortness of breath  CARDIOVASCULAR: No chest pain, no palpitations  GASTROINTESTINAL: No pain. No nausea or vomiting.  No diarrhea   NEUROLOGICAL: No headache or numbness, no tremors  MUSCULOSKELETAL: No joint pain, no muscle pain  GENITOURINARY: No dysuria, no frequency, no hesitancy  PSYCHIATRY: No depression , no anxiety  ALL OTHER  ROS negative      Vital Signs Last 24 Hrs  T(C): 37 (12 Jan 2023 12:28), Max: 37 (12 Jan 2023 12:28)  T(F): 98.6 (12 Jan 2023 12:28), Max: 98.6 (12 Jan 2023 12:28)  HR: 100 (12 Jan 2023 12:28) (85 - 100)  BP: 105/71 (12 Jan 2023 12:28) (105/71 - 116/74)  RR: 17 (12 Jan 2023 12:28) (16 - 17)  SpO2: 99% (12 Jan 2023 12:28) (99% - 99%)    Parameters below as of 12 Jan 2023 12:28  Patient On (Oxygen Delivery Method): room air        ________________________________________________  PHYSICAL EXAM:  Limited exam d/t pt's participation   GENERAL: NAD  HEENT: Normocephalic;  conjunctivae and sclerae clear; moist mucous membranes.  (+) Neck wound, dsg intact  NECK : Supple  CHEST/LUNG: Clear to auscultation bilaterally with good air entry   HEART: S1 S2  regular; no murmurs, gallops or rubs  ABDOMEN: Soft, Nontender, Nondistended; Bowel sounds present x 4 quad  (+) PEG   EXTREMITIES: No cyanosis; no edema; no calf tenderness  SKIN: Warm and dry; no rash  NERVOUS SYSTEM:  Awake and alert; Oriented to place, person and time; no new deficits  _________________________________________________  LABS:              CAPILLARY BLOOD GLUCOSE            RADIOLOGY & ADDITIONAL TESTS:    Imaging Personally Reviewed:  YES    Consultant(s) Notes Reviewed:   YES    Care Discussed with Consultants :     Plan of care was discussed with patient and /or primary care giver; all questions and concerns were addressed and care was aligned with patient's wishes.

## 2023-01-13 LAB
ANION GAP SERPL CALC-SCNC: 6 MMOL/L — SIGNIFICANT CHANGE UP (ref 5–17)
BUN SERPL-MCNC: 22 MG/DL — HIGH (ref 7–18)
CALCIUM SERPL-MCNC: 8.9 MG/DL — SIGNIFICANT CHANGE UP (ref 8.4–10.5)
CHLORIDE SERPL-SCNC: 99 MMOL/L — SIGNIFICANT CHANGE UP (ref 96–108)
CO2 SERPL-SCNC: 30 MMOL/L — SIGNIFICANT CHANGE UP (ref 22–31)
CREAT SERPL-MCNC: 0.87 MG/DL — SIGNIFICANT CHANGE UP (ref 0.5–1.3)
EGFR: 90 ML/MIN/1.73M2 — SIGNIFICANT CHANGE UP
GLUCOSE SERPL-MCNC: 136 MG/DL — HIGH (ref 70–99)
HCT VFR BLD CALC: 31.6 % — LOW (ref 39–50)
HGB BLD-MCNC: 9.5 G/DL — LOW (ref 13–17)
MCHC RBC-ENTMCNC: 27.7 PG — SIGNIFICANT CHANGE UP (ref 27–34)
MCHC RBC-ENTMCNC: 30.1 GM/DL — LOW (ref 32–36)
MCV RBC AUTO: 92.1 FL — SIGNIFICANT CHANGE UP (ref 80–100)
NRBC # BLD: 0 /100 WBCS — SIGNIFICANT CHANGE UP (ref 0–0)
PLATELET # BLD AUTO: 315 K/UL — SIGNIFICANT CHANGE UP (ref 150–400)
POTASSIUM SERPL-MCNC: 4.2 MMOL/L — SIGNIFICANT CHANGE UP (ref 3.5–5.3)
POTASSIUM SERPL-SCNC: 4.2 MMOL/L — SIGNIFICANT CHANGE UP (ref 3.5–5.3)
RBC # BLD: 3.43 M/UL — LOW (ref 4.2–5.8)
RBC # FLD: 16.3 % — HIGH (ref 10.3–14.5)
SODIUM SERPL-SCNC: 135 MMOL/L — SIGNIFICANT CHANGE UP (ref 135–145)
WBC # BLD: 5.14 K/UL — SIGNIFICANT CHANGE UP (ref 3.8–10.5)
WBC # FLD AUTO: 5.14 K/UL — SIGNIFICANT CHANGE UP (ref 3.8–10.5)

## 2023-01-13 PROCEDURE — 99232 SBSQ HOSP IP/OBS MODERATE 35: CPT

## 2023-01-13 RX ADMIN — Medication 1 APPLICATION(S): at 12:20

## 2023-01-13 RX ADMIN — FINASTERIDE 5 MILLIGRAM(S): 5 TABLET, FILM COATED ORAL at 12:21

## 2023-01-13 RX ADMIN — Medication 200 MILLIGRAM(S): at 06:26

## 2023-01-13 RX ADMIN — Medication 1 MILLIGRAM(S): at 12:20

## 2023-01-13 RX ADMIN — TAMSULOSIN HYDROCHLORIDE 0.4 MILLIGRAM(S): 0.4 CAPSULE ORAL at 21:49

## 2023-01-13 RX ADMIN — RIVAROXABAN 10 MILLIGRAM(S): KIT at 12:20

## 2023-01-13 RX ADMIN — Medication 500 MILLIGRAM(S): at 23:45

## 2023-01-13 RX ADMIN — Medication 500 MILLIGRAM(S): at 23:05

## 2023-01-13 RX ADMIN — Medication 500 MILLIGRAM(S): at 12:35

## 2023-01-13 RX ADMIN — Medication 500 MILLIGRAM(S): at 12:36

## 2023-01-13 RX ADMIN — ATORVASTATIN CALCIUM 20 MILLIGRAM(S): 80 TABLET, FILM COATED ORAL at 21:49

## 2023-01-13 RX ADMIN — PANTOPRAZOLE SODIUM 40 MILLIGRAM(S): 20 TABLET, DELAYED RELEASE ORAL at 12:20

## 2023-01-13 RX ADMIN — MUPIROCIN 1 APPLICATION(S): 20 OINTMENT TOPICAL at 06:26

## 2023-01-13 NOTE — PROGRESS NOTE ADULT - SUBJECTIVE AND OBJECTIVE BOX
NP Note discussed with  primary attending    Patient is a 77y old  Male who presents with a chief complaint of Weakness (12 Jan 2023 14:49)      INTERVAL HPI/OVERNIGHT EVENTS: Said, "oh, no" and waved had for NP to go away.      MEDICATIONS  (STANDING):  amLODIPine   Tablet 10 milliGRAM(s) Oral daily  ascorbic acid 500 milliGRAM(s) Oral daily  atorvastatin 20 milliGRAM(s) Oral at bedtime  finasteride 5 milliGRAM(s) Oral daily  folic acid 1 milliGRAM(s) Oral daily  multivitamin/minerals 1 Tablet(s) Oral daily  mupirocin 2% Ointment 1 Application(s) Topical two times a day  pantoprazole   Suspension 40 milliGRAM(s) Oral daily  rivaroxaban 10 milliGRAM(s) Enteral Tube daily  silver sulfADIAZINE 1% Cream 1 Application(s) Topical daily  sodium chloride 0.9%. 1000 milliLiter(s) (100 mL/Hr) IV Continuous <Continuous>  tamsulosin 0.4 milliGRAM(s) Oral at bedtime  zinc sulfate 220 milliGRAM(s) Oral daily    MEDICATIONS  (PRN):  acetaminophen    Suspension .. 500 milliGRAM(s) Oral every 6 hours PRN Temp greater or equal to 38C (100.4F), Mild Pain (1 - 3), Moderate Pain (4 - 6)  guaiFENesin Oral Liquid (Sugar-Free) 200 milliGRAM(s) Oral every 6 hours PRN Cough      __________________________________________________  REVIEW OF SYSTEMS: Limited exam d/t pt's participation     CONSTITUTIONAL: No fever  EYES: No acute visual disturbances  NECK: No pain or stiffness  RESPIRATORY: No cough; No shortness of breath  CARDIOVASCULAR: No chest pain, no palpitations  GASTROINTESTINAL: No pain. No nausea or vomiting.  No diarrhea   NEUROLOGICAL: No headache or numbness, no tremors  MUSCULOSKELETAL: No joint pain, no muscle pain  GENITOURINARY: No dysuria, no frequency, no hesitancy  PSYCHIATRY: No depression , no anxiety  ALL OTHER  ROS negative      Vital Signs Last 24 Hrs  T(C): 36.7 (13 Jan 2023 05:30), Max: 37 (12 Jan 2023 20:50)  T(F): 98 (13 Jan 2023 05:30), Max: 98.6 (12 Jan 2023 20:50)  HR: 89 (13 Jan 2023 06:23) (81 - 92)  BP: 105/89 (13 Jan 2023 06:23) (93/78 - 113/61)  RR: 17 (13 Jan 2023 06:23) (16 - 17)  SpO2: 100% (13 Jan 2023 06:23) (94% - 100%)    Parameters below as of 13 Jan 2023 06:23  Patient On (Oxygen Delivery Method): room air        ________________________________________________  PHYSICAL EXAM:  Limited exam d/t pt's participation   GENERAL: NAD  HEENT: Normocephalic;  conjunctivae and sclerae clear; moist mucous membranes.  (+) Neck wound, dsg intact  NECK : Supple  CHEST/LUNG: Clear to auscultation bilaterally with good air entry   HEART: S1 S2  regular; no murmurs, gallops or rubs  ABDOMEN: Soft, Nontender, Nondistended; Bowel sounds present x 4 quad  (+) PEG   EXTREMITIES: No cyanosis; no edema; no calf tenderness  SKIN: Warm and dry; no rash  NERVOUS SYSTEM:  Awake and alert; Oriented to place, person and time; no new deficits  _________________________________________________  LABS:                        9.5    5.14  )-----------( 315      ( 13 Jan 2023 07:45 )             31.6     01-13    135  |  99  |  22<H>  ----------------------------<  136<H>  4.2   |  30  |  0.87    Ca    8.9      13 Jan 2023 07:45          CAPILLARY BLOOD GLUCOSE            RADIOLOGY & ADDITIONAL TESTS:    Imaging Personally Reviewed:  YES    Consultant(s) Notes Reviewed:   YES    Care Discussed with Consultants :     Plan of care was discussed with patient and /or primary care giver; all questions and concerns were addressed and care was aligned with patient's wishes.

## 2023-01-13 NOTE — PROGRESS NOTE ADULT - ASSESSMENT
77 year old male from home with PMH of HTN, HLD, BPH, oropharyngeal mass s/p PEG tube/resection, on radiation and chemo (last dose of chemo was last week).  Admitted for Weakness.      Hospital course complicated with fevers and tachycardia and infectious work up was negative.     Hospital course further complicated by malfunctioning PEG tube.  GI Dr. Waddell consulted.   PEG tube functioning without intervention.    Discharge complicated by updated PT which patient has no need for [facility] and recommended for Home PT.  Family concern about safe discharge. [facility]  SW & CM following.  D/C planning

## 2023-01-13 NOTE — PROGRESS NOTE ADULT - NS ATTEND AMEND GEN_ALL_CORE FT
#Generalized weakness   #Oropharyngeal SCC s/p resection/PEG tube, on chemo/radiation  #Severe protein-calorie malnutrition  #HTN  #HLD  #Intracranial aneurysm  #BPH     77y M with PMH of HTN, HLD, BPH, intracranial aneurysm, oropharyngeal SCC s/p resection/PEG tube placement, on radiation/chemo, admitted with generalized weakness. Hospital course complicated by fevers and tachycardia, however infectious work up negative. Hospital course further complicated by malfunctioning PEG tube. GI consulted, but after adjustment tube started functioning and did not need to be changed. PT recommendations updated, patient previously recommended for [facility] and now home therapy. Social work following as patient's family has concerns about a safe discharge.     -PEG currently functioning  -Last dose of chemotherapy and radiation in December  -Patient reports that PEG occasionally "trickles", refused replacement on this admission  -Infectious workup negative, abx discontinued   -Continue home meds for chronic conditions  -DVT ppx: Xarelto   -Patient medically optimized for discharge, CM/SW following for dispo  -Spoke with patient's oncologist's office, Dr. Pittman; BALAJI Cummins said patient's last dose of chemo was 12/16/22, patient unable to tolerate 2/2 weakness, and chemotherapy was put on hold. Per Maria G, patient will no longer receive chemotherapy.   Spoke with patient's radiation oncologist's office, Dr. Segundo Velasquez; Nurse Tipton stated patient has had 30 fractions of radiation, last on 12/16/22, but due to extensive desquamation of his neck and weakness, treatment was held. Patient has 5 fractions remaining, which Dr. Velasquez would like completed.   However, patient is debilitated, and will unlikely be able to make his radiation treatment appointments.

## 2023-01-14 PROCEDURE — 99232 SBSQ HOSP IP/OBS MODERATE 35: CPT

## 2023-01-14 RX ADMIN — PANTOPRAZOLE SODIUM 40 MILLIGRAM(S): 20 TABLET, DELAYED RELEASE ORAL at 11:38

## 2023-01-14 RX ADMIN — Medication 500 MILLIGRAM(S): at 08:46

## 2023-01-14 RX ADMIN — MUPIROCIN 1 APPLICATION(S): 20 OINTMENT TOPICAL at 17:39

## 2023-01-14 RX ADMIN — Medication 1 TABLET(S): at 11:38

## 2023-01-14 RX ADMIN — ATORVASTATIN CALCIUM 20 MILLIGRAM(S): 80 TABLET, FILM COATED ORAL at 21:43

## 2023-01-14 RX ADMIN — Medication 500 MILLIGRAM(S): at 11:38

## 2023-01-14 RX ADMIN — Medication 200 MILLIGRAM(S): at 20:09

## 2023-01-14 RX ADMIN — Medication 1 MILLIGRAM(S): at 11:38

## 2023-01-14 RX ADMIN — ZINC SULFATE TAB 220 MG (50 MG ZINC EQUIVALENT) 220 MILLIGRAM(S): 220 (50 ZN) TAB at 11:39

## 2023-01-14 RX ADMIN — MUPIROCIN 1 APPLICATION(S): 20 OINTMENT TOPICAL at 05:49

## 2023-01-14 RX ADMIN — Medication 1 APPLICATION(S): at 17:39

## 2023-01-14 RX ADMIN — FINASTERIDE 5 MILLIGRAM(S): 5 TABLET, FILM COATED ORAL at 11:38

## 2023-01-14 RX ADMIN — TAMSULOSIN HYDROCHLORIDE 0.4 MILLIGRAM(S): 0.4 CAPSULE ORAL at 21:43

## 2023-01-14 RX ADMIN — RIVAROXABAN 10 MILLIGRAM(S): KIT at 11:38

## 2023-01-14 NOTE — PROGRESS NOTE ADULT - ASSESSMENT
#Generalized weakness   #Oropharyngeal SCC s/p resection/PEG tube, on chemo/radiation  #Severe protein-calorie malnutrition  #HTN  #HLD  #Intracranial aneurysm  #BPH     77y M with PMH of HTN, HLD, BPH, intracranial aneurysm, oropharyngeal SCC s/p resection/PEG tube placement, on radiation/chemo, admitted with generalized weakness. Hospital course complicated by fevers and tachycardia, however infectious work up negative. Hospital course further complicated by malfunctioning PEG tube. GI consulted, but after adjustment tube started functioning and did not need to be changed. PT recommendations updated, patient previously recommended for [facility] and now home therapy. Social work following as patient's family has concerns about a safe discharge.     -PEG currently functioning  -Last dose of chemotherapy and radiation in December  -Patient reports that PEG occasionally "trickles", refused replacement on this admission  -Infectious workup negative, abx discontinued   -Continue home meds for chronic conditions  -DVT ppx: Xarelto   -Patient medically optimized for discharge, CM/SW following for dispo - pending [facility]  -No longer on chemotherapy, but will need 5 more radiation treatments; patient unlikely able to go to radiation therapy 2/2 weakness and debilitation

## 2023-01-14 NOTE — PROGRESS NOTE ADULT - SUBJECTIVE AND OBJECTIVE BOX
S: Patient seen and examined at bedside. No acute events overnight.     REVIEW OF SYSTEMS:  CONSTITUTIONAL: No weakness, fevers or chills  EYES: No visual changes  ENT: No vertigo or throat pain   NECK: No pain or stiffness  RESPIRATORY: No cough, wheezing, hemoptysis; No shortness of breath  CARDIOVASCULAR: No chest pain or palpitations  GASTROINTESTINAL: No abdominal or epigastric pain. No nausea, vomiting, or hematemesis; No diarrhea or constipation. No melena or hematochezia.  GENITOURINARY: No dysuria, frequency or hematuria  NEUROLOGICAL: No numbness or weakness  SKIN: No itching, rashes  PSYCH: No depression, anxiety    O:  Vital Signs Last 24 Hrs  T(C): 36.4 (14 Jan 2023 14:15), Max: 37.4 (13 Jan 2023 20:37)  T(F): 97.5 (14 Jan 2023 14:15), Max: 99.3 (13 Jan 2023 20:37)  HR: 85 (14 Jan 2023 14:15) (85 - 98)  BP: 107/69 (14 Jan 2023 14:15) (100/65 - 107/69)  BP(mean): --  RR: 18 (14 Jan 2023 14:15) (16 - 18)  SpO2: 100% (14 Jan 2023 14:15) (97% - 100%)    Parameters below as of 14 Jan 2023 14:15  Patient On (Oxygen Delivery Method): room air    GENERAL: NAD, well-developed  HEAD:  Atraumatic, Normocephalic  EYES: EOMI, anicteric sclera bilaterally  NECK: Dressing applied   CHEST/LUNG: Clear to auscultation bilaterally, symmetrical chest rise  HEART: Regular rate and rhythm; No murmurs, rubs, or gallops  ABDOMEN: Soft, Nontender, Nondistended; Bowel sounds present, PEG in place, site is CDI  EXTREMITIES:  2+ Peripheral Pulses, No clubbing, cyanosis, or edema  NEUROLOGY: A+Ox3, cranial nerves grossly intact, moves all extremities  SKIN: No rashes or lesions, dry, warm    acetaminophen    Suspension .. 500 milliGRAM(s) Oral every 6 hours PRN  amLODIPine   Tablet 10 milliGRAM(s) Oral daily  ascorbic acid 500 milliGRAM(s) Oral daily  atorvastatin 20 milliGRAM(s) Oral at bedtime  finasteride 5 milliGRAM(s) Oral daily  folic acid 1 milliGRAM(s) Oral daily  guaiFENesin Oral Liquid (Sugar-Free) 200 milliGRAM(s) Oral every 6 hours PRN  multivitamin/minerals 1 Tablet(s) Oral daily  mupirocin 2% Ointment 1 Application(s) Topical two times a day  pantoprazole   Suspension 40 milliGRAM(s) Oral daily  rivaroxaban 10 milliGRAM(s) Enteral Tube daily  silver sulfADIAZINE 1% Cream 1 Application(s) Topical daily  sodium chloride 0.9%. 1000 milliLiter(s) IV Continuous <Continuous>  tamsulosin 0.4 milliGRAM(s) Oral at bedtime  zinc sulfate 220 milliGRAM(s) Oral daily                            9.5    5.14  )-----------( 315      ( 13 Jan 2023 07:45 )             31.6       01-13    135  |  99  |  22<H>  ----------------------------<  136<H>  4.2   |  30  |  0.87    Ca    8.9      13 Jan 2023 07:45

## 2023-01-15 PROCEDURE — 99232 SBSQ HOSP IP/OBS MODERATE 35: CPT

## 2023-01-15 RX ADMIN — Medication 200 MILLIGRAM(S): at 22:47

## 2023-01-15 RX ADMIN — FINASTERIDE 5 MILLIGRAM(S): 5 TABLET, FILM COATED ORAL at 11:34

## 2023-01-15 RX ADMIN — TAMSULOSIN HYDROCHLORIDE 0.4 MILLIGRAM(S): 0.4 CAPSULE ORAL at 22:22

## 2023-01-15 RX ADMIN — MUPIROCIN 1 APPLICATION(S): 20 OINTMENT TOPICAL at 05:49

## 2023-01-15 RX ADMIN — ATORVASTATIN CALCIUM 20 MILLIGRAM(S): 80 TABLET, FILM COATED ORAL at 22:22

## 2023-01-15 RX ADMIN — Medication 500 MILLIGRAM(S): at 11:34

## 2023-01-15 RX ADMIN — MUPIROCIN 1 APPLICATION(S): 20 OINTMENT TOPICAL at 18:09

## 2023-01-15 RX ADMIN — Medication 1 TABLET(S): at 11:34

## 2023-01-15 RX ADMIN — PANTOPRAZOLE SODIUM 40 MILLIGRAM(S): 20 TABLET, DELAYED RELEASE ORAL at 11:36

## 2023-01-15 RX ADMIN — Medication 1 MILLIGRAM(S): at 11:34

## 2023-01-15 RX ADMIN — Medication 500 MILLIGRAM(S): at 22:37

## 2023-01-15 RX ADMIN — Medication 500 MILLIGRAM(S): at 23:11

## 2023-01-15 RX ADMIN — Medication 1 APPLICATION(S): at 11:36

## 2023-01-15 RX ADMIN — ZINC SULFATE TAB 220 MG (50 MG ZINC EQUIVALENT) 220 MILLIGRAM(S): 220 (50 ZN) TAB at 11:34

## 2023-01-15 RX ADMIN — AMLODIPINE BESYLATE 10 MILLIGRAM(S): 2.5 TABLET ORAL at 05:48

## 2023-01-15 RX ADMIN — Medication 200 MILLIGRAM(S): at 06:00

## 2023-01-15 RX ADMIN — RIVAROXABAN 10 MILLIGRAM(S): KIT at 11:34

## 2023-01-15 NOTE — PROGRESS NOTE ADULT - ASSESSMENT
#Generalized weakness   #Oropharyngeal SCC s/p resection/PEG tube, on chemo/radiation  #Severe protein-calorie malnutrition  #HTN  #HLD  #Intracranial aneurysm  #BPH     77y M with PMH of HTN, HLD, BPH, intracranial aneurysm, oropharyngeal SCC s/p resection/PEG tube placement, on radiation/chemo, admitted with generalized weakness. Hospital course complicated by fevers and tachycardia, however infectious work up negative. Hospital course further complicated by malfunctioning PEG tube. GI consulted, but after adjustment tube started functioning and did not need to be changed. PT recommendations updated, patient previously recommended for [facility] and now home therapy. Social work following as patient's family has concerns about a safe discharge.     -PEG currently functioning  -Patient reports that PEG occasionally "trickles", refused replacement on this admission  -Infectious workup negative, abx discontinued   -Continue home meds for chronic conditions  -DVT ppx: Xarelto   -Patient medically optimized for discharge, CM/SW following for dispo - pending [facility]  -No longer on chemotherapy, but will need 5 more radiation treatments; patient unlikely able to go to radiation therapy 2/2 weakness and debilitation

## 2023-01-16 PROCEDURE — 99232 SBSQ HOSP IP/OBS MODERATE 35: CPT

## 2023-01-16 RX ADMIN — Medication 200 MILLIGRAM(S): at 22:28

## 2023-01-16 RX ADMIN — MUPIROCIN 1 APPLICATION(S): 20 OINTMENT TOPICAL at 06:04

## 2023-01-16 RX ADMIN — Medication 500 MILLIGRAM(S): at 11:55

## 2023-01-16 RX ADMIN — AMLODIPINE BESYLATE 10 MILLIGRAM(S): 2.5 TABLET ORAL at 06:04

## 2023-01-16 RX ADMIN — Medication 500 MILLIGRAM(S): at 12:08

## 2023-01-16 RX ADMIN — ATORVASTATIN CALCIUM 20 MILLIGRAM(S): 80 TABLET, FILM COATED ORAL at 22:28

## 2023-01-16 RX ADMIN — RIVAROXABAN 10 MILLIGRAM(S): KIT at 11:54

## 2023-01-16 RX ADMIN — FINASTERIDE 5 MILLIGRAM(S): 5 TABLET, FILM COATED ORAL at 11:54

## 2023-01-16 RX ADMIN — ZINC SULFATE TAB 220 MG (50 MG ZINC EQUIVALENT) 220 MILLIGRAM(S): 220 (50 ZN) TAB at 11:55

## 2023-01-16 RX ADMIN — Medication 1 APPLICATION(S): at 15:03

## 2023-01-16 RX ADMIN — PANTOPRAZOLE SODIUM 40 MILLIGRAM(S): 20 TABLET, DELAYED RELEASE ORAL at 11:54

## 2023-01-16 RX ADMIN — Medication 1 MILLIGRAM(S): at 11:54

## 2023-01-16 RX ADMIN — Medication 200 MILLIGRAM(S): at 06:05

## 2023-01-16 RX ADMIN — Medication 1 TABLET(S): at 11:54

## 2023-01-16 RX ADMIN — Medication 500 MILLIGRAM(S): at 13:07

## 2023-01-16 RX ADMIN — TAMSULOSIN HYDROCHLORIDE 0.4 MILLIGRAM(S): 0.4 CAPSULE ORAL at 22:28

## 2023-01-16 RX ADMIN — MUPIROCIN 1 APPLICATION(S): 20 OINTMENT TOPICAL at 17:52

## 2023-01-16 NOTE — PROGRESS NOTE ADULT - SUBJECTIVE AND OBJECTIVE BOX
S: Patient seen and examined at bedside. No acute events overnight. Appears comfortable    REVIEW OF SYSTEMS:  CONSTITUTIONAL: No weakness, fevers or chills  EYES: No visual changes  ENT: No vertigo or throat pain   NECK: No pain or stiffness  RESPIRATORY: No cough, wheezing, hemoptysis; No shortness of breath  CARDIOVASCULAR: No chest pain or palpitations  GASTROINTESTINAL: No abdominal or epigastric pain. No nausea, vomiting, or hematemesis; No diarrhea or constipation. No melena or hematochezia.  GENITOURINARY: No dysuria, frequency or hematuria  NEUROLOGICAL: No numbness or weakness  SKIN: No itching, rashes  PSYCH: No depression, anxiety    O:  Vital Signs Last 24 Hrs  T(C): 36.4 (14 Jan 2023 14:15), Max: 37.4 (13 Jan 2023 20:37)  T(F): 97.5 (14 Jan 2023 14:15), Max: 99.3 (13 Jan 2023 20:37)  HR: 85 (14 Jan 2023 14:15) (85 - 98)  BP: 107/69 (14 Jan 2023 14:15) (100/65 - 107/69)  BP(mean): --  RR: 18 (14 Jan 2023 14:15) (16 - 18)  SpO2: 100% (14 Jan 2023 14:15) (97% - 100%)    Parameters below as of 14 Jan 2023 14:15  Patient On (Oxygen Delivery Method): room air    GENERAL: NAD, cachectic  HEAD:  Atraumatic, Normocephalic  EYES: EOMI, anicteric sclera bilaterally  NECK: Dressing applied   CHEST/LUNG: Clear to auscultation bilaterally, symmetrical chest rise  HEART: Regular rate and rhythm; No murmurs, rubs, or gallops  ABDOMEN: Soft, Nontender, Nondistended; Bowel sounds present, PEG in place, site is CDI  EXTREMITIES:  2+ Peripheral Pulses, No clubbing, cyanosis, or edema  NEUROLOGY: A+Ox3, cranial nerves grossly intact, moves all extremities  SKIN: No rashes or lesions, dry, warm    acetaminophen    Suspension .. 500 milliGRAM(s) Oral every 6 hours PRN  amLODIPine   Tablet 10 milliGRAM(s) Oral daily  ascorbic acid 500 milliGRAM(s) Oral daily  atorvastatin 20 milliGRAM(s) Oral at bedtime  finasteride 5 milliGRAM(s) Oral daily  folic acid 1 milliGRAM(s) Oral daily  guaiFENesin Oral Liquid (Sugar-Free) 200 milliGRAM(s) Oral every 6 hours PRN  multivitamin/minerals 1 Tablet(s) Oral daily  mupirocin 2% Ointment 1 Application(s) Topical two times a day  pantoprazole   Suspension 40 milliGRAM(s) Oral daily  rivaroxaban 10 milliGRAM(s) Enteral Tube daily  silver sulfADIAZINE 1% Cream 1 Application(s) Topical daily  sodium chloride 0.9%. 1000 milliLiter(s) IV Continuous <Continuous>  tamsulosin 0.4 milliGRAM(s) Oral at bedtime  zinc sulfate 220 milliGRAM(s) Oral daily                            9.5    5.14  )-----------( 315      ( 13 Jan 2023 07:45 )             31.6       01-13    135  |  99  |  22<H>  ----------------------------<  136<H>  4.2   |  30  |  0.87    Ca    8.9      13 Jan 2023 07:45

## 2023-01-17 PROCEDURE — 99232 SBSQ HOSP IP/OBS MODERATE 35: CPT

## 2023-01-17 RX ADMIN — Medication 1 TABLET(S): at 12:02

## 2023-01-17 RX ADMIN — MUPIROCIN 1 APPLICATION(S): 20 OINTMENT TOPICAL at 06:57

## 2023-01-17 RX ADMIN — PANTOPRAZOLE SODIUM 40 MILLIGRAM(S): 20 TABLET, DELAYED RELEASE ORAL at 12:02

## 2023-01-17 RX ADMIN — ATORVASTATIN CALCIUM 20 MILLIGRAM(S): 80 TABLET, FILM COATED ORAL at 21:05

## 2023-01-17 RX ADMIN — Medication 1 MILLIGRAM(S): at 12:02

## 2023-01-17 RX ADMIN — AMLODIPINE BESYLATE 10 MILLIGRAM(S): 2.5 TABLET ORAL at 06:57

## 2023-01-17 RX ADMIN — TAMSULOSIN HYDROCHLORIDE 0.4 MILLIGRAM(S): 0.4 CAPSULE ORAL at 21:05

## 2023-01-17 RX ADMIN — Medication 200 MILLIGRAM(S): at 21:05

## 2023-01-17 RX ADMIN — RIVAROXABAN 10 MILLIGRAM(S): KIT at 12:02

## 2023-01-17 RX ADMIN — ZINC SULFATE TAB 220 MG (50 MG ZINC EQUIVALENT) 220 MILLIGRAM(S): 220 (50 ZN) TAB at 12:02

## 2023-01-17 RX ADMIN — MUPIROCIN 1 APPLICATION(S): 20 OINTMENT TOPICAL at 18:08

## 2023-01-17 RX ADMIN — Medication 1 APPLICATION(S): at 12:02

## 2023-01-17 RX ADMIN — Medication 500 MILLIGRAM(S): at 12:02

## 2023-01-17 RX ADMIN — FINASTERIDE 5 MILLIGRAM(S): 5 TABLET, FILM COATED ORAL at 12:02

## 2023-01-17 RX ADMIN — Medication 30 MILLILITER(S): at 12:26

## 2023-01-17 NOTE — PHYSICAL THERAPY INITIAL EVALUATION ADULT - GAIT DISTANCE, PT EVAL
+, s/s of fatigue. When moves impulsively or rapidly balance is impaired, and pt. educated to focus on taks at hand with appropriate lili/speed with activites. Pt was also offered to utilize be assessed w/rolling walker but he declined despite being educated on how it might improve his balance even more./200 feet
5 side steps

## 2023-01-17 NOTE — PROGRESS NOTE ADULT - NS ATTEND AMEND GEN_ALL_CORE FT
patient seen and examined first time today, c/o " heart burn" already on PPI, will add Maalox prn, no other complaints.   Currently patient not candidate for chemo and no plan for palliative RT. Will d/c Norvasc as BP in systolic 100 and 110s,  patient is on Xarelto as home medication  plan for dc to rehab

## 2023-01-17 NOTE — PHYSICAL THERAPY INITIAL EVALUATION ADULT - PLANNED THERAPY INTERVENTIONS, PT EVAL
balance training/gait training/neuromuscular re-education/ROM/strengthening
balance training/gait training/strengthening/transfer training

## 2023-01-17 NOTE — PHYSICAL THERAPY INITIAL EVALUATION ADULT - ORIENTATION, REHAB EVAL
January, but would not state date, even stating "what do I need to know the date for"./person/place/time
oriented to person, place, time and situation

## 2023-01-17 NOTE — PHYSICAL THERAPY INITIAL EVALUATION ADULT - ADDITIONAL COMMENTS
Patient used cane prior to last hospitalization.
-- Pt reports owning straight cane  -- rolling walker has been provided (which was bedside)  -- Pt reports ambulating in room and utilizing bathroom by himself at this time  -- He was able to tie gown and put on socks with independence

## 2023-01-17 NOTE — PHYSICAL THERAPY INITIAL EVALUATION ADULT - LIVES WITH, PROFILE
with roommate. Renting room in an apt with an elevator
roomate, in apartment elevator present states no stairs required to access

## 2023-01-17 NOTE — PHYSICAL THERAPY INITIAL EVALUATION ADULT - DIAGNOSIS, PT EVAL
Declined functional status.
(ICF Model) Pt. present w/deficits in Body Structures/Function (Impairments), incl: Strength, Balance, cognition, skin integ. , leading to deficits in performing the below noted Activities (Limitations).

## 2023-01-17 NOTE — PHYSICAL THERAPY INITIAL EVALUATION ADULT - PATIENT PROFILE REVIEW, REHAB EVAL
Limited assessment secondary to impaired ability to follow commands: this is a re-evaluation/yes
yes

## 2023-01-17 NOTE — PROGRESS NOTE ADULT - SUBJECTIVE AND OBJECTIVE BOX
NP Note discussed with  primary attending    Patient is a 77y old  Male who presents with a chief complaint of Weakness (16 Jan 2023 15:47)      INTERVAL HPI/OVERNIGHT EVENTS: Preferred to sleep and did not want to be disturbed.    MEDICATIONS  (STANDING):  ascorbic acid 500 milliGRAM(s) Oral daily  atorvastatin 20 milliGRAM(s) Oral at bedtime  finasteride 5 milliGRAM(s) Oral daily  folic acid 1 milliGRAM(s) Oral daily  multivitamin/minerals 1 Tablet(s) Oral daily  mupirocin 2% Ointment 1 Application(s) Topical two times a day  pantoprazole   Suspension 40 milliGRAM(s) Oral daily  rivaroxaban 10 milliGRAM(s) Enteral Tube daily  silver sulfADIAZINE 1% Cream 1 Application(s) Topical daily  sodium chloride 0.9%. 1000 milliLiter(s) (100 mL/Hr) IV Continuous <Continuous>  tamsulosin 0.4 milliGRAM(s) Oral at bedtime  zinc sulfate 220 milliGRAM(s) Oral daily    MEDICATIONS  (PRN):  acetaminophen    Suspension .. 500 milliGRAM(s) Oral every 6 hours PRN Temp greater or equal to 38C (100.4F), Mild Pain (1 - 3), Moderate Pain (4 - 6)  aluminum hydroxide/magnesium hydroxide/simethicone Suspension 30 milliLiter(s) Oral every 6 hours PRN Dyspepsia  guaiFENesin Oral Liquid (Sugar-Free) 200 milliGRAM(s) Oral every 6 hours PRN Cough      __________________________________________________  REVIEW OF SYSTEMS: Limited exam d/t pt's participation     CONSTITUTIONAL: No fever  EYES: No acute visual disturbances  NECK: No pain or stiffness  RESPIRATORY: No cough; No shortness of breath  CARDIOVASCULAR: No chest pain, no palpitations  GASTROINTESTINAL: No pain. No nausea or vomiting.  No diarrhea   NEUROLOGICAL: No headache or numbness, no tremors  MUSCULOSKELETAL: No joint pain, no muscle pain  GENITOURINARY: No dysuria, no frequency, no hesitancy  PSYCHIATRY: No depression , no anxiety  ALL OTHER  ROS negative      Vital Signs Last 24 Hrs  T(C): 36.7 (17 Jan 2023 05:05), Max: 37.3 (16 Jan 2023 20:17)  T(F): 98 (17 Jan 2023 05:05), Max: 99.2 (16 Jan 2023 20:17)  HR: 114 (17 Jan 2023 11:48) (90 - 114)  BP: 94/70 (17 Jan 2023 11:48) (94/70 - 112/65)  RR: 18 (17 Jan 2023 05:05) (18 - 18)  SpO2: 99% (17 Jan 2023 11:48) (95% - 100%)    Parameters below as of 17 Jan 2023 11:48  Patient On (Oxygen Delivery Method): room air        ________________________________________________  PHYSICAL EXAM:  Limited exam d/t pt's participation   GENERAL: NAD  HEENT: Normocephalic;  conjunctivae and sclerae clear; moist mucous membranes.  (+) Neck wound, dsg intact  NECK : Supple  CHEST/LUNG: Clear to auscultation bilaterally with good air entry   HEART: S1 S2  regular; no murmurs, gallops or rubs  ABDOMEN: Soft, Nontender, Nondistended; Bowel sounds present x 4 quad  (+) PEG   EXTREMITIES: No cyanosis; no edema; no calf tenderness  SKIN: Warm and dry; no rash  NERVOUS SYSTEM:  Awake and alert; Oriented to place, person and time; no new deficits  _________________________________________________  LABS:              CAPILLARY BLOOD GLUCOSE            RADIOLOGY & ADDITIONAL TESTS:    Imaging Personally Reviewed:  YES    Consultant(s) Notes Reviewed:   YES    Care Discussed with Consultants :     Plan of care was discussed with patient and /or primary care giver; all questions and concerns were addressed and care was aligned with patient's wishes.

## 2023-01-17 NOTE — PHYSICAL THERAPY INITIAL EVALUATION ADULT - STANDING BALANCE: DYNAMIC, REHAB EVAL
with walker/fair minus
Good - w/ straight cane. (Pt not agreeable to be assessed with rolling walker despite education)

## 2023-01-17 NOTE — PHYSICAL THERAPY INITIAL EVALUATION ADULT - MANUAL MUSCLE TESTING RESULTS, REHAB EVAL
BUE 4+/5. Pt would not cooperate for formal mmt testing at [facility] but is at least 3/5 functionally., able to SLR without quad lag. Neck  to ~ neutral extension but pt. does not wish to extend further as he doesn't wish to stretch scab site (despite education)
3-3plus/5 grossly graded

## 2023-01-17 NOTE — PROGRESS NOTE ADULT - ASSESSMENT
77 year old male from home with PMH of HTN, HLD, BPH, oropharyngeal mass s/p PEG tube/resection, on radiation and chemo (last dose of chemo was last week).  Admitted for Weakness.      Hospital course complicated with fevers and tachycardia and infectious work up was negative.     Hospital course further complicated by malfunctioning PEG tube.  GI Dr. Waddell consulted.   PEG tube functioning without intervention.    Discharge complicated by updated PT which patient has no need for [facility] and recommended for Home PT.  Family concern about safe discharge. [facility]  SW & CM following.  D/C planning

## 2023-01-17 NOTE — PHYSICAL THERAPY INITIAL EVALUATION ADULT - IMPAIRMENTS FOUND, PT EVAL
aerobic capacity/endurance/gait, locomotion, and balance/muscle strength
gait, locomotion, and balance/integumentary integrity/muscle strength

## 2023-01-17 NOTE — PHYSICAL THERAPY INITIAL EVALUATION ADULT - LEVEL OF INDEPENDENCE: STAIR NEGOTIATION, REHAB EVAL
Pt deferred, then post final vitals eventually agreed but stated he had some dizziness afte rtaking a few steps and sat down at EOB with improvement. May assess at future session, though pt states he does not requiree to access his residence

## 2023-01-17 NOTE — PHYSICAL THERAPY INITIAL EVALUATION ADULT - PERTINENT HX OF CURRENT PROBLEM, REHAB EVAL
Copy received from patient care    
Per chart: 77y M with PMH of HTN, HLD, BPH, intracranial aneurysm, oropharyngeal SCC s/p resection/PEG tube placement, on radiation/chemo, admitted with generalized weakness. Hospital course complicated by fevers and tachycardia...
77 year old male from home with PMH of HTN, HLD, BPH, oropharyngeal mass s/p PEG tube/resection, on radiation and chemo (last dose of chemo was last week) presenting to the ED with weakness. Patient has lost 60 lbs in last 6 months.  Recent admission 12/23-29: sepsis  2/2 COVID and neutropenic fever.  PEG tube was replaced by GI during last admission.

## 2023-01-17 NOTE — PHYSICAL THERAPY INITIAL EVALUATION ADULT - PERSONAL SAFETY AND JUDGMENT, REHAB EVAL
impulsive with activities and non-complaint at times with PT guidance/at risk behaviors demonstrated
intact

## 2023-01-17 NOTE — PHYSICAL THERAPY INITIAL EVALUATION ADULT - NSPTDISCHREC_GEN_A_CORE
Home PT level services (i.e 2-3 x week). Pt at this time wilma not required inpatient rehab level services to achieve goals and specificity of training in his home environment is favorable. May benefit from gross home support.
Sub-acute Rehab

## 2023-01-17 NOTE — PHYSICAL THERAPY INITIAL EVALUATION ADULT - CRITERIA FOR SKILLED THERAPEUTIC INTERVENTIONS
impairments found/functional limitations in following categories/risk reduction/prevention/therapy frequency/predicted duration of therapy intervention
impairments found/functional limitations in following categories/risk reduction/prevention/rehab potential/therapy frequency/predicted duration of therapy intervention/anticipated discharge recommendation

## 2023-01-17 NOTE — PHYSICAL THERAPY INITIAL EVALUATION ADULT - GENERAL OBSERVATIONS, REHAB EVAL
Patient received in ED holding. PEG tube in place.
Consult received, chart reviewed. Patient received supine in bed, NAD, +PEG (disconnected by RN). Patient eventually  agreed to REEVALUATION from Physical Therapist after education.

## 2023-01-18 PROCEDURE — 99232 SBSQ HOSP IP/OBS MODERATE 35: CPT

## 2023-01-18 RX ADMIN — Medication 1 APPLICATION(S): at 11:12

## 2023-01-18 RX ADMIN — Medication 1 MILLIGRAM(S): at 11:11

## 2023-01-18 RX ADMIN — FINASTERIDE 5 MILLIGRAM(S): 5 TABLET, FILM COATED ORAL at 11:11

## 2023-01-18 RX ADMIN — RIVAROXABAN 10 MILLIGRAM(S): KIT at 11:12

## 2023-01-18 RX ADMIN — PANTOPRAZOLE SODIUM 40 MILLIGRAM(S): 20 TABLET, DELAYED RELEASE ORAL at 11:11

## 2023-01-18 RX ADMIN — Medication 500 MILLIGRAM(S): at 11:11

## 2023-01-18 RX ADMIN — TAMSULOSIN HYDROCHLORIDE 0.4 MILLIGRAM(S): 0.4 CAPSULE ORAL at 22:06

## 2023-01-18 RX ADMIN — ATORVASTATIN CALCIUM 20 MILLIGRAM(S): 80 TABLET, FILM COATED ORAL at 22:06

## 2023-01-18 RX ADMIN — Medication 200 MILLIGRAM(S): at 22:24

## 2023-01-18 RX ADMIN — ZINC SULFATE TAB 220 MG (50 MG ZINC EQUIVALENT) 220 MILLIGRAM(S): 220 (50 ZN) TAB at 11:11

## 2023-01-18 RX ADMIN — Medication 1 TABLET(S): at 11:11

## 2023-01-18 NOTE — PROGRESS NOTE ADULT - PROBLEM SELECTOR PLAN 8
- Patient from home, unable to care for self at home according to family   - SW following-reported facility requested letter from Attending stating that pt would not continue chemo or radiation while at facility.    - Speech and swallow pending-f/u results   - Attending reported family interested in PET scan outpt

## 2023-01-18 NOTE — PROGRESS NOTE ADULT - NS ATTEND AMEND GEN_ALL_CORE FT
care plan d.w patient and his Niece Ms Radha Sierra over the phone. Patient and family does not want radiation till they get repeat PET scan- I have reached out to out patient Rad-onc to discuss the plan. Patient also want repeat speech eval as wants to eat- order placed  - they agree for long term placement- sw will send the referral   -BP controlled- after stopping norvasc,   c.w rest of medical mx

## 2023-01-18 NOTE — PROGRESS NOTE ADULT - ASSESSMENT
77 year old male from home with PMH of HTN, HLD, BPH, oropharyngeal mass s/p PEG tube/resection, on radiation and chemo (last dose of chemo was last week).  Admitted for Weakness.      Hospital course complicated with fevers and tachycardia and infectious work up was negative.     Hospital course further complicated by malfunctioning PEG tube.  GI Dr. Waddell consulted.   PEG tube functioning without intervention.    Discharge complicated by updated PT which patient has no need for [facility] and recommended for Home PT.  Family concern about safe discharge. [facility]  [facility] & CM following.  D/C planning

## 2023-01-18 NOTE — PROGRESS NOTE ADULT - SUBJECTIVE AND OBJECTIVE BOX
NP Note discussed with  primary attending    Patient is a 77y old  Male who presents with a chief complaint of Weakness (17 Jan 2023 18:04)      INTERVAL HPI/OVERNIGHT EVENTS: no new complaints    MEDICATIONS  (STANDING):  ascorbic acid 500 milliGRAM(s) Oral daily  atorvastatin 20 milliGRAM(s) Oral at bedtime  finasteride 5 milliGRAM(s) Oral daily  folic acid 1 milliGRAM(s) Oral daily  multivitamin/minerals 1 Tablet(s) Oral daily  mupirocin 2% Ointment 1 Application(s) Topical two times a day  pantoprazole   Suspension 40 milliGRAM(s) Oral daily  rivaroxaban 10 milliGRAM(s) Enteral Tube daily  silver sulfADIAZINE 1% Cream 1 Application(s) Topical daily  sodium chloride 0.9%. 1000 milliLiter(s) (100 mL/Hr) IV Continuous <Continuous>  tamsulosin 0.4 milliGRAM(s) Oral at bedtime  zinc sulfate 220 milliGRAM(s) Oral daily    MEDICATIONS  (PRN):  acetaminophen    Suspension .. 500 milliGRAM(s) Oral every 6 hours PRN Temp greater or equal to 38C (100.4F), Mild Pain (1 - 3), Moderate Pain (4 - 6)  aluminum hydroxide/magnesium hydroxide/simethicone Suspension 30 milliLiter(s) Oral every 6 hours PRN Dyspepsia  guaiFENesin Oral Liquid (Sugar-Free) 200 milliGRAM(s) Oral every 6 hours PRN Cough      __________________________________________________  REVIEW OF SYSTEMS:    CONSTITUTIONAL: No fever  EYES: No acute visual disturbances  NECK: No pain or stiffness  RESPIRATORY: No cough; No shortness of breath  CARDIOVASCULAR: No chest pain, no palpitations  GASTROINTESTINAL: No pain. No nausea or vomiting.  No diarrhea   NEUROLOGICAL: No headache or numbness, no tremors  MUSCULOSKELETAL: No joint pain, no muscle pain  GENITOURINARY: No dysuria, no frequency, no hesitancy  PSYCHIATRY: No depression , no anxiety  ALL OTHER  ROS negative        Vital Signs Last 24 Hrs  T(C): 36.8 (18 Jan 2023 05:45), Max: 36.9 (17 Jan 2023 20:55)  T(F): 98.2 (18 Jan 2023 05:45), Max: 98.4 (17 Jan 2023 20:55)  HR: 87 (18 Jan 2023 05:45) (87 - 114)  BP: 99/66 (18 Jan 2023 05:45) (94/70 - 99/66)  BP(mean): --  RR: 18 (18 Jan 2023 05:45) (18 - 18)  SpO2: 98% (18 Jan 2023 05:45) (96% - 100%)    Parameters below as of 18 Jan 2023 05:45  Patient On (Oxygen Delivery Method): room air        ________________________________________________  PHYSICAL EXAM:  GENERAL: NAD  HEENT: Normocephalic;  conjunctivae and sclerae clear; moist mucous membranes   NECK : Supple  CHEST/LUNG: Clear to auscultation bilaterally with good air entry   HEART: S1 S2  regular; no murmurs, gallops or rubs  ABDOMEN: Soft, Nontender, Nondistended; Bowel sounds present x 4 quad  EXTREMITIES: No cyanosis; no edema; no calf tenderness  SKIN: Warm and dry; no rash  NERVOUS SYSTEM:  Awake and alert; Oriented to place, person and time; no new deficits    _________________________________________________  LABS:              CAPILLARY BLOOD GLUCOSE            RADIOLOGY & ADDITIONAL TESTS:    Imaging Personally Reviewed:  YES/NO    Consultant(s) Notes Reviewed:   YES/ No    Care Discussed with Consultants :     Plan of care was discussed with patient and /or primary care giver; all questions and concerns were addressed and care was aligned with patient's wishes.     ++++++++++++++++++++INCOMPLETE+++++++++++++++++++++++++++++++++++++++++++    NP Note discussed with  primary attending    Patient is a 77y old  Male who presents with a chief complaint of Weakness (17 Jan 2023 18:04)      INTERVAL HPI/OVERNIGHT EVENTS: no new complaints    MEDICATIONS  (STANDING):  ascorbic acid 500 milliGRAM(s) Oral daily  atorvastatin 20 milliGRAM(s) Oral at bedtime  finasteride 5 milliGRAM(s) Oral daily  folic acid 1 milliGRAM(s) Oral daily  multivitamin/minerals 1 Tablet(s) Oral daily  mupirocin 2% Ointment 1 Application(s) Topical two times a day  pantoprazole   Suspension 40 milliGRAM(s) Oral daily  rivaroxaban 10 milliGRAM(s) Enteral Tube daily  silver sulfADIAZINE 1% Cream 1 Application(s) Topical daily  sodium chloride 0.9%. 1000 milliLiter(s) (100 mL/Hr) IV Continuous <Continuous>  tamsulosin 0.4 milliGRAM(s) Oral at bedtime  zinc sulfate 220 milliGRAM(s) Oral daily    MEDICATIONS  (PRN):  acetaminophen    Suspension .. 500 milliGRAM(s) Oral every 6 hours PRN Temp greater or equal to 38C (100.4F), Mild Pain (1 - 3), Moderate Pain (4 - 6)  aluminum hydroxide/magnesium hydroxide/simethicone Suspension 30 milliLiter(s) Oral every 6 hours PRN Dyspepsia  guaiFENesin Oral Liquid (Sugar-Free) 200 milliGRAM(s) Oral every 6 hours PRN Cough      __________________________________________________  REVIEW OF SYSTEMS:    CONSTITUTIONAL: No fever  EYES: No acute visual disturbances  NECK: No pain or stiffness  RESPIRATORY: No cough; No shortness of breath  CARDIOVASCULAR: No chest pain, no palpitations  GASTROINTESTINAL: No pain. No nausea or vomiting.  No diarrhea   NEUROLOGICAL: No headache or numbness, no tremors  MUSCULOSKELETAL: No joint pain, no muscle pain  GENITOURINARY: No dysuria, no frequency, no hesitancy  PSYCHIATRY: No depression , no anxiety  ALL OTHER  ROS negative        Vital Signs Last 24 Hrs  T(C): 36.8 (18 Jan 2023 05:45), Max: 36.9 (17 Jan 2023 20:55)  T(F): 98.2 (18 Jan 2023 05:45), Max: 98.4 (17 Jan 2023 20:55)  HR: 87 (18 Jan 2023 05:45) (87 - 114)  BP: 99/66 (18 Jan 2023 05:45) (94/70 - 99/66)  BP(mean): --  RR: 18 (18 Jan 2023 05:45) (18 - 18)  SpO2: 98% (18 Jan 2023 05:45) (96% - 100%)    Parameters below as of 18 Jan 2023 05:45  Patient On (Oxygen Delivery Method): room air        ________________________________________________  PHYSICAL EXAM:  GENERAL: NAD  HEENT: Normocephalic;  conjunctivae and sclerae clear; moist mucous membranes   NECK : Supple  CHEST/LUNG: Clear to auscultation bilaterally with good air entry   HEART: S1 S2  regular; no murmurs, gallops or rubs  ABDOMEN: Soft, Nontender, Nondistended; Bowel sounds present x 4 quad  EXTREMITIES: No cyanosis; no edema; no calf tenderness  SKIN: Warm and dry; no rash  NERVOUS SYSTEM:  Awake and alert; Oriented to place, person and time; no new deficits    _________________________________________________  LABS:              CAPILLARY BLOOD GLUCOSE            RADIOLOGY & ADDITIONAL TESTS:    Imaging Personally Reviewed:  YES/NO    Consultant(s) Notes Reviewed:   YES/ No    Care Discussed with Consultants :     Plan of care was discussed with patient and /or primary care giver; all questions and concerns were addressed and care was aligned with patient's wishes.     NP Note discussed with  primary attending    Patient is a 77y old  Male who presents with a chief complaint of Weakness (17 Jan 2023 18:04)      INTERVAL HPI/OVERNIGHT EVENTS: no new complaints    MEDICATIONS  (STANDING):  ascorbic acid 500 milliGRAM(s) Oral daily  atorvastatin 20 milliGRAM(s) Oral at bedtime  finasteride 5 milliGRAM(s) Oral daily  folic acid 1 milliGRAM(s) Oral daily  multivitamin/minerals 1 Tablet(s) Oral daily  mupirocin 2% Ointment 1 Application(s) Topical two times a day  pantoprazole   Suspension 40 milliGRAM(s) Oral daily  rivaroxaban 10 milliGRAM(s) Enteral Tube daily  silver sulfADIAZINE 1% Cream 1 Application(s) Topical daily  sodium chloride 0.9%. 1000 milliLiter(s) (100 mL/Hr) IV Continuous <Continuous>  tamsulosin 0.4 milliGRAM(s) Oral at bedtime  zinc sulfate 220 milliGRAM(s) Oral daily    MEDICATIONS  (PRN):  acetaminophen    Suspension .. 500 milliGRAM(s) Oral every 6 hours PRN Temp greater or equal to 38C (100.4F), Mild Pain (1 - 3), Moderate Pain (4 - 6)  aluminum hydroxide/magnesium hydroxide/simethicone Suspension 30 milliLiter(s) Oral every 6 hours PRN Dyspepsia  guaiFENesin Oral Liquid (Sugar-Free) 200 milliGRAM(s) Oral every 6 hours PRN Cough      __________________________________________________  REVIEW OF SYSTEMS: Limited exam d/t pt's participation     CONSTITUTIONAL: No fever  EYES: No acute visual disturbances  NECK: No pain or stiffness  RESPIRATORY: No cough; No shortness of breath  CARDIOVASCULAR: No chest pain, no palpitations  GASTROINTESTINAL: No pain. No nausea or vomiting.  No diarrhea   NEUROLOGICAL: No headache or numbness, no tremors  MUSCULOSKELETAL: No joint pain, no muscle pain  GENITOURINARY: No dysuria, no frequency, no hesitancy  PSYCHIATRY: No depression , no anxiety  ALL OTHER  ROS negative        Vital Signs Last 24 Hrs  T(C): 36.8 (18 Jan 2023 05:45), Max: 36.9 (17 Jan 2023 20:55)  T(F): 98.2 (18 Jan 2023 05:45), Max: 98.4 (17 Jan 2023 20:55)  HR: 87 (18 Jan 2023 05:45) (87 - 114)  BP: 99/66 (18 Jan 2023 05:45) (94/70 - 99/66)  RR: 18 (18 Jan 2023 05:45) (18 - 18)  SpO2: 98% (18 Jan 2023 05:45) (96% - 100%)    Parameters below as of 18 Jan 2023 05:45  Patient On (Oxygen Delivery Method): room air        ________________________________________________  PHYSICAL EXAM:  Limited exam d/t pt's participation   GENERAL: NAD  HEENT: Normocephalic;  conjunctivae and sclerae clear; moist mucous membranes.  (+) Neck wound, dsg intact  NECK : Supple  CHEST/LUNG: Clear to auscultation bilaterally with good air entry   HEART: S1 S2  regular; no murmurs, gallops or rubs  ABDOMEN: Soft, Nontender, Nondistended; Bowel sounds present x 4 quad  (+) PEG   EXTREMITIES: No cyanosis; no edema; no calf tenderness  SKIN: Warm and dry; no rash  NERVOUS SYSTEM:  Awake and alert; Oriented to place, person and time; no new deficits  _________________________________________________  LABS:              CAPILLARY BLOOD GLUCOSE            RADIOLOGY & ADDITIONAL TESTS:    Imaging Personally Reviewed:  YES    Consultant(s) Notes Reviewed:   YES    Care Discussed with Consultants :     Plan of care was discussed with patient and /or primary care giver; all questions and concerns were addressed and care was aligned with patient's wishes.

## 2023-01-18 NOTE — CHART NOTE - NSCHARTNOTEFT_GEN_A_CORE
Reassessment:     Patient is a 77y old  Male who presents with a chief complaint of Weakness (2023 09:35)      Factors impacting intake: [ ] none [ ] nausea  [ ] vomiting [ ] diarrhea [ ] constipation  [ ]chewing problems [ ] swallowing issues  [X ] other: HTN, HLD, BPH, oropharyngeal mass s/p PEG tube/resection, on radiation and chemo     Diet Prescription: Diet, NPO with Tube Feed:   Tube Feeding Modality: Gastrostomy  Glucerna 1.5 Earl  Total Volume for 24 Hours (mL): 960  Continuous  Starting Tube Feed Rate {mL per Hour}: 20  Increase Tube Feed Rate by (mL): 10     Every 6 hours  Until Goal Tube Feed Rate (mL per Hour): 60  Tube Feed Duration (in Hours): 16  Tube Feed Start Time: 06:00  Tube Feed Stop Time: 22:00  Free Water Flush  Bolus   Total Volume per Flush (mL): 250   Frequency: Every 6 Hours    Start Time: 16:00 (23 @ 10:52)    Intake: Patient visited, alert & "refused to speak with RD" became "verbally abusive", d/w RN, tube feeding Glucerna 1.5 Kcal infusing at goal - 60ml/hr, tolerating & meeting required nutrient needs,  no reports of GI distress, awaiting placement? rec. continue with Glucerna 1.5 Kcal goal at 60ml/hr x 16hrs (providing 960ml, 1440kcal, 79g protein, 729ml water at goal) NP/team to adjust free water as needed. RD available.     Daily Weight in k.5 (2023 05:45)    % Weight Change: weight fluctuation possible due fluid shifts      Pertinent Medications: MEDICATIONS  (STANDING):  ascorbic acid 500 milliGRAM(s) Oral daily  atorvastatin 20 milliGRAM(s) Oral at bedtime  finasteride 5 milliGRAM(s) Oral daily  folic acid 1 milliGRAM(s) Oral daily  multivitamin/minerals 1 Tablet(s) Oral daily  mupirocin 2% Ointment 1 Application(s) Topical two times a day  pantoprazole   Suspension 40 milliGRAM(s) Oral daily  rivaroxaban 10 milliGRAM(s) Enteral Tube daily  silver sulfADIAZINE 1% Cream 1 Application(s) Topical daily  sodium chloride 0.9%. 1000 milliLiter(s) (100 mL/Hr) IV Continuous <Continuous>  tamsulosin 0.4 milliGRAM(s) Oral at bedtime  zinc sulfate 220 milliGRAM(s) Oral daily    MEDICATIONS  (PRN):  acetaminophen    Suspension .. 500 milliGRAM(s) Oral every 6 hours PRN Temp greater or equal to 38C (100.4F), Mild Pain (1 - 3), Moderate Pain (4 - 6)  aluminum hydroxide/magnesium hydroxide/simethicone Suspension 30 milliLiter(s) Oral every 6 hours PRN Dyspepsia  guaiFENesin Oral Liquid (Sugar-Free) 200 milliGRAM(s) Oral every 6 hours PRN Cough    Pertinent Labs:      CAPILLARY BLOOD GLUCOSE      Skin: neck wound care    Estimated Needs:   [X ] no change since previous assessment  [ ] recalculated:     Previous Nutrition Diagnosis:   [ ] Inadequate Energy Intake [ ]Inadequate Oral Intake [ ] Excessive Energy Intake   [ ] Underweight [ ] Increased Nutrient Needs [ ] Overweight/Obesity   [ ] Altered GI Function [ ] Unintended Weight Loss [ ] Food & Nutrition Related Knowledge Deficit [Severe] Malnutrition     Nutrition Diagnosis is [X ] ongoing  [ ] resolved [ ] not applicable     New Nutrition Diagnosis: [ ] not applicable     Interventions: Optimal nutrition meeting >75% of energy nutrient needs via tube feeding route     Recommend  [ ] Change Diet To:  [ ] Nutrition Supplement  [ ] Nutrition Support  [ X] Other: c/w MVI/minerals/Folic Acid/Zinc Sulfate    Monitoring and Evaluation:   [ ] PO intake [ x ] Tolerance to diet prescription [ x ] weights [ x ] labs[ x ] follow up per protocol  [ ] other:

## 2023-01-19 ENCOUNTER — APPOINTMENT (OUTPATIENT)
Dept: NEUROLOGY | Facility: CLINIC | Age: 78
End: 2023-01-19

## 2023-01-19 PROCEDURE — 99232 SBSQ HOSP IP/OBS MODERATE 35: CPT

## 2023-01-19 RX ADMIN — ZINC SULFATE TAB 220 MG (50 MG ZINC EQUIVALENT) 220 MILLIGRAM(S): 220 (50 ZN) TAB at 12:19

## 2023-01-19 RX ADMIN — Medication 1 MILLIGRAM(S): at 12:19

## 2023-01-19 RX ADMIN — FINASTERIDE 5 MILLIGRAM(S): 5 TABLET, FILM COATED ORAL at 12:19

## 2023-01-19 RX ADMIN — Medication 500 MILLIGRAM(S): at 12:18

## 2023-01-19 RX ADMIN — MUPIROCIN 1 APPLICATION(S): 20 OINTMENT TOPICAL at 06:15

## 2023-01-19 RX ADMIN — ATORVASTATIN CALCIUM 20 MILLIGRAM(S): 80 TABLET, FILM COATED ORAL at 21:12

## 2023-01-19 RX ADMIN — TAMSULOSIN HYDROCHLORIDE 0.4 MILLIGRAM(S): 0.4 CAPSULE ORAL at 21:12

## 2023-01-19 RX ADMIN — Medication 500 MILLIGRAM(S): at 22:45

## 2023-01-19 RX ADMIN — Medication 500 MILLIGRAM(S): at 21:11

## 2023-01-19 RX ADMIN — RIVAROXABAN 10 MILLIGRAM(S): KIT at 12:19

## 2023-01-19 RX ADMIN — Medication 1 APPLICATION(S): at 12:27

## 2023-01-19 RX ADMIN — PANTOPRAZOLE SODIUM 40 MILLIGRAM(S): 20 TABLET, DELAYED RELEASE ORAL at 12:19

## 2023-01-19 RX ADMIN — Medication 1 TABLET(S): at 12:19

## 2023-01-19 NOTE — PROGRESS NOTE ADULT - ASSESSMENT
77 year old male from home with PMH of HTN, HLD, BPH, oropharyngeal mass s/p PEG tube/resection, on radiation and chemo (last dose of chemo was last week) presenting to the ED with weakness. Patient reports he was recently admitted for COVID and fever, and upon discharge home he had significant weakness in his legs and was unable to walk. Otherwise denies further complaints. Reports 60 lb weight loss over 6 months, Denies chest pain, sob, fever, chills, n/v/d, changes in urinary or bowel habits, focal weakness    Recent admission 12/23-29: sepsis  2/2 COVID and neutropenic fever. treated with cefepime., 1 u pRBC, PEG tube was replaced by GI.    77 year old male from home with PMH of HTN, HLD, BPH, oropharyngeal mass s/p PEG tube/resection, on radiation and chemo (last dose of chemo was last week) presenting to the ED with weakness. Patient reports he was recently admitted for COVID and fever, and upon discharge home he had significant weakness in his legs and was unable to walk. Otherwise denies further complaints. Reports 60 lb weight loss over 6 months, Denies chest pain, sob, fever, chills, n/v/d, changes in urinary or bowel habits, focal weakness     Recent admission 12/23-29: sepsis  2/2 COVID and neutropenic fever. treated with cefepime., 1 u pRBC, PEG tube was replaced by GI.

## 2023-01-19 NOTE — PROGRESS NOTE ADULT - PROBLEM SELECTOR PLAN 6
- H/o Oral mass on radiation and chemo (last dose of chemo was last week), follows up with Dr. Segundo Velasquez.  - C/w Wound care    - Pt to f/u w/ Heme/Onc outpt -   History of  oral mass on radiation and chemo (last dose of chemo was last week), follows up with Dr. Segundo Velasquez.  -   Continue with Wound care to site   -   Patient no longer wishes to continue with radiation therapy  -   Plan to discharge to LTC with oncology follow up

## 2023-01-19 NOTE — PROGRESS NOTE ADULT - SUBJECTIVE AND OBJECTIVE BOX
NP Note     NP Note discussed with  Primary Attending    Patient is a 77y old  Male who presents with a chief complaint of Weakness (18 Jan 2023 09:35)      INTERVAL HPI / OVERNIGHT EVENTS: no new complaints    MEDICATIONS  (STANDING):  ascorbic acid 500 milliGRAM(s) Oral daily  atorvastatin 20 milliGRAM(s) Oral at bedtime  finasteride 5 milliGRAM(s) Oral daily  folic acid 1 milliGRAM(s) Oral daily  multivitamin/minerals 1 Tablet(s) Oral daily  mupirocin 2% Ointment 1 Application(s) Topical two times a day  pantoprazole   Suspension 40 milliGRAM(s) Oral daily  rivaroxaban 10 milliGRAM(s) Enteral Tube daily  silver sulfADIAZINE 1% Cream 1 Application(s) Topical daily  tamsulosin 0.4 milliGRAM(s) Oral at bedtime  zinc sulfate 220 milliGRAM(s) Oral daily    MEDICATIONS  (PRN):  acetaminophen    Suspension .. 500 milliGRAM(s) Oral every 6 hours PRN Temp greater or equal to 38C (100.4F), Mild Pain (1 - 3), Moderate Pain (4 - 6)  aluminum hydroxide/magnesium hydroxide/simethicone Suspension 30 milliLiter(s) Oral every 6 hours PRN Dyspepsia  guaiFENesin Oral Liquid (Sugar-Free) 200 milliGRAM(s) Oral every 6 hours PRN Cough      __________________________________________________  REVIEW OF SYSTEMS:    CONSTITUTIONAL: No fever,   EYES: no acute visual disturbances  NECK: No pain or stiffness  RESPIRATORY: No cough; No shortness of breath  CARDIOVASCULAR: No chest pain, no palpitations  GASTROINTESTINAL: No pain. No nausea or vomiting; No diarrhea   NEUROLOGICAL: No headache or numbness, no tremors  MUSCULOSKELETAL: No joint pain, no muscle pain  GENITOURINARY: no dysuria, no frequency, no hesitancy  PSYCHIATRY: no depression , no anxiety  ALL OTHER  ROS negative        Vital Signs Last 24 Hrs  T(C): 36.9 (19 Jan 2023 05:53), Max: 37.3 (18 Jan 2023 21:59)  T(F): 98.4 (19 Jan 2023 05:53), Max: 99.2 (18 Jan 2023 21:59)  HR: 96 (19 Jan 2023 05:53) (87 - 104)  BP: 116/65 (19 Jan 2023 05:53) (110/74 - 116/65)  BP(mean): --  RR: 17 (19 Jan 2023 05:53) (17 - 18)  SpO2: 98% (19 Jan 2023 05:53) (98% - 99%)    Parameters below as of 19 Jan 2023 05:53  Patient On (Oxygen Delivery Method): room air        ________________________________________________  PHYSICAL EXAM:  GENERAL: NAD  HEENT: Normocephalic;  conjunctivae and sclerae clear; moist mucous membranes;   NECK : supple  CHEST/LUNG: Clear to auscultation bilaterally with good air entry   HEART: S1 S2  regular; no murmurs, gallops or rubs  ABDOMEN: Soft, Nontender, Nondistended; Bowel sounds present  EXTREMITIES: no cyanosis; no edema; no calf tenderness  SKIN: warm and dry; no rash  NERVOUS SYSTEM:  Awake and alert; Oriented  to place, person and time ; no new deficits    _________________________________________________  LABS:              CAPILLARY BLOOD GLUCOSE            RADIOLOGY & ADDITIONAL TESTS:    Imaging  Reviewed:  YES/NO    Consultant(s) Notes Reviewed:   YES/ No      Plan of care was discussed with patient and /or primary care giver; all questions and concerns were addressed  NP Note     NP Note discussed with  Primary Attending    Patient is a 77y old  Male who presents with a chief complaint of Weakness (18 Jan 2023 09:35)      INTERVAL HPI / OVERNIGHT EVENTS: no new complaints    MEDICATIONS  (STANDING):  ascorbic acid 500 milliGRAM(s) Oral daily  atorvastatin 20 milliGRAM(s) Oral at bedtime  finasteride 5 milliGRAM(s) Oral daily  folic acid 1 milliGRAM(s) Oral daily  multivitamin/minerals 1 Tablet(s) Oral daily  mupirocin 2% Ointment 1 Application(s) Topical two times a day  pantoprazole   Suspension 40 milliGRAM(s) Oral daily  rivaroxaban 10 milliGRAM(s) Enteral Tube daily  silver sulfADIAZINE 1% Cream 1 Application(s) Topical daily  tamsulosin 0.4 milliGRAM(s) Oral at bedtime  zinc sulfate 220 milliGRAM(s) Oral daily    MEDICATIONS  (PRN):  acetaminophen    Suspension .. 500 milliGRAM(s) Oral every 6 hours PRN Temp greater or equal to 38C (100.4F), Mild Pain (1 - 3), Moderate Pain (4 - 6)  aluminum hydroxide/magnesium hydroxide/simethicone Suspension 30 milliLiter(s) Oral every 6 hours PRN Dyspepsia  guaiFENesin Oral Liquid (Sugar-Free) 200 milliGRAM(s) Oral every 6 hours PRN Cough      __________________________________________________  REVIEW OF SYSTEMS:    CONSTITUTIONAL: No fever,   EYES: no acute visual disturbances  NECK: No pain or stiffness  RESPIRATORY: No cough; No shortness of breath  CARDIOVASCULAR: No chest pain, no palpitations  GASTROINTESTINAL: No pain. No nausea or vomiting; No diarrhea   NEUROLOGICAL: No headache or numbness, no tremors  MUSCULOSKELETAL: No joint pain, no muscle pain  GENITOURINARY: no dysuria, no frequency, no hesitancy  PSYCHIATRY: no depression , no anxiety  ALL OTHER  ROS negative        Vital Signs Last 24 Hrs  T(C): 36.9 (19 Jan 2023 05:53), Max: 37.3 (18 Jan 2023 21:59)  T(F): 98.4 (19 Jan 2023 05:53), Max: 99.2 (18 Jan 2023 21:59)  HR: 96 (19 Jan 2023 05:53) (87 - 104)  BP: 116/65 (19 Jan 2023 05:53) (110/74 - 116/65)  BP(mean): --  RR: 17 (19 Jan 2023 05:53) (17 - 18)  SpO2: 98% (19 Jan 2023 05:53) (98% - 99%)    Parameters below as of 19 Jan 2023 05:53  Patient On (Oxygen Delivery Method): room air        ________________________________________________  PHYSICAL EXAM:  GENERAL: No acute distress.  appears cachectic  HEENT: Normocephalic;  conjunctivae and sclerae clear; moist mucous membranes;   NECK : dry dressing in place  CHEST/LUNG: Clear to auscultation bilaterally with good air entry   HEART: S1 S2  regular; no murmurs, gallops or rubs  ABDOMEN: Soft, Nontender, Nondistended; Bowel sounds present  EXTREMITIES: no cyanosis; no edema; no calf tenderness  SKIN: warm and dry; no rash  NERVOUS SYSTEM:  Awake and alert; Oriented  to place, person and time ; no new deficits    _________________________________________________  LABS:              CAPILLARY BLOOD GLUCOSE            RADIOLOGY & ADDITIONAL TESTS:    Imaging  Reviewed:  YES/NO    Consultant(s) Notes Reviewed:   YES/ No      Plan of care was discussed with patient and /or primary care giver; all questions and concerns were addressed

## 2023-01-19 NOTE — PROGRESS NOTE ADULT - PROBLEM SELECTOR PLAN 8
- Patient from home, unable to care for self at home according to family   - SW following-reported facility requested letter from Attending stating that pt would not continue chemo or radiation while at facility.    - Speech and swallow pending-f/u results   - Attending reported family interested in PET scan outpt -  Patient from home, unable to care for self at home according to family   -  SW following-reported facility requested letter from Attending stating that pt would not continue chemo or radiation while at facility.    -  Speech and swallow pending-f/u results   -  Attending reported family interested in PET scan outpatient  -  Patient now for LTC placement  -   Acceptance and Auth pending

## 2023-01-19 NOTE — PROGRESS NOTE ADULT - PROBLEM SELECTOR PLAN 4
- Stable    - C/w Amlodipine  - Continue to monitor BP -  Continue with Amlodipine  -   Continue to monitor BP

## 2023-01-19 NOTE — PROGRESS NOTE ADULT - PROBLEM SELECTOR PLAN 3
- Most likely d/t decreased PO intake and malnutrition   - Resolved   - PT recommended Home - Most likely related to decreased PO intake and malnutrition   - Resolved   - PT recommended Home PT, patient now for LTC placement

## 2023-01-19 NOTE — PROGRESS NOTE ADULT - PROBLEM SELECTOR PLAN 7
- C/w Xarelto for DVT ppx  - C/w Protonix for GI ppx -  DVT prophylaxis with Xarelto  -  GI prophylaxis with Protonix

## 2023-01-19 NOTE — PROGRESS NOTE ADULT - PROBLEM SELECTOR PLAN 2
- Most likely d/t malignancy   - C/w supportive measures   - Nutrition consulted, appreciated - Most likely related to  malignancy   - Continue with supportive measures   - Nutrition consulted, appreciated  - SLP consulted for oral intake recommendations

## 2023-01-19 NOTE — PROGRESS NOTE ADULT - NS ATTEND AMEND GEN_ALL_CORE FT
no new complaints. D/w out-patient Rad-onc Dr. Sorto- they will not do any more radiation therapy at present. Patient also does not want it.  speech and swallow eval appreciated- start diet per recs, w supplemental feeds via NGT  patient for LTC placement  c/w rest of medical mx

## 2023-01-19 NOTE — PROGRESS NOTE ADULT - PROBLEM SELECTOR PLAN 1
- Resolved, functioning without intervention.  Pt refused replacement.    - C/w PEG tube feeds   - GI-Dr. Waddell consulted, appreciated - Resolved, functioning without intervention.  Pt refused replacement.    - Continue with  PEG tube feeds   - GI-Dr. Waddell consulted

## 2023-01-20 PROCEDURE — 99231 SBSQ HOSP IP/OBS SF/LOW 25: CPT

## 2023-01-20 RX ADMIN — Medication 200 MILLIGRAM(S): at 08:32

## 2023-01-20 RX ADMIN — MUPIROCIN 1 APPLICATION(S): 20 OINTMENT TOPICAL at 06:17

## 2023-01-20 RX ADMIN — ATORVASTATIN CALCIUM 20 MILLIGRAM(S): 80 TABLET, FILM COATED ORAL at 22:33

## 2023-01-20 RX ADMIN — Medication 500 MILLIGRAM(S): at 12:07

## 2023-01-20 RX ADMIN — Medication 200 MILLIGRAM(S): at 22:32

## 2023-01-20 RX ADMIN — ZINC SULFATE TAB 220 MG (50 MG ZINC EQUIVALENT) 220 MILLIGRAM(S): 220 (50 ZN) TAB at 12:08

## 2023-01-20 RX ADMIN — Medication 1 APPLICATION(S): at 12:07

## 2023-01-20 RX ADMIN — Medication 1 TABLET(S): at 12:07

## 2023-01-20 RX ADMIN — PANTOPRAZOLE SODIUM 40 MILLIGRAM(S): 20 TABLET, DELAYED RELEASE ORAL at 12:07

## 2023-01-20 RX ADMIN — Medication 1 MILLIGRAM(S): at 12:07

## 2023-01-20 RX ADMIN — MUPIROCIN 1 APPLICATION(S): 20 OINTMENT TOPICAL at 18:59

## 2023-01-20 RX ADMIN — RIVAROXABAN 10 MILLIGRAM(S): KIT at 12:08

## 2023-01-20 RX ADMIN — FINASTERIDE 5 MILLIGRAM(S): 5 TABLET, FILM COATED ORAL at 12:08

## 2023-01-20 RX ADMIN — TAMSULOSIN HYDROCHLORIDE 0.4 MILLIGRAM(S): 0.4 CAPSULE ORAL at 22:33

## 2023-01-20 NOTE — PROGRESS NOTE ADULT - PROBLEM SELECTOR PLAN 1
- Resolved, functioning without intervention.  Pt refused replacement.    - Continue with  PEG tube feeds   - GI-Dr Waddell following

## 2023-01-20 NOTE — PROGRESS NOTE ADULT - PROBLEM SELECTOR PLAN 3
- Most likely related to decreased PO intake and malnutrition   - Resolved   - PT recommended Home PT, patient now for LTC placement

## 2023-01-20 NOTE — PROGRESS NOTE ADULT - SUBJECTIVE AND OBJECTIVE BOX
NP Note     NP Note discussed with  Primary Attending    Patient is a 77y old  Male who presents with a chief complaint of Weakness (18 Jan 2023 09:35)    INTERVAL HPI / OVERNIGHT EVENTS: no new complaints    MEDICATIONS  (STANDING):  ascorbic acid 500 milliGRAM(s) Oral daily  atorvastatin 20 milliGRAM(s) Oral at bedtime  finasteride 5 milliGRAM(s) Oral daily  folic acid 1 milliGRAM(s) Oral daily  multivitamin/minerals 1 Tablet(s) Oral daily  mupirocin 2% Ointment 1 Application(s) Topical two times a day  pantoprazole   Suspension 40 milliGRAM(s) Oral daily  rivaroxaban 10 milliGRAM(s) Enteral Tube daily  silver sulfADIAZINE 1% Cream 1 Application(s) Topical daily  tamsulosin 0.4 milliGRAM(s) Oral at bedtime  zinc sulfate 220 milliGRAM(s) Oral daily    MEDICATIONS  (PRN):  acetaminophen    Suspension .. 500 milliGRAM(s) Oral every 6 hours PRN Temp greater or equal to 38C (100.4F), Mild Pain (1 - 3), Moderate Pain (4 - 6)  aluminum hydroxide/magnesium hydroxide/simethicone Suspension 30 milliLiter(s) Oral every 6 hours PRN Dyspepsia  guaiFENesin Oral Liquid (Sugar-Free) 200 milliGRAM(s) Oral every 6 hours PRN Cough      __________________________________________________  REVIEW OF SYSTEMS:    CONSTITUTIONAL: No fever,   EYES: no acute visual disturbances  NECK: No pain or stiffness  RESPIRATORY: No cough; No shortness of breath  CARDIOVASCULAR: No chest pain, no palpitations  GASTROINTESTINAL: No pain. No nausea or vomiting; No diarrhea   NEUROLOGICAL: No headache or numbness, no tremors  MUSCULOSKELETAL: No joint pain, no muscle pain  GENITOURINARY: no dysuria, no frequency, no hesitancy  PSYCHIATRY: no depression , no anxiety  ALL OTHER  ROS negative        Vital Signs Last 24 Hrs  T(C): 37.2 (20 Jan 2023 06:19), Max: 37.2 (20 Jan 2023 06:19)  T(F): 99 (20 Jan 2023 06:19), Max: 99 (20 Jan 2023 06:19)  HR: 85 (20 Jan 2023 06:19) (85 - 100)  BP: 101/68 (20 Jan 2023 06:19) (101/68 - 116/76)  BP(mean): 84 (19 Jan 2023 13:25) (84 - 84)  RR: 16 (20 Jan 2023 06:19) (16 - 17)  SpO2: 98% (20 Jan 2023 06:19) (98% - 100%)    Parameters below as of 20 Jan 2023 06:19  Patient On (Oxygen Delivery Method): room air      ________________________________________________  PHYSICAL EXAM:  GENERAL: No acute distress.  appears  cachectic  HEENT: Normocephalic;  conjunctivae and sclerae clear; moist mucous membranes;   NECK : dry dressing in place  CHEST/LUNG: Clear to auscultation bilaterally with good air entry   HEART: S1 S2  regular; no murmurs, gallops or rubs  ABDOMEN: Soft, Nontender, Nondistended; Bowel sounds present  EXTREMITIES: no cyanosis; no edema; no calf tenderness  SKIN: warm and dry; no rash  NERVOUS SYSTEM:  Awake and alert; Oriented  to place, person and time ; no new deficits    _________________________________________________  LABS:              CAPILLARY BLOOD GLUCOSE            RADIOLOGY & ADDITIONAL TESTS:    Imaging  Reviewed:  YES/NO    Consultant(s) Notes Reviewed:   YES/ No      Plan of care was discussed with patient and /or primary care giver; all questions and concerns were addressed

## 2023-01-20 NOTE — PROGRESS NOTE ADULT - ASSESSMENT
77 year old male from home with PMH of HTN, HLD, BPH, oropharyngeal mass s/p PEG tube/resection, on radiation and chemo (last dose of chemo was last week) presenting to the ED with weakness. Patient reports he was recently admitted for COVID and fever, and upon discharge home he had significant weakness in his legs and was unable to walk. Otherwise denies further complaints. Reports 60 lb weight loss over 6 months, Denies chest pain, sob, fever, chills, n/v/d, changes in urinary or bowel habits, focal weakness     Recent admission 12/23-29: sepsis  2/2 COVID and neutropenic fever. treated with cefepime., 1 u pRBC, PEG tube was replaced by GI.

## 2023-01-20 NOTE — PROGRESS NOTE ADULT - PROBLEM SELECTOR PLAN 8
-  Patient from home, unable to care for self at home according to family   -  SW following-reported facility requested letter from Attending stating that pt would not continue chemo or radiation while at facility.    -  Speech and swallow pending-f/u results   -  Attending reported family interested in PET scan outpatient  -  Patient now for LTC placement  -   Acceptance and Auth pending

## 2023-01-20 NOTE — PROGRESS NOTE ADULT - PROBLEM SELECTOR PLAN 2
- Most likely related to  malignancy   - Continue with supportive measures   - Nutrition consulted, appreciated  - SLP consulted for oral intake recommendations  -   Pureed diet ordered, in addition to tube feeds.

## 2023-01-20 NOTE — PROGRESS NOTE ADULT - NS ATTEND AMEND GEN_ALL_CORE FT
patient seen and examined- no new complaints. puree diet started, c/w PEG feeds, no more RT plan -d/w Dr. Velasquez -patient radiation oncologist.  c/w current supportive care  plan for LTC- Medicaid insurance is needed for LTC- family will reach out to medicaid office next week to start the process

## 2023-01-20 NOTE — PROGRESS NOTE ADULT - PROBLEM SELECTOR PLAN 6
-   History of  oral mass on radiation and chemo (last dose of chemo was last week), follows up with Dr. Segundo Velasquez.  -   Continue with Wound care to site   -   Patient no longer wishes to continue with radiation therapy  -   Plan to discharge to LTC with oncology follow up

## 2023-01-21 PROCEDURE — 99232 SBSQ HOSP IP/OBS MODERATE 35: CPT

## 2023-01-21 RX ADMIN — Medication 1 APPLICATION(S): at 11:16

## 2023-01-21 RX ADMIN — RIVAROXABAN 10 MILLIGRAM(S): KIT at 11:16

## 2023-01-21 RX ADMIN — TAMSULOSIN HYDROCHLORIDE 0.4 MILLIGRAM(S): 0.4 CAPSULE ORAL at 21:27

## 2023-01-21 RX ADMIN — ATORVASTATIN CALCIUM 20 MILLIGRAM(S): 80 TABLET, FILM COATED ORAL at 21:27

## 2023-01-21 RX ADMIN — Medication 1 TABLET(S): at 11:17

## 2023-01-21 RX ADMIN — Medication 1 MILLIGRAM(S): at 11:16

## 2023-01-21 RX ADMIN — FINASTERIDE 5 MILLIGRAM(S): 5 TABLET, FILM COATED ORAL at 11:16

## 2023-01-21 RX ADMIN — ZINC SULFATE TAB 220 MG (50 MG ZINC EQUIVALENT) 220 MILLIGRAM(S): 220 (50 ZN) TAB at 11:16

## 2023-01-21 RX ADMIN — MUPIROCIN 1 APPLICATION(S): 20 OINTMENT TOPICAL at 06:03

## 2023-01-21 RX ADMIN — PANTOPRAZOLE SODIUM 40 MILLIGRAM(S): 20 TABLET, DELAYED RELEASE ORAL at 11:16

## 2023-01-21 RX ADMIN — Medication 200 MILLIGRAM(S): at 06:03

## 2023-01-21 RX ADMIN — MUPIROCIN 1 APPLICATION(S): 20 OINTMENT TOPICAL at 17:18

## 2023-01-21 RX ADMIN — Medication 500 MILLIGRAM(S): at 11:16

## 2023-01-21 NOTE — PROGRESS NOTE ADULT - SUBJECTIVE AND OBJECTIVE BOX
Patient is a 77y old  Male who presents with a chief complaint of Weakness (20 Jan 2023 12:00)      SUBJECTIVE / OVERNIGHT EVENTS: RANDOLPH overnight, patient denies any complaints. Says he doesn't like the pureed food sometimes it burns but tolerating CLD  ADDITIONAL REVIEW OF SYSTEMS: negative as above    MEDICATIONS  (STANDING):  ascorbic acid 500 milliGRAM(s) Oral daily  atorvastatin 20 milliGRAM(s) Oral at bedtime  finasteride 5 milliGRAM(s) Oral daily  folic acid 1 milliGRAM(s) Oral daily  multivitamin/minerals 1 Tablet(s) Oral daily  mupirocin 2% Ointment 1 Application(s) Topical two times a day  pantoprazole   Suspension 40 milliGRAM(s) Oral daily  rivaroxaban 10 milliGRAM(s) Enteral Tube daily  silver sulfADIAZINE 1% Cream 1 Application(s) Topical daily  tamsulosin 0.4 milliGRAM(s) Oral at bedtime  zinc sulfate 220 milliGRAM(s) Oral daily    MEDICATIONS  (PRN):  acetaminophen    Suspension .. 500 milliGRAM(s) Oral every 6 hours PRN Temp greater or equal to 38C (100.4F), Mild Pain (1 - 3), Moderate Pain (4 - 6)  aluminum hydroxide/magnesium hydroxide/simethicone Suspension 30 milliLiter(s) Oral every 6 hours PRN Dyspepsia  guaiFENesin Oral Liquid (Sugar-Free) 200 milliGRAM(s) Oral every 6 hours PRN Cough      CAPILLARY BLOOD GLUCOSE        I&O's Summary      PHYSICAL EXAM:  Vital Signs Last 24 Hrs  T(C): 36.8 (21 Jan 2023 05:35), Max: 36.8 (21 Jan 2023 05:35)  T(F): 98.2 (21 Jan 2023 05:35), Max: 98.2 (21 Jan 2023 05:35)  HR: 85 (21 Jan 2023 05:35) (84 - 85)  BP: 106/63 (21 Jan 2023 05:35) (106/63 - 108/67)  BP(mean): --  RR: 18 (21 Jan 2023 05:35) (17 - 18)  SpO2: 100% (21 Jan 2023 05:35) (100% - 100%)    Parameters below as of 21 Jan 2023 05:35  Patient On (Oxygen Delivery Method): room air      GENERAL: NAD   EYES:   conjunctiva and sclera clear  HEENT: R neck with dressing in place  CHEST/LUNG: Clear to auscultation bilaterally   HEART: Regular rate and rhythm; No murmurs, rubs, or gallops  ABDOMEN: PEG tube site with dressing c/d/i, no tenderness  EXTREMITIES:  2+ Peripheral Pulses, No clubbing, cyanosis, or edema  PSYCH: AAOx3     LABS:                    Trend Procalcitonin          D-Dimer:      RADIOLOGY & ADDITIONAL TESTS:  Results Reviewed:   Imaging Personally Reviewed:  Electrocardiogram Personally Reviewed:    COORDINATION OF CARE:  Care Discussed with Consultants/Other Providers [Y/N]:  Prior or Outpatient Records Reviewed [Y/N]:

## 2023-01-22 PROCEDURE — 99232 SBSQ HOSP IP/OBS MODERATE 35: CPT

## 2023-01-22 RX ADMIN — Medication 1 APPLICATION(S): at 14:46

## 2023-01-22 RX ADMIN — RIVAROXABAN 10 MILLIGRAM(S): KIT at 15:03

## 2023-01-22 RX ADMIN — ZINC SULFATE TAB 220 MG (50 MG ZINC EQUIVALENT) 220 MILLIGRAM(S): 220 (50 ZN) TAB at 14:45

## 2023-01-22 RX ADMIN — Medication 1 TABLET(S): at 14:45

## 2023-01-22 RX ADMIN — ATORVASTATIN CALCIUM 20 MILLIGRAM(S): 80 TABLET, FILM COATED ORAL at 21:13

## 2023-01-22 RX ADMIN — FINASTERIDE 5 MILLIGRAM(S): 5 TABLET, FILM COATED ORAL at 14:45

## 2023-01-22 RX ADMIN — Medication 1 MILLIGRAM(S): at 14:44

## 2023-01-22 RX ADMIN — Medication 500 MILLIGRAM(S): at 14:44

## 2023-01-22 RX ADMIN — PANTOPRAZOLE SODIUM 40 MILLIGRAM(S): 20 TABLET, DELAYED RELEASE ORAL at 14:45

## 2023-01-22 RX ADMIN — TAMSULOSIN HYDROCHLORIDE 0.4 MILLIGRAM(S): 0.4 CAPSULE ORAL at 21:13

## 2023-01-22 NOTE — PROGRESS NOTE ADULT - SUBJECTIVE AND OBJECTIVE BOX
Patient is a 77y old  Male who presents with a chief complaint of Weakness (21 Jan 2023 13:04)      SUBJECTIVE / OVERNIGHT EVENTS:  ADDITIONAL REVIEW OF SYSTEMS:    MEDICATIONS  (STANDING):  ascorbic acid 500 milliGRAM(s) Oral daily  atorvastatin 20 milliGRAM(s) Oral at bedtime  finasteride 5 milliGRAM(s) Oral daily  folic acid 1 milliGRAM(s) Oral daily  multivitamin/minerals 1 Tablet(s) Oral daily  mupirocin 2% Ointment 1 Application(s) Topical two times a day  pantoprazole   Suspension 40 milliGRAM(s) Oral daily  rivaroxaban 10 milliGRAM(s) Enteral Tube daily  silver sulfADIAZINE 1% Cream 1 Application(s) Topical daily  tamsulosin 0.4 milliGRAM(s) Oral at bedtime  zinc sulfate 220 milliGRAM(s) Oral daily    MEDICATIONS  (PRN):  acetaminophen    Suspension .. 500 milliGRAM(s) Oral every 6 hours PRN Temp greater or equal to 38C (100.4F), Mild Pain (1 - 3), Moderate Pain (4 - 6)  aluminum hydroxide/magnesium hydroxide/simethicone Suspension 30 milliLiter(s) Oral every 6 hours PRN Dyspepsia  guaiFENesin Oral Liquid (Sugar-Free) 200 milliGRAM(s) Oral every 6 hours PRN Cough      CAPILLARY BLOOD GLUCOSE        I&O's Summary      PHYSICAL EXAM:  Vital Signs Last 24 Hrs  T(C): 36.7 (22 Jan 2023 05:28), Max: 36.9 (21 Jan 2023 20:25)  T(F): 98.1 (22 Jan 2023 05:28), Max: 98.4 (21 Jan 2023 20:25)  HR: 98 (22 Jan 2023 05:28) (86 - 98)  BP: 116/74 (22 Jan 2023 05:28) (107/69 - 116/74)  BP(mean): --  RR: 16 (22 Jan 2023 05:28) (16 - 17)  SpO2: 96% (22 Jan 2023 05:28) (96% - 100%)    Parameters below as of 22 Jan 2023 05:28  Patient On (Oxygen Delivery Method): room air    GENERAL: NAD  HEAD:  Atraumatic, Normocephalic  EYES: EOMI, PERRLA, conjunctiva and sclera clear  NECK:   CHEST/LUNG:no respiratory distress   HEART: Regular rate and rhythm;   ABDOMEN: Soft, Nontender, PEG tube in place  EXTREMITIES: no c/c/e  PSYCH: AAOx3      LABS:                    Trend Procalcitonin          D-Dimer:      RADIOLOGY & ADDITIONAL TESTS:  Results Reviewed:   Imaging Personally Reviewed:  Electrocardiogram Personally Reviewed:    COORDINATION OF CARE:  Care Discussed with Consultants/Other Providers [Y/N]:  Prior or Outpatient Records Reviewed [Y/N]:   Patient is a 77y old  Male who presents with a chief complaint of Weakness (21 Jan 2023 13:04)      SUBJECTIVE / OVERNIGHT EVENTS: RANDOLPH overnight, patient states he wants to try solid foods but currently unable to take pureed diet, says he has a burning sensation in his throat  ADDITIONAL REVIEW OF SYSTEMS: negative as per above     MEDICATIONS  (STANDING):  ascorbic acid 500 milliGRAM(s) Oral daily  atorvastatin 20 milliGRAM(s) Oral at bedtime  finasteride 5 milliGRAM(s) Oral daily  folic acid 1 milliGRAM(s) Oral daily  multivitamin/minerals 1 Tablet(s) Oral daily  mupirocin 2% Ointment 1 Application(s) Topical two times a day  pantoprazole   Suspension 40 milliGRAM(s) Oral daily  rivaroxaban 10 milliGRAM(s) Enteral Tube daily  silver sulfADIAZINE 1% Cream 1 Application(s) Topical daily  tamsulosin 0.4 milliGRAM(s) Oral at bedtime  zinc sulfate 220 milliGRAM(s) Oral daily    MEDICATIONS  (PRN):  acetaminophen    Suspension .. 500 milliGRAM(s) Oral every 6 hours PRN Temp greater or equal to 38C (100.4F), Mild Pain (1 - 3), Moderate Pain (4 - 6)  aluminum hydroxide/magnesium hydroxide/simethicone Suspension 30 milliLiter(s) Oral every 6 hours PRN Dyspepsia  guaiFENesin Oral Liquid (Sugar-Free) 200 milliGRAM(s) Oral every 6 hours PRN Cough      CAPILLARY BLOOD GLUCOSE        I&O's Summary      PHYSICAL EXAM:  Vital Signs Last 24 Hrs  T(C): 36.7 (22 Jan 2023 05:28), Max: 36.9 (21 Jan 2023 20:25)  T(F): 98.1 (22 Jan 2023 05:28), Max: 98.4 (21 Jan 2023 20:25)  HR: 98 (22 Jan 2023 05:28) (86 - 98)  BP: 116/74 (22 Jan 2023 05:28) (107/69 - 116/74)  BP(mean): --  RR: 16 (22 Jan 2023 05:28) (16 - 17)  SpO2: 96% (22 Jan 2023 05:28) (96% - 100%)    Parameters below as of 22 Jan 2023 05:28  Patient On (Oxygen Delivery Method): room air    GENERAL: NAD  HEAD:  Atraumatic, Normocephalic  EYES: EOMI, PERRLA, conjunctiva and sclera clear  NECK: R neck dermatitis with dressing in place c/d/i  CHEST/LUNG:no respiratory distress   HEART: Regular rate and rhythm;   ABDOMEN: Soft, Nontender, PEG tube in place  EXTREMITIES: no c/c/e  PSYCH: AAOx3      LABS:                    Trend Procalcitonin          D-Dimer:      RADIOLOGY & ADDITIONAL TESTS:  Results Reviewed:   Imaging Personally Reviewed:  Electrocardiogram Personally Reviewed:    COORDINATION OF CARE:  Care Discussed with Consultants/Other Providers [Y/N]:  Prior or Outpatient Records Reviewed [Y/N]:

## 2023-01-22 NOTE — PROGRESS NOTE ADULT - PROBLEM SELECTOR PLAN 2
- Most likely related to  malignancy   - Continue with supportive measures   - Nutrition consulted, appreciated  - SLP consulted for oral intake recommendations  -   Pureed diet ordered, in addition to tube feeds. - Most likely related to  malignancy   - Continue with supportive measures   - Nutrition consulted, appreciated  - SLP consulted for oral intake recommendations  -   Pureed diet ordered, in addition to tube feeds.  Patient requesting to try solid foods - will request SLP re-eval

## 2023-01-23 LAB — SARS-COV-2 RNA SPEC QL NAA+PROBE: SIGNIFICANT CHANGE UP

## 2023-01-23 PROCEDURE — 99232 SBSQ HOSP IP/OBS MODERATE 35: CPT

## 2023-01-23 RX ADMIN — FINASTERIDE 5 MILLIGRAM(S): 5 TABLET, FILM COATED ORAL at 11:24

## 2023-01-23 RX ADMIN — PANTOPRAZOLE SODIUM 40 MILLIGRAM(S): 20 TABLET, DELAYED RELEASE ORAL at 11:22

## 2023-01-23 RX ADMIN — Medication 200 MILLIGRAM(S): at 11:23

## 2023-01-23 RX ADMIN — Medication 200 MILLIGRAM(S): at 22:13

## 2023-01-23 RX ADMIN — ZINC SULFATE TAB 220 MG (50 MG ZINC EQUIVALENT) 220 MILLIGRAM(S): 220 (50 ZN) TAB at 11:23

## 2023-01-23 RX ADMIN — Medication 1 TABLET(S): at 11:23

## 2023-01-23 RX ADMIN — Medication 1 MILLIGRAM(S): at 11:22

## 2023-01-23 RX ADMIN — RIVAROXABAN 10 MILLIGRAM(S): KIT at 11:22

## 2023-01-23 RX ADMIN — Medication 1 APPLICATION(S): at 13:01

## 2023-01-23 RX ADMIN — MUPIROCIN 1 APPLICATION(S): 20 OINTMENT TOPICAL at 19:24

## 2023-01-23 RX ADMIN — Medication 500 MILLIGRAM(S): at 11:22

## 2023-01-23 RX ADMIN — TAMSULOSIN HYDROCHLORIDE 0.4 MILLIGRAM(S): 0.4 CAPSULE ORAL at 22:07

## 2023-01-23 RX ADMIN — ATORVASTATIN CALCIUM 20 MILLIGRAM(S): 80 TABLET, FILM COATED ORAL at 22:07

## 2023-01-23 NOTE — PROGRESS NOTE ADULT - PROBLEM SELECTOR PLAN 8
- Patient from home, unable to care for self at home according to family   - SW following-reported facility requested letter from Attending stating that pt would not continue chemo or radiation while at facility.    - Speech and swallow pending-f/u results   - Attending reported family interested in PET scan outpt.  -  Patient now for LTC placement. Accepted to [facility]. - Patient from home, unable to care for self at home according to family   - SW following-reported facility requested letter from Attending stating that pt would not continue chemo or radiation while at facility.    - Attending reported family interested in PET scan outpt.  -  Patient now for LTC placement. Accepted to [facility].

## 2023-01-23 NOTE — PROGRESS NOTE ADULT - SUBJECTIVE AND OBJECTIVE BOX
+++++++++++++++++++++INCOMPLETE+++++++++++++++++++++++++++++    NP Note discussed with  primary attending    Patient is a 77y old  Male who presents with a chief complaint of Weakness (22 Jan 2023 11:17)      INTERVAL HPI/OVERNIGHT EVENTS: no new complaints    MEDICATIONS  (STANDING):  ascorbic acid 500 milliGRAM(s) Oral daily  atorvastatin 20 milliGRAM(s) Oral at bedtime  finasteride 5 milliGRAM(s) Oral daily  folic acid 1 milliGRAM(s) Oral daily  multivitamin/minerals 1 Tablet(s) Oral daily  mupirocin 2% Ointment 1 Application(s) Topical two times a day  pantoprazole   Suspension 40 milliGRAM(s) Oral daily  rivaroxaban 10 milliGRAM(s) Enteral Tube daily  silver sulfADIAZINE 1% Cream 1 Application(s) Topical daily  tamsulosin 0.4 milliGRAM(s) Oral at bedtime  zinc sulfate 220 milliGRAM(s) Oral daily    MEDICATIONS  (PRN):  acetaminophen    Suspension .. 500 milliGRAM(s) Oral every 6 hours PRN Temp greater or equal to 38C (100.4F), Mild Pain (1 - 3), Moderate Pain (4 - 6)  aluminum hydroxide/magnesium hydroxide/simethicone Suspension 30 milliLiter(s) Oral every 6 hours PRN Dyspepsia  guaiFENesin Oral Liquid (Sugar-Free) 200 milliGRAM(s) Oral every 6 hours PRN Cough      __________________________________________________  REVIEW OF SYSTEMS:    CONSTITUTIONAL: No fever  EYES: No acute visual disturbances  NECK: No pain or stiffness  RESPIRATORY: No cough; No shortness of breath  CARDIOVASCULAR: No chest pain, no palpitations  GASTROINTESTINAL: No pain. No nausea or vomiting.  No diarrhea   NEUROLOGICAL: No headache or numbness, no tremors  MUSCULOSKELETAL: No joint pain, no muscle pain  GENITOURINARY: No dysuria, no frequency, no hesitancy  PSYCHIATRY: No depression , no anxiety  ALL OTHER  ROS negative        Vital Signs Last 24 Hrs  T(C): 36.4 (23 Jan 2023 05:30), Max: 36.8 (22 Jan 2023 20:25)  T(F): 97.5 (23 Jan 2023 05:30), Max: 98.3 (22 Jan 2023 20:25)  HR: 102 (23 Jan 2023 05:30) (90 - 102)  BP: 139/81 (23 Jan 2023 05:30) (122/81 - 139/81)  BP(mean): --  RR: 16 (23 Jan 2023 05:30) (16 - 17)  SpO2: 99% (23 Jan 2023 05:30) (97% - 100%)    Parameters below as of 23 Jan 2023 05:30  Patient On (Oxygen Delivery Method): room air        ________________________________________________  PHYSICAL EXAM:  GENERAL: NAD  HEENT: Normocephalic;  conjunctivae and sclerae clear; moist mucous membranes   NECK : Supple  CHEST/LUNG: Clear to auscultation bilaterally with good air entry   HEART: S1 S2  regular; no murmurs, gallops or rubs  ABDOMEN: Soft, Nontender, Nondistended; Bowel sounds present x 4 quad  EXTREMITIES: No cyanosis; no edema; no calf tenderness  SKIN: Warm and dry; no rash  NERVOUS SYSTEM:  Awake and alert; Oriented to place, person and time; no new deficits    _________________________________________________  LABS:              CAPILLARY BLOOD GLUCOSE            RADIOLOGY & ADDITIONAL TESTS:    Imaging Personally Reviewed:  YES/NO    Consultant(s) Notes Reviewed:   YES/ No    Care Discussed with Consultants :     Plan of care was discussed with patient and /or primary care giver; all questions and concerns were addressed and care was aligned with patient's wishes.     NP Note discussed with  primary attending    Patient is a 77y old  Male who presents with a chief complaint of Weakness (22 Jan 2023 11:17)      INTERVAL HPI/OVERNIGHT EVENTS:  Pt states, "I'm doing fine."      MEDICATIONS  (STANDING):  ascorbic acid 500 milliGRAM(s) Oral daily  atorvastatin 20 milliGRAM(s) Oral at bedtime  finasteride 5 milliGRAM(s) Oral daily  folic acid 1 milliGRAM(s) Oral daily  multivitamin/minerals 1 Tablet(s) Oral daily  mupirocin 2% Ointment 1 Application(s) Topical two times a day  pantoprazole   Suspension 40 milliGRAM(s) Oral daily  rivaroxaban 10 milliGRAM(s) Enteral Tube daily  silver sulfADIAZINE 1% Cream 1 Application(s) Topical daily  tamsulosin 0.4 milliGRAM(s) Oral at bedtime  zinc sulfate 220 milliGRAM(s) Oral daily    MEDICATIONS  (PRN):  acetaminophen    Suspension .. 500 milliGRAM(s) Oral every 6 hours PRN Temp greater or equal to 38C (100.4F), Mild Pain (1 - 3), Moderate Pain (4 - 6)  aluminum hydroxide/magnesium hydroxide/simethicone Suspension 30 milliLiter(s) Oral every 6 hours PRN Dyspepsia  guaiFENesin Oral Liquid (Sugar-Free) 200 milliGRAM(s) Oral every 6 hours PRN Cough      __________________________________________________  REVIEW OF SYSTEMS: Limited exam d/t pt's participation     CONSTITUTIONAL: No fever  EYES: No acute visual disturbances  NECK: No pain or stiffness  RESPIRATORY: No cough; No shortness of breath  CARDIOVASCULAR: No chest pain, no palpitations  GASTROINTESTINAL: No pain. No nausea or vomiting.  No diarrhea   NEUROLOGICAL: No headache or numbness, no tremors  MUSCULOSKELETAL: No joint pain, no muscle pain  GENITOURINARY: No dysuria, no frequency, no hesitancy  PSYCHIATRY: No depression , no anxiety  ALL OTHER  ROS negative        Vital Signs Last 24 Hrs  T(C): 36.4 (23 Jan 2023 05:30), Max: 36.8 (22 Jan 2023 20:25)  T(F): 97.5 (23 Jan 2023 05:30), Max: 98.3 (22 Jan 2023 20:25)  HR: 102 (23 Jan 2023 05:30) (90 - 102)  BP: 139/81 (23 Jan 2023 05:30) (122/81 - 139/81)  RR: 16 (23 Jan 2023 05:30) (16 - 17)  SpO2: 99% (23 Jan 2023 05:30) (97% - 100%)    Parameters below as of 23 Jan 2023 05:30  Patient On (Oxygen Delivery Method): room air        ________________________________________________  PHYSICAL EXAM:  Limited exam d/t pt's participation   GENERAL: NAD  HEENT: Normocephalic;  conjunctivae and sclerae clear; moist mucous membranes.  (+) Neck wound, dsg intact  NECK : Supple  CHEST/LUNG: Clear to auscultation bilaterally with good air entry   HEART: S1 S2  regular; no murmurs, gallops or rubs  ABDOMEN: Soft, Nontender, Nondistended; Bowel sounds present x 4 quad  (+) PEG   EXTREMITIES: No cyanosis; no edema; no calf tenderness  SKIN: Warm and dry; no rash  NERVOUS SYSTEM:  Awake and alert; Oriented to place, person and time; no new deficits  _________________________________________________  LABS:              CAPILLARY BLOOD GLUCOSE            RADIOLOGY & ADDITIONAL TESTS:    Imaging Personally Reviewed:  YES    Consultant(s) Notes Reviewed:   YES    Care Discussed with Consultants :     Plan of care was discussed with patient and /or primary care giver; all questions and concerns were addressed and care was aligned with patient's wishes.

## 2023-01-23 NOTE — PROGRESS NOTE ADULT - ASSESSMENT
77 year old male from home with PMH of HTN, HLD, BPH, oropharyngeal mass s/p PEG tube/resection, on radiation and chemo (last dose of chemo was last week).  Admitted for Weakness.      Hospital course complicated with fevers and tachycardia and infectious work up was negative.     Hospital course further complicated by malfunctioning PEG tube.  GI Dr. Waddell consulted.   PEG tube functioning without intervention.    Discharge complicated by updated PT which patient has no need for [facility] and recommended for Home PT.  Family concern about safe discharge. [facility]  SW & CM following.  D/C planning

## 2023-01-23 NOTE — PROGRESS NOTE ADULT - PROBLEM SELECTOR PLAN 2
: - Most likely d/t malignancy   - C/w supportive measures   - Nutrition consulted, appreciated - Most likely d/t malignancy   - C/w supportive measures   - Nutrition consulted, appreciated

## 2023-01-23 NOTE — PROGRESS NOTE ADULT - NS ATTEND AMEND GEN_ALL_CORE FT
Patient seen/evaluated at bedside 1/23/23. I agree with the NP progress note/outlined plan of care. My independent findings and conclusions are documented.    Planned for long term care placement- niece assisting with gathering documents for placement  will no longer have XRT or chemotherapy- has reportedly completed current cycle.

## 2023-01-23 NOTE — PROGRESS NOTE ADULT - PROBLEM SELECTOR PLAN 3
- Most likely d/t decreased PO intake and malnutrition   - Resolved   - PT recommended Home PT, patient now for LTC placement

## 2023-01-23 NOTE — PROGRESS NOTE ADULT - PROBLEM SELECTOR PLAN 6
- H/o Oral mass on radiation and chemo (last dose of chemo was last week), follows up with Dr. Segundo Velasquez.  - C/w Wound care    - Patient no longer wishes to continue with radiation therapy  - Plan to discharge to LTC with oncology follow up

## 2023-01-23 NOTE — PROGRESS NOTE ADULT - PROBLEM SELECTOR PLAN 1
- Resolved, functioning without intervention.  Pt refused replacement.    - C/w PEG tube feeds   - GI-Dr. Waddell consulted, appreciated. - Resolved, functioning without intervention.  Pt refused replacement.    - C/w PEG tube feed & pureed diet    - GI-Dr. Waddell consulted, appreciated  - Speech consulted, appreciated

## 2023-01-24 PROCEDURE — 99232 SBSQ HOSP IP/OBS MODERATE 35: CPT

## 2023-01-24 RX ADMIN — Medication 1 APPLICATION(S): at 12:17

## 2023-01-24 RX ADMIN — Medication 200 MILLIGRAM(S): at 12:57

## 2023-01-24 RX ADMIN — RIVAROXABAN 10 MILLIGRAM(S): KIT at 12:58

## 2023-01-24 RX ADMIN — Medication 1 TABLET(S): at 12:58

## 2023-01-24 RX ADMIN — ZINC SULFATE TAB 220 MG (50 MG ZINC EQUIVALENT) 220 MILLIGRAM(S): 220 (50 ZN) TAB at 12:58

## 2023-01-24 RX ADMIN — PANTOPRAZOLE SODIUM 40 MILLIGRAM(S): 20 TABLET, DELAYED RELEASE ORAL at 12:57

## 2023-01-24 RX ADMIN — MUPIROCIN 1 APPLICATION(S): 20 OINTMENT TOPICAL at 06:23

## 2023-01-24 RX ADMIN — Medication 500 MILLIGRAM(S): at 12:58

## 2023-01-24 RX ADMIN — MUPIROCIN 1 APPLICATION(S): 20 OINTMENT TOPICAL at 17:17

## 2023-01-24 RX ADMIN — TAMSULOSIN HYDROCHLORIDE 0.4 MILLIGRAM(S): 0.4 CAPSULE ORAL at 21:18

## 2023-01-24 RX ADMIN — Medication 1 MILLIGRAM(S): at 12:58

## 2023-01-24 RX ADMIN — ATORVASTATIN CALCIUM 20 MILLIGRAM(S): 80 TABLET, FILM COATED ORAL at 21:18

## 2023-01-24 RX ADMIN — FINASTERIDE 5 MILLIGRAM(S): 5 TABLET, FILM COATED ORAL at 12:57

## 2023-01-24 NOTE — SWALLOW BEDSIDE ASSESSMENT ADULT - SWALLOW EVAL: DIAGNOSIS
Pt p/w s&s of oral prep dysphagia c/b prolonged mastication. No overt s&s of penetration/aspiration seen at this exam.
Pt p/w s&s of oral prep dysphagia c/b prolonged mastication. No overt s&s of penetration/aspiration seen at this exam.

## 2023-01-24 NOTE — SWALLOW BEDSIDE ASSESSMENT ADULT - SPECIFY REASON(S)
Subjective re-assessment of current swallow function.
Subjective assessment of current swallow function.

## 2023-01-24 NOTE — PROGRESS NOTE ADULT - PROBLEM SELECTOR PLAN 8
- Patient from home, unable to care for self at home according to family   - SW following-reported facility requested letter from Attending stating that pt would not continue chemo or radiation while at facility.    - Attending reported family interested in PET scan outpt.  -  Patient now for LTC placement. Accepted to [facility].

## 2023-01-24 NOTE — SWALLOW BEDSIDE ASSESSMENT ADULT - SWALLOW EVAL: RECOMMENDED FEEDING/EATING TECHNIQUES
allow for swallow between intakes/alternate food with liquid/oral hygiene/position upright (90 degrees)
allow for swallow between intakes/alternate food with liquid/maintain upright posture during/after eating for 30 mins/oral hygiene/position upright (90 degrees)/small sips/bites

## 2023-01-24 NOTE — PROGRESS NOTE ADULT - SUBJECTIVE AND OBJECTIVE BOX
++++++++++++++++++++++INCOMPLETE++++++++++++++++++++++++++++++++++  NP Note discussed with  primary attending    Patient is a 77y old  Male who presents with a chief complaint of Weakness (23 Jan 2023 12:39)      INTERVAL HPI/OVERNIGHT EVENTS: no new complaints    MEDICATIONS  (STANDING):  ascorbic acid 500 milliGRAM(s) Oral daily  atorvastatin 20 milliGRAM(s) Oral at bedtime  finasteride 5 milliGRAM(s) Oral daily  folic acid 1 milliGRAM(s) Oral daily  multivitamin/minerals 1 Tablet(s) Oral daily  mupirocin 2% Ointment 1 Application(s) Topical two times a day  pantoprazole   Suspension 40 milliGRAM(s) Oral daily  rivaroxaban 10 milliGRAM(s) Enteral Tube daily  silver sulfADIAZINE 1% Cream 1 Application(s) Topical daily  tamsulosin 0.4 milliGRAM(s) Oral at bedtime  zinc sulfate 220 milliGRAM(s) Oral daily    MEDICATIONS  (PRN):  acetaminophen    Suspension .. 500 milliGRAM(s) Oral every 6 hours PRN Temp greater or equal to 38C (100.4F), Mild Pain (1 - 3), Moderate Pain (4 - 6)  aluminum hydroxide/magnesium hydroxide/simethicone Suspension 30 milliLiter(s) Oral every 6 hours PRN Dyspepsia  guaiFENesin Oral Liquid (Sugar-Free) 200 milliGRAM(s) Oral every 6 hours PRN Cough      __________________________________________________  REVIEW OF SYSTEMS:    CONSTITUTIONAL: No fever  EYES: No acute visual disturbances  NECK: No pain or stiffness  RESPIRATORY: No cough; No shortness of breath  CARDIOVASCULAR: No chest pain, no palpitations  GASTROINTESTINAL: No pain. No nausea or vomiting.  No diarrhea   NEUROLOGICAL: No headache or numbness, no tremors  MUSCULOSKELETAL: No joint pain, no muscle pain  GENITOURINARY: No dysuria, no frequency, no hesitancy  PSYCHIATRY: No depression , no anxiety  ALL OTHER  ROS negative        Vital Signs Last 24 Hrs  T(C): 36.7 (24 Jan 2023 05:23), Max: 36.9 (23 Jan 2023 20:42)  T(F): 98.1 (24 Jan 2023 05:23), Max: 98.4 (23 Jan 2023 20:42)  HR: 82 (24 Jan 2023 05:23) (82 - 96)  BP: 108/67 (24 Jan 2023 05:23) (108/67 - 129/85)  BP(mean): --  RR: 17 (24 Jan 2023 05:23) (16 - 17)  SpO2: 100% (24 Jan 2023 05:23) (97% - 100%)    Parameters below as of 24 Jan 2023 05:23  Patient On (Oxygen Delivery Method): room air        ________________________________________________  PHYSICAL EXAM:  GENERAL: NAD  HEENT: Normocephalic;  conjunctivae and sclerae clear; moist mucous membranes   NECK : Supple  CHEST/LUNG: Clear to auscultation bilaterally with good air entry   HEART: S1 S2  regular; no murmurs, gallops or rubs  ABDOMEN: Soft, Nontender, Nondistended; Bowel sounds present x 4 quad  EXTREMITIES: No cyanosis; no edema; no calf tenderness  SKIN: Warm and dry; no rash  NERVOUS SYSTEM:  Awake and alert; Oriented to place, person and time; no new deficits    _________________________________________________  LABS:              CAPILLARY BLOOD GLUCOSE            RADIOLOGY & ADDITIONAL TESTS:    Imaging Personally Reviewed:  YES/NO    Consultant(s) Notes Reviewed:   YES/ No    Care Discussed with Consultants :     Plan of care was discussed with patient and /or primary care giver; all questions and concerns were addressed and care was aligned with patient's wishes.     NP Note discussed with  primary attending    Patient is a 77y old  Male who presents with a chief complaint of Weakness (23 Jan 2023 12:39)      INTERVAL HPI/OVERNIGHT EVENTS: Pt reports, "I'm good."  NP inquired of any concerns of complaints, pt stated he ate the pureed food but feels like he would prefer soft food.  Pt reports that he ate squash last night and enjoyed it, stated he was suprised b/c he does not like squash.  Reported that he would like tuna fish and that he like farina.  NP verbalized understanding and informed pt that NP would see if his diet could be adjusted.  Pt verbalized understanding.      MEDICATIONS  (STANDING):  ascorbic acid 500 milliGRAM(s) Oral daily  atorvastatin 20 milliGRAM(s) Oral at bedtime  finasteride 5 milliGRAM(s) Oral daily  folic acid 1 milliGRAM(s) Oral daily  multivitamin/minerals 1 Tablet(s) Oral daily  mupirocin 2% Ointment 1 Application(s) Topical two times a day  pantoprazole   Suspension 40 milliGRAM(s) Oral daily  rivaroxaban 10 milliGRAM(s) Enteral Tube daily  silver sulfADIAZINE 1% Cream 1 Application(s) Topical daily  tamsulosin 0.4 milliGRAM(s) Oral at bedtime  zinc sulfate 220 milliGRAM(s) Oral daily    MEDICATIONS  (PRN):  acetaminophen    Suspension .. 500 milliGRAM(s) Oral every 6 hours PRN Temp greater or equal to 38C (100.4F), Mild Pain (1 - 3), Moderate Pain (4 - 6)  aluminum hydroxide/magnesium hydroxide/simethicone Suspension 30 milliLiter(s) Oral every 6 hours PRN Dyspepsia  guaiFENesin Oral Liquid (Sugar-Free) 200 milliGRAM(s) Oral every 6 hours PRN Cough      __________________________________________________  REVIEW OF SYSTEMS: Limited exam d/t pt's participation     CONSTITUTIONAL: No fever  EYES: No acute visual disturbances  NECK: No pain or stiffness  RESPIRATORY: No cough; No shortness of breath  CARDIOVASCULAR: No chest pain, no palpitations  GASTROINTESTINAL: No pain. No nausea or vomiting.  No diarrhea   NEUROLOGICAL: No headache or numbness, no tremors  MUSCULOSKELETAL: No joint pain, no muscle pain  GENITOURINARY: No dysuria, no frequency, no hesitancy  PSYCHIATRY: No depression , no anxiety  ALL OTHER  ROS negative      Vital Signs Last 24 Hrs  T(C): 36.7 (24 Jan 2023 05:23), Max: 36.9 (23 Jan 2023 20:42)  T(F): 98.1 (24 Jan 2023 05:23), Max: 98.4 (23 Jan 2023 20:42)  HR: 82 (24 Jan 2023 05:23) (82 - 96)  BP: 108/67 (24 Jan 2023 05:23) (108/67 - 129/85)  RR: 17 (24 Jan 2023 05:23) (16 - 17)  SpO2: 100% (24 Jan 2023 05:23) (97% - 100%)    Parameters below as of 24 Jan 2023 05:23  Patient On (Oxygen Delivery Method): room air        ________________________________________________  PHYSICAL EXAM:  Limited exam d/t pt's participation   GENERAL: NAD  HEENT: Normocephalic;  conjunctivae and sclerae clear; moist mucous membranes.  (+) Neck wound, dsg intact  NECK : Supple  CHEST/LUNG: Clear to auscultation bilaterally with good air entry   HEART: S1 S2  regular; no murmurs, gallops or rubs  ABDOMEN: Soft, Nontender, Nondistended; Bowel sounds present x 4 quad  (+) PEG   EXTREMITIES: No cyanosis; no edema; no calf tenderness  SKIN: Warm and dry; no rash  NERVOUS SYSTEM:  Awake and alert; Oriented to place, person and time; no new deficits  _________________________________________________  LABS:              CAPILLARY BLOOD GLUCOSE            RADIOLOGY & ADDITIONAL TESTS:    Imaging Personally Reviewed:  YES    Consultant(s) Notes Reviewed:   YES    Care Discussed with Consultants :     Plan of care was discussed with patient and /or primary care giver; all questions and concerns were addressed and care was aligned with patient's wishes.

## 2023-01-24 NOTE — PROGRESS NOTE ADULT - ASSESSMENT
77 year old male from home with PMH of HTN, HLD, BPH, oropharyngeal mass s/p PEG tube/resection, on radiation and chemo (last dose of chemo was last week).  Admitted for Weakness.      Hospital course complicated with fevers and tachycardia and infectious work up was negative.     Hospital course further complicated by malfunctioning PEG tube.  GI Dr. Waddell consulted.   PEG tube functioning without intervention.    Discharge complicated by updated PT which patient has no need for [facility] and recommended for Home PT.  Family concern about safe discharge. [facility]  SW & CM following.  D/C planning

## 2023-01-24 NOTE — PROGRESS NOTE ADULT - NS ATTEND AMEND GEN_ALL_CORE FT
Patient seen/evaluated at bedside on 1/24/2022. I agree with the resident progress note/outlined plan of care. My independent findings and conclusions are documented.    Reports a mild "tickling" at throat. Diet advanced today to soft, bite sized with thin liquids which he reports tolerating    AAOx3 NAD thin/frail appearing male  excoriation, hypopigmentation at circular base of neck  decreased breath sounds  S1S2 RRR  CTAb/l no accessory muscle use  soft, NT, ND, + BS + PEG insertion site, no erythema  no LE edema/cyanosis/clubbing    peg tube malfunction- currently with tube feeds  oropharyngeal cancer s/p XRT/chemo- recently completed course, will follow up with radiation oncologist as an outpatient. No further plans for additional XRT  severe protein calorie malnutrition  discharge planning in progress for LTAC

## 2023-01-24 NOTE — SWALLOW BEDSIDE ASSESSMENT ADULT - DIET PRIOR TO ADMI
As per pt; regular and thin liquids; reportedly has been on been on G-tube for 4 months.
As per pt, regular and thin liquids; reportedly has been on been on G-tube for 4 months.

## 2023-01-24 NOTE — SWALLOW BEDSIDE ASSESSMENT ADULT - COMMENTS
Pt sitting upright in bed. Pt alert and verbal; odd affect, however able to answer all SLP queries and communication desires/needs. Reports no current pain while swallowing. Pt states he prefers a puree diet despite being able to tolerate regular PO trials.
Pt sitting upright in bed. Pt AA+Ox3;verbal and able to answer all SLP queries and communication desires/needs. Reports burning sensation with purees and believes its due to radiation. Pt states he prefers a soft and bite sized diet despite being able to tolerate regular PO trials.

## 2023-01-24 NOTE — SWALLOW BEDSIDE ASSESSMENT ADULT - NS ASR SWALLOW FINDINGS DISCUS
NP Kyler and RN Josephine/Physician/Nursing/Patient
YVETTE Burton and NP Nhan/Physician/Nursing/Patient

## 2023-01-24 NOTE — PROGRESS NOTE ADULT - PROBLEM SELECTOR PLAN 1
- Resolved, functioning without intervention.  Pt refused replacement.    - C/w PEG tube feed & soft diet    - GI-Dr. Waddell consulted, appreciated  - Speech consulted, appreciated.  Speech re-eval appreciated.

## 2023-01-24 NOTE — SWALLOW BEDSIDE ASSESSMENT ADULT - SLP PERTINENT HISTORY OF CURRENT PROBLEM
77 year old male from home with PMH of HTN, HLD, BPH, oropharyngeal mass s/p PEG tube/resection, on radiation and chemo. Admitted for Weakness.
77 year old male from home with PMH of HTN, HLD, BPH, oropharyngeal mass s/p PEG tube/resection, on radiation and chemo (last dose of chemo was last week) presenting to the ED with weakness. Patient reports he was recently admitted for COVID and fever, and upon discharge home he had significant weakness in his legs and was unable to walk. Otherwise denies further complaints. Reports 60 lb weight loss over 6 months, Denies chest pain, sob, fever, chills, n/v/d, changes in urinary or bowel habits, focal weakness

## 2023-01-25 PROCEDURE — 99232 SBSQ HOSP IP/OBS MODERATE 35: CPT

## 2023-01-25 RX ADMIN — TAMSULOSIN HYDROCHLORIDE 0.4 MILLIGRAM(S): 0.4 CAPSULE ORAL at 21:45

## 2023-01-25 RX ADMIN — ATORVASTATIN CALCIUM 20 MILLIGRAM(S): 80 TABLET, FILM COATED ORAL at 21:45

## 2023-01-25 RX ADMIN — Medication 1 APPLICATION(S): at 12:32

## 2023-01-25 RX ADMIN — RIVAROXABAN 10 MILLIGRAM(S): KIT at 12:33

## 2023-01-25 RX ADMIN — PANTOPRAZOLE SODIUM 40 MILLIGRAM(S): 20 TABLET, DELAYED RELEASE ORAL at 12:33

## 2023-01-25 RX ADMIN — Medication 1 TABLET(S): at 12:33

## 2023-01-25 RX ADMIN — Medication 500 MILLIGRAM(S): at 12:33

## 2023-01-25 RX ADMIN — MUPIROCIN 1 APPLICATION(S): 20 OINTMENT TOPICAL at 06:23

## 2023-01-25 RX ADMIN — ZINC SULFATE TAB 220 MG (50 MG ZINC EQUIVALENT) 220 MILLIGRAM(S): 220 (50 ZN) TAB at 12:33

## 2023-01-25 RX ADMIN — FINASTERIDE 5 MILLIGRAM(S): 5 TABLET, FILM COATED ORAL at 12:33

## 2023-01-25 RX ADMIN — MUPIROCIN 1 APPLICATION(S): 20 OINTMENT TOPICAL at 17:55

## 2023-01-25 RX ADMIN — Medication 1 MILLIGRAM(S): at 12:33

## 2023-01-25 NOTE — CHART NOTE - NSCHARTNOTEFT_GEN_A_CORE
Reassessment:     Patient is a 77y old  Male who presents with a chief complaint of Weakness (2023 08:13)      Factors impacting intake: [ ] none [ ] nausea  [ ] vomiting [ ] diarrhea [ ] constipation  [ ]chewing problems [ ] swallowing issues  [X ] other:  oropharyngeal mass s/p PEG tube/resection    Diet Prescription: Soft & Bite Sized & NPO with tube feeding - Glucerna 1.5 Kcal, continuous 60ml/hr & 16 hrs - 23    Intake: Patient visited, pt    Daily Weight in k.9 (2023 05:26)    % Weight Change    Pertinent Medications: MEDICATIONS  (STANDING):  ascorbic acid 500 milliGRAM(s) Oral daily  atorvastatin 20 milliGRAM(s) Oral at bedtime  finasteride 5 milliGRAM(s) Oral daily  folic acid 1 milliGRAM(s) Oral daily  multivitamin/minerals 1 Tablet(s) Oral daily  mupirocin 2% Ointment 1 Application(s) Topical two times a day  pantoprazole   Suspension 40 milliGRAM(s) Oral daily  rivaroxaban 10 milliGRAM(s) Enteral Tube daily  silver sulfADIAZINE 1% Cream 1 Application(s) Topical daily  tamsulosin 0.4 milliGRAM(s) Oral at bedtime  zinc sulfate 220 milliGRAM(s) Oral daily    MEDICATIONS  (PRN):  acetaminophen    Suspension .. 500 milliGRAM(s) Oral every 6 hours PRN Temp greater or equal to 38C (100.4F), Mild Pain (1 - 3), Moderate Pain (4 - 6)  aluminum hydroxide/magnesium hydroxide/simethicone Suspension 30 milliLiter(s) Oral every 6 hours PRN Dyspepsia  guaiFENesin Oral Liquid (Sugar-Free) 200 milliGRAM(s) Oral every 6 hours PRN Cough    Pertinent Labs:      CAPILLARY BLOOD GLUCOSE        Skin:     Estimated Needs:   [ ] no change since previous assessment  [ ] recalculated:     Previous Nutrition Diagnosis:   [ ] Inadequate Energy Intake [ ]Inadequate Oral Intake [ ] Excessive Energy Intake   [ ] Underweight [ ] Increased Nutrient Needs [ ] Overweight/Obesity   [ ] Altered GI Function [ ] Unintended Weight Loss [ ] Food & Nutrition Related Knowledge Deficit [ ] Malnutrition     Nutrition Diagnosis is [ ] ongoing  [ ] resolved [ ] not applicable     New Nutrition Diagnosis: [ ] not applicable       Interventions:   Recommend  [ ] Change Diet To:  [ ] Nutrition Supplement  [ ] Nutrition Support  [ ] Other:     Monitoring and Evaluation:   [ ] PO intake [ x ] Tolerance to diet prescription [ x ] weights [ x ] labs[ x ] follow up per protocol  [ ] other: Reassessment:     Patient is a 77y old  Male who presents with a chief complaint of Weakness (2023 08:13)      Factors impacting intake: [ ] none [ ] nausea  [ ] vomiting [ ] diarrhea [ ] constipation  [ ]chewing problems [ ] swallowing issues  [X ] other:  oropharyngeal mass s/p PEG tube/resection    Diet Prescription: Soft & Bite Sized & NPO with tube feeding - Glucerna 1.5 Kcal, continuous 60ml/hr & 16 hrs - 23    Intake: Patient visited, reports of tolerating Soft & Bite Sized meals, seen by Speech/Swallow team on  & 23 for consistency upgrade & recommendations noted. Pt. provided  food choices & seen by Diet Tech, intake ~40% with tube feeding Glucerna 1.5Kcal infusing at goal 60ml/hr x 16hrs (960ml, 1440kcal, 79g protein, 729ml water at goal) MD/NP to adjust free water as needed as medically feasible. RD available.     Daily Weight in k.9 (2023 05:26)    % Weight Change: nursing to monitor daily weights     Pertinent Medications: MEDICATIONS  (STANDING):  ascorbic acid 500 milliGRAM(s) Oral daily  atorvastatin 20 milliGRAM(s) Oral at bedtime  finasteride 5 milliGRAM(s) Oral daily  folic acid 1 milliGRAM(s) Oral daily  multivitamin/minerals 1 Tablet(s) Oral daily  mupirocin 2% Ointment 1 Application(s) Topical two times a day  pantoprazole   Suspension 40 milliGRAM(s) Oral daily  rivaroxaban 10 milliGRAM(s) Enteral Tube daily  silver sulfADIAZINE 1% Cream 1 Application(s) Topical daily  tamsulosin 0.4 milliGRAM(s) Oral at bedtime  zinc sulfate 220 milliGRAM(s) Oral daily    MEDICATIONS  (PRN):  acetaminophen    Suspension .. 500 milliGRAM(s) Oral every 6 hours PRN Temp greater or equal to 38C (100.4F), Mild Pain (1 - 3), Moderate Pain (4 - 6)  aluminum hydroxide/magnesium hydroxide/simethicone Suspension 30 milliLiter(s) Oral every 6 hours PRN Dyspepsia  guaiFENesin Oral Liquid (Sugar-Free) 200 milliGRAM(s) Oral every 6 hours PRN Cough    Pertinent Labs:      CAPILLARY BLOOD GLUCOSE      Skin: pressure injuries w/wound care    Estimated Needs:   [X ] no change since previous assessment  [ ] recalculated:     Previous Nutrition Diagnosis:   [ ] Inadequate Energy Intake [ ]Inadequate Oral Intake [ ] Excessive Energy Intake   [ ] Underweight [ ] Increased Nutrient Needs [ ] Overweight/Obesity   [ ] Altered GI Function [ ] Unintended Weight Loss [ ] Food & Nutrition Related Knowledge Deficit [Severe ] Malnutrition     Nutrition Diagnosis is [ ] ongoing  [ ] resolved [ ] not applicable     New Nutrition Diagnosis: [ ] not applicable       Interventions: To meet nutrition needs via tube feeding with pleasure foods    Recommend  [ ] Change Diet To:  [ ] Nutrition Supplement  [ ] Nutrition Support  [X ] Other: Nursing to continue feeding assistance and encouragement, aspiration precaution     Monitoring and Evaluation:   [X ] PO intake [ x ] Tolerance to diet prescription [ x ] weights [ x ] labs[ x ] follow up per protocol  [ ] other:

## 2023-01-25 NOTE — PROGRESS NOTE ADULT - NS ATTEND AMEND GEN_ALL_CORE FT
Patient seen/evaluated at bedside on 1/25/2023 I agree with the resident progress note/outlined plan of care. My independent findings and conclusions are documented.    denies fevers/chills  He is open to discharge to LTAC but does not want to submit documents with personal information including his bank info. Alternatively, he would be open to home services    AAOx3 NAD thin/frail appearing male  excoriation, hypopigmentation at circular base of neck  decreased breath sounds  S1S2 RRR  CTAb/l no accessory muscle use  soft, NT, ND, + BS + PEG insertion site, no erythema  no LE edema/cyanosis/clubbing    peg tube malfunction- currently with tube feeds  oropharyngeal cancer s/p XRT/chemo- recently completed course, will follow up with radiation oncologist as an outpatient. No further plans for additional XRT  severe protein calorie malnutrition  discharge planning in progress initially for LTAC however, due to patient concerns about submitting personal documents to facilities he may have to return home with service- an option he is aware of.  -medical service will follow up with social work team

## 2023-01-25 NOTE — PROGRESS NOTE ADULT - PROBLEM SELECTOR PLAN 3
- Most likely d/t decreased PO intake and malnutrition   - Resolved   - PT recommended Home PT  - pt refused to complete medicaid yvon  - plan is d/c home with  home care, [facility]

## 2023-01-25 NOTE — PROGRESS NOTE ADULT - ASSESSMENT
77 year old male from home with PMH of HTN, HLD, BPH, oropharyngeal mass s/p PEG tube/resection, on radiation and chemo (last dose of chemo was last week).  Admitted for Weakness.      Hospital course complicated with fevers and tachycardia and infectious work up was negative.     Hospital course further complicated by malfunctioning PEG tube.  GI Dr. Waddell consulted.   PEG tube functioning without intervention.    Discharge complicated by updated PT which patient has no need for [facility] and recommended for Home PT.  Family concern about safe discharge. Home services unavailable d/t past reports from homecare.  SW & CM following.  D/C planning home with home care on 1/26/23

## 2023-01-25 NOTE — PROGRESS NOTE ADULT - PROBLEM SELECTOR PLAN 8
- Patient from home, unable to care for self at home according to family   - pt refusing to complete medicaid paperwork  - plan is for home w/ home care in AM 1/26/23

## 2023-01-25 NOTE — PROGRESS NOTE ADULT - SUBJECTIVE AND OBJECTIVE BOX
Patient is a 77y old  Male who presents with a chief complaint of Weakness (24 Jan 2023 08:13)      INTERVAL HPI/OVERNIGHT EVENTS: no overnight events    I&O's Summary    25 Jan 2023 07:01  -  25 Jan 2023 18:07  --------------------------------------------------------  IN: 0 mL / OUT: 275 mL / NET: -275 mL      Vital Signs Last 24 Hrs  T(C): 36.3 (25 Jan 2023 14:01), Max: 36.8 (24 Jan 2023 20:43)  T(F): 97.3 (25 Jan 2023 14:01), Max: 98.3 (25 Jan 2023 05:26)  HR: 95 (25 Jan 2023 14:01) (89 - 101)  BP: 125/82 (25 Jan 2023 14:01) (115/71 - 125/82)  BP(mean): --  RR: 18 (25 Jan 2023 14:01) (17 - 18)  SpO2: 99% (25 Jan 2023 14:01) (98% - 99%)    Parameters below as of 25 Jan 2023 14:01  Patient On (Oxygen Delivery Method): room air      PAST MEDICAL & SURGICAL HISTORY:  Hypertension      Hypercholesterolemia      Stage 3 chronic kidney disease      No significant past surgical history          SOCIAL HISTORY  Alcohol:  Tobacco:  Illicit substance use:      FAMILY HISTORY:      LABS:              CAPILLARY BLOOD GLUCOSE        MEDICATIONS  (STANDING):  ascorbic acid 500 milliGRAM(s) Oral daily  atorvastatin 20 milliGRAM(s) Oral at bedtime  finasteride 5 milliGRAM(s) Oral daily  folic acid 1 milliGRAM(s) Oral daily  multivitamin/minerals 1 Tablet(s) Oral daily  mupirocin 2% Ointment 1 Application(s) Topical two times a day  pantoprazole   Suspension 40 milliGRAM(s) Oral daily  rivaroxaban 10 milliGRAM(s) Enteral Tube daily  silver sulfADIAZINE 1% Cream 1 Application(s) Topical daily  tamsulosin 0.4 milliGRAM(s) Oral at bedtime  zinc sulfate 220 milliGRAM(s) Oral daily    MEDICATIONS  (PRN):  acetaminophen    Suspension .. 500 milliGRAM(s) Oral every 6 hours PRN Temp greater or equal to 38C (100.4F), Mild Pain (1 - 3), Moderate Pain (4 - 6)  aluminum hydroxide/magnesium hydroxide/simethicone Suspension 30 milliLiter(s) Oral every 6 hours PRN Dyspepsia  guaiFENesin Oral Liquid (Sugar-Free) 200 milliGRAM(s) Oral every 6 hours PRN Cough      REVIEW OF SYSTEMS: limited, pt unwilling to participate  CONSTITUTIONAL: No fever  EYES: No eye pain  ENMT: No throat pain  NECK: No pain   RESPIRATORY: No cough, No shortness of breath  CARDIOVASCULAR: No chest pain, palpitations  GASTROINTESTINAL: No abdominal pain. No nausea, vomiting.  GENITOURINARY: No dysuria  NEUROLOGICAL: No headaches  SKIN: + neck wound  MUSCULOSKELETAL: No joint pain    RADIOLOGY & ADDITIONAL TESTS:  < from: Xray Chest 1 View-PORTABLE IMMEDIATE (Xray Chest 1 View-PORTABLE IMMEDIATE .) (01.03.23 @ 20:17) >    ACC: [id number] EXAM:  XR CHEST PORTABLE IMMED 1V                          PROCEDURE DATE:  01/03/2023          INTERPRETATION:  INDICATION: Fever    COMPARISON: 12/22/2022    FINDINGS:  An AP portable semiupright view of the chest shows clear lungs   bilaterally. No infiltrates are seen. There is no pneumothorax. There are   no pleural effusions. There is no hilar or mediastinal widening. Heart   size is normal, without CHF. There are degenerative changes of the spine   and shoulders. No significant changes are seen compared to the prior   study.    IMPRESSION: Clear lungs with no significant cardiopulmonary abnormalities.    --- End of Report ---            MIRIAM CARLSON MD; Attending Radiologist  This document has been electronically signed. Jan 4 2023  5:55PM    < end of copied text >    Imaging Personally Reviewed:  [ x] YES  [ ] NO    Consultant(s) Notes Reviewed:  [ x] YES  [ ] NO    PHYSICAL EXAM: limited exam pt, refused  GENERAL: NAD  HEAD:  Atraumatic, Normocephalic  EYES: conjunctiva and sclera clear  ENMT:  Moist mucous membranes  NERVOUS SYSTEM:  Alert & Oriented   SKIN: + neck wound    Care Collaborated Discussed with Consultants/Other Providers [x ] YES  [ ] NO

## 2023-01-25 NOTE — CHART NOTE - NSCHARTNOTESELECT_GEN_ALL_CORE
COVID Result
Nutrition Services
Event Note
Nutrition Services
Second Touch/Event Note
Event Note

## 2023-01-25 NOTE — PROGRESS NOTE ADULT - NS ATTEND OPT1 GEN_ALL_CORE
I attest my time as attending is greater than 50% of the total combined time spent on qualifying patient care activities by the PA/NP and attending.
I independently performed the documented:
I attest my time as attending is greater than 50% of the total combined time spent on qualifying patient care activities by the PA/NP and attending.
I attest my time as attending is greater than 50% of the total combined time spent on qualifying patient care activities by the PA/NP and attending.
I independently performed the documented:
I attest my time as attending is greater than 50% of the total combined time spent on qualifying patient care activities by the PA/NP and attending.
I independently performed the documented:
I attest my time as attending is greater than 50% of the total combined time spent on qualifying patient care activities by the PA/NP and attending.

## 2023-01-25 NOTE — PROGRESS NOTE ADULT - NS ATTEND OPT1A GEN_ALL_CORE
Medical decision making
Exam/Medical decision making
Medical decision making
Medical decision making
Exam/Medical decision making
Exam/Medical decision making

## 2023-01-25 NOTE — PROGRESS NOTE ADULT - PROBLEM SELECTOR PLAN 6
- H/o Oral mass on radiation and chemo (last dose of chemo was last week), follows up with Dr. Segundo Velasquez.  - C/w Wound care    - Patient no longer wishes to continue with radiation therapy

## 2023-01-26 RX ADMIN — FINASTERIDE 5 MILLIGRAM(S): 5 TABLET, FILM COATED ORAL at 12:17

## 2023-01-26 RX ADMIN — TAMSULOSIN HYDROCHLORIDE 0.4 MILLIGRAM(S): 0.4 CAPSULE ORAL at 22:28

## 2023-01-26 RX ADMIN — MUPIROCIN 1 APPLICATION(S): 20 OINTMENT TOPICAL at 18:19

## 2023-01-26 RX ADMIN — Medication 1 MILLIGRAM(S): at 12:18

## 2023-01-26 RX ADMIN — Medication 1 APPLICATION(S): at 12:17

## 2023-01-26 RX ADMIN — Medication 1 TABLET(S): at 12:19

## 2023-01-26 RX ADMIN — ZINC SULFATE TAB 220 MG (50 MG ZINC EQUIVALENT) 220 MILLIGRAM(S): 220 (50 ZN) TAB at 12:19

## 2023-01-26 RX ADMIN — PANTOPRAZOLE SODIUM 40 MILLIGRAM(S): 20 TABLET, DELAYED RELEASE ORAL at 12:18

## 2023-01-26 RX ADMIN — MUPIROCIN 1 APPLICATION(S): 20 OINTMENT TOPICAL at 12:15

## 2023-01-26 RX ADMIN — ATORVASTATIN CALCIUM 20 MILLIGRAM(S): 80 TABLET, FILM COATED ORAL at 22:28

## 2023-01-26 RX ADMIN — RIVAROXABAN 10 MILLIGRAM(S): KIT at 12:18

## 2023-01-26 RX ADMIN — Medication 500 MILLIGRAM(S): at 12:19

## 2023-01-26 NOTE — PROGRESS NOTE ADULT - REASON FOR ADMISSION
Weakness

## 2023-01-26 NOTE — PROGRESS NOTE ADULT - PROBLEM SELECTOR PLAN 3
- Most likely d/t decreased PO intake and malnutrition   - Resolved   - PT recommended Home PT  - pt refused to complete medicaid yvon  - plan is d/c home with home care, [facility]

## 2023-01-26 NOTE — PROGRESS NOTE ADULT - PROVIDER SPECIALTY LIST ADULT
Internal Medicine
Hospitalist
Internal Medicine
Gastroenterology
Gastroenterology
Hospitalist
Gastroenterology
Internal Medicine
Gastroenterology

## 2023-01-26 NOTE — PROGRESS NOTE ADULT - SUBJECTIVE AND OBJECTIVE BOX
Patient is a 77y old  Male who presents with a chief complaint of Weakness (25 Jan 2023 18:06)      SUBJECTIVE / OVERNIGHT EVENTS: No acute overnight events. Pt pending d/c, SW following    MEDICATIONS  (STANDING):  ascorbic acid 500 milliGRAM(s) Oral daily  atorvastatin 20 milliGRAM(s) Oral at bedtime  finasteride 5 milliGRAM(s) Oral daily  folic acid 1 milliGRAM(s) Oral daily  multivitamin/minerals 1 Tablet(s) Oral daily  mupirocin 2% Ointment 1 Application(s) Topical two times a day  pantoprazole   Suspension 40 milliGRAM(s) Oral daily  rivaroxaban 10 milliGRAM(s) Enteral Tube daily  silver sulfADIAZINE 1% Cream 1 Application(s) Topical daily  tamsulosin 0.4 milliGRAM(s) Oral at bedtime  zinc sulfate 220 milliGRAM(s) Oral daily    MEDICATIONS  (PRN):  acetaminophen    Suspension .. 500 milliGRAM(s) Oral every 6 hours PRN Temp greater or equal to 38C (100.4F), Mild Pain (1 - 3), Moderate Pain (4 - 6)  aluminum hydroxide/magnesium hydroxide/simethicone Suspension 30 milliLiter(s) Oral every 6 hours PRN Dyspepsia  guaiFENesin Oral Liquid (Sugar-Free) 200 milliGRAM(s) Oral every 6 hours PRN Cough        CAPILLARY BLOOD GLUCOSE        I&O's Summary    25 Jan 2023 07:01  -  26 Jan 2023 07:00  --------------------------------------------------------  IN: 0 mL / OUT: 275 mL / NET: -275 mL    26 Jan 2023 07:01  -  26 Jan 2023 20:12  --------------------------------------------------------  IN: 0 mL / OUT: 250 mL / NET: -250 mL        PHYSICAL EXAM:  GENERAL: NAD, well-developed  HEAD:  Atraumatic, Normocephalic  EYES: EOMI, PERRLA, conjunctiva and sclera clear  NECK: Supple, No JVD  CHEST/LUNG: Clear to auscultation bilaterally; No wheeze  HEART: Regular rate and rhythm; S1, S2 present  ABDOMEN: Soft, Nontender, Nondistended; Bowel sounds present  EXTREMITIES:  2+ Peripheral Pulses, No clubbing, cyanosis, or edema  PSYCH: AAOx3  NEUROLOGY: non-focal  SKIN: refer to RN skin assessment note    LABS:      RADIOLOGY & ADDITIONAL TESTS:    < from: Xray Chest 1 View-PORTABLE IMMEDIATE (Xray Chest 1 View-PORTABLE IMMEDIATE .) (01.03.23 @ 20:17) >  ACC: [id number] EXAM:  XR CHEST PORTABLE IMMED 1V                          PROCEDURE DATE:  01/03/2023          INTERPRETATION:  INDICATION: Fever    COMPARISON: 12/22/2022    FINDINGS:  An AP portable semiupright view of the chest shows clear lungs   bilaterally. No infiltrates are seen. There is no pneumothorax. There are   no pleural effusions. There is no hilar or mediastinal widening. Heart   size is normal, without CHF. There are degenerative changes of the spine   and shoulders. No significant changes are seen compared to the prior   study.    IMPRESSION: Clear lungs with no significant cardiopulmonary abnormalities.    --- End of Report ---    < end of copied text >      Imaging Personally Reviewed:    Consultant(s) Notes Reviewed:      Care Discussed with Consultants/Other Providers:

## 2023-01-26 NOTE — PROGRESS NOTE ADULT - PROBLEM SELECTOR PLAN 2
- Most likely d/t malignancy   - c/w supportive measures   - Nutrition consulted, appreciated  - S/S- Soft & bite sized/ Thin liquids

## 2023-01-26 NOTE — PROGRESS NOTE ADULT - ASSESSMENT
77 year old male from home with PMH of HTN, HLD, BPH, oropharyngeal mass s/p PEG tube/resection, on radiation and chemo (last dose of chemo was last week).  Admitted for Weakness.      Hospital course complicated with fevers and tachycardia and infectious work up was negative.     Hospital course further complicated by malfunctioning PEG tube.  GI Dr. Waddell consulted.   PEG tube functioning without intervention.    Discharge complicated by updated PT which patient has no need for [facility] and recommended for Home PT.  Family concern about safe discharge. Home services unavailable d/t past reports from homecare.  SW & CM following.  D/C planning home with home care on 1/27/23

## 2023-01-26 NOTE — PROGRESS NOTE ADULT - NUTRITIONAL ASSESSMENT
This patient has been assessed with a concern for Malnutrition and has been determined to have a diagnosis/diagnoses of Severe protein-calorie malnutrition and Underweight (BMI < 19).    This patient is being managed with:   Diet NPO with Tube Feed-  Tube Feeding Modality: Gastrostomy  Glucerna 1.5 Earl  Total Volume for 24 Hours (mL): 960  Continuous  Starting Tube Feed Rate {mL per Hour}: 20  Increase Tube Feed Rate by (mL): 10     Every 6 hours  Until Goal Tube Feed Rate (mL per Hour): 60  Tube Feed Duration (in Hours): 16  Tube Feed Start Time: 06:00  Tube Feed Stop Time: 22:00  Free Water Flush  Bolus   Total Volume per Flush (mL): 250   Frequency: Every 6 Hours    Start Time: 16:00  Entered: Jan 2 2023 10:51AM    
This patient has been assessed with a concern for Malnutrition and has been determined to have a diagnosis/diagnoses of Severe protein-calorie malnutrition and Underweight (BMI < 19).    This patient is being managed with:   Diet Pureed-  DASH/TLC {Sodium & Cholesterol Restricted}  Entered: Jan 23 2023  1:18PM    
This patient has been assessed with a concern for Malnutrition and has been determined to have a diagnosis/diagnoses of Severe protein-calorie malnutrition and Underweight (BMI < 19).    This patient is being managed with:   Diet Soft and Bite Sized-  No Citrus  Tube Feeding Modality: Gastrostomy  Glucerna 1.5 Earl  Total Volume for 24 Hours (mL): 960  Continuous  Starting Tube Feed Rate {mL per Hour}: 20  Increase Tube Feed Rate by (mL): 10     Every 6 hours  Until Goal Tube Feed Rate (mL per Hour): 60  Tube Feed Duration (in Hours): 16  Tube Feed Start Time: 06:00  Tube Feed Stop Time: 22:00  Free Water Flush  Bolus   Total Volume per Flush (mL): 250   Frequency: Every 6 Hours   Total Daily Volume of Flush (mL): 16    Start Time: 15:00  Entered: Jan 24 2023 11:44AM    
This patient has been assessed with a concern for Malnutrition and has been determined to have a diagnosis/diagnoses of Severe protein-calorie malnutrition and Underweight (BMI < 19).    This patient is being managed with:   Diet NPO with Tube Feed-  Tube Feeding Modality: Gastrostomy  Glucerna 1.5 Earl  Total Volume for 24 Hours (mL): 960  Continuous  Starting Tube Feed Rate {mL per Hour}: 20  Increase Tube Feed Rate by (mL): 10     Every 6 hours  Until Goal Tube Feed Rate (mL per Hour): 60  Tube Feed Duration (in Hours): 16  Tube Feed Start Time: 06:00  Tube Feed Stop Time: 22:00  Free Water Flush  Bolus   Total Volume per Flush (mL): 250   Frequency: Every 6 Hours    Start Time: 16:00  Entered: Jan 2 2023 10:51AM    
This patient has been assessed with a concern for Malnutrition and has been determined to have a diagnosis/diagnoses of Severe protein-calorie malnutrition and Underweight (BMI < 19).    This patient is being managed with:   Diet Pureed-  Tube Feeding Modality: Gastrostomy  Glucerna 1.5 Earl  Total Volume for 24 Hours (mL): 960  Continuous  Starting Tube Feed Rate {mL per Hour}: 60  Until Goal Tube Feed Rate (mL per Hour): 60  Tube Feed Duration (in Hours): 16  Tube Feed Start Time: 06:00  Tube Feed Stop Time: 22:00  Volume Based Feeding Titration:  If the patient has achieved and tolerated the prescribed goal rate and tube feeding has been held within a 24hr period titrate tube feeding rate based on guidelines up to a maximum rate of 120mL/hr  Entered: Jan 20 2023 10:51AM    
This patient has been assessed with a concern for Malnutrition and has been determined to have a diagnosis/diagnoses of Severe protein-calorie malnutrition and Underweight (BMI < 19).    This patient is being managed with:   Diet NPO with Tube Feed-  Tube Feeding Modality: Gastrostomy  Glucerna 1.5 Earl  Total Volume for 24 Hours (mL): 960  Continuous  Starting Tube Feed Rate {mL per Hour}: 20  Increase Tube Feed Rate by (mL): 10     Every 6 hours  Until Goal Tube Feed Rate (mL per Hour): 60  Tube Feed Duration (in Hours): 16  Tube Feed Start Time: 06:00  Tube Feed Stop Time: 22:00  Free Water Flush  Bolus   Total Volume per Flush (mL): 250   Frequency: Every 6 Hours    Start Time: 16:00  Entered: Jan 2 2023 10:51AM    
This patient has been assessed with a concern for Malnutrition and has been determined to have a diagnosis/diagnoses of Severe protein-calorie malnutrition and Underweight (BMI < 19).    This patient is being managed with:   Diet Pureed-  Tube Feeding Modality: Gastrostomy  Glucerna 1.5 Earl  Total Volume for 24 Hours (mL): 960  Continuous  Starting Tube Feed Rate {mL per Hour}: 60  Until Goal Tube Feed Rate (mL per Hour): 60  Tube Feed Duration (in Hours): 16  Tube Feed Start Time: 06:00  Tube Feed Stop Time: 22:00  Volume Based Feeding Titration:  If the patient has achieved and tolerated the prescribed goal rate and tube feeding has been held within a 24hr period titrate tube feeding rate based on guidelines up to a maximum rate of 120mL/hr  Entered: Jan 20 2023 10:51AM    
This patient has been assessed with a concern for Malnutrition and has been determined to have a diagnosis/diagnoses of Severe protein-calorie malnutrition and Underweight (BMI < 19).    This patient is being managed with:   Diet NPO with Tube Feed-  Tube Feeding Modality: Gastrostomy  Glucerna 1.5 Earl  Total Volume for 24 Hours (mL): 960  Continuous  Starting Tube Feed Rate {mL per Hour}: 20  Increase Tube Feed Rate by (mL): 10     Every 6 hours  Until Goal Tube Feed Rate (mL per Hour): 60  Tube Feed Duration (in Hours): 16  Tube Feed Start Time: 06:00  Tube Feed Stop Time: 22:00  Free Water Flush  Bolus   Total Volume per Flush (mL): 250   Frequency: Every 6 Hours    Start Time: 16:00  Entered: Jan 2 2023 10:51AM    
This patient has been assessed with a concern for Malnutrition and has been determined to have a diagnosis/diagnoses of Severe protein-calorie malnutrition and Underweight (BMI < 19).    This patient is being managed with:   Diet Soft and Bite Sized-  No Citrus  Tube Feeding Modality: Gastrostomy  Glucerna 1.5 Earl  Total Volume for 24 Hours (mL): 960  Continuous  Starting Tube Feed Rate {mL per Hour}: 20  Increase Tube Feed Rate by (mL): 10     Every 6 hours  Until Goal Tube Feed Rate (mL per Hour): 60  Tube Feed Duration (in Hours): 16  Tube Feed Start Time: 06:00  Tube Feed Stop Time: 22:00  Free Water Flush  Bolus   Total Volume per Flush (mL): 250   Frequency: Every 6 Hours   Total Daily Volume of Flush (mL): 16    Start Time: 15:00  Entered: Jan 24 2023 11:44AM    
This patient has been assessed with a concern for Malnutrition and has been determined to have a diagnosis/diagnoses of Severe protein-calorie malnutrition and Underweight (BMI < 19).    This patient is being managed with:   Diet NPO after Midnight-     NPO Start Date: 05-Jan-2023   NPO Start Time: 23:59  Entered: Jan 5 2023 11:18AM    Diet NPO with Tube Feed-  Tube Feeding Modality: Gastrostomy  Glucerna 1.5 Earl  Total Volume for 24 Hours (mL): 960  Continuous  Starting Tube Feed Rate {mL per Hour}: 20  Increase Tube Feed Rate by (mL): 10     Every 6 hours  Until Goal Tube Feed Rate (mL per Hour): 60  Tube Feed Duration (in Hours): 16  Tube Feed Start Time: 06:00  Tube Feed Stop Time: 22:00  Free Water Flush  Bolus   Total Volume per Flush (mL): 250   Frequency: Every 6 Hours    Start Time: 16:00  Entered: Jan 2 2023 10:51AM    
This patient has been assessed with a concern for Malnutrition and has been determined to have a diagnosis/diagnoses of Severe protein-calorie malnutrition and Underweight (BMI < 19).    This patient is being managed with:   Diet Pureed-  Tube Feeding Modality: Gastrostomy  Glucerna 1.5 Earl  Total Volume for 24 Hours (mL): 960  Continuous  Starting Tube Feed Rate {mL per Hour}: 60  Until Goal Tube Feed Rate (mL per Hour): 60  Tube Feed Duration (in Hours): 16  Tube Feed Start Time: 06:00  Tube Feed Stop Time: 22:00  Volume Based Feeding Titration:  If the patient has achieved and tolerated the prescribed goal rate and tube feeding has been held within a 24hr period titrate tube feeding rate based on guidelines up to a maximum rate of 120mL/hr  Entered: Jan 20 2023 10:51AM    
This patient has been assessed with a concern for Malnutrition and has been determined to have a diagnosis/diagnoses of Severe protein-calorie malnutrition and Underweight (BMI < 19).    This patient is being managed with:   Diet NPO with Tube Feed-  Tube Feeding Modality: Gastrostomy  Glucerna 1.5 Earl  Total Volume for 24 Hours (mL): 960  Continuous  Starting Tube Feed Rate {mL per Hour}: 20  Increase Tube Feed Rate by (mL): 10     Every 6 hours  Until Goal Tube Feed Rate (mL per Hour): 60  Tube Feed Duration (in Hours): 16  Tube Feed Start Time: 06:00  Tube Feed Stop Time: 22:00  Free Water Flush  Bolus   Total Volume per Flush (mL): 250   Frequency: Every 6 Hours    Start Time: 16:00  Entered: Jan 2 2023 10:51AM    
This patient has been assessed with a concern for Malnutrition and has been determined to have a diagnosis/diagnoses of Severe protein-calorie malnutrition and Underweight (BMI < 19).    This patient is being managed with:   Diet NPO with Tube Feed-  Tube Feeding Modality: Gastrostomy  Glucerna 1.5 Earl  Total Volume for 24 Hours (mL): 960  Continuous  Starting Tube Feed Rate {mL per Hour}: 20  Increase Tube Feed Rate by (mL): 10     Every 6 hours  Until Goal Tube Feed Rate (mL per Hour): 60  Tube Feed Duration (in Hours): 16  Tube Feed Start Time: 06:00  Tube Feed Stop Time: 22:00  Free Water Flush  Bolus   Total Volume per Flush (mL): 250   Frequency: Every 6 Hours    Start Time: 16:00  Entered: Jan 2 2023 10:51AM    
This patient has been assessed with a concern for Malnutrition and has been determined to have a diagnosis/diagnoses of Severe protein-calorie malnutrition and Underweight (BMI < 19).    This patient is being managed with:   Diet Pureed-  Tube Feeding Modality: Gastrostomy  Glucerna 1.5 Earl  Total Volume for 24 Hours (mL): 960  Continuous  Starting Tube Feed Rate {mL per Hour}: 60  Until Goal Tube Feed Rate (mL per Hour): 60  Tube Feed Duration (in Hours): 16  Tube Feed Start Time: 06:00  Tube Feed Stop Time: 22:00  Volume Based Feeding Titration:  If the patient has achieved and tolerated the prescribed goal rate and tube feeding has been held within a 24hr period titrate tube feeding rate based on guidelines up to a maximum rate of 120mL/hr  Entered: Jan 20 2023 10:51AM    
This patient has been assessed with a concern for Malnutrition and has been determined to have a diagnosis/diagnoses of Severe protein-calorie malnutrition and Underweight (BMI < 19).    This patient is being managed with:   Diet NPO after Midnight-     NPO Start Date: 04-Jan-2023   NPO Start Time: 23:59  Entered: Jan 5 2023 11:47AM    Diet NPO after Midnight-     NPO Start Date: 05-Jan-2023   NPO Start Time: 23:59  Entered: Jan 5 2023 11:18AM    Diet NPO with Tube Feed-  Tube Feeding Modality: Gastrostomy  Glucerna 1.5 Earl  Total Volume for 24 Hours (mL): 960  Continuous  Starting Tube Feed Rate {mL per Hour}: 20  Increase Tube Feed Rate by (mL): 10     Every 6 hours  Until Goal Tube Feed Rate (mL per Hour): 60  Tube Feed Duration (in Hours): 16  Tube Feed Start Time: 06:00  Tube Feed Stop Time: 22:00  Free Water Flush  Bolus   Total Volume per Flush (mL): 250   Frequency: Every 6 Hours    Start Time: 16:00  Entered: Jan 2 2023 10:51AM    
This patient has been assessed with a concern for Malnutrition and has been determined to have a diagnosis/diagnoses of Severe protein-calorie malnutrition and Underweight (BMI < 19).    This patient is being managed with:   Diet NPO with Tube Feed-  Tube Feeding Modality: Gastrostomy  Glucerna 1.5 Earl  Total Volume for 24 Hours (mL): 960  Continuous  Starting Tube Feed Rate {mL per Hour}: 20  Increase Tube Feed Rate by (mL): 10     Every 6 hours  Until Goal Tube Feed Rate (mL per Hour): 60  Tube Feed Duration (in Hours): 16  Tube Feed Start Time: 06:00  Tube Feed Stop Time: 22:00  Free Water Flush  Bolus   Total Volume per Flush (mL): 250   Frequency: Every 6 Hours    Start Time: 16:00  Entered: Jan 2 2023 10:51AM    

## 2023-01-26 NOTE — PROGRESS NOTE ADULT - PROBLEM SELECTOR PLAN 1
- Resolved, functioning without intervention.  Pt refused replacement.    - C/w PEG tube feed & soft diet    - Appreciate GI-Dr. Bairon medrano  - Speech consulted, appreciated.  Speech re-eval appreciated.

## 2023-01-26 NOTE — PROGRESS NOTE ADULT - PROBLEM SELECTOR PLAN 8
- Patient from home, unable to care for self at home according to family   - Pts plan was for [facility], pt refusing to complete medicaid paperwork, pt now wishes to go home with any insurance covered home care services.. Reported that he is able to do his own dressing changes and administer his own peg tube feedings as before. Patient planning to speak with his roommate about him having home care services such as visiting nurse, physical therapist and HHA services in her apartment. Patient was accepted by [place] [facility], [facility] to f/u  - plan is for home w/ home care in AM 1/27/23

## 2023-01-27 VITALS
OXYGEN SATURATION: 97 % | SYSTOLIC BLOOD PRESSURE: 128 MMHG | HEART RATE: 107 BPM | DIASTOLIC BLOOD PRESSURE: 73 MMHG | RESPIRATION RATE: 18 BRPM | TEMPERATURE: 98 F

## 2023-01-27 PROCEDURE — 80307 DRUG TEST PRSMV CHEM ANLYZR: CPT

## 2023-01-27 PROCEDURE — 81001 URINALYSIS AUTO W/SCOPE: CPT

## 2023-01-27 PROCEDURE — 85610 PROTHROMBIN TIME: CPT

## 2023-01-27 PROCEDURE — 93005 ELECTROCARDIOGRAM TRACING: CPT

## 2023-01-27 PROCEDURE — 85730 THROMBOPLASTIN TIME PARTIAL: CPT

## 2023-01-27 PROCEDURE — 92610 EVALUATE SWALLOWING FUNCTION: CPT

## 2023-01-27 PROCEDURE — 87086 URINE CULTURE/COLONY COUNT: CPT

## 2023-01-27 PROCEDURE — 99239 HOSP IP/OBS DSCHRG MGMT >30: CPT

## 2023-01-27 PROCEDURE — 71045 X-RAY EXAM CHEST 1 VIEW: CPT

## 2023-01-27 PROCEDURE — 85025 COMPLETE CBC W/AUTO DIFF WBC: CPT

## 2023-01-27 PROCEDURE — 97164 PT RE-EVAL EST PLAN CARE: CPT

## 2023-01-27 PROCEDURE — 82962 GLUCOSE BLOOD TEST: CPT

## 2023-01-27 PROCEDURE — 0225U NFCT DS DNA&RNA 21 SARSCOV2: CPT

## 2023-01-27 PROCEDURE — 85027 COMPLETE CBC AUTOMATED: CPT

## 2023-01-27 PROCEDURE — 87040 BLOOD CULTURE FOR BACTERIA: CPT

## 2023-01-27 PROCEDURE — 99285 EMERGENCY DEPT VISIT HI MDM: CPT

## 2023-01-27 PROCEDURE — 87635 SARS-COV-2 COVID-19 AMP PRB: CPT

## 2023-01-27 PROCEDURE — 36415 COLL VENOUS BLD VENIPUNCTURE: CPT

## 2023-01-27 PROCEDURE — 80048 BASIC METABOLIC PNL TOTAL CA: CPT

## 2023-01-27 PROCEDURE — 80053 COMPREHEN METABOLIC PANEL: CPT

## 2023-01-27 PROCEDURE — 84145 PROCALCITONIN (PCT): CPT

## 2023-01-27 PROCEDURE — 92526 ORAL FUNCTION THERAPY: CPT

## 2023-01-27 RX ORDER — ZINC SULFATE TAB 220 MG (50 MG ZINC EQUIVALENT) 220 (50 ZN) MG
1 TAB ORAL
Qty: 30 | Refills: 0
Start: 2023-01-27 | End: 2023-02-25

## 2023-01-27 RX ORDER — MULTIVIT-MIN/FERROUS GLUCONATE 9 MG/15 ML
1 LIQUID (ML) ORAL
Qty: 30 | Refills: 0
Start: 2023-01-27 | End: 2023-02-25

## 2023-01-27 RX ORDER — ASCORBIC ACID 60 MG
1 TABLET,CHEWABLE ORAL
Qty: 30 | Refills: 0
Start: 2023-01-27 | End: 2023-02-25

## 2023-01-27 RX ADMIN — MUPIROCIN 1 APPLICATION(S): 20 OINTMENT TOPICAL at 06:22

## 2023-01-27 RX ADMIN — RIVAROXABAN 10 MILLIGRAM(S): KIT at 12:11

## 2023-01-27 RX ADMIN — PANTOPRAZOLE SODIUM 40 MILLIGRAM(S): 20 TABLET, DELAYED RELEASE ORAL at 12:12

## 2023-01-27 RX ADMIN — Medication 1 MILLIGRAM(S): at 12:11

## 2023-01-27 RX ADMIN — Medication 200 MILLIGRAM(S): at 13:45

## 2023-01-27 RX ADMIN — Medication 1 APPLICATION(S): at 13:45

## 2023-01-27 RX ADMIN — ZINC SULFATE TAB 220 MG (50 MG ZINC EQUIVALENT) 220 MILLIGRAM(S): 220 (50 ZN) TAB at 13:45

## 2023-01-27 RX ADMIN — Medication 200 MILLIGRAM(S): at 06:22

## 2023-01-27 RX ADMIN — FINASTERIDE 5 MILLIGRAM(S): 5 TABLET, FILM COATED ORAL at 12:11

## 2023-01-27 RX ADMIN — Medication 500 MILLIGRAM(S): at 12:10

## 2023-01-27 RX ADMIN — Medication 1 TABLET(S): at 12:11

## 2023-01-30 ENCOUNTER — NON-APPOINTMENT (OUTPATIENT)
Age: 78
End: 2023-01-30

## 2023-02-03 ENCOUNTER — APPOINTMENT (OUTPATIENT)
Dept: RADIATION ONCOLOGY | Facility: CLINIC | Age: 78
End: 2023-02-03

## 2023-02-07 NOTE — DISEASE MANAGEMENT
[Clinical] : TNM Stage: c [I] : I [TTNM] : 3 [NTNM] : 1 [MTNM] : 0 [de-identified] : 6000 cGy [de-identified] : [facility] [de-identified] : Oropharynx/Neck

## 2023-02-07 NOTE — HISTORY OF PRESENT ILLNESS
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 Patient presents for on treatment visit. He has baseline dysphagia at presentation. S/p PEG tube placement on 11/7/2022. He had moderate pain to the site which has decreased since  last night.\par Noted with weight decline of 10 lbs, he has been NPO since 10/6/22 midnight. Encouraged to increase hydration.\par Dysphagia is at baseline.\par Weekly Carbo/Taxol Cycle #1  on 11/4/2022\par \par 11/15/2022 Completed 8/35 Fx. Patient presents for on treatment visit\par \par 11/22/22: 14/35 fx: Notes throat pain, dysphagia & dysgeusia. Approximately 6 lb weight loss. Skin care with Neisha lotion\par \par 11/29/2022 17/35 Fx completed. He continues to have throat pain, worse with swallowing. 3 lbs weight loss in 1 week noted today. He did not continue with MMW after 2 uses due to viscosity. Able to tolerate water by mouth. Totally on PEG dependent for nutrition, taking  typically 3-4 cans/day, however took only 3 cans yesterday. Tolerating weekly chemo (Fridays)\par Will start trial Gabapentin 300MG 3x/day for throat pain. (He is currently not taking Nortriptyline)\par Nutrition services following.\par \par \par 12/6/2022 23/35 Fx completed. He was not taking the MMW and Gabapentin. He is strongly averse to oxycodone at this time due to concern over side effects and addiction. [facility] recently started viscous lidocaine. Of note, he has begun taking PO nutritional shakes by mouth, 1 per day, though weight remains low at 144 (170lb at start of treatment). \par \par \par 12/13/2022   27/35 Fx completed. Skin breakdown noted in treated neck, multiple open areas of dry desquamation surrounded by severe hyperpigmentation to neck. Started using Silvadene to open areas and Aquaphor to hyperpimented areas. He will  Oxycodone liquid at [facility] today and start trial at [facility] in addition to gabapentin 300 mg TID.\par \par \par 12/19/2022   30/35 Fx completed. He presents with severe radiation desquamation to the neck treated area with complete loss of epidermis to anterior neck. He reports pain is managed and tolerable with use of Oxycodone liquid. He is tolerating 5 cans of feeding formula via PEG. He agrees to 5 days treatment break. Will return for skin assessment on 12/23/22.\par Start use of Desitin and Silvadene. Sent Prescription for Mepilex to [facility].\par He continues on Hydration and is on schedule for today.\par \par \par

## 2023-02-07 NOTE — PHYSICAL EXAM
[General Appearance - Alert] : alert [General Appearance - In No Acute Distress] : in no acute distress [Thin] : thin [] : no respiratory distress [Respiration, Rhythm And Depth] : normal respiratory rhythm and effort [Exaggerated Use Of Accessory Muscles For Inspiration] : no accessory muscle use [Feeding Tube] : a feeding tube was present [Oriented To Time, Place, And Person] : oriented to person, place, and time [de-identified] : Trismus (sharp pain with wide jaw opening). Grade 1 mucositis in oropharynx.  [de-identified] : Grade 1 dermatitis

## 2023-02-09 ENCOUNTER — RX RENEWAL (OUTPATIENT)
Age: 78
End: 2023-02-09

## 2023-02-10 ENCOUNTER — APPOINTMENT (OUTPATIENT)
Dept: INTERNAL MEDICINE | Facility: CLINIC | Age: 78
End: 2023-02-10
Payer: MEDICARE

## 2023-02-10 PROCEDURE — 99442: CPT

## 2023-02-11 ENCOUNTER — APPOINTMENT (OUTPATIENT)
Dept: INTERNAL MEDICINE | Facility: CLINIC | Age: 78
End: 2023-02-11

## 2023-02-12 NOTE — HISTORY OF PRESENT ILLNESS
[Home] : at home, [unfilled] , at the time of the visit. [Medical Office: (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

## 2023-02-13 ENCOUNTER — APPOINTMENT (OUTPATIENT)
Dept: INTERNAL MEDICINE | Facility: CLINIC | Age: 78
End: 2023-02-13
Payer: MEDICARE

## 2023-02-13 VITALS
HEART RATE: 111 BPM | OXYGEN SATURATION: 97 % | BODY MASS INDEX: 17.92 KG/M2 | DIASTOLIC BLOOD PRESSURE: 71 MMHG | WEIGHT: 121 LBS | TEMPERATURE: 98.7 F | RESPIRATION RATE: 16 BRPM | SYSTOLIC BLOOD PRESSURE: 98 MMHG | HEIGHT: 69 IN

## 2023-02-13 DIAGNOSIS — N40.0 BENIGN PROSTATIC HYPERPLASIA WITHOUT LOWER URINARY TRACT SYMPMS: ICD-10-CM

## 2023-02-13 PROCEDURE — 99214 OFFICE O/P EST MOD 30 MIN: CPT

## 2023-02-13 RX ORDER — ISOPROPYL ALCOHOL 70 ML/100ML
70 SWAB TOPICAL
Refills: 0 | Status: DISCONTINUED | COMMUNITY
Start: 2022-02-18 | End: 2023-02-13

## 2023-02-13 RX ORDER — SILVER SULFADIAZINE 10 MG/G
1 CREAM TOPICAL TWICE DAILY
Qty: 1 | Refills: 0 | Status: DISCONTINUED | COMMUNITY
Start: 2022-12-13 | End: 2023-02-13

## 2023-02-13 RX ORDER — ACETAMINOPHEN 650 MG/20.3ML
650 SOLUTION ORAL 3 TIMES DAILY
Qty: 600 | Refills: 0 | Status: DISCONTINUED | COMMUNITY
Start: 2022-12-14 | End: 2023-02-13

## 2023-02-13 RX ORDER — BLOOD SUGAR DIAGNOSTIC
STRIP MISCELLANEOUS
Refills: 0 | Status: DISCONTINUED | COMMUNITY
Start: 2022-02-18 | End: 2023-02-13

## 2023-02-13 RX ORDER — TAMSULOSIN HYDROCHLORIDE 0.4 MG/1
0.4 CAPSULE ORAL
Qty: 30 | Refills: 5 | Status: DISCONTINUED | COMMUNITY
Start: 2023-02-04 | End: 2023-02-13

## 2023-02-13 RX ORDER — BLOOD-GLUCOSE METER
W/DEVICE KIT MISCELLANEOUS
Refills: 0 | Status: DISCONTINUED | COMMUNITY
Start: 2022-02-18 | End: 2023-02-13

## 2023-02-13 RX ORDER — DIPHENHYDRAMINE HYDROCHLORIDE AND LIDOCAINE HYDROCHLORIDE AND ALUMINUM HYDROXIDE AND MAGNESIUM HYDRO
KIT
Qty: 300 | Refills: 3 | Status: DISCONTINUED | COMMUNITY
Start: 2022-11-22 | End: 2023-02-13

## 2023-02-13 RX ORDER — LIDOCAINE HYDROCHLORIDE 20 MG/ML
2 SOLUTION ORAL; TOPICAL EVERY 4 HOURS
Qty: 2 | Refills: 3 | Status: DISCONTINUED | COMMUNITY
Start: 2022-12-02 | End: 2023-02-13

## 2023-02-13 RX ORDER — GABAPENTIN 250 MG/5ML
300 SOLUTION ORAL 3 TIMES DAILY
Qty: 200 | Refills: 0 | Status: DISCONTINUED | COMMUNITY
Start: 2022-12-13 | End: 2023-02-13

## 2023-02-13 RX ORDER — NUT.TX.IMPAIRED DIGESTIVE FXN 0.035-1/ML
LIQUID (ML) ORAL DAILY
Qty: 180 | Refills: 12 | Status: DISCONTINUED | COMMUNITY
Start: 2022-11-08 | End: 2023-02-13

## 2023-02-13 RX ORDER — METOCLOPRAMIDE HYDROCHLORIDE 5 MG/5ML
10 SOLUTION ORAL
Qty: 1200 | Refills: 2 | Status: DISCONTINUED | COMMUNITY
Start: 2022-10-28 | End: 2023-02-13

## 2023-02-13 RX ORDER — LIDOCAINE HYDROCHLORIDE 20 MG/ML
2 SOLUTION ORAL; TOPICAL
Qty: 10 | Refills: 0 | Status: DISCONTINUED | COMMUNITY
Start: 2022-12-21 | End: 2023-02-13

## 2023-02-13 RX ORDER — OXYCODONE HYDROCHLORIDE 5 MG/5ML
5 SOLUTION ORAL EVERY 6 HOURS
Qty: 100 | Refills: 0 | Status: DISCONTINUED | COMMUNITY
Start: 2022-12-12 | End: 2023-02-13

## 2023-02-14 ENCOUNTER — APPOINTMENT (OUTPATIENT)
Dept: OTOLARYNGOLOGY | Facility: CLINIC | Age: 78
End: 2023-02-14
Payer: MEDICARE

## 2023-02-14 ENCOUNTER — APPOINTMENT (OUTPATIENT)
Dept: CARDIOLOGY | Facility: CLINIC | Age: 78
End: 2023-02-14
Payer: MEDICARE

## 2023-02-14 VITALS
DIASTOLIC BLOOD PRESSURE: 77 MMHG | HEART RATE: 105 BPM | TEMPERATURE: 98.2 F | OXYGEN SATURATION: 100 % | SYSTOLIC BLOOD PRESSURE: 115 MMHG

## 2023-02-14 VITALS
WEIGHT: 121 LBS | TEMPERATURE: 98 F | HEART RATE: 106 BPM | SYSTOLIC BLOOD PRESSURE: 136 MMHG | DIASTOLIC BLOOD PRESSURE: 86 MMHG

## 2023-02-14 DIAGNOSIS — E78.5 HYPERLIPIDEMIA, UNSPECIFIED: ICD-10-CM

## 2023-02-14 PROBLEM — N40.0 BPH (BENIGN PROSTATIC HYPERPLASIA): Status: ACTIVE | Noted: 2022-08-26

## 2023-02-14 PROCEDURE — 99214 OFFICE O/P EST MOD 30 MIN: CPT | Mod: 25

## 2023-02-14 PROCEDURE — 31575 DIAGNOSTIC LARYNGOSCOPY: CPT

## 2023-02-14 PROCEDURE — 99213 OFFICE O/P EST LOW 20 MIN: CPT

## 2023-02-14 NOTE — HISTORY OF PRESENT ILLNESS
[de-identified] : 77 yro pt referred by Dr. Moran for eval of BOT mass found on CT scan from 8/18/2022. Pt c/o dysphagia since Dec 2021, worse when eating food.  Lymph node core needle biopsy results which were positive for malignant cells. \par Pt has completed 31/35 RT treatments. Completed Chemo trtment. \par Reports a lot of weight loss during treatment. Weight currently stable. \par Pt still with PEG tube, but reports is not using it. Tolerating liquids and soft foods by mouth. Reports pain/burning in the throat.   No dyspnea, dysphonia, fever or chills. \par Pt was admitted to [facility] 12/31/22-1/27/23 for weakness, hospital course complicated with fevers and tachycardia \par and infectious work up was negative. \par Complete review of systems which was performed during a previous encounter was reviewed with the patient and there are no changes except as stated in the HPI section.\par

## 2023-02-14 NOTE — CONSULT LETTER
[Dear  ___] : Dear  [unfilled], [Courtesy Letter:] : I had the pleasure of seeing your patient, [unfilled], in my office today. [Please see my note below.] : Please see my note below. [Consult Closing:] : Thank you very much for allowing me to participate in the care of this patient.  If you have any questions, please do not hesitate to contact me. [Sincerely,] : Sincerely, [FreeTextEntry2] : Dr Franklin  [FreeTextEntry3] : \par Luis Haynes MD, FACS\par \par Otolaryngology-Head and Neck Surgery\par [employer] [facility]\par

## 2023-02-14 NOTE — PHYSICAL EXAM
[No Edema] : there was no peripheral edema [Normal] : affect was normal and insight and judgment were intact [de-identified] : radiation scar right neck, no palpable mass

## 2023-02-14 NOTE — HISTORY OF PRESENT ILLNESS
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

## 2023-02-15 PROBLEM — E78.5 HLD (HYPERLIPIDEMIA): Status: ACTIVE | Noted: 2022-08-17

## 2023-02-15 NOTE — HISTORY OF PRESENT ILLNESS
[FreeTextEntry1] : 77-year-old male with T2DM, HTN, IPMN and head and neck cancer status post chemo/XRT, status post PEG tube (now taking oral feeds), COVID, with echocardiogram from 2018 showing preserved EF who presents for follow-up visit.  Patient reports that his blood pressures have been running low since he was last here and he had stopped taking amlodipine many months ago.  His statin was also discontinued.  Patient states he still feels occasional lightheadedness especially when standing up from a sitting position, and notes that he drinks about 2-3 bottles of water daily.  He is also been on Glucerna shakes for nutritional supplementation.  Patient states that he has 5 more treatments left of XRT.  He reports that he has no chest discomfort, dyspnea, palpitations, or syncopal episodes. Denies LE edema, orthopnea, or PND.\par

## 2023-02-15 NOTE — ASSESSMENT
[FreeTextEntry1] : 77-year-old male with T2DM, HTN, IPMN and head and neck cancer status post chemo/XRT, status post PEG tube (now taking oral feeds), COVID, with echocardiogram from 2018 showing preserved EF who presents for follow-up visit. \par \par 1.  HTN: At this point seems well controlled off of any medication, and in fact sometimes patient's blood pressure still dips too low.\par – He was encouraged to increase his p.o. hydration to decrease the likelihood of orthostatic episodes\par – Routine BP monitoring for now\par \par 2.  HLD: Patient's most recent LDL was adequate\par Patient is out of benefit range of statin for primary prevention due to age > 75, in addition he has a mild transaminitis.\par \par \par FUV 6 months or as needed

## 2023-02-21 ENCOUNTER — RX RENEWAL (OUTPATIENT)
Age: 78
End: 2023-02-21

## 2023-02-22 ENCOUNTER — APPOINTMENT (OUTPATIENT)
Dept: RADIATION ONCOLOGY | Facility: CLINIC | Age: 78
End: 2023-02-22

## 2023-02-27 ENCOUNTER — APPOINTMENT (OUTPATIENT)
Dept: INTERNAL MEDICINE | Facility: CLINIC | Age: 78
End: 2023-02-27
Payer: MEDICARE

## 2023-02-27 VITALS
BODY MASS INDEX: 18.51 KG/M2 | DIASTOLIC BLOOD PRESSURE: 84 MMHG | HEIGHT: 69 IN | SYSTOLIC BLOOD PRESSURE: 125 MMHG | OXYGEN SATURATION: 97 % | TEMPERATURE: 98 F | WEIGHT: 125 LBS | HEART RATE: 92 BPM | RESPIRATION RATE: 16 BRPM

## 2023-02-27 PROCEDURE — 99214 OFFICE O/P EST MOD 30 MIN: CPT

## 2023-03-01 ENCOUNTER — APPOINTMENT (OUTPATIENT)
Dept: RADIATION ONCOLOGY | Facility: CLINIC | Age: 78
End: 2023-03-01
Payer: MEDICARE

## 2023-03-01 PROCEDURE — 99024 POSTOP FOLLOW-UP VISIT: CPT

## 2023-03-01 NOTE — HISTORY OF PRESENT ILLNESS
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

## 2023-03-01 NOTE — PHYSICAL EXAM
[No Edema] : there was no peripheral edema [Normal] : affect was normal and insight and judgment were intact [de-identified] : radiation skin changes right neck

## 2023-03-03 ENCOUNTER — NON-APPOINTMENT (OUTPATIENT)
Age: 78
End: 2023-03-03

## 2023-03-07 ENCOUNTER — RX RENEWAL (OUTPATIENT)
Age: 78
End: 2023-03-07

## 2023-03-14 ENCOUNTER — APPOINTMENT (OUTPATIENT)
Dept: OTOLARYNGOLOGY | Facility: CLINIC | Age: 78
End: 2023-03-14
Payer: MEDICARE

## 2023-03-14 VITALS
WEIGHT: 125 LBS | BODY MASS INDEX: 18.51 KG/M2 | HEART RATE: 96 BPM | SYSTOLIC BLOOD PRESSURE: 166 MMHG | HEIGHT: 69 IN | DIASTOLIC BLOOD PRESSURE: 94 MMHG

## 2023-03-14 PROCEDURE — 99214 OFFICE O/P EST MOD 30 MIN: CPT | Mod: 25

## 2023-03-14 PROCEDURE — 31575 DIAGNOSTIC LARYNGOSCOPY: CPT

## 2023-03-14 NOTE — HISTORY OF PRESENT ILLNESS
[de-identified] : 77 yro pt referred by Dr. Moran for eval of BOT mass found on CT scan from 8/18/2022. Pt c/o dysphagia since Dec 2021, worse when eating food.  Lymph node core needle biopsy results which were positive for malignant cells. Pt finished chemo and radiation about 4 weeks ago.  Pt has PEG but is not using it.  Takes all food by PO. PT states he is gaining weight.  PT feels a lump on the R side of his mouth after he eats.  \par \par Complete review of systems which was performed during a previous encounter was reviewed with the patient and there are no changes except as stated in the HPI section.\par

## 2023-03-14 NOTE — CONSULT LETTER
[Dear  ___] : Dear  [unfilled], [Courtesy Letter:] : I had the pleasure of seeing your patient, [unfilled], in my office today. [Please see my note below.] : Please see my note below. [Consult Closing:] : Thank you very much for allowing me to participate in the care of this patient.  If you have any questions, please do not hesitate to contact me. [Sincerely,] : Sincerely, [FreeTextEntry2] : Dr Franklin  [FreeTextEntry3] : \par Luis Haynes MD, FACS\par \par Otolaryngology-Head and Neck Surgery\par [employer] [facility]\par

## 2023-03-20 NOTE — HISTORY OF PRESENT ILLNESS
[Home] : at home, [unfilled] , at the time of the visit. [Medical Office: ([facility])___] : at the medical office located in  [FreeTextEntry1] : Mr Sierra is a 77 year old male non-smoker with PMH of IPMN of the pancreas (on surveillance pending H&N treatment), Intracranial Aneurysm with new diagnosis of AJCC 8th Ed. at least Stage I ([id number]) and AJCC 7th Ed. at least Stage SAVAGE ([id number]) p16+ SCC of BOT.\par Planned for Concurrent chemoRadiation with 70 Gy.\par \par He completed 60Gy out of planned 70Gy Radiation therapy on 12/16/2022. He was on a 5 day treatment break due to severe moist desquamation and associated pain to treated area, however was hospitalized on 12/23/2022 at [facility] due to COVID infections, sepsis, discharged on 12/29/22, readmitted on 12/30/22 due to weakness and nutritional deficiency. Hospital length of stay was prolonged due to issues with subacute rehab placement. He was eventually discharged to rehab on 1/27/23.\par He is still peg dependent.\par \par He followed up with Dr Haynes on 2/14/23.\par \par Today he presents for post treatment evaluation.\par He is ambulating without assistance for short distance and uses cane for distance due to fatigue, balance is good.. He no longer using the PEG since discharge. He is tolerating regular food. Denies swallowing difficulty. Denies throat pain. Skin is healed. Reports dry mouth, dysgeusia, mouth sores,. Skin is healed at the neck. Face and nose area still with mild hyperpigmentation. He reports some weight gain (125 lbs as at yesterday)

## 2023-03-22 ENCOUNTER — APPOINTMENT (OUTPATIENT)
Dept: SURGICAL ONCOLOGY | Facility: CLINIC | Age: 78
End: 2023-03-22

## 2023-03-24 ENCOUNTER — APPOINTMENT (OUTPATIENT)
Dept: RADIATION ONCOLOGY | Facility: CLINIC | Age: 78
End: 2023-03-24
Payer: MEDICARE

## 2023-03-24 VITALS
BODY MASS INDEX: 21.21 KG/M2 | WEIGHT: 143.6 LBS | SYSTOLIC BLOOD PRESSURE: 144 MMHG | HEART RATE: 87 BPM | RESPIRATION RATE: 18 BRPM | OXYGEN SATURATION: 100 % | DIASTOLIC BLOOD PRESSURE: 94 MMHG | TEMPERATURE: 98 F

## 2023-03-24 PROCEDURE — 99215 OFFICE O/P EST HI 40 MIN: CPT

## 2023-03-27 ENCOUNTER — APPOINTMENT (OUTPATIENT)
Dept: INTERNAL MEDICINE | Facility: CLINIC | Age: 78
End: 2023-03-27
Payer: MEDICARE

## 2023-03-27 VITALS
RESPIRATION RATE: 16 BRPM | HEIGHT: 69 IN | DIASTOLIC BLOOD PRESSURE: 74 MMHG | BODY MASS INDEX: 21.77 KG/M2 | TEMPERATURE: 98.9 F | SYSTOLIC BLOOD PRESSURE: 121 MMHG | WEIGHT: 147 LBS | HEART RATE: 99 BPM | OXYGEN SATURATION: 97 %

## 2023-03-27 LAB
ALBUMIN SERPL ELPH-MCNC: 3.7 G/DL
ALP BLD-CCNC: 89 U/L
ALT SERPL-CCNC: 10 U/L
ANION GAP SERPL CALC-SCNC: 11 MMOL/L
AST SERPL-CCNC: 16 U/L
BASOPHILS # BLD AUTO: 0.01 K/UL
BASOPHILS NFR BLD AUTO: 0.3 %
BILIRUB SERPL-MCNC: 0.3 MG/DL
BUN SERPL-MCNC: 19 MG/DL
CALCIUM SERPL-MCNC: 9.5 MG/DL
CHLORIDE SERPL-SCNC: 108 MMOL/L
CO2 SERPL-SCNC: 26 MMOL/L
CREAT SERPL-MCNC: 1.12 MG/DL
EGFR: 69 ML/MIN/1.73M2
EOSINOPHIL # BLD AUTO: 0.04 K/UL
EOSINOPHIL NFR BLD AUTO: 1.3 %
GLUCOSE SERPL-MCNC: 134 MG/DL
HCT VFR BLD CALC: 40 %
HGB BLD-MCNC: 12 G/DL
IMM GRANULOCYTES NFR BLD AUTO: 0.6 %
LYMPHOCYTES # BLD AUTO: 0.62 K/UL
LYMPHOCYTES NFR BLD AUTO: 20.1 %
MAN DIFF?: NORMAL
MCHC RBC-ENTMCNC: 27.8 PG
MCHC RBC-ENTMCNC: 30 GM/DL
MCV RBC AUTO: 92.8 FL
MONOCYTES # BLD AUTO: 0.41 K/UL
MONOCYTES NFR BLD AUTO: 13.3 %
NEUTROPHILS # BLD AUTO: 1.98 K/UL
NEUTROPHILS NFR BLD AUTO: 64.4 %
PLATELET # BLD AUTO: 211 K/UL
POTASSIUM SERPL-SCNC: 4.6 MMOL/L
PROT SERPL-MCNC: 6.5 G/DL
RBC # BLD: 4.31 M/UL
RBC # FLD: 14.4 %
SODIUM SERPL-SCNC: 144 MMOL/L
WBC # FLD AUTO: 3.08 K/UL

## 2023-03-27 PROCEDURE — 99214 OFFICE O/P EST MOD 30 MIN: CPT

## 2023-03-27 RX ORDER — MUPIROCIN 20 MG/G
2 OINTMENT TOPICAL
Qty: 22 | Refills: 0 | Status: DISCONTINUED | COMMUNITY
Start: 2022-12-28

## 2023-03-27 RX ORDER — TRAMADOL HYDROCHLORIDE 50 MG/1
50 TABLET, COATED ORAL
Qty: 90 | Refills: 0 | Status: DISCONTINUED | COMMUNITY
Start: 2022-10-18

## 2023-03-27 RX ORDER — ASCORBIC ACID 500 MG
500 TABLET ORAL
Qty: 30 | Refills: 0 | Status: DISCONTINUED | COMMUNITY
Start: 2023-01-27

## 2023-03-27 RX ORDER — MULTIPLE VITAMINS W/ MINERALS TAB 9MG-400MCG
TAB ORAL
Qty: 30 | Refills: 0 | Status: DISCONTINUED | COMMUNITY
Start: 2023-01-27

## 2023-03-27 RX ORDER — GABAPENTIN 100 MG/1
100 CAPSULE ORAL
Qty: 90 | Refills: 0 | Status: DISCONTINUED | COMMUNITY
Start: 2022-11-15

## 2023-03-27 NOTE — PHYSICAL EXAM
[General Appearance - Well Developed] : well developed [General Appearance - Well Nourished] : well nourished [General Appearance - In No Acute Distress] : in no acute distress [] : no respiratory distress [Exaggerated Use Of Accessory Muscles For Inspiration] : no accessory muscle use [Nondistended] : nondistended [Normal] : oriented to person, place and time, the affect was normal, the mood was normal and not anxious [de-identified] : some mild pigmentation changes in L soft-palate consistent with treatment effect. No apparent lesions in oropharynx or oral cavity. [de-identified] : Post-treatment changes, no palpable adenopathy. Grade 1 submandibular lymphedema. [de-identified] : Normal rate [de-identified] : mixed hypo and hyperpigmentation on neck, no erythema or skin breakdown, no peeling

## 2023-03-27 NOTE — REVIEW OF SYSTEMS
[Dysphagia] : dysphagia [Hoarseness] : hoarseness [Negative] : Psychiatric [Alopecia: Grade 0] : Alopecia: Grade 0 [Pruritus: Grade 0] : Pruritus: Grade 0 [Skin Atrophy: Grade 0] : Skin Atrophy: Grade 0 [Skin Hyperpigmentation: Grade 2 - Hyperpigmentation covering >10% BSA; associated psychosocial impact] : Skin Hyperpigmentation: Grade 2 - Hyperpigmentation covering >10% BSA; associated psychosocial impact [Dermatitis Radiation: Grade 1 - Faint erythema or dry desquamation] : Dermatitis Radiation: Grade 1 - Faint erythema or dry desquamation [Tinnitus - Grade 0] : Tinnitus - Grade 0 [Blurred Vision: Grade 0] : Blurred Vision: Grade 0 [Mucositis Oral: Grade 0] : Mucositis Oral: Grade 0  [Xerostomia: Grade 0] : Xerostomia: Grade 0 [Oral Pain: Grade 0] : Oral Pain: Grade 0 [Salivary duct inflammation: Grade 0] : Salivary duct inflammation: Grade 0 [Dysgeusia: Grade 2 - Altered taste with change in diet (e.g., oral supplements); noxious or unpleasant taste; loss of taste] : Dysgeusia: Grade 2 - Altered taste with change in diet (e.g., oral supplements); noxious or unpleasant taste; loss of taste [Loss of Hearing] : no loss of hearing [Nosebleeds] : no nosebleeds [Odynophagia] : no odynophagia [Mucosal Pain] : no mucosal pain [FreeTextEntry9] : minimal tightness in neck

## 2023-03-27 NOTE — PROCEDURE
[Gag Reflex] : gag reflex preventing mirror examination [Topical Lidocaine] : topical lidocaine [Flexible Endoscope] : examined with the flexible endoscope [Normal] : the true vocal cords ~T were normal [Mass ___ cm] : no masses on the base of the tongue [Lesion(s)] : no lesions

## 2023-03-27 NOTE — HISTORY OF PRESENT ILLNESS
[Home] : at home, [unfilled] , at the time of the visit. [Medical Office: ([facility])___] : at the medical office located in  [FreeTextEntry1] : Mr Sierra is a 77 year old male non-smoker with PMH of IPMN of the pancreas (on surveillance pending H&N treatment), Intracranial Aneurysm with new diagnosis of AJCC 8th Ed. at least Stage I ([id number]) and AJCC 7th Ed. at least Stage SAVAGE ([id number]) p16+ SCC of BOT.\par Planned for Concurrent chemoRadiation with 70 Gy, but had prolonged admission s/p fx 30/35 and given excellent clinical response, had treatment terminated.\par \par He completed 60Gy out of planned 70Gy Radiation therapy on 12/16/2022. He was on a 5 day treatment break due to severe moist desquamation and associated pain to treated area, however was hospitalized on 12/23/2022 at [facility] due to COVID infections, sepsis, discharged on 12/29/22, readmitted on 12/30/22 due to weakness and nutritional deficiency. Hospital length of stay was prolonged due to issues with subacute rehab placement. He was eventually discharged to rehab on 1/27/23.\par He is still peg dependent.\par \par He followed up with Dr Haynes on 3/14/23. Scope - base of tongue and vallecula clear and no discrete masses, hypopharynx normal, crisp epiglottis, no evidence of laryngomalacia, bilateral vocal fold mobility full and symmetric, no significant arytenoids edema or erythema, and no mass or lesions.\par \par He has also reestablished care with surgical oncology in setting of pancreatic cyst. Plan for surveillance at this time.\par \par Today he presents for follow-up. Reports feeling well overall. Endorses mild dysphagia that is worse with thicker textured foods. Denies odynophagia, N/V, or loss of appetite. Continues to have dysgeusia which he says is unchanged, as well as occasional xerostomia (relieved with hard candy use). His current diet consists of about 3 Ensure daily on top of meals as tolerated (consuming at least half a plate each meal). Weight (double-checked) today is 143.5 lbs. On assessment, pigmentation changes on neck (mixed hypo and hyperpigmentation on neck, pt using 3 creams once daily to manage, feels there is improvement). PEG tube remains in place, but not currently in use.

## 2023-03-28 NOTE — PHYSICAL EXAM
[No Edema] : there was no peripheral edema [Normal] : affect was normal and insight and judgment were intact [de-identified] : no palpable mass; radiation skin changes healed

## 2023-03-28 NOTE — HISTORY OF PRESENT ILLNESS
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,  pancreatic mass, DM2, HTN, intracranial aneurysm presents for follow up\par \par hospitalized 12/31-1/27 for failure to thrive\par has visiting nurse\par brings in medications\par endorses occ dizziness, but able to walk around [place] yesterday without difficulty\par does not use G tube, able to eat soft foods\par endorses throat burning\par XRT on hold now pending follow up with rad onc\par has ENT follow up tomorrow\par \par 2//27:\par \par ENT - no residual mass\par \par has decided to complete XRT\par feeling better, able to eat more solid food\par does not use G tube\par \par 3/27/23:\par rad onc - no further radiation treatment\par PET/CT pending\par has f/u ent and rad onc\par able to eat solid foods, gained weight, does not use G tube\par \par surgical oncology follow up - awaiting PET/CT results \par patient declining pancreatic surgery

## 2023-05-12 ENCOUNTER — NON-APPOINTMENT (OUTPATIENT)
Age: 78
End: 2023-05-12

## 2023-05-15 ENCOUNTER — APPOINTMENT (OUTPATIENT)
Dept: NUCLEAR MEDICINE | Facility: IMAGING CENTER | Age: 78
End: 2023-05-15
Payer: MEDICARE

## 2023-05-15 ENCOUNTER — OUTPATIENT (OUTPATIENT)
Dept: OUTPATIENT SERVICES | Facility: HOSPITAL | Age: 78
LOS: 1 days | End: 2023-05-15
Payer: COMMERCIAL

## 2023-05-15 DIAGNOSIS — Z00.8 ENCOUNTER FOR OTHER GENERAL EXAMINATION: ICD-10-CM

## 2023-05-15 DIAGNOSIS — C10.9 MALIGNANT NEOPLASM OF OROPHARYNX, UNSPECIFIED: ICD-10-CM

## 2023-05-15 PROCEDURE — 78815 PET IMAGE W/CT SKULL-THIGH: CPT

## 2023-05-15 PROCEDURE — 78815 PET IMAGE W/CT SKULL-THIGH: CPT | Mod: 26,PS

## 2023-05-15 PROCEDURE — A9552: CPT

## 2023-05-30 ENCOUNTER — APPOINTMENT (OUTPATIENT)
Dept: THORACIC SURGERY | Facility: HOSPITAL | Age: 78
End: 2023-05-30

## 2023-05-30 ENCOUNTER — OUTPATIENT (OUTPATIENT)
Dept: OUTPATIENT SERVICES | Facility: HOSPITAL | Age: 78
LOS: 1 days | Discharge: HOME | End: 2023-05-30
Payer: MEDICARE

## 2023-05-30 ENCOUNTER — APPOINTMENT (OUTPATIENT)
Dept: OTOLARYNGOLOGY | Facility: CLINIC | Age: 78
End: 2023-05-30

## 2023-05-30 VITALS
RESPIRATION RATE: 17 BRPM | SYSTOLIC BLOOD PRESSURE: 138 MMHG | DIASTOLIC BLOOD PRESSURE: 80 MMHG | HEART RATE: 72 BPM | OXYGEN SATURATION: 100 %

## 2023-05-30 VITALS
TEMPERATURE: 98 F | SYSTOLIC BLOOD PRESSURE: 169 MMHG | HEART RATE: 88 BPM | RESPIRATION RATE: 16 BRPM | OXYGEN SATURATION: 100 % | DIASTOLIC BLOOD PRESSURE: 88 MMHG

## 2023-05-30 DIAGNOSIS — R13.10 DYSPHAGIA, UNSPECIFIED: ICD-10-CM

## 2023-05-30 PROCEDURE — 43247 EGD REMOVE FOREIGN BODY: CPT | Mod: 52

## 2023-05-30 RX ORDER — SODIUM CHLORIDE 9 MG/ML
1000 INJECTION INTRAMUSCULAR; INTRAVENOUS; SUBCUTANEOUS
Refills: 0 | Status: DISCONTINUED | OUTPATIENT
Start: 2023-05-30 | End: 2023-06-13

## 2023-05-30 RX ADMIN — SODIUM CHLORIDE 30 MILLILITER(S): 9 INJECTION INTRAMUSCULAR; INTRAVENOUS; SUBCUTANEOUS at 09:08

## 2023-05-30 NOTE — ASU PATIENT PROFILE, ADULT - PATIENT'S GENDER IDENTITY
[FreeTextEntry1] : Ms. Grace is a 69 year old male with a history of abnormal chest CT, asthma, allergic rhinitis, lung nodule, morbid obesity, JETT, other emphysema, and SOB presenting to the office today for a follow up visit. His chief complaint is foot pain.\par -s/p stent \par -s/p TAVR \par -she notes after procedure, she would have srs issues with breathing\par -she notes being put off the BP medications \par -she notes some hoarseness \par -she notes some swelling in her legs \par -she notes getting 7-8hrs of sleep a night \par -she notes taking a water pill to control her swelling \par -she notes occasionally wheezing \par -she notes using dreamstation \par -she notes wheezing sometimes \par -she notes the water pill helped a lot \par patient denies any headaches, nausea, vomiting, fever, chills, sweats, chest pain, chest pressure, palpitations, coughing, wheezing, fatigue, diarrhea, constipation, dysphagia, myalgias, dizziness, leg swelling, leg pain, itchy eyes, itchy ears, heartburn, reflux or sour taste in the mouth  Male

## 2023-06-30 ENCOUNTER — APPOINTMENT (OUTPATIENT)
Dept: RADIATION ONCOLOGY | Facility: CLINIC | Age: 78
End: 2023-06-30
Payer: MEDICARE

## 2023-06-30 VITALS
WEIGHT: 145 LBS | TEMPERATURE: 98.2 F | HEART RATE: 85 BPM | OXYGEN SATURATION: 100 % | RESPIRATION RATE: 16 BRPM | BODY MASS INDEX: 21.41 KG/M2 | SYSTOLIC BLOOD PRESSURE: 140 MMHG | DIASTOLIC BLOOD PRESSURE: 90 MMHG

## 2023-06-30 PROCEDURE — 99215 OFFICE O/P EST HI 40 MIN: CPT | Mod: 25

## 2023-06-30 PROCEDURE — 31505 DIAGNOSTIC LARYNGOSCOPY: CPT

## 2023-07-06 NOTE — HISTORY OF PRESENT ILLNESS
[FreeTextEntry1] : Mr Sierra is a 77 year old male non-smoker with PMH of IPMN of the pancreas (on surveillance pending H&N treatment), Intracranial Aneurysm with new diagnosis of AJCC 8th Ed. at least Stage I ([id number]) and AJCC 7th Ed. at least Stage SAVAGE ([id number]) p16+ SCC of BOT.\par Planned for Concurrent chemoRadiation with 70 Gy, but had prolonged admission s/p fx 30/35 and given excellent clinical response, had treatment terminated.\par \par He completed 60Gy out of planned 70Gy Radiation therapy on 12/16/2022. He was on a 5 day treatment break due to severe moist desquamation and associated pain to treated area, however was hospitalized on 12/23/2022 at [facility] due to COVID infections, sepsis, discharged on 12/29/22, readmitted on 12/30/22 due to weakness and nutritional deficiency. Hospital length of stay was prolonged due to issues with subacute rehab placement. He was eventually discharged to rehab on 1/27/23.\par He is still peg dependent.\par \par He followed up with Dr Haynes on 3/14/23. Scope - base of tongue and vallecula clear and no discrete masses, hypopharynx normal, crisp epiglottis, no evidence of laryngomalacia, bilateral vocal fold mobility full and symmetric, no significant arytenoids edema or erythema, and no mass or lesions.\par \par He has also reestablished care with surgical oncology in setting of pancreatic cyst. Plan for surveillance at this time.\par \par Today he presents for follow-up. He is doing very well overall. Gaining weight with minimal dysphagia after PEG removal. Mild dry mouth and dysgeusia, improving. No H&N pain or abdominal pain. No neck stiffness. No new hot/cold sensitivity. Active, independent. \par \par 5/15/23 PET/CT\par IMPRESSION:\par 1. Interval resolution of hypermetabolic right BOT malignant lesion. Currently no focal abnormal activity in the head and neck, or elsewhere, that is concerning for FDG avid malignant process.\par \par 2. Hypodense changes in the atrophic pancreas without FDG activity.\par \par 3. Enlarged prostate gland.

## 2023-07-06 NOTE — PROCEDURE
[Gag Reflex] : gag reflex preventing mirror examination [Topical Lidocaine] : topical lidocaine [Flexible Endoscope] : examined with the flexible endoscope [Normal] : the true vocal cords ~T were normal [de-identified] : Soft tissue more prominent at L rather than R BoT. No lesions or concerning fullness.

## 2023-07-06 NOTE — PHYSICAL EXAM
[Outer Ear] : the ears and nose were normal in appearance [Hearing Threshold Finger Rub Not [place]] : hearing was normal [Normal Oral Cavity] : oral cavity was normal [Nasal Cavity] : the nasal mucosa and septum were normal [] : no respiratory distress [Exaggerated Use Of Accessory Muscles For Inspiration] : no accessory muscle use [Nondistended] : nondistended [Cervical Lymph Nodes Enlarged Posterior Bilaterally] : posterior cervical [Cervical Lymph Nodes Enlarged Anterior Bilaterally] : anterior cervical [Supraclavicular Lymph Nodes Enlarged Bilaterally] : supraclavicular [Normal] : oriented to person, place and time, the affect was normal, the mood was normal and not anxious [de-identified] : No discreet masses of tonsil or soft palate b/l. MMM [de-identified] : Healed PEG site [de-identified] : Mixed hyper/hypopigmentation of the anterior neck in region of healed dermatitis.

## 2023-07-24 ENCOUNTER — APPOINTMENT (OUTPATIENT)
Dept: INTERNAL MEDICINE | Facility: CLINIC | Age: 78
End: 2023-07-24
Payer: MEDICARE

## 2023-07-24 VITALS
RESPIRATION RATE: 16 BRPM | TEMPERATURE: 98.3 F | DIASTOLIC BLOOD PRESSURE: 98 MMHG | WEIGHT: 154 LBS | HEART RATE: 76 BPM | HEIGHT: 69 IN | OXYGEN SATURATION: 98 % | BODY MASS INDEX: 22.81 KG/M2 | SYSTOLIC BLOOD PRESSURE: 168 MMHG

## 2023-07-24 PROCEDURE — G0439: CPT

## 2023-07-26 NOTE — HISTORY OF PRESENT ILLNESS
[de-identified] : 78yoM, retired security PMH head and neck cancer,  pancreatic mass, DM2, HTN presents for annual exam\par \par 2023 PET - no residual or recurrent disease\par has not yet scheduled ENT follow up\par \par declined pancreatic surgery - does not want to revisit surg onc\par PET - no FDG avidity in pancreas

## 2023-07-26 NOTE — HEALTH RISK ASSESSMENT
[No] : No [0] : 2) Feeling down, depressed, or hopeless: Not at all (0) [[id number]] : 0 [ColonoscopyDate] : due [ColonoscopyComments] : patient declined at this time [Never] : Never

## 2023-07-26 NOTE — PHYSICAL EXAM
[No Acute Distress] : no acute distress [Well Nourished] : well nourished [Well Developed] : well developed [Well-Appearing] : well-appearing [Normal Sclera/Conjunctiva] : normal sclera/conjunctiva [PERRL] : pupils equal round and reactive to light [EOMI] : extraocular movements intact [Normal Outer Ear/Nose] : the outer ears and nose were normal in appearance [Normal Oropharynx] : the oropharynx was normal [No JVD] : no jugular venous distention [No Lymphadenopathy] : no lymphadenopathy [Supple] : supple [Thyroid Normal, No Nodules] : the thyroid was normal and there were no nodules present [No Respiratory Distress] : no respiratory distress  [No Accessory Muscle Use] : no accessory muscle use [Clear to Auscultation] : lungs were clear to auscultation bilaterally [Normal Rate] : normal rate  [Regular Rhythm] : with a regular rhythm [Normal S1, S2] : normal S1 and S2 [No Murmur] : no murmur heard [No Carotid Bruits] : no carotid bruits [No Abdominal Bruit] : a ~M bruit was not heard ~T in the abdomen [No Varicosities] : no varicosities [Pedal Pulses Present] : the pedal pulses are present [No Edema] : there was no peripheral edema [No Palpable Aorta] : no palpable aorta [No Extremity Clubbing/Cyanosis] : no extremity clubbing/cyanosis [Soft] : abdomen soft [Non Tender] : non-tender [Non-distended] : non-distended [No Masses] : no abdominal mass palpated [No HSM] : no HSM [Normal Bowel Sounds] : normal bowel sounds [Normal Posterior Cervical Nodes] : no posterior cervical lymphadenopathy [Normal Anterior Cervical Nodes] : no anterior cervical lymphadenopathy [No CVA Tenderness] : no CVA  tenderness [No Spinal Tenderness] : no spinal tenderness [No Joint Swelling] : no joint swelling [Grossly Normal Strength/Tone] : grossly normal strength/tone [No Rash] : no rash [Coordination Grossly Intact] : coordination grossly intact [No Focal Deficits] : no focal deficits [Normal Gait] : normal gait [Deep Tendon Reflexes (DTR)] : deep tendon reflexes were 2+ and symmetric [Normal Affect] : the affect was normal [Normal Insight/Judgement] : insight and judgment were intact [de-identified] : healing radiation changes [de-identified] : reducible RIH

## 2023-07-28 LAB
ALBUMIN SERPL ELPH-MCNC: 3.9 G/DL
ALP BLD-CCNC: 95 U/L
ALT SERPL-CCNC: 16 U/L
ANION GAP SERPL CALC-SCNC: 9 MMOL/L
AST SERPL-CCNC: 20 U/L
BILIRUB SERPL-MCNC: 0.4 MG/DL
BUN SERPL-MCNC: 18 MG/DL
CALCIUM SERPL-MCNC: 8.9 MG/DL
CHLORIDE SERPL-SCNC: 106 MMOL/L
CHOLEST SERPL-MCNC: 208 MG/DL
CO2 SERPL-SCNC: 27 MMOL/L
CREAT SERPL-MCNC: 1.13 MG/DL
CREAT SPEC-SCNC: 188 MG/DL
EGFR: 67 ML/MIN/1.73M2
ESTIMATED AVERAGE GLUCOSE: 143 MG/DL
GLUCOSE SERPL-MCNC: 130 MG/DL
HBA1C MFR BLD HPLC: 6.6 %
HDLC SERPL-MCNC: 57 MG/DL
LDLC SERPL CALC-MCNC: 134 MG/DL
MICROALBUMIN 24H UR DL<=1MG/L-MCNC: 7 MG/DL
MICROALBUMIN/CREAT 24H UR-RTO: 37 MG/G
NONHDLC SERPL-MCNC: 150 MG/DL
POTASSIUM SERPL-SCNC: 4.5 MMOL/L
PROT SERPL-MCNC: 6.9 G/DL
SODIUM SERPL-SCNC: 142 MMOL/L
TRIGL SERPL-MCNC: 90 MG/DL
TSH SERPL-ACNC: 5.47 UIU/ML

## 2023-07-31 ENCOUNTER — APPOINTMENT (OUTPATIENT)
Dept: SURGERY | Facility: CLINIC | Age: 78
End: 2023-07-31
Payer: MEDICARE

## 2023-07-31 VITALS
BODY MASS INDEX: 22.96 KG/M2 | WEIGHT: 155 LBS | HEART RATE: 74 BPM | SYSTOLIC BLOOD PRESSURE: 167 MMHG | DIASTOLIC BLOOD PRESSURE: 94 MMHG | HEIGHT: 69 IN

## 2023-07-31 PROCEDURE — 99213 OFFICE O/P EST LOW 20 MIN: CPT

## 2023-07-31 NOTE — CONSULT LETTER
[Dear  ___] : Dear  [unfilled], [Consult Letter:] : I had the pleasure of evaluating your patient, [unfilled]. [Please see my note below.] : Please see my note below. [Consult Closing:] : Thank you very much for allowing me to participate in the care of this patient.  If you have any questions, please do not hesitate to contact me. [Sincerely,] : Sincerely, [FreeTextEntry3] : Pramod Marin MD, FACS

## 2023-07-31 NOTE — HISTORY OF PRESENT ILLNESS
[de-identified] : Mr. HILLARY DREW is  a 78 year old male who was referred by Dr. Valeria Henry with the chief complaint of having pain and swelling in his Right  groin.  He has had the condition for 3-4 months and is worse when he is walking or standing.  He is stating that the  swelling  is getting bigger and symptomatic. He denies any fever or  night sweats. Appetite is good and weight is stable. Mr. DREW  is s/p RT and chemo for oropharyngeal CA

## 2023-07-31 NOTE — ASSESSMENT
[FreeTextEntry1] : Impression:  large reducible Right  inguinal hernia with palpable bowel in the sack.

## 2023-07-31 NOTE — PHYSICAL EXAM
[Alert] : alert [Oriented to Person] : oriented to person [Oriented to Place] : oriented to place [Oriented to Time] : oriented to time [Calm] : calm [de-identified] : He  is alert, well-groomed, and in NAD   [de-identified] : anicteric.  Nasal mucosa pink, septum midline. Oral mucosa pink.  Tongue midline, Pharynx without exudates.   [de-identified] : limiter ROM of the neck  [de-identified] :  large reducible Right  inguinal hernia with palpable bowel in the sack.  No evidence of hernia on the opposite side.  No penile discharge or lesions.  No scrotal swelling or discoloration. Testes descended bilaterally, smooth, without masses. Epididymis non-tender.

## 2023-07-31 NOTE — PLAN
[FreeTextEntry1] : Mr. DREW  was told significance of findings, options, risks and benefits were explained.  Informed consent for right inguinal hernia repair with mesh , and potential risks, benefits and alternatives (surgical options were discussed including non-surgical options or the option of no surgery) to the planned surgery were discussed in depth.  All surgical options were discussed including non-surgical treatments.  He wishes to proceed with surgery.  We will plan for surgery on at the next available date, pending any required insurance pre-certification or pre-approval. He agrees to obtain any necessary pre-operative evaluations and testing prior to surgery. Patient advised to seek immediate medical attention with any acute change in symptoms or with the development of any new or worsening symptoms.  Patient's questions and concerns addressed to patient's satisfaction, and patient verbalized an understanding of the information discussed.

## 2023-08-15 ENCOUNTER — RX RENEWAL (OUTPATIENT)
Age: 78
End: 2023-08-15

## 2023-08-15 ENCOUNTER — APPOINTMENT (OUTPATIENT)
Dept: OTOLARYNGOLOGY | Facility: CLINIC | Age: 78
End: 2023-08-15
Payer: MEDICARE

## 2023-08-15 VITALS
HEART RATE: 76 BPM | HEIGHT: 69 IN | BODY MASS INDEX: 22.96 KG/M2 | SYSTOLIC BLOOD PRESSURE: 149 MMHG | WEIGHT: 155 LBS | DIASTOLIC BLOOD PRESSURE: 89 MMHG

## 2023-08-15 PROCEDURE — 31575 DIAGNOSTIC LARYNGOSCOPY: CPT

## 2023-08-15 PROCEDURE — 99214 OFFICE O/P EST MOD 30 MIN: CPT | Mod: 25

## 2023-08-15 NOTE — CONSULT LETTER
[Dear  ___] : Dear  [unfilled], [Courtesy Letter:] : I had the pleasure of seeing your patient, [unfilled], in my office today. [Please see my note below.] : Please see my note below. [Consult Closing:] : Thank you very much for allowing me to participate in the care of this patient.  If you have any questions, please do not hesitate to contact me. [Sincerely,] : Sincerely, [FreeTextEntry2] : Dr Franklin  [FreeTextEntry3] : \par  Luis Haynes MD, FACS\par  \par  Otolaryngology-Head and Neck Surgery\par  [employer] [facility]\par

## 2023-08-15 NOTE — HISTORY OF PRESENT ILLNESS
[de-identified] : 78 yro pt was referred by Dr. Moran for eval of BOT mass found on CT scan from 8/18/2022. Pt c/o dysphagia since Dec 2021, worse when eating food.  Lymph node core needle biopsy results which were positive for malignant cells. Pt completed 60Gy out of planned 70Gy Radiation therapy on 12/16/2022 and Chemo was completed before then.  PEG tube was removed. Pt states he is tolerating solids, liquids, and pills by mouth. Pt reports sour taste in mouth that is worse with certain foods.  Weight is stable. Needs to cut harder foods into very small fine pieces to swallow comfortably.  Fullness is right cheek is more noticeable. Voice is more raspy now since radiation. No odynophagia dyspnea, hemoptysis, recent fever or chills.      PET/CT 5/15/23: IMPRESSION: 1. Interval resolution of hypermetabolic right BOT malignant lesion. Currently no focal abnormal activity in the head and neck, or elsewhere, that is concerning for FDG avid malignant process.  2. Hypodense changes in the atrophic pancreas without FDG activity.  3. Enlarged prostate gland.

## 2023-08-28 ENCOUNTER — APPOINTMENT (OUTPATIENT)
Dept: INTERNAL MEDICINE | Facility: CLINIC | Age: 78
End: 2023-08-28
Payer: MEDICARE

## 2023-08-28 ENCOUNTER — NON-APPOINTMENT (OUTPATIENT)
Age: 78
End: 2023-08-28

## 2023-08-28 VITALS
OXYGEN SATURATION: 99 % | WEIGHT: 156 LBS | HEART RATE: 81 BPM | HEIGHT: 69 IN | RESPIRATION RATE: 16 BRPM | TEMPERATURE: 98 F | DIASTOLIC BLOOD PRESSURE: 88 MMHG | SYSTOLIC BLOOD PRESSURE: 148 MMHG | BODY MASS INDEX: 23.11 KG/M2

## 2023-08-28 DIAGNOSIS — K40.90 UNILATERAL INGUINAL HERNIA, W/OUT OBSTRUCTION OR GANGRENE, NOT SPECIFIED AS RECURRENT: ICD-10-CM

## 2023-08-28 PROCEDURE — 99214 OFFICE O/P EST MOD 30 MIN: CPT | Mod: 25

## 2023-08-28 PROCEDURE — 93000 ELECTROCARDIOGRAM COMPLETE: CPT

## 2023-08-29 DIAGNOSIS — Z85.9 PERSONAL HISTORY OF MALIGNANT NEOPLASM, UNSPECIFIED: ICD-10-CM

## 2023-08-29 PROBLEM — K40.90 RIGHT INGUINAL HERNIA: Status: ACTIVE | Noted: 2023-07-24

## 2023-08-29 NOTE — HISTORY OF PRESENT ILLNESS
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

## 2023-08-31 ENCOUNTER — RESULT REVIEW (OUTPATIENT)
Age: 78
End: 2023-08-31

## 2023-09-05 ENCOUNTER — APPOINTMENT (OUTPATIENT)
Age: 78
End: 2023-09-05
Payer: MEDICARE

## 2023-09-05 VITALS
HEIGHT: 69 IN | BODY MASS INDEX: 22.81 KG/M2 | SYSTOLIC BLOOD PRESSURE: 160 MMHG | DIASTOLIC BLOOD PRESSURE: 83 MMHG | OXYGEN SATURATION: 98 % | WEIGHT: 154 LBS | HEART RATE: 78 BPM

## 2023-09-05 PROCEDURE — 99214 OFFICE O/P EST MOD 30 MIN: CPT

## 2023-09-06 ENCOUNTER — APPOINTMENT (OUTPATIENT)
Dept: GASTROENTEROLOGY | Facility: CLINIC | Age: 78
End: 2023-09-06

## 2023-09-06 NOTE — CONSULT LETTER
[FreeTextEntry2] : Rafael Walsh MD [FreeTextEntry3] : Minh Franklin MD, FICS, FACS\par Director of Surgical Oncology- [facility] \par , Department of Surgery  \par [facility] [employer] [facility] \par [address]\[place] [address]\par \par [address]\[place] [address]\par \par [address]\[place] [address]\par \par (mob) [phone]\par (o) [phone]\par (f) [phone]\par \par

## 2023-09-06 NOTE — HISTORY OF PRESENT ILLNESS
[de-identified] : Mr. HILLARY DREW is a 77 year old man presenting with IPMN, presents for follow up visit.   referred by Dr. 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 Here for a follow-up visit.  9/5/23 He presents today to discuss his treatment options. 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 He reports no abdominal apin, has lost weight while on therapy, but no GI complaints.  PMH of renal cysts, BPH and HTN, newly diagnosed DM in 2021 (recent a1c= 13.2). Denies any family history of pancreatic cancer or pancreatitis   Hillary was initially followed at [facility] in 12/2021 p/w urinary issues, subsequent imaging  MRCP 12/2021 at [facility] showed multiple cystic lesions in pancreas possibly communicating with the dilated PD, suspicious for intraductal neoplasm.  s/p EUS w/ FNA 1/2022- findings of mild dilatation of PD, multiple cysts likely sidebranch IPMN, one cyst w/ a mural nodule (measuring 1.6 x 1.2 cm)  s/p targeted biopsy.  mild PD dilatation ~4.5 mm. normal CBD Fluid ,220. cytology with atypical findings, rare atypical but degenerated cells, a few lymphocytes and macrophages  He was discussed at [facility] tumor board with recommendations made for a surgical oncology evaluation given that mucinous cystadenocarcinoma cannot be ruled out, however his insurance would not cover a surgical oncology visit at [facility].  He then lost follow-up due to insurance issues.   He saw Dr. Walsh in 6/2022 to establish care who ordered him for an MRCP and referred to Dr. Clancy for an EUS   6/2022- Ca 19-9: 87 (elevated) At his initial visit with Dr. Clancy last week, they discussed his inability to obtain an MRI due to claustrophobia, and will obtain a CT pancreas protocol. If CT w/ ductal dilation & mural nodule, given prior EUS findings and atypia with elevated CEA, high risk IPMN and may be reasonable to consider surgical resection vs repeat EUS.   CT pancreas protocol from 6/27/2022 with Atrophic pancreas with severe short length ductal stricture at the head and upstream ductal dilation with possible nodular enhancement versus debris within several dilated branch ducts. Accompanying cysts versus branch duct IPMNs. Recommend further enhancement with contrast enhancement MRI abdomen Enlarged prostate with extensive ingrowth into the bladder, sequela of chronic outlet obstruction and 1.5 cm lamellated bladder calculus. Consider urology referral  6/29/22- He reports occasional constipation that is relieved with MiraLAX. Retired from job as . ECOG 0, Denies prior surgical history, denies tobacco, alcohol or illicit drug use. Hillary presents today with his niece, another niece present via telephone, discussed findings from [facility] and recent CT imaging. Explained to patient that surgery up front is initial treatment plan, discussed with patient that a repeat EUS may be necessary, he is in agreement. Explained that we need to see the imaging and pathology from [facility] in order to decide next steps.  Labs 6/29/22: CBC: WNL | CEA: 6.2 | LFTs: WNL  7/6/22- Patient returns today, having obtained all necessary records, for further recommendations. Patient in agreement to proceed with surgery after extensive discussion. refer to endocrinology for preop optimization and education of glycemic control-  Pt has a main duct IPMN with complete atrophy of his pancreas - the MPD >1cm with IPMN through out his ductal system including branch ducts. I suspect an invasive component given the CT appearance and serum tumor marker elevation. A segmental resection would be inadequate given an entire field defect like picture of the main duct. He will benefit from a total pancreatectomy, discussed post-op brittle diabetes with patient & niece  Hillary will need [facility] (has plans to establish care with Dr. Valeria Moran and Dr. Jennifer Garzon) & PST  12/2022: Completed 60Gy out of planned 70Gy Radiation therapy  5/15/2023 PET: 1. Interval resolution of hypermetabolic right BOT malignant lesion. Currently no focal abnormal activity in the head and neck, or elsewhere, that is concerning for FDG avid malignant process. 2. Hypodense changes in the atrophic pancreas without FDG activity. 3. Enlarged prostate gland.

## 2023-09-06 NOTE — REASON FOR VISIT
[Follow-Up Visit] : a follow-up visit for [FreeTextEntry2] : pancreatic cystic neoplasm  with main duct IPMN

## 2023-09-06 NOTE — PHYSICAL EXAM
[Normal] : supple, no neck mass and thyroid not enlarged [Normal Neck Lymph Nodes] : normal neck lymph nodes  [Normal Supraclavicular Lymph Nodes] : normal supraclavicular lymph nodes [Normal Groin Lymph Nodes] : normal groin lymph nodes [Normal] : oriented to person, place and time, with appropriate affect [FreeTextEntry1] : COVID-19 precautions as per [facility] policy was universally followed\par  [de-identified] : Abdomen soft, non-tender, non-distended, and no palpable masses.

## 2023-09-06 NOTE — ASSESSMENT
[FreeTextEntry1] : IMP: 78 year old male presenting with IPMN with high CEA, suspicious for malignancy with main duct IPMN   Labs 6/2022:  Ca 19-9 87 U/mL Cyst CEA- >100,000 Serum CEA- 6.2  CT pancreas protocol from 6/27/2022 with Atrophic pancreas with severe short length ductal stricture at the head and upstream ductal dilation with possible nodular enhancement versus debris within several dilated branch ducts. Accompanying cysts versus branch duct IPMNs.  7/20/22- Joshua presents today with questions regarding upcoming surgery, at our last office visit we discussed details in depth.   pt completed 60 GY out of 70 GY radiation therapy on 12/16/22. pt was in and out of hospital due to COVID , weakness and nutritional deficiency. pt is PEG dependent.   5/15/2023 PET: 1. Interval resolution of hypermetabolic right BOT malignant lesion. Currently no focal abnormal activity in the head and neck, or elsewhere, that is concerning for FDG avid malignant process. 2. Hypodense changes in the atrophic pancreas without FDG activity. 3. Enlarged prostate gland.  PLAN: - PET is reassuring with no activity, however will need to surveil the pancreatic cystic mass. Pt is not willing to undergo CT or MRI - will refer him to  for EUS and re-biopsy - His care was modified by his new diagnosis of BOT ca requiring precedence and immediate treatment and he is hesitant to consider any surgical options for the pancreatic cystic neoplasm at this point  - RTO after EUS  I have discussed the diagnosis, therapeutic plan and options with the patient at length. Patient expressed verbal understanding to proceed with proposed plan. All questions answered.

## 2023-09-20 ENCOUNTER — APPOINTMENT (OUTPATIENT)
Dept: GASTROENTEROLOGY | Facility: CLINIC | Age: 78
End: 2023-09-20
Payer: MEDICARE

## 2023-09-20 DIAGNOSIS — K86.89 OTHER SPECIFIED DISEASES OF PANCREAS: ICD-10-CM

## 2023-09-20 DIAGNOSIS — K86.2 CYST OF PANCREAS: ICD-10-CM

## 2023-09-20 PROCEDURE — 99214 OFFICE O/P EST MOD 30 MIN: CPT

## 2023-09-23 LAB
ALBUMIN SERPL ELPH-MCNC: 4.1 G/DL
ALP BLD-CCNC: 102 U/L
ALT SERPL-CCNC: 15 U/L
ANION GAP SERPL CALC-SCNC: 12 MMOL/L
AST SERPL-CCNC: 18 U/L
BILIRUB SERPL-MCNC: 0.5 MG/DL
BUN SERPL-MCNC: 15 MG/DL
CALCIUM SERPL-MCNC: 9.2 MG/DL
CHLORIDE SERPL-SCNC: 105 MMOL/L
CHOLEST SERPL-MCNC: 226 MG/DL
CO2 SERPL-SCNC: 27 MMOL/L
CREAT SERPL-MCNC: 1.14 MG/DL
EGFR: 67 ML/MIN/1.73M2
ESTIMATED AVERAGE GLUCOSE: 148 MG/DL
GLUCOSE SERPL-MCNC: 142 MG/DL
HBA1C MFR BLD HPLC: 6.8 %
HCT VFR BLD CALC: 42.8 %
HDLC SERPL-MCNC: 51 MG/DL
HGB BLD-MCNC: 13.3 G/DL
LDLC SERPL CALC-MCNC: 154 MG/DL
MCHC RBC-ENTMCNC: 27.8 PG
MCHC RBC-ENTMCNC: 31.1 GM/DL
MCV RBC AUTO: 89.4 FL
NONHDLC SERPL-MCNC: 175 MG/DL
PLATELET # BLD AUTO: 213 K/UL
POTASSIUM SERPL-SCNC: 4.5 MMOL/L
PROT SERPL-MCNC: 6.6 G/DL
RBC # BLD: 4.79 M/UL
RBC # FLD: 14.2 %
SODIUM SERPL-SCNC: 145 MMOL/L
TRIGL SERPL-MCNC: 120 MG/DL
WBC # FLD AUTO: 4.67 K/UL

## 2023-09-28 ENCOUNTER — OUTPATIENT (OUTPATIENT)
Dept: OUTPATIENT SERVICES | Facility: HOSPITAL | Age: 78
LOS: 1 days | End: 2023-09-28
Payer: MEDICARE

## 2023-09-28 VITALS
WEIGHT: 154.1 LBS | DIASTOLIC BLOOD PRESSURE: 86 MMHG | HEART RATE: 72 BPM | RESPIRATION RATE: 18 BRPM | OXYGEN SATURATION: 97 % | HEIGHT: 69 IN | TEMPERATURE: 98 F | SYSTOLIC BLOOD PRESSURE: 129 MMHG

## 2023-09-28 DIAGNOSIS — I10 ESSENTIAL (PRIMARY) HYPERTENSION: ICD-10-CM

## 2023-09-28 DIAGNOSIS — K40.90 UNILATERAL INGUINAL HERNIA, WITHOUT OBSTRUCTION OR GANGRENE, NOT SPECIFIED AS RECURRENT: ICD-10-CM

## 2023-09-28 DIAGNOSIS — Z01.818 ENCOUNTER FOR OTHER PREPROCEDURAL EXAMINATION: ICD-10-CM

## 2023-09-28 DIAGNOSIS — Z91.89 OTHER SPECIFIED PERSONAL RISK FACTORS, NOT ELSEWHERE CLASSIFIED: ICD-10-CM

## 2023-09-28 DIAGNOSIS — Z93.1 GASTROSTOMY STATUS: Chronic | ICD-10-CM

## 2023-09-28 PROCEDURE — G0463: CPT

## 2023-09-28 RX ORDER — FINASTERIDE 5 MG/1
1 TABLET, FILM COATED ORAL
Qty: 0 | Refills: 0 | DISCHARGE

## 2023-09-28 NOTE — H&P PST ADULT - PROBLEM SELECTOR PLAN 2
Current treatment regimen - Amlodipine 10mg daily.   Advised to continue with current regimen    Patient instructed with understanding verbalized to take Amlodipine with a sip of water the morning of surgery.    Follow up with PCP/Cardiologist post operatively

## 2023-09-28 NOTE — H&P PST ADULT - PROBLEM SELECTOR PLAN 1
Patient is scheduled for right inguinal hernia repair with mesh on 10/5/2023  Written and oral preoperative instructions given to patient with understanding verbalized.   Instructions given to include using 4% chlorhexidine wash as directed starting 3days before day of surgery (inclusive of day of surgery)  Maintaining NPO status post midnight day before surgery  Stopping aspirin, NSAIDs, herbs, vitamins 7days before surgery   Patient is to expect a phone call day before surgery between the hours of 430- 630pm giving arrival time for surgery day.

## 2023-09-28 NOTE — H&P PST ADULT - SOURCE OF INFORMATION, PROFILE
Patient has severe nausea and vomiting. Order received for Reglan. Dr Edwards notified, patient will be admitted for Observation.  
Report calledto charge RN on 3W. Patient to be transferred when bed clean.  
patient

## 2023-09-28 NOTE — H&P PST ADULT - HISTORY OF PRESENT ILLNESS
77 year old male with pmhx of hypertension, hyperlipidemia, enlarged prostate, covid 19 virus, oropharyngeal cancer s/p PEG tube/resection completed radiation and chemotherapy presents with c/o right inguinal hernia x 1year. Patient is here today for presurgical testing for scheduled right inguinal hernia repair on 10/5/2023

## 2023-09-28 NOTE — H&P PST ADULT - NSANTHOSAYNRD_GEN_A_CORE
No. EBER screening performed.  STOP BANG Legend: 0-2 = LOW Risk; 3-4 = INTERMEDIATE Risk; 5-8 = HIGH Risk

## 2023-09-28 NOTE — H&P PST ADULT - NSICDXPASTMEDICALHX_GEN_ALL_CORE_FT
PAST MEDICAL HISTORY:  Enlarged prostate     History of oropharyngeal cancer     Hypercholesterolemia     Hypertension     Stage 3 chronic kidney disease

## 2023-09-28 NOTE — H&P PST ADULT - PROBLEM SELECTOR PLAN 3
Patient today with STOP bang score of 3, Intermediate risk for EBER   Patient denies current hx/dx of EBER/Sleep Study Test   Recommend maintaining perioperative EBER risk precautions.

## 2023-09-28 NOTE — H&P PST ADULT - RESPIRATORY
clear to auscultation bilaterally/no wheezes/no rales/breath sounds equal/good air movement/respirations non-labored

## 2023-10-02 PROBLEM — Z85.819 PERSONAL HISTORY OF MALIGNANT NEOPLASM OF UNSPECIFIED SITE OF LIP, ORAL CAVITY, AND PHARYNX: Chronic | Status: ACTIVE | Noted: 2023-09-28

## 2023-10-02 PROBLEM — N40.0 BENIGN PROSTATIC HYPERPLASIA WITHOUT LOWER URINARY TRACT SYMPTOMS: Chronic | Status: ACTIVE | Noted: 2023-09-28

## 2023-10-03 ENCOUNTER — OUTPATIENT (OUTPATIENT)
Dept: OUTPATIENT SERVICES | Facility: HOSPITAL | Age: 78
LOS: 1 days | End: 2023-10-03
Payer: MEDICARE

## 2023-10-03 ENCOUNTER — APPOINTMENT (OUTPATIENT)
Dept: CT IMAGING | Facility: HOSPITAL | Age: 78
End: 2023-10-03
Payer: MEDICARE

## 2023-10-03 DIAGNOSIS — K86.2 CYST OF PANCREAS: ICD-10-CM

## 2023-10-03 DIAGNOSIS — Z93.1 GASTROSTOMY STATUS: Chronic | ICD-10-CM

## 2023-10-03 DIAGNOSIS — Z85.9 PERSONAL HISTORY OF MALIGNANT NEOPLASM, UNSPECIFIED: ICD-10-CM

## 2023-10-03 PROCEDURE — 74176 CT ABD & PELVIS W/O CONTRAST: CPT

## 2023-10-03 PROCEDURE — 74176 CT ABD & PELVIS W/O CONTRAST: CPT | Mod: 26

## 2023-10-04 ENCOUNTER — TRANSCRIPTION ENCOUNTER (OUTPATIENT)
Age: 78
End: 2023-10-04

## 2023-10-05 ENCOUNTER — APPOINTMENT (OUTPATIENT)
Dept: SURGERY | Facility: HOSPITAL | Age: 78
End: 2023-10-05
Payer: MEDICARE

## 2023-10-05 ENCOUNTER — TRANSCRIPTION ENCOUNTER (OUTPATIENT)
Age: 78
End: 2023-10-05

## 2023-10-05 ENCOUNTER — OUTPATIENT (OUTPATIENT)
Dept: OUTPATIENT SERVICES | Facility: HOSPITAL | Age: 78
LOS: 1 days | End: 2023-10-05
Payer: MEDICARE

## 2023-10-05 VITALS
TEMPERATURE: 98 F | HEART RATE: 77 BPM | SYSTOLIC BLOOD PRESSURE: 163 MMHG | OXYGEN SATURATION: 99 % | RESPIRATION RATE: 16 BRPM | DIASTOLIC BLOOD PRESSURE: 85 MMHG

## 2023-10-05 VITALS
SYSTOLIC BLOOD PRESSURE: 157 MMHG | WEIGHT: 154.1 LBS | TEMPERATURE: 98 F | HEART RATE: 84 BPM | OXYGEN SATURATION: 100 % | DIASTOLIC BLOOD PRESSURE: 90 MMHG | RESPIRATION RATE: 16 BRPM | HEIGHT: 69 IN

## 2023-10-05 DIAGNOSIS — Z93.1 GASTROSTOMY STATUS: Chronic | ICD-10-CM

## 2023-10-05 DIAGNOSIS — Z01.818 ENCOUNTER FOR OTHER PREPROCEDURAL EXAMINATION: ICD-10-CM

## 2023-10-05 DIAGNOSIS — K40.90 UNILATERAL INGUINAL HERNIA, WITHOUT OBSTRUCTION OR GANGRENE, NOT SPECIFIED AS RECURRENT: ICD-10-CM

## 2023-10-05 PROCEDURE — C1781: CPT

## 2023-10-05 PROCEDURE — 49505 PRP I/HERN INIT REDUC >5 YR: CPT | Mod: AS,RT

## 2023-10-05 PROCEDURE — 49505 PRP I/HERN INIT REDUC >5 YR: CPT

## 2023-10-05 PROCEDURE — 49505 PRP I/HERN INIT REDUC >5 YR: CPT | Mod: RT

## 2023-10-05 DEVICE — MESH HERNIA PROLENE RECTANGLE 3 X 6": Type: IMPLANTABLE DEVICE | Site: RIGHT | Status: FUNCTIONAL

## 2023-10-05 RX ORDER — ACETAMINOPHEN 500 MG
650 TABLET ORAL ONCE
Refills: 0 | Status: DISCONTINUED | OUTPATIENT
Start: 2023-10-05 | End: 2023-10-19

## 2023-10-05 RX ORDER — ATORVASTATIN CALCIUM 80 MG/1
1 TABLET, FILM COATED ORAL
Qty: 0 | Refills: 0 | DISCHARGE

## 2023-10-05 RX ORDER — OXYCODONE HYDROCHLORIDE 5 MG/1
5 TABLET ORAL ONCE
Refills: 0 | Status: DISCONTINUED | OUTPATIENT
Start: 2023-10-05 | End: 2023-10-05

## 2023-10-05 RX ORDER — OXYCODONE HYDROCHLORIDE 5 MG/1
1 TABLET ORAL
Qty: 8 | Refills: 0
Start: 2023-10-05

## 2023-10-05 RX ORDER — ONDANSETRON 8 MG/1
4 TABLET, FILM COATED ORAL ONCE
Refills: 0 | Status: DISCONTINUED | OUTPATIENT
Start: 2023-10-05 | End: 2023-10-05

## 2023-10-05 RX ORDER — HYDROMORPHONE HYDROCHLORIDE 2 MG/ML
0.5 INJECTION INTRAMUSCULAR; INTRAVENOUS; SUBCUTANEOUS
Refills: 0 | Status: DISCONTINUED | OUTPATIENT
Start: 2023-10-05 | End: 2023-10-05

## 2023-10-05 RX ORDER — SODIUM CHLORIDE 9 MG/ML
3 INJECTION INTRAMUSCULAR; INTRAVENOUS; SUBCUTANEOUS EVERY 8 HOURS
Refills: 0 | Status: DISCONTINUED | OUTPATIENT
Start: 2023-10-05 | End: 2023-10-05

## 2023-10-05 RX ORDER — HYDROMORPHONE HYDROCHLORIDE 2 MG/ML
1 INJECTION INTRAMUSCULAR; INTRAVENOUS; SUBCUTANEOUS
Refills: 0 | Status: DISCONTINUED | OUTPATIENT
Start: 2023-10-05 | End: 2023-10-05

## 2023-10-05 NOTE — ASU DISCHARGE PLAN (ADULT/PEDIATRIC) - CARE PROVIDER_API CALL
Pramod Marin  Surgery  [address], [address]  [place] [address]  Phone: [phone]  Fax: [phone]  Follow Up Time:

## 2023-10-05 NOTE — ASU PATIENT PROFILE, ADULT - TEACHING/LEARNING LEARNING PREFERENCES
individual instruction/verbal instruction group instruction/individual instruction/verbal instruction

## 2023-10-05 NOTE — ASU DISCHARGE PLAN (ADULT/PEDIATRIC) - ASU DC SPECIAL INSTRUCTIONSFT
Please follow-up with your surgeon in 1 week. Drink plenty of fluids and rest as needed. Call for any fever over 101, nausea, vomiting, severe pain, no passing of gas or bowel movement.     DIET   You may resume your regular diet as normal.     SURGICAL SITES   Remove outer dressing and keep white steri-strips in place allowing them to fall off on their own. You may shower 48 hours post-operatively but do not bathe or soak in the water for 1-2 weeks; pat dry. If you notice any signs of surgical site infection (ie. redness, swelling, pain, pus drainage), please seek medical care immediately.    ACTIVITY Do not lift anything heavier than 10 pounds for 2 weeks (2-3 months for hernia, no exercise for 2 weeks) and avoid strenuous activity for 4-6 weeks.     PAIN CONTROL   You may take Motrin 600mg (with food) or Tylenol 650mg as needed for mild pain. Stagger one medication 3 hours after the other for maximum pain control. Maximum daily dose of Tylenol should not exceed 4grams/day.  You may take your prescribed oxycodone for severe breakthrough pain not that is not relieved by Tylenol/Motrin. Do not drive or make important decisions while taking this medication and do not take more than 4 pills in 24 hours.Please refer to medical records/ progress notes for in depth hospital course. Discharge plan discussed and agreed with attending.

## 2023-10-05 NOTE — PROGRESS NOTE ADULT - SUBJECTIVE AND OBJECTIVE BOX
55.7 [   ] ICU                                          [   ] CCU                                      [ X  ] Medical Floor        Patient is a 77 year old male with dysphagia and Peg tube malfunction. GI consulted to evaluate.        77 year old male with past medical history significant for hypertension, hypercholesteremia, BPH and oropharyngeal mass s/p resection, on radiation and chemo (last dose of chemo was last week) presenting to the ED with complaints of irritation and burning in his throat for three days. Patient also c/o leaking peg tube and irritation at peg site. Patient denies abdominal pain, nausea, vomiting, hematemesis, hematochezia, melena, fever, chills, chest pain, SOB, palpitation, hematuria, dysuria or diarrhea.      Patient appears comfortable. No new complaints reported, No abdominal pain, N/V, hematemesis, hematochezia, melena, fever, chills, chest pain, SOB, cough or diarrhea reported.         PAIN MANAGEMENT:  Pain Scale:                0 /10  Pain Location:         PAST MEDICAL HISTORY  Hypertension  Hypercholesterolemia  Stage 3 chronic kidney disease        PAST SURGICAL HISTORY  Resection oropharyngeal mass        Allergies    No Known Allergies    Intolerances  None          SOCIAL HISTORY  Advanced Directives:       [ X ] Full Code       [  ] DNR  Marital Status:         [  ] M      [ X ] S      [  ] D       [  ] W  Children:       [ X ] Yes      [  ] No  Occupation:        [  ] Employed       [ X ] Unemployed       [  ] Retired  Diet:       [ X ] Regular       [  ] PEG feeding          [  ] NG tube feeding  Drug Use:           [ X ] Patient denied          [  ] Yes  Alcohol:           [ X ] No             [  ] Yes (socially)         [  ] Yes (chronic)  Tobacco:           [  ] Yes           [ X ] No      FAMILY HISTORY  [ X ] Heart Disease            [ X ] Diabetes             [ X ] HTN             [  ] Colon Cancer             [  ] Stomach Cancer              [  ] Pancreatic Cancer      VITALS  Vital Signs Last 24 Hrs  T(C): 36.5 (25 Dec 2022 05:24), Max: 36.5 (25 Dec 2022 05:24)  T(F): 97.7 (25 Dec 2022 05:24), Max: 97.7 (25 Dec 2022 05:24)  HR: 86 (25 Dec 2022 05:24) (86 - 104)  BP: 144/86 (25 Dec 2022 05:24) (144/86 - 157/94)   RR: 20 (25 Dec 2022 05:24) (18 - 20)  SpO2: 99% (25 Dec 2022 05:24) (98% - 99%)  Parameters below as of 25 Dec 2022 05:24  Patient On (Oxygen Delivery Method): room air       MEDICATIONS  (STANDING):  cefepime   IVPB      cefepime   IVPB 2000 milliGRAM(s) IV Intermittent every 12 hours  enoxaparin Injectable 40 milliGRAM(s) SubCutaneous every 12 hours  potassium phosphate / sodium phosphate Powder (PHOS-NaK) 1 Packet(s) Oral two times a day  remdesivir  IVPB   IV Intermittent   remdesivir  IVPB 100 milliGRAM(s) IV Intermittent every 24 hours  silver sulfADIAZINE 1% Cream 1 Application(s) Topical daily  sodium chloride 0.9%. 1000 milliLiter(s) (80 mL/Hr) IV Continuous <Continuous>  sodium chloride 0.9%. 1000 milliLiter(s) (80 mL/Hr) IV Continuous <Continuous>    MEDICATIONS  (PRN):  acetaminophen   IVPB .. 750 milliGRAM(s) IV Intermittent once PRN Temp greater or equal to 38C (100.4F), Mild Pain (1 - 3)  FIRST- Mouthwash  BLM 10 milliLiter(s) Swish and Spit every 6 hours PRN Mouth Care                            9.8    3.05  )-----------( 288      ( 25 Dec 2022 07:14 )             31.4       12-25    147<H>  |  109<H>  |  19<H>  ----------------------------<  141<H>  3.7   |  27  |  0.96    Ca    8.3<L>      25 Dec 2022 07:14  Phos  2.2     12-25  Mg     1.7     12-25    TPro  5.9<L>  /  Alb  1.6<L>  /  TBili  0.7  /  DBili  x   /  AST  20  /  ALT  32  /  AlkPhos  81  12-25

## 2023-10-05 NOTE — ASU PATIENT PROFILE, ADULT - NS TRANSFER PATIENT BELONGINGS
locker 11/Clothing
[FreeTextEntry1] : CHANTEL  presented for urgent follow up on Apr 27. 2022. She is a 19 year old female who was referred for cardiac evaluation due to the family history of long QT syndrome in her sister. Her sister Sofy has LONG QT Syndrome Type 1. We ordered genetic testing and she was positive for the same genetic abnormality as her sister. 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 The KCNQ1 gene encodes the pore-forming subunit of a potassium channel involved in repolarization of the cardiac ventricular action potential (Argentina et al., 1990). Pathogenic variants in KCNQ1 and other cardiac ion channel genes tend to prolong the duration of the ventricular action potential, thus lengthening the QT interval seen on an electrocardiogram (ECG) (Cesar et al., 2008; oJnah et al., 2004). Pathogenic variants in the KCNQ1 gene have been reported in approximately 39%-62% of patients with autosomal dominant long QT syndrome (LQTS) type 1 (LQT1) and are associated with increased risk of cardiac events triggered by emotion, exercise and vigorous activity, particularly swimming (Gume et al., 2018).  \par \par CHANTEL has had no cardiac complaints. Specifically, there has been no chest pain, palpitations, excessive diaphoresis, shortness of breath, lightheadedness, or syncope. There has been no recent change in activity level, no fatigue, and no difficulty gaining weight or weight loss. CHANTEL is active in soccer and has had no recent decrease in exercise athletic endurance.\par \par She had her last period Feb 23, 2022.\par \par Chantel has a different dad than Sofy. There are 2 brothers being evaluated today.\par \par During her sisters visit she explained she is having right sided chest pain. Made worse by movement and deep breathing  Prescribed Aleve 1 tab po bid with food for 5 days. and hydration.She started wearing a bra to sleep and taking gas pills and she is much improved.

## 2023-10-06 ENCOUNTER — APPOINTMENT (OUTPATIENT)
Dept: RADIATION ONCOLOGY | Facility: CLINIC | Age: 78
End: 2023-10-06

## 2023-10-28 NOTE — DIETITIAN INITIAL EVALUATION ADULT - FACTORS AFF FOOD INTAKE
generalized weakness, s/p chemo/radiation (last dose of chemo was last week) , oropharyngeal mass s/p PEG tube placement, h/o COVID infection/difficulty chewing/difficulty feeding self/difficulty swallowing/difficulty with food procurement/preparation/pain/other (specify) Patient/Caregiver provided printed discharge information.

## 2023-11-06 NOTE — ED ADULT NURSE NOTE - NS ED NOTE ABUSE RESPONSE YN
General Sunscreen Counseling: I recommended a broad spectrum sunscreen with at least SPF of 30, but higher is better.  I explained that SPF 30 sunscreens block approximately 97 percent of the sun's harmful rays in vitro, but in practice a higher SPF offers more protection from sun exposure as most people don't use the density of application to get the number on the body.  Sunscreens should be applied at least 15 minutes prior to expected sun exposure and then every 2 hours after that as long as sun exposure continues. If swimming or exercising sunscreen should be reapplied more frequently.  One ounce, or 30 fluid ounces (the equivalent of a shot glass full of sunscreen), is adequate to protect the skin not covered by a bathing suit. I also recommended a lip balm with a sunscreen as well. Sun protective clothing (UPF50+) can be used in lieu of sunscreen but must be worn the entire time you are exposed to the sun's rays. Detail Level: Simple Products Recommended: Recommend SPF 50 or greater for face, 30 or greater for body\\nRecommend mineral sunscreen in all cases (active ingredients zinc oxide and/or titanium dioxide)\\nBrands: Neutrogena, Tizo, La Roche Posay, Elta MD Yes

## 2023-11-28 ENCOUNTER — APPOINTMENT (OUTPATIENT)
Dept: GASTROENTEROLOGY | Facility: CLINIC | Age: 78
End: 2023-11-28
Payer: MEDICARE

## 2023-11-28 VITALS
WEIGHT: 161 LBS | TEMPERATURE: 98 F | DIASTOLIC BLOOD PRESSURE: 90 MMHG | OXYGEN SATURATION: 98 % | HEIGHT: 69 IN | BODY MASS INDEX: 23.85 KG/M2 | SYSTOLIC BLOOD PRESSURE: 135 MMHG | HEART RATE: 96 BPM

## 2023-11-28 DIAGNOSIS — K21.9 GASTRO-ESOPHAGEAL REFLUX DISEASE W/OUT ESOPHAGITIS: ICD-10-CM

## 2023-11-28 PROCEDURE — 99213 OFFICE O/P EST LOW 20 MIN: CPT

## 2023-11-29 LAB
ANION GAP SERPL CALC-SCNC: 12 MMOL/L
BUN SERPL-MCNC: 17 MG/DL
CALCIUM SERPL-MCNC: 9 MG/DL
CHLORIDE SERPL-SCNC: 103 MMOL/L
CO2 SERPL-SCNC: 28 MMOL/L
CREAT SERPL-MCNC: 1.26 MG/DL
EGFR: 59 ML/MIN/1.73M2
GLUCOSE SERPL-MCNC: 155 MG/DL
POTASSIUM SERPL-SCNC: 4.1 MMOL/L
SODIUM SERPL-SCNC: 143 MMOL/L

## 2023-12-01 ENCOUNTER — OUTPATIENT (OUTPATIENT)
Dept: OUTPATIENT SERVICES | Facility: HOSPITAL | Age: 78
LOS: 1 days | End: 2023-12-01
Payer: MEDICARE

## 2023-12-01 ENCOUNTER — APPOINTMENT (OUTPATIENT)
Dept: CT IMAGING | Facility: HOSPITAL | Age: 78
End: 2023-12-01
Payer: MEDICARE

## 2023-12-01 DIAGNOSIS — K86.89 OTHER SPECIFIED DISEASES OF PANCREAS: ICD-10-CM

## 2023-12-01 DIAGNOSIS — Z93.1 GASTROSTOMY STATUS: Chronic | ICD-10-CM

## 2023-12-01 PROCEDURE — 74177 CT ABD & PELVIS W/CONTRAST: CPT

## 2023-12-01 PROCEDURE — 74177 CT ABD & PELVIS W/CONTRAST: CPT | Mod: 26

## 2023-12-11 ENCOUNTER — RX RENEWAL (OUTPATIENT)
Age: 78
End: 2023-12-11

## 2023-12-28 ENCOUNTER — APPOINTMENT (OUTPATIENT)
Dept: GASTROENTEROLOGY | Facility: CLINIC | Age: 78
End: 2023-12-28
Payer: MEDICARE

## 2023-12-28 VITALS
OXYGEN SATURATION: 98 % | HEIGHT: 69 IN | TEMPERATURE: 96.2 F | DIASTOLIC BLOOD PRESSURE: 91 MMHG | SYSTOLIC BLOOD PRESSURE: 144 MMHG | HEART RATE: 110 BPM | WEIGHT: 156 LBS | BODY MASS INDEX: 23.11 KG/M2

## 2023-12-28 DIAGNOSIS — K86.89 OTHER SPECIFIED DISEASES OF PANCREAS: ICD-10-CM

## 2023-12-28 PROCEDURE — 99213 OFFICE O/P EST LOW 20 MIN: CPT

## 2023-12-28 NOTE — HISTORY OF PRESENT ILLNESS
[FreeTextEntry1] : 78M with pmhx of main duct IPMN (diagnosed at [facility], w/ mural nodule and atypia cytology) presenting for follow-up. Was recommended previously multiple times for surgery but refused. Recommended for MRI for surveillance but refused. Denies any complaints today, no abd pain, n/v/d/c, melena, hematochezia, fever/chills, jaundice, dark urine, or other issues.

## 2023-12-28 NOTE — ASSESSMENT
[FreeTextEntry1] : 78M with pmhx of main duct IPMN (diagnosed at [facility], w/ mural nodule and atypia cytology) presenting for follow-up. Was recommended previously multiple times for surgery but refused. Recommended for MRI for surveillance but refused.  - Pt today accompanied by niece. After discussion again, pt now agreeable for surgery and treatment for his underlying suspected mixed duct IPMN. Again warned of risks of pancreatic cancer and need for appropriate f/u.  - Follow-up with surgery Dr. Franklin.

## 2023-12-28 NOTE — PHYSICAL EXAM
[Alert] : alert [Normal Voice/Communication] : normal voice/communication [Healthy Appearing] : healthy appearing [No Acute Distress] : no acute distress [Sclera] : the sclera and conjunctiva were normal [Hearing Threshold Finger Rub Not [place]] : hearing was normal [Normal Lips/Gums] : the lips and gums were normal [Oropharynx] : the oropharynx was normal [Normal Appearance] : the appearance of the neck was normal [No Neck Mass] : no neck mass was observed [No Respiratory Distress] : no respiratory distress [No Acc Muscle Use] : no accessory muscle use [Respiration, Rhythm And Depth] : normal respiratory rhythm and effort [Auscultation Breath Sounds / Voice Sounds] : lungs were clear to auscultation bilaterally [Heart Rate And Rhythm] : heart rate was normal and rhythm regular [Normal S1, S2] : normal S1 and S2 [Murmurs] : no murmurs [Bowel Sounds] : normal bowel sounds [Abdomen Tenderness] : non-tender [No Masses] : no abdominal mass palpated [Abdomen Soft] : soft [] : no hepatosplenomegaly [Oriented To Time, Place, And Person] : oriented to person, place, and time

## 2024-01-02 ENCOUNTER — APPOINTMENT (OUTPATIENT)
Dept: SURGICAL ONCOLOGY | Facility: CLINIC | Age: 79
End: 2024-01-02

## 2024-01-03 ENCOUNTER — NON-APPOINTMENT (OUTPATIENT)
Age: 79
End: 2024-01-03

## 2024-01-03 NOTE — PHYSICAL EXAM
Will check blood pressure at physical exam on 6/20/22 [FreeTextEntry1] : COVID-19 precautions as per [facility] policy was universally followed\par   [de-identified] : Abdomen soft, non-tender, non-distended, and no palpable masses.

## 2024-01-03 NOTE — HISTORY OF PRESENT ILLNESS
[de-identified] : Mr. HILLARY DREW is a 78 year old man presenting with IPMN, presents for follow up visit.   Initially referred by Dr. 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 Here for a follow-up visit.  9/5/23 He presents today to discuss his treatment options. 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 He reports no abdominal apin, has lost weight while on therapy, but no GI complaints.  PMH of renal cysts, BPH and HTN, newly diagnosed DM in 2021 (recent a1c= 13.2). Denies any family history of pancreatic cancer or pancreatitis   Hillary was initially followed at [facility] in 12/2021 p/w urinary issues, subsequent imaging  MRCP 12/2021 at [facility] showed multiple cystic lesions in pancreas possibly communicating with the dilated PD, suspicious for intraductal neoplasm.  s/p EUS w/ FNA 1/2022- findings of mild dilatation of PD, multiple cysts likely sidebranch IPMN, one cyst w/ a mural nodule (measuring 1.6 x 1.2 cm)  s/p targeted biopsy.  mild PD dilatation ~4.5 mm. normal CBD Fluid ,220. cytology with atypical findings, rare atypical but degenerated cells, a few lymphocytes and macrophages  He was discussed at [facility] tumor board with recommendations made for a surgical oncology evaluation given that mucinous cystadenocarcinoma cannot be ruled out, however his insurance would not cover a surgical oncology visit at [facility].  He then lost follow-up due to insurance issues.   He saw Dr. Walsh in 6/2022 to establish care who ordered him for an MRCP and referred to Dr. Clancy for an EUS   6/2022- Ca 19-9: 87 (elevated) At his initial visit with Dr. Clancy last week, they discussed his inability to obtain an MRI due to claustrophobia, and will obtain a CT pancreas protocol. If CT w/ ductal dilation & mural nodule, given prior EUS findings and atypia with elevated CEA, high risk IPMN and may be reasonable to consider surgical resection vs repeat EUS.   CT pancreas protocol from 6/27/2022 with Atrophic pancreas with severe short length ductal stricture at the head and upstream ductal dilation with possible nodular enhancement versus debris within several dilated branch ducts. Accompanying cysts versus branch duct IPMNs. Recommend further enhancement with contrast enhancement MRI abdomen Enlarged prostate with extensive ingrowth into the bladder, sequela of chronic outlet obstruction and 1.5 cm lamellated bladder calculus. Consider urology referral  6/29/22- He reports occasional constipation that is relieved with MiraLAX. Retired from job as . [employer]COG 0, Denies prior surgical history, denies tobacco, alcohol or illicit drug use. Hillary presents today with his niece, another niece present via telephone, discussed findings from [facility] and recent CT imaging. Explained to patient that surgery up front is initial treatment plan, discussed with patient that a repeat EUS may be necessary, he is in agreement. Explained that we need to see the imaging and pathology from [facility] in order to decide next steps.  Labs 6/29/22: CBC: WNL | CEA: 6.2 | LFTs: WNL  7/6/22- Patient returns today, having obtained all necessary records, for further recommendations. Patient in agreement to proceed with surgery after extensive discussion. refer to endocrinology for preop optimization and education of glycemic control-  Pt has a main duct IPMN with complete atrophy of his pancreas - the MPD >1cm with IPMN through out his ductal system including branch ducts. I suspect an invasive component given the CT appearance and serum tumor marker elevation. A segmental resection would be inadequate given an entire field defect like picture of the main duct. He will benefit from a total pancreatectomy, discussed post-op brittle diabetes with patient & niece  Hillary will need [facility] (has plans to establish care with Dr. Valeria Moran and Dr. Jennifer Garzon) & PST  12/2022: Completed 60Gy out of planned 70Gy Radiation therapy  5/15/2023 PET: 1. Interval resolution of hypermetabolic right BOT malignant lesion. Currently no focal abnormal activity in the head and neck, or elsewhere, that is concerning for FDG avid malignant process. 2. Hypodense changes in the atrophic pancreas without FDG activity. 3. Enlarged prostate gland.  Patient recently seen by Dr. Clancy and here today for follow up visit.  Patient refused MRI for surveillance and had CT scan instead.  CT A/P 12/01/23 showed focal mural thickening of the gallbladder measuring up to 1.2 cm which appears contiguous with hepatic parenchyma. Recommend further evaluation with abdominal MR. 2.  Progressive enlargement of pancreas cystic lesions measuring up to 5.5 cm in the pancreatic tail. No discrete nodular enhancement. Differentials include mixed type IPMN.

## 2024-01-03 NOTE — CONSULT LETTER
[FreeTextEntry2] : Rafael Walsh MD [FreeTextEntry3] : Minh Franklin MD, FICS, FACS\par  Director of Surgical Oncology- [facility] \par  , Department of Surgery  \par  [facility] [employer] [facility] [employer] [facility] \par  [address]\[place] [address]\par  \par  [address]\[place] [address]\par  \par  [address]\[place] [address]\par  \par  (mob) [phone]\par  (o) [phone]\par  (f) [phone]\par  \par

## 2024-01-03 NOTE — ASSESSMENT
[FreeTextEntry1] : IMP: 77 yo M here for follow up, with  IPMN with high CEA, suspicious for malignancy with main duct IPMN.   CT A/P 12/01/23:  Focal mural thickening of the gallbladder measuring up to 1.2 cm which appears contiguous with hepatic parenchyma. Recommend further evaluation with abdominal MR. 2.  Progressive enlargement of pancreas cystic lesions measuring up to 5.5 cm in the pancreatic tail. No discrete nodular enhancement. Differentials include mixed type IPMN.   PLAN:  -

## 2024-01-12 ENCOUNTER — RX RENEWAL (OUTPATIENT)
Age: 79
End: 2024-01-12

## 2024-01-12 RX ORDER — AMLODIPINE BESYLATE 5 MG/1
5 TABLET ORAL DAILY
Qty: 90 | Refills: 3 | Status: ACTIVE | COMMUNITY
Start: 2023-07-24 | End: 1900-01-01

## 2024-01-22 ENCOUNTER — APPOINTMENT (OUTPATIENT)
Dept: INTERNAL MEDICINE | Facility: CLINIC | Age: 79
End: 2024-01-22
Payer: MEDICARE

## 2024-01-22 DIAGNOSIS — D49.0 NEOPLASM OF UNSPECIFIED BEHAVIOR OF DIGESTIVE SYSTEM: ICD-10-CM

## 2024-01-22 DIAGNOSIS — E11.9 TYPE 2 DIABETES MELLITUS W/OUT COMPLICATIONS: ICD-10-CM

## 2024-01-22 DIAGNOSIS — I10 ESSENTIAL (PRIMARY) HYPERTENSION: ICD-10-CM

## 2024-01-22 PROCEDURE — G0009: CPT

## 2024-01-22 PROCEDURE — G2211 COMPLEX E/M VISIT ADD ON: CPT

## 2024-01-22 PROCEDURE — 99214 OFFICE O/P EST MOD 30 MIN: CPT | Mod: 25

## 2024-01-22 PROCEDURE — 90677 PCV20 VACCINE IM: CPT

## 2024-01-22 PROCEDURE — 90662 IIV NO PRSV INCREASED AG IM: CPT

## 2024-01-22 PROCEDURE — 90472 IMMUNIZATION ADMIN EACH ADD: CPT

## 2024-01-25 PROBLEM — I10 HYPERTENSION: Status: ACTIVE | Noted: 2022-06-29

## 2024-01-25 PROBLEM — E11.9 TYPE 2 DIABETES MELLITUS WITHOUT COMPLICATION, WITHOUT LONG-TERM CURRENT USE OF INSULIN: Status: ACTIVE | Noted: 2024-01-25

## 2024-01-25 PROBLEM — D49.0 IPMN (INTRADUCTAL PAPILLARY MUCINOUS NEOPLASM): Status: ACTIVE | Noted: 2023-09-01

## 2024-01-25 NOTE — HISTORY OF PRESENT ILLNESS
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

## 2024-01-25 NOTE — PHYSICAL EXAM
[Well Nourished] : well nourished [No Acute Distress] : no acute distress [Well Developed] : well developed [Well-Appearing] : well-appearing [PERRL] : pupils equal round and reactive to light [Normal Sclera/Conjunctiva] : normal sclera/conjunctiva [EOMI] : extraocular movements intact [Normal Outer Ear/Nose] : the outer ears and nose were normal in appearance [Normal Oropharynx] : the oropharynx was normal [No JVD] : no jugular venous distention [Supple] : supple [No Lymphadenopathy] : no lymphadenopathy [Thyroid Normal, No Nodules] : the thyroid was normal and there were no nodules present [No Respiratory Distress] : no respiratory distress  [Clear to Auscultation] : lungs were clear to auscultation bilaterally [No Accessory Muscle Use] : no accessory muscle use [Normal Rate] : normal rate  [Regular Rhythm] : with a regular rhythm [Normal S1, S2] : normal S1 and S2 [No Murmur] : no murmur heard [No Carotid Bruits] : no carotid bruits [No Abdominal Bruit] : a ~M bruit was not heard ~T in the abdomen [No Varicosities] : no varicosities [Pedal Pulses Present] : the pedal pulses are present [No Edema] : there was no peripheral edema [No Palpable Aorta] : no palpable aorta [No Extremity Clubbing/Cyanosis] : no extremity clubbing/cyanosis [Soft] : abdomen soft [Non Tender] : non-tender [No Masses] : no abdominal mass palpated [Non-distended] : non-distended [No HSM] : no HSM [Normal Bowel Sounds] : normal bowel sounds [Normal Anterior Cervical Nodes] : no anterior cervical lymphadenopathy [Normal Posterior Cervical Nodes] : no posterior cervical lymphadenopathy [No Spinal Tenderness] : no spinal tenderness [No CVA Tenderness] : no CVA  tenderness [No Rash] : no rash [Grossly Normal Strength/Tone] : grossly normal strength/tone [No Joint Swelling] : no joint swelling [No Focal Deficits] : no focal deficits [Coordination Grossly Intact] : coordination grossly intact [Deep Tendon Reflexes (DTR)] : deep tendon reflexes were 2+ and symmetric [Normal Gait] : normal gait [Normal Insight/Judgement] : insight and judgment were intact [Normal Affect] : the affect was normal [de-identified] : healed radiation scars right neck, no mass

## 2024-01-27 ENCOUNTER — NON-APPOINTMENT (OUTPATIENT)
Age: 79
End: 2024-01-27

## 2024-03-02 ENCOUNTER — APPOINTMENT (OUTPATIENT)
Dept: INTERNAL MEDICINE | Facility: CLINIC | Age: 79
End: 2024-03-02

## 2024-03-19 ENCOUNTER — APPOINTMENT (OUTPATIENT)
Dept: OTOLARYNGOLOGY | Facility: CLINIC | Age: 79
End: 2024-03-19
Payer: MEDICARE

## 2024-03-19 VITALS
HEIGHT: 69 IN | WEIGHT: 156 LBS | DIASTOLIC BLOOD PRESSURE: 86 MMHG | BODY MASS INDEX: 23.11 KG/M2 | HEART RATE: 96 BPM | SYSTOLIC BLOOD PRESSURE: 136 MMHG

## 2024-03-19 DIAGNOSIS — C10.9 MALIGNANT NEOPLASM OF OROPHARYNX, UNSPECIFIED: ICD-10-CM

## 2024-03-19 PROCEDURE — 31575 DIAGNOSTIC LARYNGOSCOPY: CPT

## 2024-03-19 PROCEDURE — 99213 OFFICE O/P EST LOW 20 MIN: CPT | Mod: 25

## 2024-03-19 NOTE — CONSULT LETTER
[Dear  ___] : Dear  [unfilled], [Courtesy Letter:] : I had the pleasure of seeing your patient, [unfilled], in my office today. [Please see my note below.] : Please see my note below. [Consult Closing:] : Thank you very much for allowing me to participate in the care of this patient.  If you have any questions, please do not hesitate to contact me. [Sincerely,] : Sincerely, [FreeTextEntry2] : Dr Franklin  [FreeTextEntry3] : \par  Luis Haynes MD, FACS\par  \par  Otolaryngology-Head and Neck Surgery\par  [employer] [facility]\par

## 2024-03-19 NOTE — HISTORY OF PRESENT ILLNESS
[de-identified] : 78 yro pt was referred by Dr. Moran for eval of BOT mass found on CT scan from 8/18/2022. Lymph node core needle biopsy results which were positive for malignant cells. Pt completed 60Gy out of planned 70Gy Radiation therapy on 12/16/2022 and Chemo was completed before then.  PEG tube was removed. Last PET done May 2023.

## 2024-03-21 NOTE — H&P PST ADULT - NSICDXPROCEDURE_GEN_ALL_CORE_FT
Problem: Pain  Goal: Acceptable pain level achieved/maintained at rest using appropriate pain scale for the patient  Outcome: Monitoring/Evaluating progress  Goal: Acceptable pain level achieved/maintained with activity using appropriate pain scale for the patient  Outcome: Monitoring/Evaluating progress  Goal: Acceptable pain level achieved/maintained without oversedation  Outcome: Monitoring/Evaluating progress     Problem: Skin Integrity Alteration  Goal: Skin remains intact with no new/deterioration of wound or pressure injury  Outcome: Monitoring/Evaluating progress  Goal: Participates in wound care activities  Outcome: Monitoring/Evaluating progress     Problem: At Risk for Falls  Goal: Patient does not fall  Outcome: Monitoring/Evaluating progress  Goal: Patient takes action to control fall-related risks  Outcome: Monitoring/Evaluating progress      PROCEDURES:  Ila repair of right inguinal hernia 28-Sep-2023 09:01:06  Shirley Tompkins

## 2024-03-27 ENCOUNTER — APPOINTMENT (OUTPATIENT)
Dept: CT IMAGING | Facility: CLINIC | Age: 79
End: 2024-03-27
Payer: MEDICARE

## 2024-03-27 PROCEDURE — 70491 CT SOFT TISSUE NECK W/DYE: CPT

## 2024-03-27 PROCEDURE — 71260 CT THORAX DX C+: CPT

## 2024-04-04 ENCOUNTER — RX RENEWAL (OUTPATIENT)
Age: 79
End: 2024-04-04

## 2024-04-04 RX ORDER — PANTOPRAZOLE 20 MG/1
20 TABLET, DELAYED RELEASE ORAL
Qty: 30 | Refills: 0 | Status: ACTIVE | COMMUNITY
Start: 2022-08-26 | End: 1900-01-01

## 2024-05-08 NOTE — DISCHARGE NOTE NURSING/CASE MANAGEMENT/SOCIAL WORK - BRAND OF COVID-19 VACCINATION
Had conversation at length with daughter, daughter was upset that patient had soiled sheets the previous day. Collaborated with BILL Meza to ensure patient received shower and was placed in clean clothes, linen changed at about 10:00 this morning. Patients daughter reported wanting to speak with the physician and case management. Per MD, Dr. Rutledge spoke with daughter on the phone. Per Mayra in case management, she was able to speak with patients daughter and answer questions regarding HH on the phone.   Patient discharged with friend via private vehicle at 12:38. Patient discharged with all personal belongings and meds delivered to bedside. Patient educated on follow up care, new medications, and when to return to the ER. Patient verbalized understanding.              Pfizer dose 1 and 2

## 2024-05-10 ENCOUNTER — APPOINTMENT (OUTPATIENT)
Dept: RADIATION ONCOLOGY | Facility: CLINIC | Age: 79
End: 2024-05-10
Payer: MEDICARE

## 2024-05-10 ENCOUNTER — NON-APPOINTMENT (OUTPATIENT)
Age: 79
End: 2024-05-10

## 2024-05-10 VITALS
SYSTOLIC BLOOD PRESSURE: 139 MMHG | TEMPERATURE: 98 F | BODY MASS INDEX: 22.22 KG/M2 | WEIGHT: 150 LBS | HEIGHT: 69 IN | OXYGEN SATURATION: 100 % | RESPIRATION RATE: 16 BRPM | DIASTOLIC BLOOD PRESSURE: 90 MMHG | HEART RATE: 75 BPM

## 2024-05-10 PROCEDURE — 99215 OFFICE O/P EST HI 40 MIN: CPT | Mod: 25

## 2024-05-10 PROCEDURE — 31505 DIAGNOSTIC LARYNGOSCOPY: CPT

## 2024-05-10 RX ORDER — CALENDULA OFFICINALIS FLOWERING TOP 1 [HP_X]/G
CREAM TOPICAL
Qty: 1 | Refills: 0 | Status: COMPLETED | COMMUNITY
Start: 2023-06-30 | End: 2024-05-10

## 2024-05-10 NOTE — REVIEW OF SYSTEMS
[Negative] : Allergic/Immunologic [Dysgeusia: Grade 1- Altered taste but no change in diet] : Dysgeusia: Grade 1 - Altered taste but no change in diet

## 2024-05-17 NOTE — DISEASE MANAGEMENT
[TTNM] : 3 [NTNM] : 1 [MTNM] : 0 [de-identified] : 7864 [de-identified] : 9039 [de-identified] : Oropharynx/Neck

## 2024-05-17 NOTE — PHYSICAL EXAM
[Outer Ear] : the ears and nose were normal in appearance [Hearing Threshold Finger Rub Not Coleman] : hearing was normal [Normal Oral Cavity] : oral cavity was normal [Nasal Cavity] : the nasal mucosa and septum were normal [] : no respiratory distress [Exaggerated Use Of Accessory Muscles For Inspiration] : no accessory muscle use [Nondistended] : nondistended [Cervical Lymph Nodes Enlarged Posterior Bilaterally] : posterior cervical [Cervical Lymph Nodes Enlarged Anterior Bilaterally] : anterior cervical [Supraclavicular Lymph Nodes Enlarged Bilaterally] : supraclavicular [Normal] : oriented to person, place and time, the affect was normal, the mood was normal and not anxious [de-identified] : No discreet masses of tonsil or soft palate b/l. MMM [de-identified] : Healed PEG site [de-identified] : Mixed hyper/hypopigmentation of the anterior neck in region of healed dermatitis.

## 2024-05-17 NOTE — HISTORY OF PRESENT ILLNESS
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 Planned for Concurrent chemoRadiation with 70 Gy, but had prolonged admission s/p fx 30/35 and given excellent clinical response, had treatment terminated. He completed 60Gy out of planned 70Gy Radiation therapy on 12/16/2022.   5/10/24: RPA Mr. Sierra reports he is feeling well overall. He denies fatigue. He continues to have an alteration in taste but reports he is eating and gaining weight. No pain, dysphagia, new masses. Good neck mobility, no swelling. No cold sensitivity   10/3/23 CT A/P Diffuse ductal dilatation/multiple cysts in atrophic pancreas without significant change; limited evaluation without intravenous contrast  3/27/24: CT neck soft tissue : Posttreatment changes are evident without evidence of residual or recurrent oropharyngeal neoplasm. Marked interval decrease in size of a previously seen large and necrotic right-sided level 2 lymph node seen on the original neck CT study from 8/18/2022. No new or enlarging cervical lymph nodes. Findings are compatible with a favorable treatment response when compared with the original neck CT study dated 8/18/2022. Overall findings are stable in comparison with the prior whole body PET/CT study from 5/15/2023. CT Chest 3/27/24: No intrathoracic metastases.

## 2024-05-17 NOTE — PROCEDURE
[Gag Reflex] : gag reflex preventing mirror examination [Topical Lidocaine] : topical lidocaine [Flexible Endoscope] : examined with the flexible endoscope [Normal] : the true vocal cords ~T were normal [de-identified] : Post-RT changes

## 2024-05-31 NOTE — H&P PST ADULT - NEGATIVE RESPIRATORY AND THORAX SYMPTOMS
----- Message from Willie Caruso MD sent at 5/27/2024  9:11 AM CDT -----  Please notify patient unremarkable CT scan follow up only as needed.  
Noted and sent message with results to pt via [employer].  
no wheezing/no dyspnea/no cough/no hemoptysis/no pleuritic chest pain

## 2024-06-10 LAB
ALBUMIN SERPL ELPH-MCNC: 4.2 G/DL
ALP BLD-CCNC: 126 U/L
ALT SERPL-CCNC: 14 U/L
ANION GAP SERPL CALC-SCNC: 13 MMOL/L
AST SERPL-CCNC: 19 U/L
BILIRUB SERPL-MCNC: 0.3 MG/DL
BUN SERPL-MCNC: 15 MG/DL
CALCIUM SERPL-MCNC: 9.1 MG/DL
CHLORIDE SERPL-SCNC: 103 MMOL/L
CHOLEST SERPL-MCNC: 236 MG/DL
CO2 SERPL-SCNC: 22 MMOL/L
CREAT SERPL-MCNC: 1.2 MG/DL
CREAT SPEC-SCNC: 206 MG/DL
EGFR: 63 ML/MIN/1.73M2
ESTIMATED AVERAGE GLUCOSE: 166 MG/DL
GLUCOSE SERPL-MCNC: 136 MG/DL
HBA1C MFR BLD HPLC: 7.4 %
HCT VFR BLD CALC: 43.5 %
HDLC SERPL-MCNC: 51 MG/DL
HGB BLD-MCNC: 13.7 G/DL
LDLC SERPL CALC-MCNC: 163 MG/DL
MCHC RBC-ENTMCNC: 27.9 PG
MCHC RBC-ENTMCNC: 31.5 GM/DL
MCV RBC AUTO: 88.6 FL
MICROALBUMIN 24H UR DL<=1MG/L-MCNC: 7.8 MG/DL
MICROALBUMIN/CREAT 24H UR-RTO: 38 MG/G
NONHDLC SERPL-MCNC: 185 MG/DL
PLATELET # BLD AUTO: 240 K/UL
POTASSIUM SERPL-SCNC: 3.7 MMOL/L
PROT SERPL-MCNC: 7.2 G/DL
RBC # BLD: 4.91 M/UL
RBC # FLD: 13.6 %
SODIUM SERPL-SCNC: 139 MMOL/L
TRIGL SERPL-MCNC: 122 MG/DL
WBC # FLD AUTO: 4.23 K/UL

## 2024-07-16 ENCOUNTER — APPOINTMENT (OUTPATIENT)
Dept: OTOLARYNGOLOGY | Facility: CLINIC | Age: 79
End: 2024-07-16

## 2024-07-16 ENCOUNTER — APPOINTMENT (OUTPATIENT)
Dept: INTERNAL MEDICINE | Facility: CLINIC | Age: 79
End: 2024-07-16
Payer: MEDICARE

## 2024-07-16 VITALS
HEIGHT: 69 IN | SYSTOLIC BLOOD PRESSURE: 144 MMHG | RESPIRATION RATE: 16 BRPM | DIASTOLIC BLOOD PRESSURE: 91 MMHG | WEIGHT: 156 LBS | BODY MASS INDEX: 23.11 KG/M2 | OXYGEN SATURATION: 97 % | TEMPERATURE: 98.1 F | HEART RATE: 86 BPM

## 2024-07-16 DIAGNOSIS — I67.1 CEREBRAL ANEURYSM, NONRUPTURED: ICD-10-CM

## 2024-07-16 DIAGNOSIS — C10.9 MALIGNANT NEOPLASM OF OROPHARYNX, UNSPECIFIED: ICD-10-CM

## 2024-07-16 DIAGNOSIS — D49.0 NEOPLASM OF UNSPECIFIED BEHAVIOR OF DIGESTIVE SYSTEM: ICD-10-CM

## 2024-07-16 DIAGNOSIS — Z00.00 ENCOUNTER FOR GENERAL ADULT MEDICAL EXAMINATION W/OUT ABNORMAL FINDINGS: ICD-10-CM

## 2024-07-16 DIAGNOSIS — E11.9 TYPE 2 DIABETES MELLITUS W/OUT COMPLICATIONS: ICD-10-CM

## 2024-07-16 DIAGNOSIS — Z86.79 PERSONAL HISTORY OF OTHER DISEASES OF THE CIRCULATORY SYSTEM: ICD-10-CM

## 2024-07-16 LAB — PSA SERPL-MCNC: 16.4 NG/ML

## 2024-07-16 PROCEDURE — 99214 OFFICE O/P EST MOD 30 MIN: CPT

## 2024-07-16 PROCEDURE — G2211 COMPLEX E/M VISIT ADD ON: CPT

## 2024-07-17 LAB
ALBUMIN SERPL ELPH-MCNC: 4.2 G/DL
ALP BLD-CCNC: 109 U/L
ALT SERPL-CCNC: 17 U/L
ANION GAP SERPL CALC-SCNC: 12 MMOL/L
AST SERPL-CCNC: 22 U/L
BILIRUB SERPL-MCNC: 0.5 MG/DL
BUN SERPL-MCNC: 13 MG/DL
CALCIUM SERPL-MCNC: 8.9 MG/DL
CHLORIDE SERPL-SCNC: 103 MMOL/L
CHOLEST SERPL-MCNC: 217 MG/DL
CO2 SERPL-SCNC: 24 MMOL/L
CREAT SERPL-MCNC: 1.25 MG/DL
CREAT SPEC-SCNC: 350 MG/DL
EGFR: 60 ML/MIN/1.73M2
ESTIMATED AVERAGE GLUCOSE: 189 MG/DL
GLUCOSE SERPL-MCNC: 171 MG/DL
HBA1C MFR BLD HPLC: 8.2 %
HCT VFR BLD CALC: 43 %
HDLC SERPL-MCNC: 47 MG/DL
HGB BLD-MCNC: 13.5 G/DL
LDLC SERPL CALC-MCNC: 145 MG/DL
MCHC RBC-ENTMCNC: 27.2 PG
MCHC RBC-ENTMCNC: 31.4 GM/DL
MCV RBC AUTO: 86.5 FL
MICROALBUMIN 24H UR DL<=1MG/L-MCNC: 15.6 MG/DL
MICROALBUMIN/CREAT 24H UR-RTO: 45 MG/G
NONHDLC SERPL-MCNC: 170 MG/DL
PLATELET # BLD AUTO: 179 K/UL
POTASSIUM SERPL-SCNC: 4.3 MMOL/L
PROT SERPL-MCNC: 7.2 G/DL
RBC # BLD: 4.97 M/UL
RBC # FLD: 14.8 %
SODIUM SERPL-SCNC: 140 MMOL/L
TRIGL SERPL-MCNC: 139 MG/DL
VIT B12 SERPL-MCNC: 600 PG/ML
WBC # FLD AUTO: 5 K/UL

## 2024-07-18 ENCOUNTER — NON-APPOINTMENT (OUTPATIENT)
Age: 79
End: 2024-07-18

## 2024-08-06 PROBLEM — R97.20 ELEVATED PSA: Status: ACTIVE | Noted: 2024-08-06

## 2024-08-06 RX ORDER — ATORVASTATIN CALCIUM 20 MG/1
20 TABLET, FILM COATED ORAL
Qty: 90 | Refills: 2 | Status: ACTIVE | COMMUNITY
Start: 2024-08-06 | End: 1900-01-01

## 2024-08-06 RX ORDER — GLIMEPIRIDE 1 MG/1
1 TABLET ORAL DAILY
Qty: 90 | Refills: 1 | Status: ACTIVE | COMMUNITY
Start: 2024-08-06 | End: 1900-01-01

## 2024-08-16 ENCOUNTER — APPOINTMENT (OUTPATIENT)
Dept: UROLOGY | Facility: CLINIC | Age: 79
End: 2024-08-16

## 2024-08-22 NOTE — PROGRESS NOTE ADULT - PROBLEM SELECTOR PROBLEM 4
Physical Therapy Evaluation    Visit Type: Initial Evaluation  Visit: 1  Referring Provider: RADHA Rios  Medical Diagnosis (from order): M54.2 - Cervicalgia   Treatment Diagnosis: cervical, thoracic - increased pain/symptoms, impaired posture, impaired range of motion, impaired activity tolerance, impaired mobility and impaired body mechanics.  Chart reviewed at time of initial evaluation (relevant co-morbidities, allergies, tests and medications listed):   Past Medical History:  9/14: CVA (cerebral infarction)      Comment:  right frontal - arm weakness only  No date: Diabetes mellitus  (CMD)  No date: HTN (hypertension)  No date: Osteopenia  No date: PVD (peripheral vascular disease) (CMD)    Cervical xray:  Mild anterolisthesis of C4 on C5   Mild-moderate intervertebral disc height loss and uncovertebral arthropathy  of C5-C7. Multilevel facet arthropathy worst within the mid and upper  cervical spine and most pronounced at C4-5 on the left.             SUBJECTIVE                                                                                                               HX: at least one year history of increasing pain, I have been getting injections for my low back pain which are helpful.    Pain / Symptoms  - Pain/symptom is: intermittent  - Pain rating (out of 10): Current: 4 ; Best: 2; Worst: 5  - Location: Braline pain up to base of neck and into thoracic region. Radiations into L hand when in bed? CT brace is slightly helpful. Occassional R neck pain  - Alleviating Factors: over-the-counter medication  - Progression since onset: worsening    Function:   Limitations / Exacerbation Factors:   - Patient reports pain with function reported below.  - Standing to cut food for prep  Pain with cleaning the tube  Prolonged sitting and computer work  Prior Level of Function: declining function, therefore referred to therapy,    Patient Goals: decreased pain.    Prior treatment  - no therapies  - Discharged 
from hospital, home health, or skilled nursing facility in last 30 days: no  Home Environment   - Patient lives with: significant other  - Type of home: multiple level home  - Assistance available: consistent  - Denies 2 or more falls or an unexplained fall with injury in the last year.  - Feel safe at home / work / school: yes      OBJECTIVE                                                                                                                                     Outcome/Assessments  Outcome Measures:   Neck Disability Index (NDI): Neck Disability Index Score: 20  NDI Total Possible Score: 50  NDI Score Calculated: 40 %  (scored 0-100, higher score indicates higher disability) see flowsheet for additional documentation         Posture/Observation: flexed posture, derrek shoulder elevated      Cervical Range of Motion (% of normal)  Date Initial    Flexion (80-90)    Extension (70) 70%   Lateral Flexion Left (20-45) 90%   Lateral Flexion Right (20-45)    Rotation Left (70-90)  80%   Rotation Right (70-90)     standard testing positions unless otherwise noted; ranges are reported in active range of motion norms included in ();  p=pain  All motions within functional/normal limits except as noted.    Strength (out of 5)   Left Right   Date Initial Initial   Deltoids (C5)     Shoulder External Rotators (C6/7)  4-   Shoulder Internal Rotators (C6/7)     Biceps (C6)     Triceps (C7)     Wrist Extensors (C6)      Wrist Extensors (C7)     Finger Intrinsics (T1)          standard testing positions unless otherwise noted; nerve root level included in ( ); * =pain  Gross muscle strength within functional/normal limits except as noted.    Deep Tendon Reflex:  Assessment of biceps and triceps completed; only deficits reported:  Biceps (C5): wnl  Triceps (C7): wnl    Dermatome:  Light touch within normal limits bilaterally    Palpation: non specific tenderness to thoracic region and R scapular region    Treatment   
  Therapeutic Exercise  See HEP    Skilled input: verbal instruction/cues and tactile instruction/cues    Writer verbally educated and received verbal consent for hand placement, positioning of patient, and techniques to be performed today from patient for hand placement and palpation for techniques as described above and how they are pertinent to the patient's plan of care.  Home Exercise Program  Access Code: [email or web address]  URL: [email or web address]  Date: 08/26/2024  Prepared by: Chi Shaffer    Exercises  - scapular retraction  - 2 x daily - 10 reps    Standing calf stretch 30 sec x 3      ASSESSMENT                                                                                                          75 year old patient has reported functional limitations listed above impacted by signs and symptoms consistent with treatment diagnosis below.  Treatment Diagnosis:   - Involved: cervical, thoracic.  - Symptoms/impairments: increased pain/symptoms, impaired posture, impaired range of motion, impaired activity tolerance, impaired mobility and impaired body mechanics.    Suspect lower cervical / upper thoracic involvement with postural contributions  Pain/symptoms after session (out of 10): 4    Prognosis: patient will benefit from skilled therapy  Rehabilitative: good.  Predicted patient presentation: Low (stable) - Patient comorbidities and complexities, as defined above, will have little effect on progress for prescribed plan of care.  Education:   - Present and ready to learn: patient  - Results of above outlined education: Verbalizes understanding, Demonstrates understanding and Needs reinforcement    PLAN                                                                                                                         The following skilled interventions to be implemented to achieve goals listed below:  Manual Therapy (82766)  Neuromuscular Re-Education (76209)  Therapeutic Activity 
(84482)  Therapeutic Exercise (18735)  Electrical Stimulation Unattended (27542 or )  Ultrasound (41629)    Frequency / Duration  2 times per week tapering as patient progresses for 5 weeks for an estimated total of 10 visits    Patient involved in and agreed to plan of care and goals.  Patient offered attendance policy at [facility].    Suggestions for next session as indicated: Progress per plan of care, manual assessment of cervical region, trial of modalities for pain relief, MT prn, scapular stabilization program, further assess R shoulder for possible cuff involvement    Goals  Long Term Goals: to be met by end of plan of care  1. Patient will report pain to become intermittent, not greater than 3/10  2. Patient will be able to perform food prep for 20 mins with pain not greater than 3/10  3. Patient will be able to work on the computer for 30 mins with pain not greater than 3/10  4. Patient will be independent with progressed and modified home exercise program.  5. Neck Disability Index: Patient will score 28% or lower on The Neck Disability Index to indicate a decreased level of impairment related to neck or arm symptoms. (minimal clinically important difference: 12% of calculated score)      Therapy procedure time and total treatment time can be found documented on the Time Entry flowsheet    
Prophylactic measure

## 2024-09-04 ENCOUNTER — APPOINTMENT (OUTPATIENT)
Dept: CARE COORDINATION | Facility: HOME HEALTH | Age: 79
End: 2024-09-04
Payer: MEDICARE

## 2024-09-04 VITALS — WEIGHT: 155 LBS | BODY MASS INDEX: 22.96 KG/M2 | HEIGHT: 69 IN

## 2024-09-04 DIAGNOSIS — K21.9 GASTRO-ESOPHAGEAL REFLUX DISEASE W/OUT ESOPHAGITIS: ICD-10-CM

## 2024-09-04 DIAGNOSIS — Z85.819 PERSONAL HISTORY OF MALIGNANT NEOPLASM OF UNSPECIFIED SITE OF LIP, ORAL CAVITY, AND PHARYNX: ICD-10-CM

## 2024-09-04 DIAGNOSIS — E11.65 TYPE 2 DIABETES MELLITUS WITH HYPERGLYCEMIA: ICD-10-CM

## 2024-09-04 DIAGNOSIS — N40.0 BENIGN PROSTATIC HYPERPLASIA WITHOUT LOWER URINARY TRACT SYMPMS: ICD-10-CM

## 2024-09-04 PROCEDURE — 99350 HOME/RES VST EST HIGH MDM 60: CPT | Mod: 25,95

## 2024-09-04 PROCEDURE — G0444 DEPRESSION SCREEN ANNUAL: CPT | Mod: 93

## 2024-09-04 RX ORDER — PANTOPRAZOLE SODIUM 40 MG/1
40 TABLET, DELAYED RELEASE ORAL
Refills: 0 | Status: ACTIVE | COMMUNITY

## 2024-09-04 NOTE — PHYSICAL EXAM
[Normal Sclera/Conjunctiva] : normal sclera/conjunctiva [Normal Outer Ear/Nose] : the outer ears and nose were normal in appearance [Supple] : supple [No Respiratory Distress] : no respiratory distress  [No Accessory Muscle Use] : no accessory muscle use [Soft] : abdomen soft [Non Tender] : non-tender [Non-distended] : non-distended [No Masses] : no abdominal mass palpated [Speech Grossly Normal] : speech grossly normal [Memory Grossly Normal] : memory grossly normal [Alert and Oriented x3] : oriented to person, place, and time [Normal Mood] : the mood was normal [Normal] : affect was normal and insight and judgment were intact

## 2024-09-04 NOTE — COUNSELING
[Fall prevention counseling provided] : Fall prevention counseling provided [Adequate lighting] : Adequate lighting [No throw rugs] : No throw rugs [Use proper foot wear] : Use proper foot wear [Use recommended devices] : Use recommended devices [Behavioral health counseling provided] : Behavioral health counseling provided [Sleep ___ hours/day] : Sleep [unfilled] hours/day [Engage in a relaxing activity] : Engage in a relaxing activity [Plan in advance] : Plan in advance [No] : Risk of tobacco use and health benefits of smoking cessation discussed: No [Potential consequences of obesity discussed] : Potential consequences of obesity discussed [Benefits of weight loss discussed] : Benefits of weight loss discussed [Encouraged to maintain food diary] : Encouraged to maintain food diary [Encouraged to increase physical activity] : Encouraged to increase physical activity [Encouraged to use exercise tracking device] : Encouraged to use exercise tracking device [Weigh Self Weekly] : weigh self weekly [Decrease Portions] : decrease portions [Keep Food Diary] : keep food diary

## 2024-09-05 NOTE — HEALTH RISK ASSESSMENT
[No] : No [No falls in past year] : Patient reported no falls in the past year [Little interest or pleasure doing things] : 1) Little interest or pleasure doing things [Feeling down, depressed, or hopeless] : 2) Feeling down, depressed, or hopeless [0] : 2) Feeling down, depressed, or hopeless: Not at all (0) [PHQ-2 Negative - No further assessment needed] : PHQ-2 Negative - No further assessment needed [I have developed a follow-up plan documented below in the note.] : I have developed a follow-up plan documented below in the note. [Time Spent: ___ Minutes] : I spent [unfilled] minutes performing a depression screening for this patient. [With Patient/Caregiver] : , with patient/caregiver [Designated Healthcare Proxy] : Designated healthcare proxy [Name: ___] : Health Care Proxy's Name: [unfilled]  [Relationship: ___] : Relationship: [unfilled] [I will adhere to the patient's wishes.] : I will adhere to the patient's wishes. [Time Spent: ___ minutes] : Time Spent: [unfilled] minutes [Never] : Never [Audit-CScore] : 0 [[id number]] : 0 [AdvancecareDate] : 09/24 [FreeTextEntry4] : hcp completed, content not disclosed.

## 2024-09-05 NOTE — HEALTH RISK ASSESSMENT
[No] : No [No falls in past year] : Patient reported no falls in the past year [Little interest or pleasure doing things] : 1) Little interest or pleasure doing things [Feeling down, depressed, or hopeless] : 2) Feeling down, depressed, or hopeless [0] : 2) Feeling down, depressed, or hopeless: Not at all (0) [PHQ-2 Negative - No further assessment needed] : PHQ-2 Negative - No further assessment needed [I have developed a follow-up plan documented below in the note.] : I have developed a follow-up plan documented below in the note. [Time Spent: ___ Minutes] : I spent [unfilled] minutes performing a depression screening for this patient. [With Patient/Caregiver] : , with patient/caregiver [Designated Healthcare Proxy] : Designated healthcare proxy [Name: ___] : Health Care Proxy's Name: [unfilled]  [Relationship: ___] : Relationship: [unfilled] [I will adhere to the patient's wishes.] : I will adhere to the patient's wishes. [Time Spent: ___ minutes] : Time Spent: [unfilled] minutes [Never] : Never [Audit-CScore] : 0 [[id number]] : 0 [AdvancecareDate] : 09/24 [FreeTextEntry4] : hcp completed, content not disclosed.

## 2024-09-05 NOTE — PLAN
[FreeTextEntry1] : Patient seen in home, enforced w/ pt the need for daily weight/bp monitoring/blood glucose monitoring, low salt diet and educated pt to avoid processed/canned food and limit take out, increase vegetable/fiber and fruit intake (portion control).  Daily exercise w/ easy to reach realistic goals.  Continue w/ current regimen and medication as ordered.  Adhere to follow up w/ pcp/endo/opth/nephro/dpm.  Pt advised to call with any questions/concerns.    Total time spent w/ patient 60mins.  Depression screening time spent >5 mins.

## 2024-09-05 NOTE — HISTORY OF PRESENT ILLNESS
[Other: _____] : [unfilled] [FreeTextEntry1] : This visit was provided via telehealth using real-time 2-way audio visual technology. The patient, HILLARY DREW was located at home, [address] [place] [address], at the time of the visit.   The provider, Chavo DALY, was located at [facility] at the time of the visit.  The patient, HILLARY DREW and provider participated in the telehealth encounter.  Verbal consent for telehealth services was given at time prior to the start of visit.  [de-identified] : HFI. This is HILLARY DREW 79 year English, M, presented for virtual visit as stated above.  Recently reported vitals in flow sheet. Medication reconciliation performed.  The patient reported h/o: head and neck cancer s/p XRT, pancreatic IPMN, DM2, HTN, HLD, BPH, and GERD. recent labs 7/16/24 A1C 8.2, glu 171, bun13, cret 1.25, eGFR 60 BMI: 22.89, PCP: Valeria Moran HILLARY DREW, is seen via telecommunication as stated above, appears pleasant and in nad.   AAO (see PE). Patient denies any feeling of depression, and HI/SI. Patient denies any f/c, sob, cp, dizziness, lightheadedness, abnormal bruising/bleeding, n/v/d, or abdominal pain.

## 2024-09-05 NOTE — HISTORY OF PRESENT ILLNESS
[Other: _____] : [unfilled] [FreeTextEntry1] : This visit was provided via telehealth using real-time 2-way audio visual technology. The patient, HILLARY DREW was located at home, [address] [place] [address], at the time of the visit.   The provider, Chavo DALY, was located at [facility] at the time of the visit.  The patient, HILLARY DREW and provider participated in the telehealth encounter.  Verbal consent for telehealth services was given at time prior to the start of visit.  [de-identified] : HFI. This is HILLARY DREW 79 year English, M, presented for virtual visit as stated above.  Recently reported vitals in flow sheet. Medication reconciliation performed.  The patient reported h/o: head and neck cancer s/p XRT, pancreatic IPMN, DM2, HTN, HLD, BPH, and GERD. recent labs 7/16/24 A1C 8.2, glu 171, bun13, cret 1.25, eGFR 60 BMI: 22.89, PCP: Valeria Moran HILLARY DREW, is seen via telecommunication as stated above, appears pleasant and in nad.   AAO (see PE). Patient denies any feeling of depression, and HI/SI. Patient denies any f/c, sob, cp, dizziness, lightheadedness, abnormal bruising/bleeding, n/v/d, or abdominal pain.

## 2024-10-09 ENCOUNTER — APPOINTMENT (OUTPATIENT)
Dept: UROLOGY | Facility: CLINIC | Age: 79
End: 2024-10-09
Payer: MEDICARE

## 2024-10-09 VITALS
DIASTOLIC BLOOD PRESSURE: 85 MMHG | TEMPERATURE: 96.4 F | OXYGEN SATURATION: 99 % | HEART RATE: 80 BPM | SYSTOLIC BLOOD PRESSURE: 156 MMHG

## 2024-10-09 DIAGNOSIS — R97.20 ELEVATED PROSTATE, SPECIFIC ANTIGEN [PSA]: ICD-10-CM

## 2024-10-09 PROCEDURE — 99214 OFFICE O/P EST MOD 30 MIN: CPT

## 2024-10-09 PROCEDURE — G2211 COMPLEX E/M VISIT ADD ON: CPT

## 2024-10-09 RX ORDER — DIAZEPAM 5 MG/1
5 TABLET ORAL
Qty: 1 | Refills: 0 | Status: ACTIVE | COMMUNITY
Start: 2024-10-09 | End: 1900-01-01

## 2024-10-17 ENCOUNTER — APPOINTMENT (OUTPATIENT)
Dept: MRI IMAGING | Facility: CLINIC | Age: 79
End: 2024-10-17

## 2024-10-17 ENCOUNTER — RESULT REVIEW (OUTPATIENT)
Age: 79
End: 2024-10-17

## 2024-10-17 PROCEDURE — 76498P: CUSTOM

## 2024-10-17 PROCEDURE — A9585: CPT

## 2024-10-17 PROCEDURE — 72197 MRI PELVIS W/O & W/DYE: CPT

## 2024-10-18 ENCOUNTER — APPOINTMENT (OUTPATIENT)
Dept: CARE COORDINATION | Facility: HOME HEALTH | Age: 79
End: 2024-10-18

## 2024-10-18 DIAGNOSIS — C10.9 MALIGNANT NEOPLASM OF OROPHARYNX, UNSPECIFIED: ICD-10-CM

## 2024-10-18 DIAGNOSIS — Z87.448 PERSONAL HISTORY OF OTHER DISEASES OF URINARY SYSTEM: ICD-10-CM

## 2024-10-18 DIAGNOSIS — E11.9 TYPE 2 DIABETES MELLITUS W/OUT COMPLICATIONS: ICD-10-CM

## 2024-10-18 DIAGNOSIS — Z43.1 ENCOUNTER FOR ATTENTION TO GASTROSTOMY: ICD-10-CM

## 2024-10-18 PROCEDURE — 99347 HOME/RES VST EST SF MDM 20: CPT | Mod: 95

## 2024-10-29 ENCOUNTER — APPOINTMENT (OUTPATIENT)
Dept: OTOLARYNGOLOGY | Facility: CLINIC | Age: 79
End: 2024-10-29
Payer: MEDICARE

## 2024-10-29 VITALS
HEART RATE: 99 BPM | SYSTOLIC BLOOD PRESSURE: 156 MMHG | WEIGHT: 155 LBS | DIASTOLIC BLOOD PRESSURE: 104 MMHG | BODY MASS INDEX: 22.96 KG/M2 | HEIGHT: 69 IN

## 2024-10-29 DIAGNOSIS — C10.9 MALIGNANT NEOPLASM OF OROPHARYNX, UNSPECIFIED: ICD-10-CM

## 2024-10-29 PROCEDURE — 31575 DIAGNOSTIC LARYNGOSCOPY: CPT

## 2024-10-29 PROCEDURE — 99212 OFFICE O/P EST SF 10 MIN: CPT | Mod: 25

## 2024-10-30 ENCOUNTER — APPOINTMENT (OUTPATIENT)
Dept: UROLOGY | Facility: CLINIC | Age: 79
End: 2024-10-30
Payer: MEDICARE

## 2024-10-30 VITALS
OXYGEN SATURATION: 100 % | SYSTOLIC BLOOD PRESSURE: 144 MMHG | WEIGHT: 155 LBS | TEMPERATURE: 97.5 F | BODY MASS INDEX: 22.96 KG/M2 | HEART RATE: 97 BPM | DIASTOLIC BLOOD PRESSURE: 90 MMHG | HEIGHT: 69 IN

## 2024-10-30 DIAGNOSIS — N13.8 BENIGN PROSTATIC HYPERPLASIA WITH LOWER URINARY TRACT SYMPMS: ICD-10-CM

## 2024-10-30 DIAGNOSIS — N40.1 BENIGN PROSTATIC HYPERPLASIA WITH LOWER URINARY TRACT SYMPMS: ICD-10-CM

## 2024-10-30 DIAGNOSIS — R97.20 ELEVATED PROSTATE, SPECIFIC ANTIGEN [PSA]: ICD-10-CM

## 2024-10-30 DIAGNOSIS — R93.89 ABNORMAL FINDINGS ON DIAGNOSTIC IMAGING OF OTHER SPECIFIED BODY STRUCTURES: ICD-10-CM

## 2024-10-30 PROCEDURE — G2211 COMPLEX E/M VISIT ADD ON: CPT

## 2024-10-30 PROCEDURE — 99214 OFFICE O/P EST MOD 30 MIN: CPT

## 2024-11-12 LAB
APPEARANCE: CLEAR
BACTERIA UR CULT: ABNORMAL
BACTERIA: NEGATIVE /HPF
BILIRUBIN URINE: NEGATIVE
BLOOD URINE: NEGATIVE
CAST: 0 /LPF
COLOR: YELLOW
EPITHELIAL CELLS: 1 /HPF
GLUCOSE QUALITATIVE U: 250 MG/DL
KETONES URINE: NEGATIVE MG/DL
LEUKOCYTE ESTERASE URINE: ABNORMAL
MICROSCOPIC-UA: NORMAL
NITRITE URINE: POSITIVE
PH URINE: 6.5
PROTEIN URINE: 30 MG/DL
RED BLOOD CELLS URINE: 0 /HPF
REVIEW: NORMAL
SPECIFIC GRAVITY URINE: 1.02
UROBILINOGEN URINE: 0.2 MG/DL
WHITE BLOOD CELLS URINE: 26 /HPF

## 2024-11-15 ENCOUNTER — APPOINTMENT (OUTPATIENT)
Dept: RADIATION ONCOLOGY | Facility: CLINIC | Age: 79
End: 2024-11-15
Payer: MEDICARE

## 2024-11-15 VITALS
TEMPERATURE: 97.7 F | OXYGEN SATURATION: 100 % | RESPIRATION RATE: 16 BRPM | SYSTOLIC BLOOD PRESSURE: 143 MMHG | DIASTOLIC BLOOD PRESSURE: 81 MMHG | HEART RATE: 85 BPM

## 2024-11-15 PROCEDURE — G2211 COMPLEX E/M VISIT ADD ON: CPT

## 2024-11-15 PROCEDURE — 99215 OFFICE O/P EST HI 40 MIN: CPT

## 2024-11-18 ENCOUNTER — APPOINTMENT (OUTPATIENT)
Dept: INTERNAL MEDICINE | Facility: CLINIC | Age: 79
End: 2024-11-18
Payer: MEDICARE

## 2024-11-18 VITALS
TEMPERATURE: 98.1 F | DIASTOLIC BLOOD PRESSURE: 92 MMHG | WEIGHT: 159 LBS | OXYGEN SATURATION: 100 % | HEART RATE: 71 BPM | HEIGHT: 69 IN | RESPIRATION RATE: 16 BRPM | BODY MASS INDEX: 23.55 KG/M2 | SYSTOLIC BLOOD PRESSURE: 156 MMHG

## 2024-11-18 DIAGNOSIS — I10 ESSENTIAL (PRIMARY) HYPERTENSION: ICD-10-CM

## 2024-11-18 DIAGNOSIS — E11.9 TYPE 2 DIABETES MELLITUS W/OUT COMPLICATIONS: ICD-10-CM

## 2024-11-18 DIAGNOSIS — N39.0 URINARY TRACT INFECTION, SITE NOT SPECIFIED: ICD-10-CM

## 2024-11-18 DIAGNOSIS — R97.20 ELEVATED PROSTATE, SPECIFIC ANTIGEN [PSA]: ICD-10-CM

## 2024-11-18 DIAGNOSIS — Z85.819 PERSONAL HISTORY OF MALIGNANT NEOPLASM OF UNSPECIFIED SITE OF LIP, ORAL CAVITY, AND PHARYNX: ICD-10-CM

## 2024-11-18 PROCEDURE — 90662 IIV NO PRSV INCREASED AG IM: CPT

## 2024-11-18 PROCEDURE — G0008: CPT

## 2024-11-18 PROCEDURE — 99214 OFFICE O/P EST MOD 30 MIN: CPT | Mod: 25

## 2024-11-18 RX ORDER — OLMESARTAN MEDOXOMIL 20 MG/1
20 TABLET, FILM COATED ORAL
Qty: 90 | Refills: 1 | Status: ACTIVE | COMMUNITY
Start: 2024-11-18 | End: 1900-01-01

## 2024-11-18 RX ORDER — NITROFURANTOIN (MONOHYDRATE/MACROCRYSTALS) 25; 75 MG/1; MG/1
100 CAPSULE ORAL
Qty: 28 | Refills: 0 | Status: ACTIVE | COMMUNITY
Start: 2024-11-18 | End: 1900-01-01

## 2024-11-19 LAB
ALBUMIN SERPL ELPH-MCNC: 4.2 G/DL
ALP BLD-CCNC: 141 U/L
ALT SERPL-CCNC: 35 U/L
ANION GAP SERPL CALC-SCNC: 10 MMOL/L
AST SERPL-CCNC: 29 U/L
BILIRUB SERPL-MCNC: 0.3 MG/DL
BUN SERPL-MCNC: 23 MG/DL
CALCIUM SERPL-MCNC: 9.5 MG/DL
CHLORIDE SERPL-SCNC: 103 MMOL/L
CHOLEST SERPL-MCNC: 153 MG/DL
CO2 SERPL-SCNC: 27 MMOL/L
CREAT SERPL-MCNC: 1.23 MG/DL
CREAT SPEC-SCNC: 191 MG/DL
EGFR: 60 ML/MIN/1.73M2
ESTIMATED AVERAGE GLUCOSE: 163 MG/DL
GLUCOSE SERPL-MCNC: 153 MG/DL
HBA1C MFR BLD HPLC: 7.3 %
HCT VFR BLD CALC: 45.1 %
HDLC SERPL-MCNC: 55 MG/DL
HGB BLD-MCNC: 13.6 G/DL
LDLC SERPL CALC-MCNC: 72 MG/DL
MCHC RBC-ENTMCNC: 27.6 PG
MCHC RBC-ENTMCNC: 30.2 G/DL
MCV RBC AUTO: 91.5 FL
MICROALBUMIN 24H UR DL<=1MG/L-MCNC: 8.2 MG/DL
MICROALBUMIN/CREAT 24H UR-RTO: 43 MG/G
NONHDLC SERPL-MCNC: 97 MG/DL
PLATELET # BLD AUTO: 265 K/UL
POTASSIUM SERPL-SCNC: 4.3 MMOL/L
PROT SERPL-MCNC: 7.3 G/DL
RBC # BLD: 4.93 M/UL
RBC # FLD: 15.1 %
SODIUM SERPL-SCNC: 140 MMOL/L
TRIGL SERPL-MCNC: 148 MG/DL
WBC # FLD AUTO: 4.84 K/UL

## 2024-12-10 ENCOUNTER — APPOINTMENT (OUTPATIENT)
Dept: CARE COORDINATION | Facility: HOME HEALTH | Age: 79
End: 2024-12-10

## 2025-01-23 ENCOUNTER — RX RENEWAL (OUTPATIENT)
Age: 80
End: 2025-01-23

## 2025-01-30 NOTE — PACU DISCHARGE NOTE - HYDRATION STATUS:
Satisfactory Is This A New Presentation, Or A Follow-Up?: Warts How Severe Are Your Warts?: mild Treatment Number (Optional): 1

## 2025-02-05 ENCOUNTER — APPOINTMENT (OUTPATIENT)
Dept: CT IMAGING | Facility: CLINIC | Age: 80
End: 2025-02-05
Payer: MEDICARE

## 2025-02-05 PROCEDURE — 71260 CT THORAX DX C+: CPT

## 2025-02-18 ENCOUNTER — APPOINTMENT (OUTPATIENT)
Dept: OTOLARYNGOLOGY | Facility: CLINIC | Age: 80
End: 2025-02-18
Payer: MEDICARE

## 2025-02-18 VITALS
BODY MASS INDEX: 25.34 KG/M2 | DIASTOLIC BLOOD PRESSURE: 78 MMHG | SYSTOLIC BLOOD PRESSURE: 134 MMHG | WEIGHT: 177 LBS | HEART RATE: 84 BPM | OXYGEN SATURATION: 98 % | HEIGHT: 70 IN

## 2025-02-18 DIAGNOSIS — C10.9 MALIGNANT NEOPLASM OF OROPHARYNX, UNSPECIFIED: ICD-10-CM

## 2025-02-18 PROCEDURE — 31575 DIAGNOSTIC LARYNGOSCOPY: CPT

## 2025-02-18 PROCEDURE — 99212 OFFICE O/P EST SF 10 MIN: CPT | Mod: 25

## 2025-03-12 ENCOUNTER — NON-APPOINTMENT (OUTPATIENT)
Age: 80
End: 2025-03-12

## 2025-03-14 ENCOUNTER — APPOINTMENT (OUTPATIENT)
Dept: UROLOGY | Facility: CLINIC | Age: 80
End: 2025-03-14
Payer: MEDICARE

## 2025-03-14 ENCOUNTER — APPOINTMENT (OUTPATIENT)
Dept: UROLOGY | Facility: CLINIC | Age: 80
End: 2025-03-14

## 2025-03-14 ENCOUNTER — OUTPATIENT (OUTPATIENT)
Dept: OUTPATIENT SERVICES | Facility: HOSPITAL | Age: 80
LOS: 1 days | End: 2025-03-14
Payer: COMMERCIAL

## 2025-03-14 VITALS
BODY MASS INDEX: 25.34 KG/M2 | WEIGHT: 177 LBS | SYSTOLIC BLOOD PRESSURE: 130 MMHG | OXYGEN SATURATION: 98 % | DIASTOLIC BLOOD PRESSURE: 60 MMHG | HEIGHT: 70 IN | TEMPERATURE: 96.8 F | HEART RATE: 87 BPM

## 2025-03-14 DIAGNOSIS — Z93.1 GASTROSTOMY STATUS: Chronic | ICD-10-CM

## 2025-03-14 DIAGNOSIS — Z00.8 ENCOUNTER FOR OTHER GENERAL EXAMINATION: ICD-10-CM

## 2025-03-14 PROCEDURE — 99214 OFFICE O/P EST MOD 30 MIN: CPT

## 2025-03-14 PROCEDURE — G2211 COMPLEX E/M VISIT ADD ON: CPT

## 2025-03-17 ENCOUNTER — APPOINTMENT (OUTPATIENT)
Dept: UROLOGY | Facility: CLINIC | Age: 80
End: 2025-03-17
Payer: MEDICARE

## 2025-03-17 DIAGNOSIS — R97.20 ELEVATED PROSTATE, SPECIFIC ANTIGEN [PSA]: ICD-10-CM

## 2025-03-17 DIAGNOSIS — N21.0 CALCULUS IN BLADDER: ICD-10-CM

## 2025-03-17 DIAGNOSIS — N40.0 BENIGN PROSTATIC HYPERPLASIA WITHOUT LOWER URINARY TRACT SYMPMS: ICD-10-CM

## 2025-03-17 DIAGNOSIS — N39.0 URINARY TRACT INFECTION, SITE NOT SPECIFIED: ICD-10-CM

## 2025-03-17 PROCEDURE — 99212 OFFICE O/P EST SF 10 MIN: CPT | Mod: 93

## 2025-03-17 PROCEDURE — C8001: CPT

## 2025-04-04 ENCOUNTER — APPOINTMENT (OUTPATIENT)
Dept: INTERNAL MEDICINE | Facility: CLINIC | Age: 80
End: 2025-04-04

## 2025-04-04 ENCOUNTER — NON-APPOINTMENT (OUTPATIENT)
Age: 80
End: 2025-04-04

## 2025-04-04 VITALS
DIASTOLIC BLOOD PRESSURE: 71 MMHG | WEIGHT: 167 LBS | HEART RATE: 82 BPM | RESPIRATION RATE: 16 BRPM | HEIGHT: 70 IN | SYSTOLIC BLOOD PRESSURE: 137 MMHG | TEMPERATURE: 97.7 F | OXYGEN SATURATION: 97 % | BODY MASS INDEX: 23.91 KG/M2

## 2025-04-04 DIAGNOSIS — I10 ESSENTIAL (PRIMARY) HYPERTENSION: ICD-10-CM

## 2025-04-04 DIAGNOSIS — E11.9 TYPE 2 DIABETES MELLITUS W/OUT COMPLICATIONS: ICD-10-CM

## 2025-04-04 DIAGNOSIS — Z85.819 PERSONAL HISTORY OF MALIGNANT NEOPLASM OF UNSPECIFIED SITE OF LIP, ORAL CAVITY, AND PHARYNX: ICD-10-CM

## 2025-04-04 PROCEDURE — 93000 ELECTROCARDIOGRAM COMPLETE: CPT

## 2025-04-04 PROCEDURE — 99213 OFFICE O/P EST LOW 20 MIN: CPT | Mod: 25

## 2025-04-07 LAB
ALBUMIN SERPL ELPH-MCNC: 4.2 G/DL
ALP BLD-CCNC: 126 U/L
ALT SERPL-CCNC: 23 U/L
ANION GAP SERPL CALC-SCNC: 11 MMOL/L
AST SERPL-CCNC: 19 U/L
BILIRUB SERPL-MCNC: 0.4 MG/DL
BUN SERPL-MCNC: 19 MG/DL
CALCIUM SERPL-MCNC: 9.5 MG/DL
CHLORIDE SERPL-SCNC: 105 MMOL/L
CHOLEST SERPL-MCNC: 143 MG/DL
CO2 SERPL-SCNC: 27 MMOL/L
CREAT SERPL-MCNC: 1.44 MG/DL
CREAT SPEC-SCNC: 267 MG/DL
EGFRCR SERPLBLD CKD-EPI 2021: 50 ML/MIN/1.73M2
ESTIMATED AVERAGE GLUCOSE: 171 MG/DL
GLUCOSE SERPL-MCNC: 170 MG/DL
HBA1C MFR BLD HPLC: 7.6 %
HCT VFR BLD CALC: 45.3 %
HDLC SERPL-MCNC: 56 MG/DL
HGB BLD-MCNC: 13.8 G/DL
LDLC SERPL-MCNC: 75 MG/DL
MCHC RBC-ENTMCNC: 27.3 PG
MCHC RBC-ENTMCNC: 30.5 G/DL
MCV RBC AUTO: 89.7 FL
MICROALBUMIN 24H UR DL<=1MG/L-MCNC: 4.6 MG/DL
MICROALBUMIN/CREAT 24H UR-RTO: 17 MG/G
NONHDLC SERPL-MCNC: 88 MG/DL
PLATELET # BLD AUTO: 259 K/UL
POTASSIUM SERPL-SCNC: 4.4 MMOL/L
PROT SERPL-MCNC: 7 G/DL
RBC # BLD: 5.05 M/UL
RBC # FLD: 14.6 %
SODIUM SERPL-SCNC: 142 MMOL/L
TRIGL SERPL-MCNC: 59 MG/DL
WBC # FLD AUTO: 4.68 K/UL

## 2025-04-08 ENCOUNTER — OUTPATIENT (OUTPATIENT)
Dept: OUTPATIENT SERVICES | Facility: HOSPITAL | Age: 80
LOS: 1 days | End: 2025-04-08

## 2025-04-08 ENCOUNTER — OUTPATIENT (OUTPATIENT)
Dept: OUTPATIENT SERVICES | Facility: HOSPITAL | Age: 80
LOS: 1 days | End: 2025-04-08
Payer: COMMERCIAL

## 2025-04-08 VITALS
HEIGHT: 70 IN | TEMPERATURE: 97 F | RESPIRATION RATE: 16 BRPM | HEART RATE: 87 BPM | SYSTOLIC BLOOD PRESSURE: 146 MMHG | DIASTOLIC BLOOD PRESSURE: 81 MMHG | OXYGEN SATURATION: 100 % | WEIGHT: 169.98 LBS

## 2025-04-08 DIAGNOSIS — R97.20 ELEVATED PROSTATE SPECIFIC ANTIGEN [PSA]: ICD-10-CM

## 2025-04-08 DIAGNOSIS — N40.0 BENIGN PROSTATIC HYPERPLASIA WITHOUT LOWER URINARY TRACT SYMPTOMS: ICD-10-CM

## 2025-04-08 DIAGNOSIS — Z00.8 ENCOUNTER FOR OTHER GENERAL EXAMINATION: ICD-10-CM

## 2025-04-08 DIAGNOSIS — Z93.1 GASTROSTOMY STATUS: Chronic | ICD-10-CM

## 2025-04-08 DIAGNOSIS — I10 ESSENTIAL (PRIMARY) HYPERTENSION: ICD-10-CM

## 2025-04-08 DIAGNOSIS — Z98.890 OTHER SPECIFIED POSTPROCEDURAL STATES: Chronic | ICD-10-CM

## 2025-04-08 DIAGNOSIS — E11.9 TYPE 2 DIABETES MELLITUS WITHOUT COMPLICATIONS: ICD-10-CM

## 2025-04-08 PROCEDURE — C8001: CPT

## 2025-04-08 NOTE — H&P PST ADULT - NSICDXPASTMEDICALHX_GEN_ALL_CORE_FT
PAST MEDICAL HISTORY:  Elevated prostate specific antigen (PSA)     Enlarged prostate     History of oropharyngeal cancer     Hypercholesterolemia     Hypertension     Stage 3 chronic kidney disease     Type 2 diabetes mellitus

## 2025-04-08 NOTE — H&P PST ADULT - PROBLEM SELECTOR PLAN 3
Patient instructed to hold Glimepiride the night before and morning of surgery. Pt verbalized understanding.

## 2025-04-08 NOTE — H&P PST ADULT - LAST ECHOCARDIOGRAM
2018-  Normal Left Ventricular Systolic Function,  (EF = 55 to 60%), Grade II diastolic dysfunction. Copy in sunrise.

## 2025-04-08 NOTE — H&P PST ADULT - PROBLEM SELECTOR PLAN 2
Patient instructed to take Olmesartan and Amlodipine  with a sip of water on the morning of procedure. Pt verbalized understanding.

## 2025-04-08 NOTE — H&P PST ADULT - PROBLEM SELECTOR PLAN 1
Patient is scheduled for transperineal fusion prostate biopsy on 4/15/2025. Preop teaching done, written and verbal instructions given, with teach back, pt able to verbalize understanding. Pt will take own Pantoprazole on the morning of procedure for GI prophylaxis.      Urine culture done at PST.  CBC, CMP, A1c in [facility]-   A1c 7.6 and Cr 1.44.     Anesthesia: s/p RT to neck- Hx oropharyngeal cancer. Scar tissue on right neck

## 2025-04-08 NOTE — H&P PST ADULT - ENMT COMMENTS
Hx oropharyngeal cancer, s/p RT and chemo. S/P PEG removed 2023 MALLAMPATI I Full ROM neck. Denies dentures. Denies loose teeth. Missing teeth noted.

## 2025-04-08 NOTE — H&P PST ADULT - HISTORY OF PRESENT ILLNESS
79 yr old male with PMH of HTN, HLD, T2DM, oropharyngeal cancer, s/p chemo and RT  presents to PST for preop evaluation. Pt had hx of enlarged prostate and elevated PSA level. Prostate biopsy was attempted in the office but he was unable to tolerated and wanted to consider it under anesthesia. MRI showed prostate lesion.  Pt denies any symptoms, Patient is scheduled for transperineal fusion prostate biopsy on 4/15/2025.

## 2025-04-08 NOTE — H&P PST ADULT - NSICDXPASTSURGICALHX_GEN_ALL_CORE_FT
PAST SURGICAL HISTORY:  PEG (percutaneous endoscopic gastrostomy) status     S/P hernia surgery

## 2025-04-09 ENCOUNTER — NON-APPOINTMENT (OUTPATIENT)
Age: 80
End: 2025-04-09

## 2025-04-09 LAB
CULTURE RESULTS: SIGNIFICANT CHANGE UP
SPECIMEN SOURCE: SIGNIFICANT CHANGE UP

## 2025-04-11 ENCOUNTER — NON-APPOINTMENT (OUTPATIENT)
Age: 80
End: 2025-04-11

## 2025-04-11 DIAGNOSIS — R97.20 ELEVATED PROSTATE, SPECIFIC ANTIGEN [PSA]: ICD-10-CM

## 2025-04-11 RX ORDER — TAMSULOSIN HYDROCHLORIDE 0.4 MG/1
0.4 CAPSULE ORAL
Qty: 90 | Refills: 2 | Status: ACTIVE | COMMUNITY
Start: 2025-04-11 | End: 1900-01-01

## 2025-04-15 ENCOUNTER — OUTPATIENT (OUTPATIENT)
Dept: OUTPATIENT SERVICES | Facility: HOSPITAL | Age: 80
LOS: 1 days | End: 2025-04-15
Payer: MEDICARE

## 2025-04-15 ENCOUNTER — TRANSCRIPTION ENCOUNTER (OUTPATIENT)
Age: 80
End: 2025-04-15

## 2025-04-15 ENCOUNTER — RESULT REVIEW (OUTPATIENT)
Age: 80
End: 2025-04-15

## 2025-04-15 ENCOUNTER — APPOINTMENT (OUTPATIENT)
Dept: UROLOGY | Facility: AMBULATORY SURGERY CENTER | Age: 80
End: 2025-04-15

## 2025-04-15 VITALS
HEART RATE: 85 BPM | SYSTOLIC BLOOD PRESSURE: 147 MMHG | OXYGEN SATURATION: 98 % | DIASTOLIC BLOOD PRESSURE: 91 MMHG | WEIGHT: 169.76 LBS | RESPIRATION RATE: 12 BRPM | HEIGHT: 70 IN | TEMPERATURE: 98 F

## 2025-04-15 VITALS — TEMPERATURE: 98 F

## 2025-04-15 DIAGNOSIS — Z98.890 OTHER SPECIFIED POSTPROCEDURAL STATES: Chronic | ICD-10-CM

## 2025-04-15 DIAGNOSIS — Z93.1 GASTROSTOMY STATUS: Chronic | ICD-10-CM

## 2025-04-15 DIAGNOSIS — R97.20 ELEVATED PROSTATE SPECIFIC ANTIGEN [PSA]: ICD-10-CM

## 2025-04-15 LAB — GLUCOSE BLDC GLUCOMTR-MCNC: 139 MG/DL — HIGH (ref 70–99)

## 2025-04-15 PROCEDURE — 88344 IMHCHEM/IMCYTCHM EA MLT ANTB: CPT | Mod: 26,59

## 2025-04-15 PROCEDURE — 76999F: CUSTOM | Mod: 26

## 2025-04-15 PROCEDURE — 55706 BX PRST8 NDL SAT SAMPLING: CPT

## 2025-04-15 PROCEDURE — 88341 IMHCHEM/IMCYTCHM EA ADD ANTB: CPT | Mod: 26

## 2025-04-15 PROCEDURE — G0416: CPT | Mod: 26

## 2025-04-15 PROCEDURE — 88342 IMHCHEM/IMCYTCHM 1ST ANTB: CPT | Mod: 26

## 2025-04-15 RX ORDER — OLMESARTAN MEDOXOMIL 5 MG/1
1 TABLET, FILM COATED ORAL
Refills: 0 | DISCHARGE

## 2025-04-15 RX ORDER — GLIMEPIRIDE 4 MG/1
1 TABLET ORAL
Refills: 0 | DISCHARGE

## 2025-04-15 RX ORDER — AMLODIPINE BESYLATE 10 MG/1
1 TABLET ORAL
Refills: 0 | DISCHARGE

## 2025-04-15 NOTE — ASU DISCHARGE PLAN (ADULT/PEDIATRIC) - ASU DC SPECIAL INSTRUCTIONSFT
Referral faxed to number below.    PAIN CONTROL: You may take Tylenol (acetaminophen) 650-975mg and/or Motrin (ibuprofen) 400-600mg, both available over the counter, for pain every 6 hours as needed. Do not exceed 4000mg of Tylenol (acetaminophen) daily. You may alternate these medications such that you take one or the other every 3 hours for around the clock pain coverage. Do not exceed 4 grams of Tylenol daily.   WOUND CARE: You have gauze covering the biopsy site, you may change this if it becomes saturated otherwise you may remove it in 24hours. .  BATHING: Please do not submerge your wound underwater for 72hours. You may shower.   ACTIVITY: No heavy lifting or straining. Otherwise, you may return to your usual level of physical activity.   DIET: Return to your usual diet.  NOTIFY YOUR SURGEON IF: You have any bleeding that does not stop, any pus draining from your wound, any fever (over 100.4 F), inability to urinate, or chills, persistent nausea/vomiting, persistent diarrhea, or if your pain is not controlled on your discharge pain medications.  FOLLOW-UP: Dr. Gottlieb's office will call you with follow-up information after your biopsy results.

## 2025-04-15 NOTE — ASU DISCHARGE PLAN (ADULT/PEDIATRIC) - CARE PROVIDER_API CALL
Shayan Gottlieb  Urology  [address] [place] [address], [address] [address]  Phone: [phone]  Fax: [phone]  Follow Up Time:

## 2025-04-15 NOTE — ASU DISCHARGE PLAN (ADULT/PEDIATRIC) - FINANCIAL ASSISTANCE
[facility] provides services at a reduced cost to those who are determined to be eligible through [facility]’s financial assistance program. Information regarding [facility]’s financial assistance program can be found by going to [email or web address] or by calling [phone].

## 2025-04-15 NOTE — ASU DISCHARGE PLAN (ADULT/PEDIATRIC) - NPI NUMBER (FOR SYSADMIN USE ONLY) :
[id number]]

## 2025-04-16 ENCOUNTER — NON-APPOINTMENT (OUTPATIENT)
Age: 80
End: 2025-04-16

## 2025-04-18 LAB — SURGICAL PATHOLOGY STUDY: SIGNIFICANT CHANGE UP

## 2025-04-21 NOTE — ED PROVIDER NOTE - HISTORY ATTESTATION, MLM
Treatment Plan Tracking    Treatment Plan not completed within required time limits due to:  Late due to scheduling .                      I have reviewed and confirmed nurses' notes...

## 2025-04-23 ENCOUNTER — APPOINTMENT (OUTPATIENT)
Dept: RADIATION ONCOLOGY | Facility: CLINIC | Age: 80
End: 2025-04-23
Payer: MEDICARE

## 2025-04-23 VITALS
DIASTOLIC BLOOD PRESSURE: 85 MMHG | RESPIRATION RATE: 16 BRPM | HEIGHT: 70 IN | OXYGEN SATURATION: 99 % | TEMPERATURE: 98.2 F | WEIGHT: 173 LBS | SYSTOLIC BLOOD PRESSURE: 138 MMHG | HEART RATE: 81 BPM | BODY MASS INDEX: 24.77 KG/M2

## 2025-04-23 PROBLEM — E11.9 TYPE 2 DIABETES MELLITUS WITHOUT COMPLICATIONS: Chronic | Status: ACTIVE | Noted: 2025-04-08

## 2025-04-23 PROCEDURE — 31575 DIAGNOSTIC LARYNGOSCOPY: CPT

## 2025-04-23 PROCEDURE — 99215 OFFICE O/P EST HI 40 MIN: CPT | Mod: 25

## 2025-04-23 RX ORDER — PANTOPRAZOLE 20 MG/1
20 TABLET, DELAYED RELEASE ORAL
Refills: 0 | Status: ACTIVE | COMMUNITY

## 2025-04-23 RX ORDER — VITAMIN B COMPLEX
TABLET ORAL
Refills: 0 | Status: ACTIVE | COMMUNITY

## 2025-04-29 PROBLEM — C61 PROSTATE CANCER: Status: ACTIVE | Noted: 2025-04-29

## 2025-05-02 ENCOUNTER — APPOINTMENT (OUTPATIENT)
Dept: UROLOGY | Facility: CLINIC | Age: 80
End: 2025-05-02
Payer: MEDICARE

## 2025-05-02 ENCOUNTER — NON-APPOINTMENT (OUTPATIENT)
Age: 80
End: 2025-05-02

## 2025-05-02 ENCOUNTER — APPOINTMENT (OUTPATIENT)
Dept: INTERNAL MEDICINE | Facility: CLINIC | Age: 80
End: 2025-05-02

## 2025-05-02 VITALS
OXYGEN SATURATION: 97 % | BODY MASS INDEX: 24.34 KG/M2 | TEMPERATURE: 97.9 F | HEIGHT: 70 IN | RESPIRATION RATE: 16 BRPM | WEIGHT: 170 LBS | SYSTOLIC BLOOD PRESSURE: 129 MMHG | HEART RATE: 87 BPM | DIASTOLIC BLOOD PRESSURE: 77 MMHG

## 2025-05-02 DIAGNOSIS — C61 MALIGNANT NEOPLASM OF PROSTATE: ICD-10-CM

## 2025-05-02 DIAGNOSIS — E11.9 TYPE 2 DIABETES MELLITUS W/OUT COMPLICATIONS: ICD-10-CM

## 2025-05-02 DIAGNOSIS — I10 ESSENTIAL (PRIMARY) HYPERTENSION: ICD-10-CM

## 2025-05-02 DIAGNOSIS — R97.20 ELEVATED PROSTATE, SPECIFIC ANTIGEN [PSA]: ICD-10-CM

## 2025-05-02 PROCEDURE — 99214 OFFICE O/P EST MOD 30 MIN: CPT

## 2025-05-02 PROCEDURE — G2211 COMPLEX E/M VISIT ADD ON: CPT

## 2025-05-13 ENCOUNTER — NON-APPOINTMENT (OUTPATIENT)
Age: 80
End: 2025-05-13

## 2025-05-15 ENCOUNTER — NON-APPOINTMENT (OUTPATIENT)
Age: 80
End: 2025-05-15

## 2025-05-21 ENCOUNTER — APPOINTMENT (OUTPATIENT)
Dept: RADIATION ONCOLOGY | Facility: CLINIC | Age: 80
End: 2025-05-21

## 2025-05-21 VITALS
RESPIRATION RATE: 16 BRPM | BODY MASS INDEX: 24.34 KG/M2 | HEIGHT: 70 IN | SYSTOLIC BLOOD PRESSURE: 123 MMHG | HEART RATE: 85 BPM | DIASTOLIC BLOOD PRESSURE: 89 MMHG | OXYGEN SATURATION: 99 % | WEIGHT: 170 LBS

## 2025-05-21 DIAGNOSIS — C61 MALIGNANT NEOPLASM OF PROSTATE: ICD-10-CM

## 2025-05-21 PROCEDURE — 99215 OFFICE O/P EST HI 40 MIN: CPT

## 2025-05-21 RX ORDER — AMOXICILLIN AND CLAVULANATE POTASSIUM 875; 125 MG/1; MG/1
875-125 TABLET, COATED ORAL
Qty: 6 | Refills: 0 | Status: ACTIVE | COMMUNITY
Start: 2025-05-21 | End: 1900-01-01

## 2025-05-23 ENCOUNTER — NON-APPOINTMENT (OUTPATIENT)
Age: 80
End: 2025-05-23

## 2025-05-30 ENCOUNTER — APPOINTMENT (OUTPATIENT)
Dept: INTERNAL MEDICINE | Facility: CLINIC | Age: 80
End: 2025-05-30

## 2025-05-30 VITALS
DIASTOLIC BLOOD PRESSURE: 81 MMHG | RESPIRATION RATE: 16 BRPM | SYSTOLIC BLOOD PRESSURE: 132 MMHG | WEIGHT: 171 LBS | TEMPERATURE: 97.3 F | HEIGHT: 70 IN | HEART RATE: 78 BPM | OXYGEN SATURATION: 98 % | BODY MASS INDEX: 24.48 KG/M2

## 2025-05-30 PROCEDURE — 99214 OFFICE O/P EST MOD 30 MIN: CPT

## 2025-06-09 ENCOUNTER — NON-APPOINTMENT (OUTPATIENT)
Age: 80
End: 2025-06-09

## 2025-06-10 ENCOUNTER — NON-APPOINTMENT (OUTPATIENT)
Age: 80
End: 2025-06-10

## 2025-06-11 ENCOUNTER — NON-APPOINTMENT (OUTPATIENT)
Age: 80
End: 2025-06-11

## 2025-06-17 ENCOUNTER — APPOINTMENT (OUTPATIENT)
Dept: OTOLARYNGOLOGY | Facility: CLINIC | Age: 80
End: 2025-06-17

## 2025-07-07 ENCOUNTER — NON-APPOINTMENT (OUTPATIENT)
Age: 80
End: 2025-07-07

## 2025-07-09 ENCOUNTER — NON-APPOINTMENT (OUTPATIENT)
Age: 80
End: 2025-07-09

## 2025-07-10 ENCOUNTER — NON-APPOINTMENT (OUTPATIENT)
Age: 80
End: 2025-07-10

## 2025-07-15 ENCOUNTER — NON-APPOINTMENT (OUTPATIENT)
Age: 80
End: 2025-07-15

## 2025-07-16 ENCOUNTER — NON-APPOINTMENT (OUTPATIENT)
Age: 80
End: 2025-07-16

## 2025-07-22 ENCOUNTER — NON-APPOINTMENT (OUTPATIENT)
Age: 80
End: 2025-07-22

## 2025-07-29 ENCOUNTER — APPOINTMENT (OUTPATIENT)
Dept: OTOLARYNGOLOGY | Facility: CLINIC | Age: 80
End: 2025-07-29
Payer: MEDICARE

## 2025-07-29 VITALS
DIASTOLIC BLOOD PRESSURE: 80 MMHG | HEART RATE: 88 BPM | WEIGHT: 170 LBS | OXYGEN SATURATION: 98 % | SYSTOLIC BLOOD PRESSURE: 125 MMHG | HEIGHT: 70 IN | BODY MASS INDEX: 24.34 KG/M2

## 2025-07-29 DIAGNOSIS — C10.9 MALIGNANT NEOPLASM OF OROPHARYNX, UNSPECIFIED: ICD-10-CM

## 2025-07-29 PROCEDURE — 31575 DIAGNOSTIC LARYNGOSCOPY: CPT

## 2025-07-29 PROCEDURE — 99212 OFFICE O/P EST SF 10 MIN: CPT | Mod: 25

## 2025-07-29 RX ORDER — GLIMEPIRIDE 1 MG/1
1 TABLET ORAL DAILY
Qty: 30 | Refills: 0 | Status: ACTIVE | COMMUNITY
Start: 2025-07-29 | End: 1900-01-01

## 2025-07-31 ENCOUNTER — NON-APPOINTMENT (OUTPATIENT)
Age: 80
End: 2025-07-31

## 2025-08-06 ENCOUNTER — NON-APPOINTMENT (OUTPATIENT)
Age: 80
End: 2025-08-06

## 2025-08-13 ENCOUNTER — NON-APPOINTMENT (OUTPATIENT)
Age: 80
End: 2025-08-13

## 2025-08-20 ENCOUNTER — NON-APPOINTMENT (OUTPATIENT)
Age: 80
End: 2025-08-20

## 2025-08-25 ENCOUNTER — LABORATORY RESULT (OUTPATIENT)
Age: 80
End: 2025-08-25

## 2025-08-25 ENCOUNTER — APPOINTMENT (OUTPATIENT)
Dept: INTERNAL MEDICINE | Facility: CLINIC | Age: 80
End: 2025-08-25
Payer: MEDICARE

## 2025-08-25 VITALS
RESPIRATION RATE: 16 BRPM | TEMPERATURE: 98.4 F | BODY MASS INDEX: 23.34 KG/M2 | SYSTOLIC BLOOD PRESSURE: 114 MMHG | DIASTOLIC BLOOD PRESSURE: 77 MMHG | HEART RATE: 91 BPM | OXYGEN SATURATION: 98 % | HEIGHT: 70 IN | WEIGHT: 163 LBS

## 2025-08-25 DIAGNOSIS — E78.5 HYPERLIPIDEMIA, UNSPECIFIED: ICD-10-CM

## 2025-08-25 DIAGNOSIS — N18.30 CHRONIC KIDNEY DISEASE, STAGE 3 UNSPECIFIED: ICD-10-CM

## 2025-08-25 DIAGNOSIS — E11.9 TYPE 2 DIABETES MELLITUS W/OUT COMPLICATIONS: ICD-10-CM

## 2025-08-25 DIAGNOSIS — I10 ESSENTIAL (PRIMARY) HYPERTENSION: ICD-10-CM

## 2025-08-25 PROCEDURE — G2211 COMPLEX E/M VISIT ADD ON: CPT

## 2025-08-25 PROCEDURE — 99214 OFFICE O/P EST MOD 30 MIN: CPT

## 2025-08-26 ENCOUNTER — EMERGENCY (EMERGENCY)
Facility: HOSPITAL | Age: 80
LOS: 1 days | End: 2025-08-26
Attending: STUDENT IN AN ORGANIZED HEALTH CARE EDUCATION/TRAINING PROGRAM
Payer: MEDICARE

## 2025-08-26 VITALS
HEART RATE: 88 BPM | TEMPERATURE: 98 F | DIASTOLIC BLOOD PRESSURE: 87 MMHG | SYSTOLIC BLOOD PRESSURE: 147 MMHG | OXYGEN SATURATION: 99 % | RESPIRATION RATE: 19 BRPM

## 2025-08-26 VITALS
HEART RATE: 90 BPM | SYSTOLIC BLOOD PRESSURE: 149 MMHG | DIASTOLIC BLOOD PRESSURE: 84 MMHG | TEMPERATURE: 98 F | OXYGEN SATURATION: 98 % | WEIGHT: 162.04 LBS | RESPIRATION RATE: 18 BRPM | HEIGHT: 70 IN

## 2025-08-26 DIAGNOSIS — Z98.890 OTHER SPECIFIED POSTPROCEDURAL STATES: Chronic | ICD-10-CM

## 2025-08-26 DIAGNOSIS — Z93.1 GASTROSTOMY STATUS: Chronic | ICD-10-CM

## 2025-08-26 LAB
ALBUMIN SERPL ELPH-MCNC: 3.1 G/DL — LOW (ref 3.5–5)
ALBUMIN SERPL ELPH-MCNC: 3.1 G/DL — LOW (ref 3.5–5)
ALBUMIN SERPL ELPH-MCNC: 3.8 G/DL
ALBUMIN, RANDOM URINE: 20 MG/DL
ALP BLD-CCNC: 117 U/L
ALP SERPL-CCNC: 102 U/L — SIGNIFICANT CHANGE UP (ref 40–120)
ALP SERPL-CCNC: 98 U/L — SIGNIFICANT CHANGE UP (ref 40–120)
ALT FLD-CCNC: 21 U/L DA — SIGNIFICANT CHANGE UP (ref 10–60)
ALT FLD-CCNC: 24 U/L DA — SIGNIFICANT CHANGE UP (ref 10–60)
ALT SERPL-CCNC: 23 U/L
ANION GAP SERPL CALC-SCNC: 18 MMOL/L
ANION GAP SERPL CALC-SCNC: 2 MMOL/L — LOW (ref 5–17)
ANION GAP SERPL CALC-SCNC: 3 MMOL/L — LOW (ref 5–17)
AST SERPL-CCNC: 18 U/L — SIGNIFICANT CHANGE UP (ref 10–40)
AST SERPL-CCNC: 30 U/L
AST SERPL-CCNC: 47 U/L — HIGH (ref 10–40)
BASOPHILS # BLD AUTO: 0.02 K/UL — SIGNIFICANT CHANGE UP (ref 0–0.2)
BASOPHILS # BLD AUTO: 0.03 K/UL
BASOPHILS NFR BLD AUTO: 0.4 % — SIGNIFICANT CHANGE UP (ref 0–2)
BASOPHILS NFR BLD AUTO: 0.7 %
BILIRUB SERPL-MCNC: 0.6 MG/DL
BILIRUB SERPL-MCNC: 0.6 MG/DL — SIGNIFICANT CHANGE UP (ref 0.2–1.2)
BILIRUB SERPL-MCNC: 0.7 MG/DL — SIGNIFICANT CHANGE UP (ref 0.2–1.2)
BUN SERPL-MCNC: 17 MG/DL — SIGNIFICANT CHANGE UP (ref 7–18)
BUN SERPL-MCNC: 18 MG/DL — SIGNIFICANT CHANGE UP (ref 7–18)
BUN SERPL-MCNC: 20 MG/DL
CALCIUM SERPL-MCNC: 8.3 MG/DL — LOW (ref 8.4–10.5)
CALCIUM SERPL-MCNC: 8.5 MG/DL — SIGNIFICANT CHANGE UP (ref 8.4–10.5)
CALCIUM SERPL-MCNC: 9.8 MG/DL
CHLORIDE SERPL-SCNC: 102 MMOL/L
CHLORIDE SERPL-SCNC: 103 MMOL/L — SIGNIFICANT CHANGE UP (ref 96–108)
CHLORIDE SERPL-SCNC: 105 MMOL/L — SIGNIFICANT CHANGE UP (ref 96–108)
CHOLEST SERPL-MCNC: 142 MG/DL
CO2 SERPL-SCNC: 20 MMOL/L
CO2 SERPL-SCNC: 24 MMOL/L — SIGNIFICANT CHANGE UP (ref 22–31)
CO2 SERPL-SCNC: 28 MMOL/L — SIGNIFICANT CHANGE UP (ref 22–31)
CREAT SERPL-MCNC: 1.44 MG/DL
CREAT SERPL-MCNC: 1.49 MG/DL — HIGH (ref 0.5–1.3)
CREAT SERPL-MCNC: 1.56 MG/DL — HIGH (ref 0.5–1.3)
CREAT SPEC-SCNC: 337 MG/DL
EGFR: 45 ML/MIN/1.73M2 — LOW
EGFR: 45 ML/MIN/1.73M2 — LOW
EGFR: 48 ML/MIN/1.73M2 — LOW
EGFR: 48 ML/MIN/1.73M2 — LOW
EGFRCR SERPLBLD CKD-EPI 2021: 50 ML/MIN/1.73M2
EOSINOPHIL # BLD AUTO: 0.04 K/UL — SIGNIFICANT CHANGE UP (ref 0–0.5)
EOSINOPHIL # BLD AUTO: 0.23 K/UL
EOSINOPHIL NFR BLD AUTO: 0.9 % — SIGNIFICANT CHANGE UP (ref 0–6)
EOSINOPHIL NFR BLD AUTO: 5.4 %
ESTIMATED AVERAGE GLUCOSE: 169 MG/DL
GLUCOSE SERPL-MCNC: 141 MG/DL — HIGH (ref 70–99)
GLUCOSE SERPL-MCNC: 142 MG/DL — HIGH (ref 70–99)
GLUCOSE SERPL-MCNC: 146 MG/DL
HBA1C MFR BLD HPLC: 7.5 %
HCT VFR BLD CALC: 36.4 % — LOW (ref 39–50)
HCT VFR BLD CALC: 40.5 %
HDLC SERPL-MCNC: 48 MG/DL
HGB BLD-MCNC: 11.7 G/DL — LOW (ref 13–17)
HGB BLD-MCNC: 12.2 G/DL
IMM GRANULOCYTES NFR BLD AUTO: 0.6 % — SIGNIFICANT CHANGE UP (ref 0–0.9)
IMM GRANULOCYTES NFR BLD AUTO: 0.7 %
LDLC SERPL-MCNC: 79 MG/DL
LIDOCAIN IGE QN: 9 U/L — LOW (ref 13–75)
LYMPHOCYTES # BLD AUTO: 0.25 K/UL — LOW (ref 1–3.3)
LYMPHOCYTES # BLD AUTO: 0.59 K/UL
LYMPHOCYTES # BLD AUTO: 5.4 % — LOW (ref 13–44)
LYMPHOCYTES NFR BLD AUTO: 13.9 %
MAGNESIUM SERPL-MCNC: 1.9 MG/DL — SIGNIFICANT CHANGE UP (ref 1.6–2.6)
MAN DIFF?: NORMAL
MCHC RBC-ENTMCNC: 26.8 PG
MCHC RBC-ENTMCNC: 27.5 PG — SIGNIFICANT CHANGE UP (ref 27–34)
MCHC RBC-ENTMCNC: 30.1 G/DL
MCHC RBC-ENTMCNC: 32.1 G/DL — SIGNIFICANT CHANGE UP (ref 32–36)
MCV RBC AUTO: 85.6 FL — SIGNIFICANT CHANGE UP (ref 80–100)
MCV RBC AUTO: 89 FL
MICROALBUMIN/CREAT 24H UR-RTO: 59 MG/G
MONOCYTES # BLD AUTO: 0.34 K/UL — SIGNIFICANT CHANGE UP (ref 0–0.9)
MONOCYTES # BLD AUTO: 0.38 K/UL
MONOCYTES NFR BLD AUTO: 7.3 % — SIGNIFICANT CHANGE UP (ref 2–14)
MONOCYTES NFR BLD AUTO: 8.9 %
NEUTROPHILS # BLD AUTO: 2.99 K/UL
NEUTROPHILS # BLD AUTO: 3.95 K/UL — SIGNIFICANT CHANGE UP (ref 1.8–7.4)
NEUTROPHILS NFR BLD AUTO: 70.4 %
NEUTROPHILS NFR BLD AUTO: 85.4 % — HIGH (ref 43–77)
NONHDLC SERPL-MCNC: 94 MG/DL
NRBC BLD AUTO-RTO: 0 /100 WBCS — SIGNIFICANT CHANGE UP (ref 0–0)
PHOSPHATE SERPL-MCNC: 2.9 MG/DL — SIGNIFICANT CHANGE UP (ref 2.5–4.5)
PLATELET # BLD AUTO: 172 K/UL — SIGNIFICANT CHANGE UP (ref 150–400)
PLATELET # BLD AUTO: 193 K/UL
POTASSIUM SERPL-MCNC: 4.2 MMOL/L — SIGNIFICANT CHANGE UP (ref 3.5–5.3)
POTASSIUM SERPL-MCNC: 5.9 MMOL/L — HIGH (ref 3.5–5.3)
POTASSIUM SERPL-SCNC: 4.2 MMOL/L — SIGNIFICANT CHANGE UP (ref 3.5–5.3)
POTASSIUM SERPL-SCNC: 4.9 MMOL/L
POTASSIUM SERPL-SCNC: 5.9 MMOL/L — HIGH (ref 3.5–5.3)
PROT SERPL-MCNC: 6.7 G/DL
PROT SERPL-MCNC: 6.7 G/DL — SIGNIFICANT CHANGE UP (ref 6–8.3)
PROT SERPL-MCNC: 7.3 G/DL — SIGNIFICANT CHANGE UP (ref 6–8.3)
RBC # BLD: 4.25 M/UL — SIGNIFICANT CHANGE UP (ref 4.2–5.8)
RBC # BLD: 4.55 M/UL
RBC # FLD: 14.1 % — SIGNIFICANT CHANGE UP (ref 10.3–14.5)
RBC # FLD: 15.1 %
SODIUM SERPL-SCNC: 131 MMOL/L — LOW (ref 135–145)
SODIUM SERPL-SCNC: 134 MMOL/L — LOW (ref 135–145)
SODIUM SERPL-SCNC: 140 MMOL/L
TRIGL SERPL-MCNC: 77 MG/DL
TROPONIN I, HIGH SENSITIVITY RESULT: 9.3 NG/L — SIGNIFICANT CHANGE UP
TSH SERPL-ACNC: 11.7 UIU/ML
WBC # BLD: 4.63 K/UL — SIGNIFICANT CHANGE UP (ref 3.8–10.5)
WBC # FLD AUTO: 4.25 K/UL
WBC # FLD AUTO: 4.63 K/UL — SIGNIFICANT CHANGE UP (ref 3.8–10.5)

## 2025-08-26 PROCEDURE — 74177 CT ABD & PELVIS W/CONTRAST: CPT

## 2025-08-26 PROCEDURE — 84100 ASSAY OF PHOSPHORUS: CPT

## 2025-08-26 PROCEDURE — 96375 TX/PRO/DX INJ NEW DRUG ADDON: CPT

## 2025-08-26 PROCEDURE — 83690 ASSAY OF LIPASE: CPT

## 2025-08-26 PROCEDURE — 74177 CT ABD & PELVIS W/CONTRAST: CPT | Mod: 26

## 2025-08-26 PROCEDURE — 36415 COLL VENOUS BLD VENIPUNCTURE: CPT

## 2025-08-26 PROCEDURE — 83735 ASSAY OF MAGNESIUM: CPT

## 2025-08-26 PROCEDURE — 99284 EMERGENCY DEPT VISIT MOD MDM: CPT | Mod: 25

## 2025-08-26 PROCEDURE — 96374 THER/PROPH/DIAG INJ IV PUSH: CPT | Mod: XU

## 2025-08-26 PROCEDURE — 99285 EMERGENCY DEPT VISIT HI MDM: CPT

## 2025-08-26 PROCEDURE — 84484 ASSAY OF TROPONIN QUANT: CPT

## 2025-08-26 PROCEDURE — 85025 COMPLETE CBC W/AUTO DIFF WBC: CPT

## 2025-08-26 PROCEDURE — 80053 COMPREHEN METABOLIC PANEL: CPT

## 2025-08-26 RX ORDER — KETOROLAC TROMETHAMINE 30 MG/ML
15 INJECTION, SOLUTION INTRAMUSCULAR; INTRAVENOUS ONCE
Refills: 0 | Status: DISCONTINUED | OUTPATIENT
Start: 2025-08-26 | End: 2025-08-26

## 2025-08-26 RX ORDER — OXYCODONE HYDROCHLORIDE AND ACETAMINOPHEN 10; 325 MG/1; MG/1
1 TABLET ORAL ONCE
Refills: 0 | Status: DISCONTINUED | OUTPATIENT
Start: 2025-08-26 | End: 2025-08-26

## 2025-08-26 RX ORDER — MAGNESIUM, ALUMINUM HYDROXIDE 200-200 MG
30 TABLET,CHEWABLE ORAL ONCE
Refills: 0 | Status: COMPLETED | OUTPATIENT
Start: 2025-08-26 | End: 2025-08-26

## 2025-08-26 RX ORDER — OXYCODONE HYDROCHLORIDE AND ACETAMINOPHEN 10; 325 MG/1; MG/1
1 TABLET ORAL
Qty: 12 | Refills: 0
Start: 2025-08-26 | End: 2025-08-28

## 2025-08-26 RX ORDER — ACETAMINOPHEN 500 MG/5ML
1000 LIQUID (ML) ORAL ONCE
Refills: 0 | Status: COMPLETED | OUTPATIENT
Start: 2025-08-26 | End: 2025-08-26

## 2025-08-26 RX ADMIN — KETOROLAC TROMETHAMINE 15 MILLIGRAM(S): 30 INJECTION, SOLUTION INTRAMUSCULAR; INTRAVENOUS at 21:59

## 2025-08-26 RX ADMIN — Medication 20 MILLIGRAM(S): at 17:31

## 2025-08-26 RX ADMIN — Medication 30 MILLILITER(S): at 17:32

## 2025-08-26 RX ADMIN — Medication 400 MILLIGRAM(S): at 17:31

## 2025-08-27 ENCOUNTER — NON-APPOINTMENT (OUTPATIENT)
Age: 80
End: 2025-08-27

## 2025-09-02 ENCOUNTER — NON-APPOINTMENT (OUTPATIENT)
Age: 80
End: 2025-09-02

## 2025-09-04 ENCOUNTER — APPOINTMENT (OUTPATIENT)
Dept: UROLOGY | Facility: CLINIC | Age: 80
End: 2025-09-04

## (undated) DEVICE — PACK IV START WITH CHG

## (undated) DEVICE — GLV 7.5 PROTEXIS (WHITE)

## (undated) DEVICE — VENODYNE/SCD SLEEVE CALF MEDIUM

## (undated) DEVICE — POSITIONER FOAM EGG CRATE ULNAR 2PCS (PINK)

## (undated) DEVICE — CATH IV SAFE BC 22G X 1" (BLUE)

## (undated) DEVICE — CLAMP BX HOT RAD JAW 3

## (undated) DEVICE — DRAPE LIGHT HANDLE COVER (BLUE)

## (undated) DEVICE — BIOPSY FORCEP COLD DISP

## (undated) DEVICE — DRAPE LAPAROTOMY W POUCHES

## (undated) DEVICE — GRID BRACHYTHERAPY EZ 18G

## (undated) DEVICE — BIOPSY FORCEP RADIAL JAW 4 STANDARD WITH NEEDLE

## (undated) DEVICE — Device

## (undated) DEVICE — BASIN EMESIS 10IN GRADUATED MAUVE

## (undated) DEVICE — DRSG BANDAID 0.75X3"

## (undated) DEVICE — SUT SURGIPRO 2-0 30" GS-22

## (undated) DEVICE — TUBING CANNULA SALTER LABS NASAL ADULT 7FT

## (undated) DEVICE — CONTAINER FORMALIN 80ML YELLOW

## (undated) DEVICE — ELCTR GROUNDING PAD ADULT COVIDIEN

## (undated) DEVICE — SUT POLYSORB 3-0 30" V-20 UNDYED

## (undated) DEVICE — SUT POLYSORB 2-0 30" V-20 UNDYED

## (undated) DEVICE — ELCTR ECG CONDUCTIVE ADHESIVE

## (undated) DEVICE — UNDERPAD LINEN SAVER 17 X 24"

## (undated) DEVICE — SOL INJ NS 0.9% 500ML 2 PORT

## (undated) DEVICE — SUT POLYSORB 4-0 27" P-12 UNDYED

## (undated) DEVICE — DENTURE CUP PINK

## (undated) DEVICE — BASIN SET DOUBLE

## (undated) DEVICE — DRSG CURITY GAUZE SPONGE 4 X 4" 12-PLY NON-STERILE

## (undated) DEVICE — DRSG CURITY GAUZE SPONGE 4 X 4" 12-PLY

## (undated) DEVICE — DRAIN PENROSE 5/8" X 18" LATEX

## (undated) DEVICE — DRSG TELFA 3 X 8

## (undated) DEVICE — LUBRICATING JELLY HR ONE SHOT 3G

## (undated) DEVICE — DRSG TEGADERM 4X4.75"

## (undated) DEVICE — SALIVA EJECTOR (BLUE)

## (undated) DEVICE — WARMING BLANKET UPPER ADULT

## (undated) DEVICE — SUT POLYSORB 2-0 18" TIES UNDYED

## (undated) DEVICE — PREP CHLORAPREP HI-LITE ORANGE 26ML

## (undated) DEVICE — DRSG 2X2

## (undated) DEVICE — CATH IV SAFE BC 20G X 1.16" (PINK)

## (undated) DEVICE — GLV 7.5 PROTEXIS (BLUE)

## (undated) DEVICE — GOWN LG

## (undated) DEVICE — TUBING SUCTION NONCONDUCTIVE 6MM X 12FT

## (undated) DEVICE — BALLOON ENDOCAVITY 2X14CM

## (undated) DEVICE — MASK OXYGEN PANORAMIC

## (undated) DEVICE — PREP CHLORAPREP HI-LITE ORANGE 3ML

## (undated) DEVICE — NDL HYPO SAFE 25G X 1.5" (ORANGE)

## (undated) DEVICE — TUBING SUCTION 20FT

## (undated) DEVICE — BALLOON US ENDO

## (undated) DEVICE — SYR LUER LOK 20CC

## (undated) DEVICE — NDL MAX CORE 18G X 25CM

## (undated) DEVICE — FOR-ESU VALLEYLAB T7E14996DX: Type: DURABLE MEDICAL EQUIPMENT

## (undated) DEVICE — FOLEY HOLDER STATLOCK 2 WAY ADULT

## (undated) DEVICE — STERIS DEFENDO 3-PIECE KIT (AIR/WATER, SUCTION & BIOPSY VALVES)

## (undated) DEVICE — BITE BLOCK ADULT 20 X 27MM (GREEN)

## (undated) DEVICE — SPONGE DISSECTOR PEANUT

## (undated) DEVICE — TUBING IV SET GRAVITY 3Y 100" MACRO

## (undated) DEVICE — TUBING MEDI-VAC W MAXIGRIP CONNECTORS 1/4"X6'

## (undated) DEVICE — TUBING SUCTION CONN 6FT STERILE

## (undated) DEVICE — DRSG MASTISOL

## (undated) DEVICE — SOL IRR POUR NS 0.9% 1500ML

## (undated) DEVICE — LINE BREATHE SAMPLNG

## (undated) DEVICE — SUCTION YANKAUER NO CONTROL VENT

## (undated) DEVICE — KIT ENDO PROCEDURE CUSTOM

## (undated) DEVICE — GLV 7 PROTEXIS (WHITE)

## (undated) DEVICE — SYR ALLIANCE II INFLATION 60ML

## (undated) DEVICE — SOL INJ NS 0.9% 500ML 1-PORT

## (undated) DEVICE — NEOGUARD ENDOCAVITY PROBE COVER 1 X 11.8"

## (undated) DEVICE — CATH ANGIOCATH 14G

## (undated) DEVICE — NDL SPINAL 20G X 6" QUINCKE

## (undated) DEVICE — PACK MINOR NO DRAPE